# Patient Record
Sex: FEMALE | Race: WHITE | NOT HISPANIC OR LATINO | Employment: OTHER | URBAN - METROPOLITAN AREA
[De-identification: names, ages, dates, MRNs, and addresses within clinical notes are randomized per-mention and may not be internally consistent; named-entity substitution may affect disease eponyms.]

---

## 2017-01-09 ENCOUNTER — OFFICE VISIT (OUTPATIENT)
Dept: URGENT CARE | Facility: PHYSICIAN GROUP | Age: 82
End: 2017-01-09
Payer: MEDICARE

## 2017-01-09 VITALS
WEIGHT: 130 LBS | HEART RATE: 74 BPM | DIASTOLIC BLOOD PRESSURE: 86 MMHG | SYSTOLIC BLOOD PRESSURE: 118 MMHG | TEMPERATURE: 98.4 F | OXYGEN SATURATION: 95 % | HEIGHT: 62 IN | BODY MASS INDEX: 23.92 KG/M2

## 2017-01-09 DIAGNOSIS — J06.9 ACUTE UPPER RESPIRATORY INFECTION: ICD-10-CM

## 2017-01-09 PROCEDURE — 99214 OFFICE O/P EST MOD 30 MIN: CPT | Performed by: FAMILY MEDICINE

## 2017-01-09 RX ORDER — DOXYCYCLINE HYCLATE 100 MG
TABLET ORAL
Qty: 20 TAB | Refills: 0 | Status: SHIPPED | OUTPATIENT
Start: 2017-01-09 | End: 2017-02-16

## 2017-01-10 ENCOUNTER — SLEEP CENTER VISIT (OUTPATIENT)
Dept: SLEEP MEDICINE | Facility: MEDICAL CENTER | Age: 82
End: 2017-01-10
Payer: MEDICARE

## 2017-01-10 VITALS
WEIGHT: 132 LBS | RESPIRATION RATE: 15 BRPM | DIASTOLIC BLOOD PRESSURE: 60 MMHG | TEMPERATURE: 97.5 F | HEIGHT: 62 IN | BODY MASS INDEX: 24.29 KG/M2 | SYSTOLIC BLOOD PRESSURE: 130 MMHG | HEART RATE: 72 BPM

## 2017-01-10 DIAGNOSIS — F51.04 CHRONIC INSOMNIA: ICD-10-CM

## 2017-01-10 DIAGNOSIS — G47.33 OSA TREATED WITH BIPAP: ICD-10-CM

## 2017-01-10 DIAGNOSIS — I47.10 SVT (SUPRAVENTRICULAR TACHYCARDIA): ICD-10-CM

## 2017-01-10 PROCEDURE — 99213 OFFICE O/P EST LOW 20 MIN: CPT | Performed by: NURSE PRACTITIONER

## 2017-01-10 NOTE — PROGRESS NOTES
Chief Complaint:    Chief Complaint   Patient presents with   • Sinus Problem     sinus pressure, headache, cough       History of Present Illness:     present. This is a new problem. Has headaches, pressure in sinuses, and nasal symptoms. Some sore throat. Mild cough. No fever.  was seen here 1/3/17 and reports his symptoms started just like her symptoms and he was rx'd Doxcycline and feels it really helped. He is still on med. Patient is concerned due to her underlying sleep apnea and does not want her symptoms to get as bad as her  got.       Review of Systems:    Constitutional: Negative for fever, chills, and diaphoresis.   Eyes: Negative for change in vision, photophobia, pain, redness, and discharge.  ENT: See HPI.    Respiratory: See HPI.    Cardiovascular: Negative for chest pain, palpitations, orthopnea, claudication, leg swelling, and PND.   Gastrointestinal: Negative for abdominal pain, nausea, vomiting, diarrhea, constipation, blood in stool, and melena.   Genitourinary: Negative for dysuria, urinary urgency, urinary frequency, hematuria, and flank pain.   Musculoskeletal: Negative for myalgias, joint pain, neck pain, and back pain.   Skin: Negative for rash and itching.   Neurological: See HPI.    Endo: Negative for polydipsia.   Heme: Does not bruise/bleed easily.   Psychiatric/Behavioral: Negative for depression, suicidal ideas, hallucinations, memory loss and substance abuse. The patient is not nervous/anxious and does not have insomnia.      Past Medical History:    Past Medical History   Diagnosis Date   • GERD (gastroesophageal reflux disease)    • OSTEOPOROSIS    • Vitamin D deficiency disease    • Insomnia    • Blepharospasm syndrome    • Female bladder prolapse, acquired    • Hypertension    • Constipation    • Sleep apnea    • Hyperlipidemia    • Edema 5/29/2012   • Palpitations 5/29/2012   • Varicose veins 5/29/2012   • Atherosclerosis of native arteries of the  extremities with intermittent claudication 10/16/2013   • Bright red rectal bleeding 6/24/2015   • Chest pain 07/2015     Unspecified; Nuclear stress test negative    • AAA (abdominal aortic aneurysm) (HCC)        Past Surgical History:    Past Surgical History   Procedure Laterality Date   • Abdominal hysterectomy total     • Injection sclero hemorroids     • Hemorrhoidectomy     • Hysterectomy, total abdominal     • Arthroscopy, knee     • Tonsillectomy     • Pr knee scope,diagnostic  2003;2009   • Other orthopedic surgery       knee scope x 2   • Cystocele repair  1/17/2011     Performed by GILMAR CHUNG at [facility]   • Rectocele repair  1/17/2011     Performed by GILMAR CHUNG at [facility]   • Bladder suspension  1/17/2011     Performed by GILMAR CHUNG at [facility]   • Cystoscopy  1/17/2011     Performed by GILMAR CHUNG at [facility]   • Cataract extraction with iol         Social History:    Social History     Social History   • Marital Status:      Spouse Name: N/A   • Number of Children: N/A   • Years of Education: N/A     Occupational History   • Not on file.     Social History Main Topics   • Smoking status: Never Smoker    • Smokeless tobacco: Not on file   • Alcohol Use: 0.0 oz/week     0 Standard drinks or equivalent per week      Comment: occasionally   • Drug Use: No   • Sexual Activity: No     Other Topics Concern   • Not on file     Social History Narrative       Family History:    Family History   Problem Relation Age of Onset   • Non-contributory Mother      gall bladder surgery   • Cancer Father      colon rectal   • Heart Disease Brother      sleep apnea   • Heart Disease Brother        Medications:    Current Outpatient Prescriptions on File Prior to Visit   Medication Sig Dispense Refill   • rosuvastatin (CRESTOR) 10 MG Tab Take 1 Tab by mouth every evening. 90 Tab 3   • metoprolol  "(LOPRESSOR) 25 MG Tab TAKE ONE TABLET BY MOUTH TWICE DAILY 180 Tab 3   • atorvastatin (LIPITOR) 10 MG Tab Take 1 Tab by mouth every day. 90 Tab 3   • PREMARIN 0.625 MG/GM Cream INSERT 1 GRAM VAGINALLY 3 TIMES WEEKLY 1 Tube 0   • mometasone (NASONEX) 50 MCG/ACT nasal spray Spray 2 Sprays in nose every day. 1 Inhaler 5   • Lansoprazole (PREVACID PO) Take  by mouth.     • Multiple Vitamins-Minerals (CENTRUM SILVER PO) Take 1 Tab by mouth every day.     • omeprazole (PRILOSEC) 20 MG CPDR Take 20 mg by mouth every day.       No current facility-administered medications on file prior to visit.       Allergies:    Allergies   Allergen Reactions   • Vicodin [Hydrocodone-Acetaminophen] Rash   • Vytorin          Vitals:    Filed Vitals:    01/09/17 1558   BP: 118/86   Pulse: 74   Temp: 36.9 °C (98.4 °F)   Height: 1.575 m (5' 2\")   Weight: 58.968 kg (130 lb)   SpO2: 95%       Physical Exam:    Constitutional: Vital signs reviewed. Appears well-developed and well-nourished. No acute distress.   Eyes: Sclera white, conjunctivae clear.   ENT: Bilateral nasal congestion and erythematous nasal mucosa. External ears normal. External auditory canals normal without discharge. TMs translucent and non-bulging. Hearing normal. Lips/teeth are normal. Oral mucosa pink and moist. Posterior pharynx: WNL.  Neck: Neck supple.   Cardiovascular: Regular rate and rhythm. No murmur.  Pulmonary/Chest: Respirations non-labored. Clear to auscultation bilaterally.  Lymph: Cervical nodes without tenderness or enlargement.  Musculoskeletal: Normal gait. Normal range of motion. No muscular atrophy or weakness.  Neurological: Alert and oriented to person, place, and time. Muscle tone normal. Coordination normal.   Skin: No rashes or lesions. Warm, dry, normal turgor.  Psychiatric: Normal mood and affect. Behavior is normal. Judgment and thought content normal.       Assessment / Plan:    1. Acute upper respiratory infection  - doxycycline (VIBRAMYCIN) 100 " MG Tab; 1 TAB TWICE A DAY X 10 DAYS.  Dispense: 20 Tab; Refill: 0      Discussed with them DDX and management options.    They feel she has same as , and  was rx'd Doxycycline on 1/3/17 with improvement. Agreeable to same.    Agreeable to medication prescribed.    Follow-up with PCP or urgent care if getting worse or not better with above.

## 2017-01-10 NOTE — PROGRESS NOTES
Chief Complaint   Patient presents with   • Follow-Up     Overdue August Visit          HPI: This patient is a 84 y.o. female, who presents for 6 month follow-up of RUY. Her  accompanies her today. PSG indicates an AHI of 48.3, minimum saturation 81%. Patient was initially tried on auto CPAP but had elevated AHI and continued fatigue. She underwent titration study and was successfully titrated to BiPAP 19/15 cm H2O. She was unable to tolerate pressures so they have been lowered for tolerance. CNOX on 13/9 cm H2O indicated adequate nocturnal saturation however compliance card showed elevated AHI. Her pressures have been increased back to BiPAP 15/11. Compliance report indicates 100% usage, average use of 8 hours per night, AHI of 11. She has tried numerous different masks, and has a myriad of complaints about numerous different types of masks today. We did discuss alternatives including dental appliance or oxygen. She tells me she has found a mask that is reasonably comfortable. [facility] home care has been very helpful. Despite trouble she wants to continue with BiPAP. Patient complains of ongoing insomnia for many years. She is unable to fall asleep until 2 or 3 in the morning. Her insomnia is not exacerbated by BiPAP. Patient is currently being treated with doxycycline for URI.  She was recently treated for a UTI. We discussed referral to repair. The past but she did not follow through with this. This was revisited today and she is agreeable to consider the possibility.    Past Medical History   Diagnosis Date   • GERD (gastroesophageal reflux disease)    • OSTEOPOROSIS    • Vitamin D deficiency disease    • Insomnia    • Blepharospasm syndrome    • Female bladder prolapse, acquired    • Hypertension    • Constipation    • Sleep apnea    • Hyperlipidemia    • Edema 5/29/2012   • Palpitations 5/29/2012   • Varicose veins 5/29/2012   • Atherosclerosis of native arteries of the extremities with  "intermittent claudication 10/16/2013   • Bright red rectal bleeding 6/24/2015   • Chest pain 07/2015     Unspecified; Nuclear stress test negative    • AAA (abdominal aortic aneurysm) (HCC)        Social History   Substance Use Topics   • Smoking status: Never Smoker    • Smokeless tobacco: Not on file   • Alcohol Use: 0.0 oz/week     0 Standard drinks or equivalent per week      Comment: occasionally       Family History   Problem Relation Age of Onset   • Non-contributory Mother      gall bladder surgery   • Cancer Father      colon rectal   • Heart Disease Brother      sleep apnea   • Heart Disease Brother        Current medications as of today   Current Outpatient Prescriptions   Medication Sig Dispense Refill   • doxycycline (VIBRAMYCIN) 100 MG Tab 1 TAB TWICE A DAY X 10 DAYS. 20 Tab 0   • rosuvastatin (CRESTOR) 10 MG Tab Take 1 Tab by mouth every evening. 90 Tab 3   • metoprolol (LOPRESSOR) 25 MG Tab TAKE ONE TABLET BY MOUTH TWICE DAILY 180 Tab 3   • atorvastatin (LIPITOR) 10 MG Tab Take 1 Tab by mouth every day. 90 Tab 3   • PREMARIN 0.625 MG/GM Cream INSERT 1 GRAM VAGINALLY 3 TIMES WEEKLY 1 Tube 0   • mometasone (NASONEX) 50 MCG/ACT nasal spray Spray 2 Sprays in nose every day. 1 Inhaler 5   • Lansoprazole (PREVACID PO) Take  by mouth.     • Multiple Vitamins-Minerals (CENTRUM SILVER PO) Take 1 Tab by mouth every day.     • omeprazole (PRILOSEC) 20 MG CPDR Take 20 mg by mouth every day.       No current facility-administered medications for this visit.       Allergies: Vicodin and Vytorin    Blood pressure 130/60, pulse 72, temperature 36.4 °C (97.5 °F), temperature source Temporal, resp. rate 15, height 1.575 m (5' 2.01\"), weight 59.875 kg (132 lb).      ROS:   Constitutional: Denies fevers, chills, night sweats, weight loss. Positive for fatigue.  HEENT: Denies earache, difficulty hearing, tinnitus, nasal congestion, hoarseness  Cardiovascular: Denies chest pain, tightness, palpitations, orthopnea or " edema  Respiratory: Denies cough, wheeze, dyspnea, hemoptysis  Sleep: See HPI  GI: Denies heartburn, dysphagia, nausea, abdominal pain, diarrhea or constipation  : Denies frequent urination, hematuria, discharge or painful urination  Musculoskeletal: Denies back pain, painful joints, sore muscles  Neurological: Denies weakness or headaches  Skin: No rashes    Physical exam:   Appearance: Well-nourished, well-developed, in no acute distress  HEENT: Normocephalic, atraumatic, white sclera, PERRLA  Respiratory: no intercostal retractions or accessory muscle use   Lungs auscultation: Clear to auscultation bilaterally  Cardiovascular: Regular rate rhythm no murmurs, rubs or gallops  Gait: Normal  Digits: No clubbing, cyanosis  Motor: No focal deficits  Orientation: Oriented to time, person and place    Diagnosis:  1. Chronic insomnia  REFERRAL TO OTHER   2. RUY treated with BiPAP     3. SVT (supraventricular tachycardia) (HCC)         Plan:  1. Continue BiPAP nightly  2. Clean mask and tubing weekly  3. Follow up with preferred home care for mask fitting  4. Referral to Dr. Evelyne Bucio for insomnia  5. Follow-up  Here annually

## 2017-01-10 NOTE — MR AVS SNAPSHOT
"        Nini Taylor   1/10/2017 11:40 AM   Sleep Center Visit   MRN: [id number]    Department:  [facility]   Dept Phone:  [phone]    Description:  Female : 1932   Provider:  UNIQUE Davies           Reason for Visit     Follow-Up Overdue August Visit       Allergies as of 1/10/2017     Allergen Noted Reactions    Vicodin [Hydrocodone-Acetaminophen] 2010   Rash    Vytorin 12/15/2008         You were diagnosed with     Chronic insomnia   [806446]         Vital Signs     Blood Pressure Pulse Temperature Respirations Height Weight    130/60 mmHg 72 36.4 °C (97.5 °F) (Temporal) 15 1.575 m (5' 2.01\") 59.875 kg (132 lb)    Body Mass Index Smoking Status                24.14 kg/m2 Never Smoker           Basic Information     Date Of Birth Sex Race Ethnicity Preferred Language    1932 Female White Non- English      Problem List              ICD-10-CM Priority Class Noted - Resolved    GERD (gastroesophageal reflux disease) K21.9   Unknown - Present    OSTEOPOROSIS    Unknown - Present    Insomnia G47.00   Unknown - Present    Blepharospasm syndrome G24.5   Unknown - Present    Hyperlipidemia E78.5   2012 - Present    Vitamin D deficiency disease E55.9   Unknown - Present    Female bladder prolapse, acquired N81.10   Unknown - Present    Hypertension I10   Unknown - Present    Edema (Chronic) R60.9   2012 - Present    Varicose veins (Chronic) I86.8   2012 - Present    Atherosclerosis of native arteries of extremity with intermittent claudication (HCC) I70.219   10/16/2013 - Present    RUY on CPAP G47.33   2014 - Present    Palpitations R00.2   2015 - Present    Other chest pain R07.89   2015 - Present    Abdominal aortic aneurysm (HCC) I71.4   2015 - Present    SVT (supraventricular tachycardia) (HCC) I47.1   2015 - Present    Palpitation R00.2   2015 - Present    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   2016 - Present    Chronic insomnia " F51.04   1/10/2017 - Present      Health Maintenance        Date Due Completion Dates    IMM DTaP/Tdap/Td Vaccine (1 - Tdap) 7/8/1951 ---    IMM ZOSTER VACCINE 7/8/1992 ---    BONE DENSITY 7/8/1997 ---    MAMMOGRAM 10/18/2017 10/18/2016    IMM PNEUMOCOCCAL 65+ (ADULT) LOW/MEDIUM RISK SERIES (2 of 2 - PPSV23) 10/19/2017 10/19/2016    COLONOSCOPY 2/19/2023 2/19/2013 (Done)    Override on 2/19/2013: Done            Current Immunizations     13-VALENT PCV PREVNAR 10/19/2016    Influenza TIV (IM) 10/15/2011    Influenza Vaccine 10/1/2010    Influenza Vaccine Quad Inj (Preserved) 10/19/2016    Pneumococcal Vaccine (UF)Historical Data 10/1/2009      Below and/or attached are the medications your provider expects you to take. Review all of your home medications and newly ordered medications with your provider and/or pharmacist. Follow medication instructions as directed by your provider and/or pharmacist. Please keep your medication list with you and share with your provider. Update the information when medications are discontinued, doses are changed, or new medications (including over-the-counter products) are added; and carry medication information at all times in the event of emergency situations     Allergies:  VICODIN - Rash     VYTORIN - (reactions not documented)               Medications  Valid as of: January 10, 2017 - 12:07 PM    Generic Name Brand Name Tablet Size Instructions for use    Atorvastatin Calcium (Tab) LIPITOR 10 MG Take 1 Tab by mouth every day.        Doxycycline Hyclate (Tab) VIBRAMYCIN 100 MG 1 TAB TWICE A DAY X 10 DAYS.        Estrogens, Conjugated (Cream) PREMARIN 0.625 MG/GM INSERT 1 GRAM VAGINALLY 3 TIMES WEEKLY        Lansoprazole   Take  by mouth.        Metoprolol Tartrate (Tab) LOPRESSOR 25 MG TAKE ONE TABLET BY MOUTH TWICE DAILY        Mometasone Furoate (Suspension) NASONEX 50 MCG/ACT Spray 2 Sprays in nose every day.        Multiple Vitamins-Minerals   Take 1 Tab by mouth every day.         Omeprazole (CAPSULE DELAYED RELEASE) PRILOSEC 20 MG Take 20 mg by mouth every day.        Rosuvastatin Calcium (Tab) CRESTOR 10 MG Take 1 Tab by mouth every evening.        .                 Medicines prescribed today were sent to:     [facility] [address] [place] [address] [address] [id number] [facility] [address]    Phone: [phone] Fax: [phone]    Open 24 Hours?: No      Medication refill instructions:       If your prescription bottle indicates you have medication refills left, it is not necessary to call your provider’s office. Please contact your pharmacy and they will refill your medication.    If your prescription bottle indicates you do not have any refills left, you may request refills at any time through one of the following ways: The online [employer] system (except Urgent Care), by calling your provider’s office, or by asking your pharmacy to contact your provider’s office with a refill request. Medication refills are processed only during regular business hours and may not be available until the next business day. Your provider may request additional information or to have a follow-up visit with you prior to refilling your medication.   *Please Note: Medication refills are assigned a new Rx number when refilled electronically. Your pharmacy may indicate that no refills were authorized even though a new prescription for the same medication is available at the pharmacy. Please request the medicine by name with the pharmacy before contacting your provider for a refill.        Referral     A referral request has been sent to our patient care coordination department. Please allow 3-5 business days for us to process this request and contact you either by phone or mail. If you do not hear from us by the 5th business day, please call us at [phone].        Instructions    1.       Other Notes About Your Plan     DME:  [facility] / ph [phone] / fax [phone]                MyChart Status: Patient Declined

## 2017-02-03 ENCOUNTER — TELEPHONE (OUTPATIENT)
Dept: CARDIOLOGY | Facility: MEDICAL CENTER | Age: 82
End: 2017-02-03

## 2017-02-03 DIAGNOSIS — E78.2 MIXED HYPERLIPIDEMIA: ICD-10-CM

## 2017-02-03 DIAGNOSIS — I10 ESSENTIAL HYPERTENSION: ICD-10-CM

## 2017-02-03 NOTE — TELEPHONE ENCOUNTER
----- Message from Chandrika Muñoz sent at 2/3/2017 11:34 AM PST -----  Regarding: asking to  lab slip today  Contact: [phone]  ST/sg    Pt calling to report she d/c'd taking statin on 12/26 and wants to get blood work to check her cholesterol.  Please call pt to discuss, also asking all about ST's progress, [phone].  Pt is asking to  lab slip today in person.        Patient calls,  States that she stopped her Generic Crestor herself on 12/16/17 due to myalgias.  She is wanting to have lab work done off the statin.   She states that she feels much better since she stopped the medication.  Lab slip ordered for lipid and CMP.    Advised that she may need to be to be referred to Dr. Yu for lipid treatment.   She has tried Simvastatin, Atorvastatin, Vytorin, Crestor and Generic Crestor.   aydin

## 2017-02-06 ENCOUNTER — HOSPITAL ENCOUNTER (OUTPATIENT)
Dept: LAB | Facility: MEDICAL CENTER | Age: 82
End: 2017-02-06
Payer: MEDICARE

## 2017-02-06 DIAGNOSIS — I10 ESSENTIAL HYPERTENSION: ICD-10-CM

## 2017-02-06 DIAGNOSIS — E78.2 MIXED HYPERLIPIDEMIA: ICD-10-CM

## 2017-02-06 LAB
ALBUMIN SERPL BCP-MCNC: 4 G/DL (ref 3.2–4.9)
ALBUMIN/GLOB SERPL: 1.4 G/DL
ALP SERPL-CCNC: 75 U/L (ref 30–99)
ALT SERPL-CCNC: 8 U/L (ref 2–50)
ANION GAP SERPL CALC-SCNC: 3 MMOL/L (ref 0–11.9)
AST SERPL-CCNC: 19 U/L (ref 12–45)
BILIRUB SERPL-MCNC: 1.1 MG/DL (ref 0.1–1.5)
BUN SERPL-MCNC: 17 MG/DL (ref 8–22)
CALCIUM SERPL-MCNC: 9.7 MG/DL (ref 8.5–10.5)
CHLORIDE SERPL-SCNC: 101 MMOL/L (ref 96–112)
CHOLEST SERPL-MCNC: 274 MG/DL (ref 100–199)
CO2 SERPL-SCNC: 32 MMOL/L (ref 20–33)
CREAT SERPL-MCNC: 1.01 MG/DL (ref 0.5–1.4)
GLOBULIN SER CALC-MCNC: 2.8 G/DL (ref 1.9–3.5)
GLUCOSE SERPL-MCNC: 85 MG/DL (ref 65–99)
HDLC SERPL-MCNC: 58 MG/DL
LDLC SERPL CALC-MCNC: 196 MG/DL
POTASSIUM SERPL-SCNC: 4.2 MMOL/L (ref 3.6–5.5)
PROT SERPL-MCNC: 6.8 G/DL (ref 6–8.2)
SODIUM SERPL-SCNC: 136 MMOL/L (ref 135–145)
TRIGL SERPL-MCNC: 101 MG/DL (ref 0–149)

## 2017-02-06 PROCEDURE — 80053 COMPREHEN METABOLIC PANEL: CPT

## 2017-02-06 PROCEDURE — 36415 COLL VENOUS BLD VENIPUNCTURE: CPT

## 2017-02-06 PROCEDURE — 80061 LIPID PANEL: CPT

## 2017-02-08 ENCOUNTER — TELEPHONE (OUTPATIENT)
Dept: CARDIOLOGY | Facility: MEDICAL CENTER | Age: 82
End: 2017-02-08

## 2017-02-08 DIAGNOSIS — E78.00 ELEVATED LDL CHOLESTEROL LEVEL: ICD-10-CM

## 2017-02-08 DIAGNOSIS — Z78.9 STATIN INTOLERANCE: ICD-10-CM

## 2017-02-08 NOTE — TELEPHONE ENCOUNTER
Called patient. She states that she got her lab results from the lab yesterday, and is concerned because her cholesterol is 274. She stopped her statin medication in December due to myalgias.    Reviewed Richard Jolley LPN's note from 2/3/2017. Advised patient that she can see another cardiologist in this office or be referred to [facility].    Patient agrees to see Dr. Bloch at the [facility]. Referral initiated.    RISHABH TOMPKINS

## 2017-02-08 NOTE — TELEPHONE ENCOUNTER
----- Message from Richard Whaley L.P.N. sent at 2/7/2017  6:14 PM PST -----  Regarding: FW: patient wants to go over lab results      ----- Message -----     From: Magaly Bentley     Sent: 2/7/2017   4:41 PM       To: Richard Whaley L.P.N.  Subject: patient wants to go over lab results             ST/Jan      Patient wants to go over her lab results. She can be reached at [phone]

## 2017-02-16 ENCOUNTER — TELEPHONE (OUTPATIENT)
Dept: VASCULAR LAB | Facility: MEDICAL CENTER | Age: 82
End: 2017-02-16

## 2017-02-16 ENCOUNTER — NON-PROVIDER VISIT (OUTPATIENT)
Dept: VASCULAR LAB | Facility: MEDICAL CENTER | Age: 82
End: 2017-02-16
Attending: INTERNAL MEDICINE
Payer: MEDICARE

## 2017-02-16 DIAGNOSIS — I71.40 ABDOMINAL AORTIC ANEURYSM (AAA) WITHOUT RUPTURE (HCC): ICD-10-CM

## 2017-02-16 DIAGNOSIS — E78.5 HYPERLIPIDEMIA, UNSPECIFIED HYPERLIPIDEMIA TYPE: ICD-10-CM

## 2017-02-16 PROCEDURE — 99213 OFFICE O/P EST LOW 20 MIN: CPT

## 2017-02-16 NOTE — PROGRESS NOTES
"Date of Service: 02/16/2017    Nini Taylor has been referred for evaluation and management of dyslipidemia    Referral Source: Brigitte EDDY  History of ASCVD: No    Current Prescription Lipid Lowering Medications - including dose:   Statin: None  Non-Statin: None  Current Lipid Lowering and Related Supplements:   None  Any Current Side Effects Potentially Related to Lipid Lowering therapy?   No  Current Adherence to Lipid Lowering Therapies   Completely non-adherent  Previously Attempted Interventions for Lipids - including outcome  Statin: Simvastatin Unknown dose/duration - myalgias    Crestor unkown dose, duration \"years\" - states she started getting sharp \"zings\" down her arms.   Atorvastatin unknown dose, duration of 90 days - severe UE myalgia   Non-Statin: States none, however MR shows possibly Vytorin use   Outcome: Unknown  Any Previous History of Statin Intolerance?   Yes, Details: See above.   Baseline Lipids Prior to Treatment:      2/6/2017 12:20   Cholesterol,Tot 274 (H)   Triglycerides 101   HDL 58    (H)     Other Pertinent History: Abdominal Aortic Aneurism, also diagnosed with atherosclerosis of extremities with intermittent claudication.   History of other CV risk factors: Denies FH of CVD.  PAST MEDICAL HISTORY:  has a past medical history of GERD (gastroesophageal reflux disease); OSTEOPOROSIS; Vitamin D deficiency disease; Insomnia; Blepharospasm syndrome; Female bladder prolapse, acquired; Hypertension; Constipation; Sleep apnea; Hyperlipidemia; Edema (5/29/2012); Palpitations (5/29/2012); Varicose veins (5/29/2012); Atherosclerosis of native arteries of the extremities with intermittent claudication (10/16/2013); Bright red rectal bleeding (6/24/2015); Chest pain (07/2015); and AAA (abdominal aortic aneurysm) (CMS-HCC).  PAST SURGICAL HISTORY:  has past surgical history that includes abdominal hysterectomy total; injection sclero hemorroids; hemorrhoidectomy; " hysterectomy, total abdominal; arthroscopy, knee; tonsillectomy; knee scope,diagnostic (2003;2009); other orthopedic surgery; cystocele repair (1/17/2011); rectocele repair (1/17/2011); bladder suspension (1/17/2011); cystoscopy (1/17/2011); and cataract extraction with iol.  CURRENT MEDICATIONS:   Current outpatient prescriptions:   •  doxycycline, 1 TAB TWICE A DAY X 10 DAYS.  •  rosuvastatin, 10 mg, Oral, Q EVENING  •  metoprolol, TAKE ONE TABLET BY MOUTH TWICE DAILY  •  atorvastatin, 10 mg, Oral, DAILY  •  PREMARIN, INSERT 1 GRAM VAGINALLY 3 TIMES WEEKLY  •  mometasone, 2 Spray, Nasal, DAILY  •  Lansoprazole (PREVACID PO), Take  by mouth.  •  Multiple Vitamins-Minerals (CENTRUM SILVER PO), 1 Tab, Oral, DAILY  •  omeprazole, 20 mg, Oral, DAILY  ALLERGIES: Vicodin and Vytorin    SOCIAL HISTORY   History   Smoking status   • Never Smoker    Smokeless tobacco   • Not on file     Change in weight: Stable  Exercise habits: no regular exercise program  Diet: common adult    ROS  Physical Exam    DATA REVIEW:  Most Recent Lipid Panel:   Lab Results   Component Value Date    CHOLSTRLTOT 274* 02/06/2017    TRIGLYCERIDE 101 02/06/2017    HDL 58 02/06/2017    * 02/06/2017       Other Pertinent Blood Work:   Lab Results   Component Value Date    SODIUM 136 02/06/2017    POTASSIUM 4.2 02/06/2017    CHLORIDE 101 02/06/2017    CO2 32 02/06/2017    ANION 3.0 02/06/2017    GLUCOSE 85 02/06/2017    BUN 17 02/06/2017    CREATININE 1.01 02/06/2017    CALCIUM 9.7 02/06/2017    ASTSGOT 19 02/06/2017    ALTSGPT 8 02/06/2017    ALKPHOSPHAT 75 02/06/2017    TBILIRUBIN 1.1 02/06/2017    ALBUMIN 4.0 02/06/2017    AGRATIO 1.4 02/06/2017    CREACTPROT 0.20 11/29/2016    TSHULTRASEN 1.520 07/05/2016    CPKTOTAL 38 12/22/2016       Other: NA    Recent Imaging Studies:    None since last visit    ASSESSMENT AND PLAN  Patient Type, check all that apply:   Secondary Prevention (Abdominal aortic aneurism)  Established Atherosclerotic  "Cardiovascular Disease (ASCVD)  1) AAA  2) PAD   Other Established (non-atherosclerotic) Vascular Disease, if Present:  As above  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):   Unclear - Baseline LDL-C c/w FH, but no obvious family history or physical findings  ACC/AHA Indication for Statin Therapy, gurpreet all that apply:  LDL-C at baseline >190 mg/dl: Indication for High intensity statin   Calculated Risk for ASCVD, if applicable    N/A  Other Significant Risk Markers, if any, gurpreet all that apply   None  National Lipid Association (NLA) Goal  LDL-C:   <70 mg/dL  Lifestyle Recommendations From Today’s Visit:    None  Statin Therapy Recommendations from Today’s Visit:   Crestor 5 mg MWF  Non-Statin Prescription Medications Recommendations from Today’s Visit:   None  Indication for PCSK9 Inhibitor, if applicable:  Not currently indicated until maximum tolerated statin dose is found.   Supplements Recommended at this visit: CoQ10     Discussed prior statin use, it is apparent pt was not able to tolerate simvastatin and atorvastatin.  She states she had some \"zinging\" pain down her arms.  Pt states she has not been on any statin for the past 2 weeks and her zinging in her shoulder hasn't improved.  She had myalgia in large muscle groups with the previous statins, but no such pain with Crestor.  It appears there may be confounding lifestyle habits adding to the muscle pain.  She also feels it may be due to her extremely large purse she constantly carries everywhere.  The pain described by the patient more likely fits some type of nerve pain.  Not entirely consistent with myalgias related to statin use.       Medication reconciliation performed.     Pt's cholesterol has been mostly well maintained on statin therapy before, suggested trying CoQ10 for 1 week prior to beginning Crestor 5 mg MWF.  Pt declined starting Vit D replacement to help reduce myalgia, will obtain Vit D level prior to next visit to help " encourage replacement if necessary.  Pt currently has Crestor at home and an active Rx.  Instructed pt to monitor increased pain and to avoid heavy exercise while she starts the statin again.  No prior elevated CPK or PASTORA in the past, not significantly concerned with another trial of Crestor.    Due to baseline LDL, LDL goal of <70 would be desired, however if pt could only obtain <100, then it may be appropriate considering pt's age.    Blood Work Ordered At Today’s visit: Lipid Panel, Vit D   Follow-Up: 8 weeks    Chandler Brown, PHARMD     Agree with above.  Patient appears to have evidence of ASCVD as documented by PMH of AAA and PAD  May have FH, but difficult to make the diagnosis in absence of family history and/or physical findings.  Agree with trial of crestor - increase dose as tolerated  May be candidate for zetia or welchol as add on therapy  Would consider PCSK9 inhibitor if LDL not well controlled on max tolerated statin therapy.    Michael J Bloch, MD  Certified Clinical Lipid Specialist  Medial Director, [facility]    CC:  Nevaeh Munroe M.D.

## 2017-02-16 NOTE — MR AVS SNAPSHOT
Nini Taylor   2017 2:45 PM   Non-Provider Visit   MRN: [id number]    Department:  Vascular Medicine   Dept Phone:  [phone]    Description:  Female : 1932   Provider:  [facility] EXAM 4           Allergies as of 2017     Allergen Noted Reactions    Vicodin [Hydrocodone-Acetaminophen] 2010   Rash    Vytorin 12/15/2008         Vital Signs     Smoking Status                   Never Smoker            Basic Information     Date Of Birth Sex Race Ethnicity Preferred Language    1932 Female White Non- English      Your appointments     2017 10:00 AM   Follow up Pharmacotherapy Visit with [facility] EXAM 5   [facility]  (--)    [address] [facility]  [place] [address] [phone]            May 11, 2017  2:20 PM   Initial Visit with Michael J Bloch, M.D., [facility] EXAM 1   [facility]  (--)    [address] [facility] [address] [facility] [place] [address] [phone]              Problem List              ICD-10-CM Priority Class Noted - Resolved    GERD (gastroesophageal reflux disease) K21.9   Unknown - Present    OSTEOPOROSIS    Unknown - Present    Insomnia G47.00   Unknown - Present    Blepharospasm syndrome G24.5   Unknown - Present    Hyperlipidemia E78.5   2012 - Present    Vitamin D deficiency disease E55.9   Unknown - Present    Female bladder prolapse, acquired N81.10   Unknown - Present    Hypertension I10   Unknown - Present    Edema (Chronic) R60.9   2012 - Present    Varicose veins (Chronic) I86.8   2012 - Present    Atherosclerosis of native arteries of extremity with intermittent claudication (CMS-[facility]) I70.219   10/16/2013 - Present    RUY treated with BiPAP G47.33   2014 - Present    Palpitations R00.2   2015 - Present    Other chest pain R07.89   2015 - Present    Abdominal aortic aneurysm (CMS-[facility]) I71.4   2015 - Present    SVT (supraventricular  tachycardia) (CMS-HCC) I47.1   8/17/2015 - Present    Palpitation R00.2   8/17/2015 - Present    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   12/22/2016 - Present    Chronic insomnia F51.04   1/10/2017 - Present      Health Maintenance        Date Due Completion Dates    IMM DTaP/Tdap/Td Vaccine (1 - Tdap) 7/8/1951 ---    IMM ZOSTER VACCINE 7/8/1992 ---    BONE DENSITY 7/8/1997 ---    MAMMOGRAM 10/18/2017 10/18/2016    IMM PNEUMOCOCCAL 65+ (ADULT) LOW/MEDIUM RISK SERIES (2 of 2 - PPSV23) 10/19/2017 10/19/2016    COLONOSCOPY 2/19/2023 2/19/2013 (Done)    Override on 2/19/2013: Done            Current Immunizations     13-VALENT PCV PREVNAR 10/19/2016    Influenza TIV (IM) 10/15/2011    Influenza Vaccine 10/1/2010    Influenza Vaccine Quad Inj (Preserved) 10/19/2016    Pneumococcal Vaccine (UF)Historical Data 10/1/2009      Below and/or attached are the medications your provider expects you to take. Review all of your home medications and newly ordered medications with your provider and/or pharmacist. Follow medication instructions as directed by your provider and/or pharmacist. Please keep your medication list with you and share with your provider. Update the information when medications are discontinued, doses are changed, or new medications (including over-the-counter products) are added; and carry medication information at all times in the event of emergency situations     Allergies:  VICODIN - Rash     VYTORIN - (reactions not documented)               Medications  Valid as of: February 16, 2017 -  2:48 PM    Generic Name Brand Name Tablet Size Instructions for use    Atorvastatin Calcium (Tab) LIPITOR 10 MG Take 1 Tab by mouth every day.        Doxycycline Hyclate (Tab) VIBRAMYCIN 100 MG 1 TAB TWICE A DAY X 10 DAYS.        Estrogens, Conjugated (Cream) PREMARIN 0.625 MG/GM INSERT 1 GRAM VAGINALLY 3 TIMES WEEKLY        Lansoprazole   Take  by mouth.        Metoprolol Tartrate (Tab) LOPRESSOR 25 MG TAKE ONE TABLET BY MOUTH TWICE DAILY         Mometasone Furoate (Suspension) NASONEX 50 MCG/ACT Spray 2 Sprays in nose every day.        Multiple Vitamins-Minerals   Take 1 Tab by mouth every day.        Omeprazole (CAPSULE DELAYED RELEASE) PRILOSEC 20 MG Take 20 mg by mouth every day.        Rosuvastatin Calcium (Tab) CRESTOR 10 MG Take 1 Tab by mouth every evening.        .                 Medicines prescribed today were sent to:     [facility] [address] [place] [address] [address] [facility]    [address] [facility] [place] [address]    Phone: [phone] Fax: [phone]    Open 24 Hours?: No      Medication refill instructions:       If your prescription bottle indicates you have medication refills left, it is not necessary to call your provider’s office. Please contact your pharmacy and they will refill your medication.    If your prescription bottle indicates you do not have any refills left, you may request refills at any time through one of the following ways: The online [employer] system (except Urgent Care), by calling your provider’s office, or by asking your pharmacy to contact your provider’s office with a refill request. Medication refills are processed only during regular business hours and may not be available until the next business day. Your provider may request additional information or to have a follow-up visit with you prior to refilling your medication.   *Please Note: Medication refills are assigned a new Rx number when refilled electronically. Your pharmacy may indicate that no refills were authorized even though a new prescription for the same medication is available at the pharmacy. Please request the medicine by name with the pharmacy before contacting your provider for a refill.        Other Notes About Your Plan     DME:  [facility] / ph [phone] / fax [phone]             [employer] Status: Patient Declined

## 2017-03-16 ENCOUNTER — TELEPHONE (OUTPATIENT)
Dept: MEDICAL GROUP | Facility: PHYSICIAN GROUP | Age: 82
End: 2017-03-16

## 2017-03-16 ENCOUNTER — HOSPITAL ENCOUNTER (OUTPATIENT)
Facility: MEDICAL CENTER | Age: 82
End: 2017-03-16
Attending: FAMILY MEDICINE
Payer: MEDICARE

## 2017-03-16 ENCOUNTER — TELEPHONE (OUTPATIENT)
Dept: VASCULAR LAB | Facility: MEDICAL CENTER | Age: 82
End: 2017-03-16

## 2017-03-16 DIAGNOSIS — R35.0 FREQUENT URINATION: ICD-10-CM

## 2017-03-16 LAB
APPEARANCE UR: ABNORMAL
BACTERIA #/AREA URNS HPF: ABNORMAL /HPF
BILIRUB UR QL STRIP.AUTO: NEGATIVE
COLOR UR AUTO: YELLOW
CULTURE IF INDICATED INDCX: YES UA CULTURE
EPITHELIAL CELLS 1715: ABNORMAL /HPF
GLUCOSE UR STRIP.AUTO-MCNC: NEGATIVE MG/DL
KETONES UR STRIP.AUTO-MCNC: NEGATIVE MG/DL
LEUKOCYTE ESTERASE UR QL STRIP.AUTO: ABNORMAL
MICRO URNS: ABNORMAL
NITRITE UR QL STRIP.AUTO: POSITIVE
PH UR: 6 [PH]
PROT UR QL STRIP: NEGATIVE MG/DL
RBC #/AREA URNS HPF: ABNORMAL /HPF
RBC UR QL AUTO: NEGATIVE
SP GR UR STRIP.AUTO: 1.01
WBC #/AREA URNS HPF: >150 /HPF

## 2017-03-16 PROCEDURE — 87077 CULTURE AEROBIC IDENTIFY: CPT

## 2017-03-16 PROCEDURE — 87086 URINE CULTURE/COLONY COUNT: CPT

## 2017-03-16 PROCEDURE — 87186 SC STD MICRODIL/AGAR DIL: CPT

## 2017-03-16 PROCEDURE — 81001 URINALYSIS AUTO W/SCOPE: CPT

## 2017-03-16 NOTE — TELEPHONE ENCOUNTER
Patient came in today and stated that she has been urinating frequently and has an odor to her urine.  She is asking if you can order a urine test for her.  I had her leave a sample just in case.  Please sign the pended order if this is ok.  Thank you.

## 2017-03-17 ENCOUNTER — TELEPHONE (OUTPATIENT)
Dept: MEDICAL GROUP | Facility: PHYSICIAN GROUP | Age: 82
End: 2017-03-17

## 2017-03-17 DIAGNOSIS — N30.00 ACUTE CYSTITIS WITHOUT HEMATURIA: ICD-10-CM

## 2017-03-17 RX ORDER — CIPROFLOXACIN 500 MG/1
500 TABLET, FILM COATED ORAL 2 TIMES DAILY
Qty: 20 TAB | Refills: 0 | Status: SHIPPED | OUTPATIENT
Start: 2017-03-17 | End: 2017-03-27

## 2017-03-17 NOTE — TELEPHONE ENCOUNTER
----- Message from Michael J Bloch, M.D. sent at 3/16/2017 11:23 AM PDT -----  Regarding: RE: Muscle Cramps on Rosuvastatin  Please schedule her for a pharmacotherapy f/u appt ASAP - hopefully this week. Have her stay on the rosuva until that appointment.  Please drop a phone note to document when done.    Thanks  Bloch  ----- Message -----     From: Lizz Cobos, Kiko Ass't     Sent: 3/16/2017  11:09 AM       To: Michael J Bloch, M.D.  Subject: Muscle Cramps on Rosuvastatin                    Dr. Bloch,    Nini has been taking her Rosuvastatin and it has been going good except for muscle cramps in her arms and legs. Last night was the worst the pain in her arms woke her up at 2am the pain was so bad she broke out in a sweat. The pain stayed until 4am and then she fell back to sleep. She said she is still walking everyday and the pain does not bother her in the day time. She wants to know what she should do?

## 2017-03-17 NOTE — TELEPHONE ENCOUNTER
Pt called stating Dr Bloch has her on rosuvastatin that might be causing he to have muscle cramps and pain.  She has appointment with him on Mon.  She states pharmacist told her Cipro can cause muscle pain as well so she is going to ask Dr Bloch if she should take it.  She will call our office back if any concerns.

## 2017-03-17 NOTE — TELEPHONE ENCOUNTER
----- Message from Nevaeh Munroe M.D. sent at 3/17/2017  7:38 AM PDT -----  Looks like there is a urine infection.  I am sending in antibiotics

## 2017-03-17 NOTE — TELEPHONE ENCOUNTER
I have scheduled Nini for Monday 03/20/2017 at 3:00pm. She is going to take her Rosuvastatin tomorrow as regularly scheduled and then when discuss with the pharmacist on Monday whether to continue or not.

## 2017-03-18 LAB
BACTERIA UR CULT: ABNORMAL
SIGNIFICANT IND 70042: ABNORMAL
SOURCE SOURCE: ABNORMAL

## 2017-03-20 ENCOUNTER — TELEPHONE (OUTPATIENT)
Dept: MEDICAL GROUP | Facility: PHYSICIAN GROUP | Age: 82
End: 2017-03-20

## 2017-03-20 ENCOUNTER — NON-PROVIDER VISIT (OUTPATIENT)
Dept: VASCULAR LAB | Facility: MEDICAL CENTER | Age: 82
End: 2017-03-20
Attending: FAMILY MEDICINE
Payer: MEDICARE

## 2017-03-20 DIAGNOSIS — E78.5 HYPERLIPIDEMIA, UNSPECIFIED HYPERLIPIDEMIA TYPE: ICD-10-CM

## 2017-03-20 PROCEDURE — 99212 OFFICE O/P EST SF 10 MIN: CPT

## 2017-03-20 NOTE — MR AVS SNAPSHOT
Nini Hightowersarah beth   3/20/2017 3:00 PM   Appointment   MRN: [id number]    Department:  Vascular Medicine   Dept Phone:  [phone]    Description:  Female : 1932   Provider:  [facility] EXAM 5           Allergies as of 3/20/2017     Allergen Noted Reactions    Vicodin [Hydrocodone-Acetaminophen] 2010   Rash    Vytorin 12/15/2008         Vital Signs     Smoking Status                   Never Smoker            Basic Information     Date Of Birth Sex Race Ethnicity Preferred Language    1932 Female White Non- English      Your appointments     2017  8:30 AM   Established Patient with [facility] EXAM 5   [facility]  (--)    [address] [facility]  [place] [address]   [phone]            2017 10:00 AM   Follow up Pharmacotherapy Visit with [facility] EXAM 5   [facility]  (--)    [address] [facility] [address] [place] [address]   [phone]            May 11, 2017  2:20 PM   Initial Visit with Michael J Bloch, M.D., [facility] EXAM 1   [facility]  (--)    [address] [facility] [place] [address]   [phone]              Problem List              ICD-10-CM Priority Class Noted - Resolved    GERD (gastroesophageal reflux disease) K21.9   Unknown - Present    OSTEOPOROSIS    Unknown - Present    Insomnia G47.00   Unknown - Present    Blepharospasm syndrome G24.5   Unknown - Present    Hyperlipidemia E78.5   2012 - Present    Vitamin D deficiency disease E55.9   Unknown - Present    Female bladder prolapse, acquired N81.10   Unknown - Present    Hypertension I10   Unknown - Present    Edema (Chronic) R60.9   2012 - Present    Varicose veins (Chronic) I86.8   2012 - Present    Atherosclerosis of native arteries of extremity with intermittent claudication (CMS-HCC) I70.219   10/16/2013 - Present    RUY treated with BiPAP G47.33   2014 -  Present    Palpitations R00.2   4/24/2015 - Present    Other chest pain R07.89   7/17/2015 - Present    Abdominal aortic aneurysm (CMS-HCC) I71.4   7/17/2015 - Present    SVT (supraventricular tachycardia) (CMS-HCC) I47.1   8/17/2015 - Present    Palpitation R00.2   8/17/2015 - Present    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   12/22/2016 - Present    Chronic insomnia F51.04   1/10/2017 - Present      Health Maintenance        Date Due Completion Dates    IMM DTaP/Tdap/Td Vaccine (1 - Tdap) 7/8/1951 ---    IMM ZOSTER VACCINE 7/8/1992 ---    BONE DENSITY 7/8/1997 ---    MAMMOGRAM 10/18/2017 10/18/2016    IMM PNEUMOCOCCAL 65+ (ADULT) LOW/MEDIUM RISK SERIES (2 of 2 - PPSV23) 10/19/2017 10/19/2016    COLONOSCOPY 2/19/2023 2/19/2013 (Done)    Override on 2/19/2013: Done            Current Immunizations     13-VALENT PCV PREVNAR 10/19/2016    Influenza TIV (IM) 10/15/2011    Influenza Vaccine 10/1/2010    Influenza Vaccine Quad Inj (Preserved) 10/19/2016    Pneumococcal Vaccine (UF)Historical Data 10/1/2009      Below and/or attached are the medications your provider expects you to take. Review all of your home medications and newly ordered medications with your provider and/or pharmacist. Follow medication instructions as directed by your provider and/or pharmacist. Please keep your medication list with you and share with your provider. Update the information when medications are discontinued, doses are changed, or new medications (including over-the-counter products) are added; and carry medication information at all times in the event of emergency situations     Allergies:  VICODIN - Rash     VYTORIN - (reactions not documented)               Medications  Valid as of: March 20, 2017 -  3:15 PM    Generic Name Brand Name Tablet Size Instructions for use    Ciprofloxacin HCl (Tab) CIPRO 500 MG Take 1 Tab by mouth 2 times a day for 10 days.        Estrogens, Conjugated (Cream) PREMARIN 0.625 MG/GM INSERT 1 GRAM VAGINALLY 3 TIMES WEEKLY           Lansoprazole   Take  by mouth.        Metoprolol Tartrate (Tab) LOPRESSOR 25 MG TAKE ONE TABLET BY MOUTH TWICE DAILY        Mometasone Furoate (Suspension) NASONEX 50 MCG/ACT Spray 2 Sprays in nose every day.        Multiple Vitamins-Minerals   Take 1 Tab by mouth every day.        Omeprazole (CAPSULE DELAYED RELEASE) PRILOSEC 20 MG Take 20 mg by mouth every day.        Rosuvastatin Calcium (Tab) CRESTOR 10 MG Take 1 Tab by mouth every evening.        .                 Medicines prescribed today were sent to:     [facility] [address] [place] [address] [address] [place] [address] [facility] [address]    Phone: [phone] Fax: [phone]    Open 24 Hours?: No      Medication refill instructions:       If your prescription bottle indicates you have medication refills left, it is not necessary to call your provider’s office. Please contact your pharmacy and they will refill your medication.    If your prescription bottle indicates you do not have any refills left, you may request refills at any time through one of the following ways: The online [employer] system (except Urgent Care), by calling your provider’s office, or by asking your pharmacy to contact your provider’s office with a refill request. Medication refills are processed only during regular business hours and may not be available until the next business day. Your provider may request additional information or to have a follow-up visit with you prior to refilling your medication.   *Please Note: Medication refills are assigned a new Rx number when refilled electronically. Your pharmacy may indicate that no refills were authorized even though a new prescription for the same medication is available at the pharmacy. Please request the medicine by name with the pharmacy before contacting your provider for a refill.        Other Notes About Your Plan     DME:  [facility] / ph [phone] / fax [phone]             [employer] Status:  Patient Declined

## 2017-03-20 NOTE — TELEPHONE ENCOUNTER
Patient was notified, she states that she has an appointment today with her cardiologist to reevaluate her Crestor since she experiencing muscle pain.

## 2017-03-20 NOTE — TELEPHONE ENCOUNTER
----- Message from GIANNI Alves sent at 3/20/2017  6:46 AM PDT -----  Please let Nini know that her urine culture was positive and is sensitive to the antibiotic she was given.  Remind her to complete the entire course to ensure resolution of the infection.  Thank you

## 2017-03-22 NOTE — PROGRESS NOTES
"Date of Service: 03/20/2017    Nini Taylor has been referred for evaluation and management of dyslipidemia    Referral Source: Brigitte EDDY  History of ASCVD: No    Current Prescription Lipid Lowering Medications - including dose:   Statin: Crestor 5 mg MWF  Non-Statin: None  Current Lipid Lowering and Related Supplements:   CoQ10  Any Current Side Effects Potentially Related to Lipid Lowering therapy?   No  Current Adherence to Lipid Lowering Therapies   Completely non-adherent  Previously Attempted Interventions for Lipids - including outcome  Statin: Simvastatin Unknown dose/duration - myalgias                Crestor unkown dose, duration \"years\" - states she started getting sharp \"zings\" down her arms.              Atorvastatin unknown dose, duration of 90 days - severe UE myalgia    Non-Statin: States none, however MR shows possibly Vytorin use              Outcome: Unknown  Any Previous History of Statin Intolerance?   Yes, Details: See above.   Baseline Lipids Prior to Treatment:         2/6/2017 12:20    Cholesterol,Tot  274 (H)    Triglycerides  101    HDL  58    LDL  196 (H)      Other Pertinent History: Abdominal Aortic Aneurism, also diagnosed with atherosclerosis of extremities with intermittent claudication.    History of other CV risk factors: Denies FH of CVD.  PAST MEDICAL HISTORY:  has a past medical history of GERD (gastroesophageal reflux disease); OSTEOPOROSIS; Vitamin D deficiency disease; Insomnia; Blepharospasm syndrome; Female bladder prolapse, acquired; Hypertension; Constipation; Sleep apnea; Hyperlipidemia; Edema (5/29/2012); Palpitations (5/29/2012); Varicose veins (5/29/2012); Atherosclerosis of native arteries of the extremities with intermittent claudication (10/16/2013); Bright red rectal bleeding (6/24/2015); Chest pain (07/2015); and AAA (abdominal aortic aneurysm) (CMS-[facility]).  PAST SURGICAL HISTORY:  has past surgical history that includes abdominal hysterectomy " total; injection sclero hemorroids; hemorrhoidectomy; hysterectomy, total abdominal; arthroscopy, knee; tonsillectomy; knee scope,diagnostic (2003;2009); other orthopedic surgery; cystocele repair (1/17/2011); rectocele repair (1/17/2011); bladder suspension (1/17/2011); cystoscopy (1/17/2011); and cataract extraction with iol.   CURRENT MEDICATIONS:     Current outpatient prescriptions:    •  doxycycline, 1 TAB TWICE A DAY X 10 DAYS.  •  rosuvastatin, 10 mg, Oral, Q EVENING  •  metoprolol, TAKE ONE TABLET BY MOUTH TWICE DAILY  •  atorvastatin, 10 mg, Oral, DAILY  •  PREMARIN, INSERT 1 GRAM VAGINALLY 3 TIMES WEEKLY  •  mometasone, 2 Spray, Nasal, DAILY  •  Lansoprazole (PREVACID PO), Take  by mouth.  •  Multiple Vitamins-Minerals (CENTRUM SILVER PO), 1 Tab, Oral, DAILY  •  omeprazole, 20 mg, Oral, DAILY     ALLERGIES: Vicodin and Vytorin    SOCIAL HISTORY   History    Smoking status    •  Never Smoker     Smokeless tobacco    •  Not on file      Change in weight: Stable  Exercise habits: no regular exercise program  Diet: common adult    ROS  Physical Exam    DATA REVIEW:  Most Recent Lipid Panel:   Lab Results    Component  Value  Date      CHOLSTRLTOT  274*  02/06/2017      TRIGLYCERIDE  101  02/06/2017      HDL  58  02/06/2017      LDL  196*  02/06/2017        Other Pertinent Blood Work:   Lab Results    Component  Value  Date      SODIUM  136  02/06/2017      POTASSIUM  4.2  02/06/2017      CHLORIDE  101  02/06/2017      CO2  32  02/06/2017      ANION  3.0  02/06/2017      GLUCOSE  85  02/06/2017      BUN  17  02/06/2017      CREATININE  1.01  02/06/2017      CALCIUM  9.7  02/06/2017      ASTSGOT  19  02/06/2017      ALTSGPT  8  02/06/2017      ALKPHOSPHAT  75  02/06/2017      TBILIRUBIN  1.1  02/06/2017      ALBUMIN  4.0  02/06/2017      AGRATIO  1.4  02/06/2017      CREACTPROT  0.20  11/29/2016      TSHULTRASEN  1.520  07/05/2016      CPKTOTAL  38  12/22/2016        Other: NA    Recent Imaging Studies:    None  "since last visit    ASSESSMENT AND PLAN  Patient Type, check all that apply:    Secondary Prevention (Abdominal aortic aneurism)  Established Atherosclerotic Cardiovascular Disease (ASCVD)  1) AAA  2) PAD    Other Established (non-atherosclerotic) Vascular Disease, if Present:  As above  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):   Unclear - Baseline LDL-C c/w FH, but no obvious family history or physical findings  ACC/AHA Indication for Statin Therapy, gurpreet all that apply:  LDL-C at baseline >190 mg/dl: Indication for High intensity statin   Calculated Risk for ASCVD, if applicable    N/A  Other Significant Risk Markers, if any, gurpreet all that apply   None  National Lipid Association (NLA) Goal  LDL-C:   <70 mg/dL  Lifestyle Recommendations From Today’s Visit:    None  Statin Therapy Recommendations from Today’s Visit:   Crestor 5 mg MWF  Non-Statin Prescription Medications Recommendations from Today’s Visit:   None  Indication for PCSK9 Inhibitor, if applicable:  Not currently indicated until maximum tolerated statin dose is found.    Supplements Recommended at this visit: CoQ10     Pt made appt for today due to \"zinging\" pain she has had with he shoulder and leg.  States the Zinging started acting up again.  States the pain in her leg occurs mostly when she lies down to sleep at night.  Based on the area the patient was describing, sounds to be more of a sciatic nerve condition.  She denies pain in large muscle groups or pain in any type of muscle.      States her shoulder has been starting to have the same sensations as her leg.  Pt is convinced the pain is due to the statin medication.  Had a lengthy conversation about nerve vs muscle pain.  She states that this current pain is annoying but it is tolerable.  She has high concerns it is the statin.  Will obtain CPK with lipid panel, to help pt have peace of mind and investigate if she is having any kind of muscle deterioration.     Instructed pt " to follow up with PCP concerning possible sciatica or nerve conditions.     States she has been consistent with Crestor and CoQ10.    Due to baseline LDL, LDL goal of <70 would be desired, however if pt could only obtain <100, then it may be appropriate considering pt's age.    Blood Work Ordered At Today’s visit: Lipid Panel, Vit D, CPK  Follow-Up: 3 weeks    Chandler Brown, FABRIZIOD

## 2017-04-06 ENCOUNTER — HOSPITAL ENCOUNTER (OUTPATIENT)
Dept: LAB | Facility: MEDICAL CENTER | Age: 82
End: 2017-04-06
Attending: NURSE PRACTITIONER
Payer: MEDICARE

## 2017-04-06 DIAGNOSIS — E78.5 HYPERLIPIDEMIA, UNSPECIFIED HYPERLIPIDEMIA TYPE: ICD-10-CM

## 2017-04-06 LAB
25(OH)D3 SERPL-MCNC: 22 NG/ML (ref 30–100)
CHOLEST SERPL-MCNC: 184 MG/DL (ref 100–199)
CK SERPL-CCNC: 116 U/L (ref 0–154)
HDLC SERPL-MCNC: 56 MG/DL
LDLC SERPL CALC-MCNC: 110 MG/DL
TRIGL SERPL-MCNC: 89 MG/DL (ref 0–149)

## 2017-04-06 PROCEDURE — 80061 LIPID PANEL: CPT

## 2017-04-06 PROCEDURE — 82306 VITAMIN D 25 HYDROXY: CPT | Mod: GA

## 2017-04-06 PROCEDURE — 36415 COLL VENOUS BLD VENIPUNCTURE: CPT

## 2017-04-06 PROCEDURE — 82550 ASSAY OF CK (CPK): CPT

## 2017-04-14 ENCOUNTER — NON-PROVIDER VISIT (OUTPATIENT)
Dept: VASCULAR LAB | Facility: MEDICAL CENTER | Age: 82
End: 2017-04-14
Attending: INTERNAL MEDICINE
Payer: MEDICARE

## 2017-04-14 DIAGNOSIS — E55.9 VITAMIN D DEFICIENCY DISEASE: ICD-10-CM

## 2017-04-14 PROCEDURE — 99212 OFFICE O/P EST SF 10 MIN: CPT

## 2017-04-14 NOTE — MR AVS SNAPSHOT
Nini Hightowersarah beth   2017 10:00 AM   Appointment   MRN: [id number]    Department:  Vascular Medicine   Dept Phone:  [phone]    Description:  Female : 1932   Provider:  [facility] EXAM 4           Allergies as of 2017     Allergen Noted Reactions    Vicodin [Hydrocodone-Acetaminophen] 2010   Rash    Vytorin 12/15/2008         Vital Signs     Smoking Status                   Never Smoker            Basic Information     Date Of Birth Sex Race Ethnicity Preferred Language    1932 Female White Non- English      Your appointments     May 11, 2017  2:20 PM   Initial Visit with Michael J Bloch, M.D., [facility] EXAM 1   [facility]  (--)    [address] [id number] [phone]            Oct 13, 2017 10:00 AM   Follow up Pharmacotherapy Visit with [facility] EXAM 4   [facility]  (--)    [address] [id number] [phone]              Problem List              ICD-10-CM Priority Class Noted - Resolved    GERD (gastroesophageal reflux disease) K21.9   Unknown - Present    OSTEOPOROSIS    Unknown - Present    Insomnia G47.00   Unknown - Present    Blepharospasm syndrome G24.5   Unknown - Present    Hyperlipidemia E78.5   2012 - Present    Vitamin D deficiency disease E55.9   Unknown - Present    Female bladder prolapse, acquired N81.10   Unknown - Present    Hypertension I10   Unknown - Present    Edema (Chronic) R60.9   2012 - Present    Varicose veins (Chronic) I86.8   2012 - Present    Atherosclerosis of native arteries of extremity with intermittent claudication (CMS-HCC) I70.219   10/16/2013 - Present    RUY treated with BiPAP G47.33   2014 - Present    Palpitations R00.2   2015 - Present    Other chest pain R07.89   2015 - Present    Abdominal aortic aneurysm (CMS-HCC) I71.4   2015 - Present    SVT (supraventricular  tachycardia) (CMS-HCC) I47.1   8/17/2015 - Present    Palpitation R00.2   8/17/2015 - Present    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   12/22/2016 - Present    Chronic insomnia F51.04   1/10/2017 - Present      Health Maintenance        Date Due Completion Dates    IMM DTaP/Tdap/Td Vaccine (1 - Tdap) 7/8/1951 ---    IMM ZOSTER VACCINE 7/8/1992 ---    BONE DENSITY 7/8/1997 ---    MAMMOGRAM 10/18/2017 10/18/2016    IMM PNEUMOCOCCAL 65+ (ADULT) LOW/MEDIUM RISK SERIES (2 of 2 - PPSV23) 10/19/2017 10/19/2016    COLONOSCOPY 2/19/2023 2/19/2013 (Done)    Override on 2/19/2013: Done            Current Immunizations     13-VALENT PCV PREVNAR 10/19/2016    Influenza TIV (IM) 10/15/2011    Influenza Vaccine 10/1/2010    Influenza Vaccine Quad Inj (Preserved) 10/19/2016    Pneumococcal Vaccine (UF)Historical Data 10/1/2009      Below and/or attached are the medications your provider expects you to take. Review all of your home medications and newly ordered medications with your provider and/or pharmacist. Follow medication instructions as directed by your provider and/or pharmacist. Please keep your medication list with you and share with your provider. Update the information when medications are discontinued, doses are changed, or new medications (including over-the-counter products) are added; and carry medication information at all times in the event of emergency situations     Allergies:  VICODIN - Rash     VYTORIN - (reactions not documented)               Medications  Valid as of: April 14, 2017 - 10:02 AM    Generic Name Brand Name Tablet Size Instructions for use    Estrogens, Conjugated (Cream) PREMARIN 0.625 MG/GM INSERT 1 GRAM VAGINALLY 3 TIMES WEEKLY        Lansoprazole   Take  by mouth.        Metoprolol Tartrate (Tab) LOPRESSOR 25 MG TAKE ONE TABLET BY MOUTH TWICE DAILY        Mometasone Furoate (Suspension) NASONEX 50 MCG/ACT Spray 2 Sprays in nose every day.        Multiple Vitamins-Minerals   Take 1 Tab by mouth every day.         Omeprazole (CAPSULE DELAYED RELEASE) PRILOSEC 20 MG Take 20 mg by mouth every day.        Rosuvastatin Calcium (Tab) CRESTOR 10 MG Take 1 Tab by mouth every evening.        .                 Medicines prescribed today were sent to:     [facility] [address] [place] [address] [address] [address] [facility] [address]    Phone: [phone] Fax: [phone]    Open 24 Hours?: No      Medication refill instructions:       If your prescription bottle indicates you have medication refills left, it is not necessary to call your provider’s office. Please contact your pharmacy and they will refill your medication.    If your prescription bottle indicates you do not have any refills left, you may request refills at any time through one of the following ways: The online voxapp system (except Urgent Care), by calling your provider’s office, or by asking your pharmacy to contact your provider’s office with a refill request. Medication refills are processed only during regular business hours and may not be available until the next business day. Your provider may request additional information or to have a follow-up visit with you prior to refilling your medication.   *Please Note: Medication refills are assigned a new Rx number when refilled electronically. Your pharmacy may indicate that no refills were authorized even though a new prescription for the same medication is available at the pharmacy. Please request the medicine by name with the pharmacy before contacting your provider for a refill.        Other Notes About Your Plan     DME:  [facility] / ph [phone] / fax [phone]             MyChart Status: Patient Declined

## 2017-04-14 NOTE — PROGRESS NOTES
"    Date of Service: 04/14/2017    Nini Taylor has been referred for evaluation and management of dyslipidemia    Referral Source: Brigitte EDDY  History of ASCVD: No    Current Prescription Lipid Lowering Medications - including dose:   Statin: Crestor 5 mg MWF  Non-Statin: None  Current Lipid Lowering and Related Supplements:   CoQ10  Any Current Side Effects Potentially Related to Lipid Lowering therapy?   No  Current Adherence to Lipid Lowering Therapies   Completely non-adherent  Previously Attempted Interventions for Lipids - including outcome  Statin: Simvastatin Unknown dose/duration - myalgias                Crestor unkown dose, duration \"years\" - states she started getting sharp \"zings\" down her arms.              Atorvastatin unknown dose, duration of 90 days - severe UE myalgia    Non-Statin: States none, however MR shows possibly Vytorin use              Outcome: Unknown  Any Previous History of Statin Intolerance?   Yes, Details: See above.    Baseline Lipids Prior to Treatment:          2/6/2017 12:20     Cholesterol,Tot   274 (H)     Triglycerides   101     HDL   58     LDL   196 (H)       Other Pertinent History: Abdominal Aortic Aneurism, also diagnosed with atherosclerosis of extremities with intermittent claudication.    History of other CV risk factors: Denies FH of CVD.  PAST MEDICAL HISTORY:  has a past medical history of GERD (gastroesophageal reflux disease); OSTEOPOROSIS; Vitamin D deficiency disease; Insomnia; Blepharospasm syndrome; Female bladder prolapse, acquired; Hypertension; Constipation; Sleep apnea; Hyperlipidemia; Edema (5/29/2012); Palpitations (5/29/2012); Varicose veins (5/29/2012); Atherosclerosis of native arteries of the extremities with intermittent claudication (10/16/2013); Bright red rectal bleeding (6/24/2015); Chest pain (07/2015); and AAA (abdominal aortic aneurysm) (CMS-[facility]).  PAST SURGICAL HISTORY:  has past surgical history that includes abdominal " hysterectomy total; injection sclero hemorroids; hemorrhoidectomy; hysterectomy, total abdominal; arthroscopy, knee; tonsillectomy; knee scope,diagnostic (2003;2009); other orthopedic surgery; cystocele repair (1/17/2011); rectocele repair (1/17/2011); bladder suspension (1/17/2011); cystoscopy (1/17/2011); and cataract extraction with iol.    CURRENT MEDICATIONS:      Current outpatient prescriptions:    •  doxycycline, 1 TAB TWICE A DAY X 10 DAYS.  •  rosuvastatin, 10 mg, Oral, Q EVENING  •  metoprolol, TAKE ONE TABLET BY MOUTH TWICE DAILY  •  atorvastatin, 10 mg, Oral, DAILY  •  PREMARIN, INSERT 1 GRAM VAGINALLY 3 TIMES WEEKLY  •  mometasone, 2 Spray, Nasal, DAILY  •  Lansoprazole (PREVACID PO), Take  by mouth.  •  Multiple Vitamins-Minerals (CENTRUM SILVER PO), 1 Tab, Oral, DAILY  •  omeprazole, 20 mg, Oral, DAILY      ALLERGIES: Vicodin and Vytorin    SOCIAL HISTORY   History     Smoking status     •   Never Smoker      Smokeless tobacco     •   Not on file       Change in weight: Stable  Exercise habits: no regular exercise program  Diet: common adult    ROS  Physical Exam    DATA REVIEW:  Most Recent Lipid Panel:  4/14/2017 09:54   4/6/2017 14:53   CPK Total 116   Cholesterol,Tot 184   Triglycerides 89   HDL 56    (H)     Other Pertinent Blood Work:   Lab Results     Component   Value   Date        SODIUM   136   02/06/2017        POTASSIUM   4.2   02/06/2017        CHLORIDE   101   02/06/2017        CO2   32   02/06/2017        ANION   3.0   02/06/2017        GLUCOSE   85   02/06/2017        BUN   17   02/06/2017        CREATININE   1.01   02/06/2017        CALCIUM   9.7   02/06/2017        ASTSGOT   19   02/06/2017        ALTSGPT   8   02/06/2017        ALKPHOSPHAT   75   02/06/2017        TBILIRUBIN   1.1   02/06/2017        ALBUMIN   4.0   02/06/2017        AGRATIO   1.4   02/06/2017        CREACTPROT   0.20   11/29/2016        TSHULTRASEN   1.520   07/05/2016        CPKTOTAL   38   12/22/2016    "     4/14/2017 09:54   4/6/2017 14:53   25-Hydroxy   Vitamin D 25 22 (L)     Other: NA    Recent Imaging Studies:    None since last visit    ASSESSMENT AND PLAN  Patient Type, check all that apply:    Secondary Prevention (Abdominal aortic aneurism)  Established Atherosclerotic Cardiovascular Disease (ASCVD)  1) AAA  2) PAD    Other Established (non-atherosclerotic) Vascular Disease, if Present:  As above  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):   Unclear - Baseline LDL-C c/w FH, but no obvious family history or physical findings  ACC/AHA Indication for Statin Therapy, gurpreet all that apply:  LDL-C at baseline >190 mg/dl: Indication for High intensity statin    Calculated Risk for ASCVD, if applicable    N/A  Other Significant Risk Markers, if any, gurpreet all that apply   None  National Lipid Association (NLA) Goal  LDL-C:   <70 mg/dL  Lifestyle Recommendations From Today’s Visit:    None  Statin Therapy Recommendations from Today’s Visit:   Crestor 5 mg MWF  Non-Statin Prescription Medications Recommendations from Today’s Visit:   None  Indication for PCSK9 Inhibitor, if applicable:  Not currently indicated until maximum tolerated statin dose is found.    Supplements Recommended at this visit: Vit D 1000 units daily    Pt not at goal for LDL, or non-HDL.    Pt states that her \"zinging\" pain in her arms has been diminishing.  States she still has \"zinging\" pain in her legs but only when she lays on her side.  Encouraged her to follow up with PCP for sciatica evaluation.    Pt anxious about her medications and states she feels that Ciprofloxacin made symptoms worse.      Discussed CPK returned normal, not likely she is having obvious muscle break down by medications.    Cholesterol is not at goal but pt would like to hold off on adding additional medication.  Pt denies any reaction to Zetia.  Pt did have a reaction to Vytorin, but likely was due to statin component.      Pt appears to be very " adherent with all medications, but requires slow changes to her medications due to anxiety.    Due to baseline LDL, LDL goal of <70 would be desired, however if pt could only obtain <100, then it may be appropriate considering pt's age.    Blood Work Ordered At Today’s visit: Lipid Panel, Vit D.  Follow-Up: 6 months.     Chandler Brown, PHARMD

## 2017-04-25 RX ORDER — CONJUGATED ESTROGENS 0.62 MG/G
CREAM VAGINAL
Qty: 1 TUBE | Refills: 0 | Status: SHIPPED | OUTPATIENT
Start: 2017-04-25 | End: 2018-10-09 | Stop reason: SDUPTHER

## 2017-04-25 NOTE — TELEPHONE ENCOUNTER
Was the patient seen in the last year in this department? Yes     Does patient have an active prescription for medications requested? No     Received Request Via: Pharmacy      Pt met protocol?: Yes    LAST OV 12/22/2016

## 2017-05-02 ENCOUNTER — OFFICE VISIT (OUTPATIENT)
Dept: MEDICAL GROUP | Facility: PHYSICIAN GROUP | Age: 82
End: 2017-05-02
Payer: MEDICARE

## 2017-05-02 ENCOUNTER — HOSPITAL ENCOUNTER (OUTPATIENT)
Facility: MEDICAL CENTER | Age: 82
End: 2017-05-02
Attending: NURSE PRACTITIONER
Payer: MEDICARE

## 2017-05-02 VITALS
SYSTOLIC BLOOD PRESSURE: 126 MMHG | DIASTOLIC BLOOD PRESSURE: 82 MMHG | OXYGEN SATURATION: 96 % | TEMPERATURE: 97.7 F | WEIGHT: 132 LBS | RESPIRATION RATE: 12 BRPM | HEIGHT: 62 IN | BODY MASS INDEX: 24.29 KG/M2 | HEART RATE: 66 BPM

## 2017-05-02 DIAGNOSIS — A49.9 BACTERIAL UTI: Primary | ICD-10-CM

## 2017-05-02 DIAGNOSIS — N39.0 BACTERIAL UTI: Primary | ICD-10-CM

## 2017-05-02 DIAGNOSIS — R30.0 DYSURIA: ICD-10-CM

## 2017-05-02 LAB
APPEARANCE UR: NORMAL
BILIRUB UR STRIP-MCNC: NEGATIVE MG/DL
COLOR UR AUTO: YELLOW
GLUCOSE UR STRIP.AUTO-MCNC: NEGATIVE MG/DL
KETONES UR STRIP.AUTO-MCNC: NEGATIVE MG/DL
LEUKOCYTE ESTERASE UR QL STRIP.AUTO: NORMAL
NITRITE UR QL STRIP.AUTO: NORMAL
PH UR STRIP.AUTO: 6 [PH] (ref 5–8)
PROT UR QL STRIP: NEGATIVE MG/DL
RBC UR QL AUTO: NORMAL
SP GR UR STRIP.AUTO: 1.01
UROBILINOGEN UR STRIP-MCNC: NEGATIVE MG/DL

## 2017-05-02 PROCEDURE — G8599 NO ASA/ANTIPLAT THER USE RNG: HCPCS | Performed by: NURSE PRACTITIONER

## 2017-05-02 PROCEDURE — 4040F PNEUMOC VAC/ADMIN/RCVD: CPT | Performed by: NURSE PRACTITIONER

## 2017-05-02 PROCEDURE — 1036F TOBACCO NON-USER: CPT | Performed by: NURSE PRACTITIONER

## 2017-05-02 PROCEDURE — 1101F PT FALLS ASSESS-DOCD LE1/YR: CPT | Performed by: NURSE PRACTITIONER

## 2017-05-02 PROCEDURE — 87077 CULTURE AEROBIC IDENTIFY: CPT

## 2017-05-02 PROCEDURE — G8420 CALC BMI NORM PARAMETERS: HCPCS | Performed by: NURSE PRACTITIONER

## 2017-05-02 PROCEDURE — 99214 OFFICE O/P EST MOD 30 MIN: CPT | Performed by: NURSE PRACTITIONER

## 2017-05-02 PROCEDURE — 87186 SC STD MICRODIL/AGAR DIL: CPT

## 2017-05-02 PROCEDURE — 81002 URINALYSIS NONAUTO W/O SCOPE: CPT | Performed by: NURSE PRACTITIONER

## 2017-05-02 PROCEDURE — G8432 DEP SCR NOT DOC, RNG: HCPCS | Performed by: NURSE PRACTITIONER

## 2017-05-02 PROCEDURE — 87086 URINE CULTURE/COLONY COUNT: CPT

## 2017-05-02 RX ORDER — SULFAMETHOXAZOLE AND TRIMETHOPRIM 800; 160 MG/1; MG/1
1 TABLET ORAL 2 TIMES DAILY
Qty: 14 TAB | Refills: 0 | Status: SHIPPED | OUTPATIENT
Start: 2017-05-02 | End: 2017-05-09

## 2017-05-02 NOTE — PROGRESS NOTES
Chief Complaint   Patient presents with   • Urinary Frequency       HISTORY OF PRESENT ILLNESS: Patient is a 84 y.o. female established patient who presents today to discuss the following.  She is patient Dr. Munroe, this is her first visit with myself.    1. Bacterial UTI 2. Dysuria  Patient states that she has been experiencing persistent dysuria.  She unfortunately has a recent history of recurrent urinary tract infections, several positive cultures indicating Escherichia coli.  She was recently treated with a course of ciprofloxacin with return of symptoms approximately one month after completion.  She denies any hematuria but does report some suprapubic discomfort.    Allergies:Vicodin and Vytorin    Current Outpatient Prescriptions Ordered in [facility]   Medication Sig Dispense Refill   • sulfamethoxazole-trimethoprim (BACTRIM DS) 800-160 MG tablet Take 1 Tab by mouth 2 times a day for 7 days. 14 Tab 0   • PREMARIN 0.625 MG/GM Cream INSERT 1 GRAM VAGINALLY 3 TIMES WEEKLY 1 Tube 0   • vitamin D (CHOLECALCIFEROL) 1000 UNIT Tab Take 1 Tab by mouth every day. 60 Tab 5   • rosuvastatin (CRESTOR) 10 MG Tab Take 1 Tab by mouth every evening. 90 Tab 3   • metoprolol (LOPRESSOR) 25 MG Tab TAKE ONE TABLET BY MOUTH TWICE DAILY 180 Tab 3   • mometasone (NASONEX) 50 MCG/ACT nasal spray Spray 2 Sprays in nose every day. 1 Inhaler 5   • Lansoprazole (PREVACID PO) Take  by mouth.     • Multiple Vitamins-Minerals (CENTRUM SILVER PO) Take 1 Tab by mouth every day.     • omeprazole (PRILOSEC) 20 MG CPDR Take 20 mg by mouth every day.       No current [facility]-ordered facility-administered medications on file.       Past Medical History   Diagnosis Date   • GERD (gastroesophageal reflux disease)    • OSTEOPOROSIS    • Vitamin D deficiency disease    • Insomnia    • Blepharospasm syndrome    • Female bladder prolapse, acquired    • Hypertension    • Constipation    • Sleep apnea    • Hyperlipidemia    • Edema 5/29/2012   • Palpitations  2012   • Varicose veins 2012   • Atherosclerosis of native arteries of the extremities with intermittent claudication 10/16/2013   • Bright red rectal bleeding 2015   • Chest pain 2015     Unspecified; Nuclear stress test negative    • AAA (abdominal aortic aneurysm) (CMS-[facility])        Social History   Substance Use Topics   • Smoking status: Never Smoker    • Smokeless tobacco: Not on file   • Alcohol Use: 0.0 oz/week     0 Standard drinks or equivalent per week      Comment: occasionally       Family Status   Relation Status Death Age   • Mother  88   • Father  87   • Brother  63   • Brother  49   • Brother  6     car accident     Family History   Problem Relation Age of Onset   • Non-contributory Mother      gall bladder surgery   • Cancer Father      colon rectal   • Heart Disease Brother      sleep apnea   • Heart Disease Brother        ROS: see above    Review of Systems   Constitutional: Negative for fever, chills, weight loss and malaise/fatigue.   HENT: Negative for ear pain, nosebleeds, congestion, sore throat and neck pain.    Eyes: Negative for blurred vision.   Respiratory: Negative for cough, sputum production, shortness of breath and wheezing.    Cardiovascular: Negative for chest pain, palpitations, orthopnea and leg swelling.   Gastrointestinal: Negative for heartburn, nausea, vomiting and abdominal pain.   Genitourinary: Negative for dysuria, urgency and frequency.   Musculoskeletal: Negative for myalgias, back pain and joint pain.   Skin: Negative for rash and itching.   Neurological: Negative for dizziness, tingling, tremors, sensory change, focal weakness and headaches.   Endo/Heme/Allergies: Does not bruise/bleed easily.   Psychiatric/Behavioral: Negative for depression, suicidal ideas and memory loss.  The patient is not nervous/anxious and does not have insomnia.        Exam:  Blood pressure 126/82, pulse 66, temperature 36.5 °C (97.7  "°F), resp. rate 12, height 1.575 m (5' 2\"), weight 59.875 kg (132 lb), SpO2 96 %.  General:  Well nourished, well developed female in NAD  Head is grossly normal.  Neck: Thyroid is not enlarged.  Pulmonary: Normal effort.   Cardiovascular: Regular rate.   Extremities: no clubbing, cyanosis, or edema.  Psych:  Mood and affect are normal.  Answering questions appropriately with good eye contact.    Lab Results   Component Value Date/Time    POC COLOR Yellow 05/02/2017 01:00 PM    POC APPEARANCE Cloudy 05/02/2017 01:00 PM    POC LEUKOCYTE ESTERASE Large 05/02/2017 01:00 PM    POC NITRITES Pink 05/02/2017 01:00 PM    POC UROBILIGEN Negative 05/02/2017 01:00 PM    POC PROTEIN Negative 05/02/2017 01:00 PM    POC URINE PH 6.0 05/02/2017 01:00 PM    POC BLOOD Trace 05/02/2017 01:00 PM    POC SPECIFIC GRAVITY 1.010 05/02/2017 01:00 PM    POC KETONES Negative 05/02/2017 01:00 PM    POC BILIRUBEN Negative 05/02/2017 01:00 PM    POC GLUCOSE Negative 05/02/2017 01:00 PM        Please note that this dictation was created using voice recognition software. I have made every reasonable attempt to correct obvious errors, but I expect that there are errors of grammar and possibly content that I did not discover before finalizing the note.    Assessment/Plan:    1. Bacterial UTI  sulfamethoxazole-trimethoprim (BACTRIM DS) 800-160 MG tablet    POCT Urinalysis    URINE CULTURE(NEW)   2. Dysuria  sulfamethoxazole-trimethoprim (BACTRIM DS) 800-160 MG tablet    POCT Urinalysis    URINE CULTURE(NEW)        1.  Treat empirically for repeat UTI, send urine for culture.  2.  Encourage patient to maintain hydration status, follow-up with gynecologist as scheduled.  3.  Return to clinic if symptoms worsen or fail to improve.    "

## 2017-05-02 NOTE — MR AVS SNAPSHOT
Nini Corneliusshaquille   2017 1:00 PM   Office Visit   MRN: [id number]    Department:  [facility]   Dept Phone:  [phone]    Description:  Female : 1932   Provider:  GIANNI Alves           Reason for Visit     Urinary Frequency           Allergies as of 2017     Allergen Noted Reactions    Vicodin [Hydrocodone-Acetaminophen] 2010   Rash    Vytorin 12/15/2008         You were diagnosed with     Bacterial UTI   [522739]  -  Primary     Dysuria   [788.1.ICD-9-CM]         Vital Signs     Smoking Status                   Never Smoker            Basic Information     Date Of Birth Sex Race Ethnicity Preferred Language    1932 Female White Non- English      Your appointments     May 11, 2017  2:20 PM   Initial Visit with Michael J Bloch, M.D., [facility] EXAM 1   [facility] [place] [place] [facility]  (--)    [address] [facility] [address]   [phone]            Oct 13, 2017 10:00 AM   Follow up Pharmacotherapy Visit with [facility] EXAM 4   [facility]  (--)    [address] [facility] [address]   [phone]              Problem List              ICD-10-CM Priority Class Noted - Resolved    GERD (gastroesophageal reflux disease) K21.9   Unknown - Present    OSTEOPOROSIS    Unknown - Present    Insomnia G47.00   Unknown - Present    Blepharospasm syndrome G24.5   Unknown - Present    Hyperlipidemia E78.5   2012 - Present    Vitamin D deficiency disease E55.9   Unknown - Present    Female bladder prolapse, acquired N81.10   Unknown - Present    Hypertension I10   Unknown - Present    Edema (Chronic) R60.9   2012 - Present    Varicose veins (Chronic) I86.8   2012 - Present    Atherosclerosis of native arteries of extremity with intermittent claudication (CMS-HCC) I70.219   10/16/2013 - Present    RUY treated with BiPAP G47.33   2014 - Present    Palpitations R00.2    4/24/2015 - Present    Other chest pain R07.89   7/17/2015 - Present    Abdominal aortic aneurysm (CMS-HCC) I71.4   7/17/2015 - Present    SVT (supraventricular tachycardia) I47.1   8/17/2015 - Present    Palpitation R00.2   8/17/2015 - Present    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   12/22/2016 - Present    Chronic insomnia F51.04   1/10/2017 - Present      Health Maintenance        Date Due Completion Dates    IMM DTaP/Tdap/Td Vaccine (1 - Tdap) 7/8/1951 ---    IMM ZOSTER VACCINE 7/8/1992 ---    BONE DENSITY 7/8/1997 ---    MAMMOGRAM 10/18/2017 10/18/2016    IMM PNEUMOCOCCAL 65+ (ADULT) LOW/MEDIUM RISK SERIES (2 of 2 - PPSV23) 10/19/2017 10/19/2016    COLONOSCOPY 2/19/2023 2/19/2013 (Done)    Override on 2/19/2013: Done            Current Immunizations     13-VALENT PCV PREVNAR 10/19/2016    Influenza TIV (IM) 10/15/2011    Influenza Vaccine 10/1/2010    Influenza Vaccine Quad Inj (Preserved) 10/19/2016    Pneumococcal Vaccine (UF)Historical Data 10/1/2009      Below and/or attached are the medications your provider expects you to take. Review all of your home medications and newly ordered medications with your provider and/or pharmacist. Follow medication instructions as directed by your provider and/or pharmacist. Please keep your medication list with you and share with your provider. Update the information when medications are discontinued, doses are changed, or new medications (including over-the-counter products) are added; and carry medication information at all times in the event of emergency situations     Allergies:  VICODIN - Rash     VYTORIN - (reactions not documented)               Medications  Valid as of: May 02, 2017 -  1:26 PM    Generic Name Brand Name Tablet Size Instructions for use    Cholecalciferol (Tab) cholecalciferol 1000 UNIT Take 1 Tab by mouth every day.        Estrogens, Conjugated (Cream) PREMARIN 0.625 MG/GM INSERT 1 GRAM VAGINALLY 3 TIMES WEEKLY        Lansoprazole   Take  by mouth.         Metoprolol Tartrate (Tab) LOPRESSOR 25 MG TAKE ONE TABLET BY MOUTH TWICE DAILY        Mometasone Furoate (Suspension) NASONEX 50 MCG/ACT Spray 2 Sprays in nose every day.        Multiple Vitamins-Minerals   Take 1 Tab by mouth every day.        Omeprazole (CAPSULE DELAYED RELEASE) PRILOSEC 20 MG Take 20 mg by mouth every day.        Rosuvastatin Calcium (Tab) CRESTOR 10 MG Take 1 Tab by mouth every evening.        Sulfamethoxazole-Trimethoprim (Tab) BACTRIM -160 MG Take 1 Tab by mouth 2 times a day for 7 days.        .                 Medicines prescribed today were sent to:     [facility] [address] [place] [address] [address] [facility]    [address] [facility] [address]    Phone: [phone] Fax: [phone]    Open 24 Hours?: No      Medication refill instructions:       If your prescription bottle indicates you have medication refills left, it is not necessary to call your provider’s office. Please contact your pharmacy and they will refill your medication.    If your prescription bottle indicates you do not have any refills left, you may request refills at any time through one of the following ways: The online [employer] system (except Urgent Care), by calling your provider’s office, or by asking your pharmacy to contact your provider’s office with a refill request. Medication refills are processed only during regular business hours and may not be available until the next business day. Your provider may request additional information or to have a follow-up visit with you prior to refilling your medication.   *Please Note: Medication refills are assigned a new Rx number when refilled electronically. Your pharmacy may indicate that no refills were authorized even though a new prescription for the same medication is available at the pharmacy. Please request the medicine by name with the pharmacy before contacting your provider for a refill.        Your To Do List     Future Labs/Procedures Complete By  Expires    URINE CULTURE(NEW)  As directed 5/2/2018      Other Notes About Your Plan     DME:  [facility] / ph [phone] / fax [phone]             MyChart Status: Patient Declined

## 2017-05-03 ENCOUNTER — HOSPITAL ENCOUNTER (OUTPATIENT)
Facility: MEDICAL CENTER | Age: 82
End: 2017-05-03
Attending: NURSE PRACTITIONER
Payer: MEDICARE

## 2017-05-03 DIAGNOSIS — N39.0 BACTERIAL UTI: ICD-10-CM

## 2017-05-03 DIAGNOSIS — R30.0 DYSURIA: ICD-10-CM

## 2017-05-03 DIAGNOSIS — A49.9 BACTERIAL UTI: ICD-10-CM

## 2017-05-05 LAB
BACTERIA UR CULT: ABNORMAL
SIGNIFICANT IND 70042: ABNORMAL
SITE SITE: ABNORMAL
SOURCE SOURCE: ABNORMAL

## 2017-05-08 ENCOUNTER — TELEPHONE (OUTPATIENT)
Dept: MEDICAL GROUP | Facility: PHYSICIAN GROUP | Age: 82
End: 2017-05-08

## 2017-05-08 NOTE — TELEPHONE ENCOUNTER
----- Message from GIANNI Alves sent at 5/7/2017  3:02 PM PDT -----  Urine culture is positive. Please complete entire course of antibiotics.  RTC if symptoms fail to resolve.  Thank you

## 2017-05-10 ENCOUNTER — OFFICE VISIT (OUTPATIENT)
Dept: URGENT CARE | Facility: PHYSICIAN GROUP | Age: 82
End: 2017-05-10
Payer: MEDICARE

## 2017-05-10 VITALS
TEMPERATURE: 98.4 F | DIASTOLIC BLOOD PRESSURE: 84 MMHG | HEART RATE: 80 BPM | RESPIRATION RATE: 16 BRPM | SYSTOLIC BLOOD PRESSURE: 122 MMHG | OXYGEN SATURATION: 93 %

## 2017-05-10 DIAGNOSIS — L27.0 ALLERGIC DRUG RASH DUE TO SULFONAMIDE: ICD-10-CM

## 2017-05-10 DIAGNOSIS — T37.0X5A ALLERGIC DRUG RASH DUE TO SULFONAMIDE: ICD-10-CM

## 2017-05-10 DIAGNOSIS — L50.0 ALLERGIC URTICARIA: ICD-10-CM

## 2017-05-10 PROCEDURE — 4040F PNEUMOC VAC/ADMIN/RCVD: CPT | Performed by: EMERGENCY MEDICINE

## 2017-05-10 PROCEDURE — G8420 CALC BMI NORM PARAMETERS: HCPCS | Performed by: EMERGENCY MEDICINE

## 2017-05-10 PROCEDURE — 1101F PT FALLS ASSESS-DOCD LE1/YR: CPT | Performed by: EMERGENCY MEDICINE

## 2017-05-10 PROCEDURE — G8599 NO ASA/ANTIPLAT THER USE RNG: HCPCS | Performed by: EMERGENCY MEDICINE

## 2017-05-10 PROCEDURE — 99203 OFFICE O/P NEW LOW 30 MIN: CPT | Performed by: EMERGENCY MEDICINE

## 2017-05-10 PROCEDURE — G8432 DEP SCR NOT DOC, RNG: HCPCS | Performed by: EMERGENCY MEDICINE

## 2017-05-10 PROCEDURE — 1036F TOBACCO NON-USER: CPT | Performed by: EMERGENCY MEDICINE

## 2017-05-10 ASSESSMENT — ENCOUNTER SYMPTOMS
CHILLS: 0
RHINORRHEA: 0
COUGH: 0
SORE THROAT: 0
FEVER: 0
DIARRHEA: 0
SHORTNESS OF BREATH: 0
VOMITING: 0
FATIGUE: 0

## 2017-05-10 NOTE — PROGRESS NOTES
Subjective:      Nini Taylor is a 84 y.o. female who presents with Rash            Rash  This is a new problem. The current episode started yesterday. The problem has been gradually worsening since onset. The affected locations include the torso, back, neck, left upper leg, left arm, right upper leg and right arm. The rash is characterized by itchiness. She was exposed to a new medication. Pertinent negatives include no congestion, cough, diarrhea, facial edema, fatigue, fever, joint pain, rhinorrhea, shortness of breath, sore throat or vomiting. Past treatments include nothing.    finished course of Bactrim DS for UTI; urinary symptoms resolved    Review of Systems   Constitutional: Negative for fever, chills and fatigue.   HENT: Negative for congestion, rhinorrhea and sore throat.    Respiratory: Negative for cough and shortness of breath.    Gastrointestinal: Negative for vomiting and diarrhea.   Genitourinary: Negative for dysuria, urgency, frequency and hematuria.   Musculoskeletal: Negative for joint pain.   Skin: Positive for itching and rash.     PMH:  has a past medical history of GERD (gastroesophageal reflux disease); OSTEOPOROSIS; Vitamin D deficiency disease; Insomnia; Blepharospasm syndrome; Female bladder prolapse, acquired; Hypertension; Constipation; Sleep apnea; Hyperlipidemia; Edema (5/29/2012); Palpitations (5/29/2012); Varicose veins (5/29/2012); Atherosclerosis of native arteries of the extremities with intermittent claudication (10/16/2013); Bright red rectal bleeding (6/24/2015); Chest pain (07/2015); and AAA (abdominal aortic aneurysm) (CMS-[facility]).  MEDS:   Current outpatient prescriptions:   •  PREMARIN 0.625 MG/GM Cream, INSERT 1 GRAM VAGINALLY 3 TIMES WEEKLY, Disp: 1 Tube, Rfl: 0  •  vitamin D (CHOLECALCIFEROL) 1000 UNIT Tab, Take 1 Tab by mouth every day., Disp: 60 Tab, Rfl: 5  •  rosuvastatin (CRESTOR) 10 MG Tab, Take 1 Tab by mouth every evening., Disp: 90 Tab, Rfl: 3  •  metoprolol  (LOPRESSOR) 25 MG Tab, TAKE ONE TABLET BY MOUTH TWICE DAILY, Disp: 180 Tab, Rfl: 3  •  mometasone (NASONEX) 50 MCG/ACT nasal spray, Spray 2 Sprays in nose every day., Disp: 1 Inhaler, Rfl: 5  •  Lansoprazole (PREVACID PO), Take  by mouth., Disp: , Rfl:   •  Multiple Vitamins-Minerals (CENTRUM SILVER PO), Take 1 Tab by mouth every day., Disp: , Rfl:   •  omeprazole (PRILOSEC) 20 MG CPDR, Take 20 mg by mouth every day., Disp: , Rfl:   ALLERGIES:   Allergies   Allergen Reactions   • Vicodin [Hydrocodone-Acetaminophen] Rash   • Vytorin      SURGHX:   Past Surgical History   Procedure Laterality Date   • Abdominal hysterectomy total     • Injection sclero hemorroids     • Hemorrhoidectomy     • Hysterectomy, total abdominal     • Arthroscopy, knee     • Tonsillectomy     • Pr knee scope,diagnostic  2003;2009   • Other orthopedic surgery       knee scope x 2   • Cystocele repair  1/17/2011     [facility]   • Rectocele repair  1/17/2011     [facility]   • Bladder suspension  1/17/2011     [facility]   • Cystoscopy  1/17/2011     [facility]   • Cataract extraction with iol       SOCHX:  reports that she has never smoked. She has never used smokeless tobacco. She reports that she drinks alcohol. She reports that she does not use illicit drugs.  FH: family history includes Cancer in her father; Heart Disease in her brother and brother; Non-contributory in her mother.       Objective:     /84 mmHg  Pulse 80  Temp(Src) 36.9 °C (98.4 °F)  Resp 16  SpO2 93%     Physical Exam   Constitutional: She is oriented to person, place, and time. She appears well-developed and well-nourished. She is cooperative. She does not have a sickly appearance. She does not appear ill. No distress.   HENT:   Nose: Nose normal.    Mouth/Throat: Uvula is midline and oropharynx is clear and moist.   Eyes: Conjunctivae are normal.   Cardiovascular: Normal rate, regular rhythm, S1 normal and S2 normal.    Murmur heard.   Systolic murmur is present with a grade of 3/6   Pulmonary/Chest: Effort normal and breath sounds normal.   Abdominal: Soft. She exhibits no distension. There is no tenderness. There is no CVA tenderness.   Neurological: She is alert and oriented to person, place, and time.   Skin: Skin is warm and dry. Rash noted. Rash is urticarial.   Diffuse urticarial rash to torso greater than proximal extremities, neck. No petechia, no purpura, no evidence of secondary infection.   Psychiatric: She has a normal mood and affect.               Assessment/Plan:     1. Allergic urticaria  Advised may use Aveeno bath, OTC nonsedating antihistamine when necessary    2. Allergic drug rash due to sulfonamide  Advised avoidance of Bactrim, sulfa medications.

## 2017-05-10 NOTE — PATIENT INSTRUCTIONS
"Use over-the-counter cetirizine (Zyrtec), fexofenadine (Allegra), or loratadine (Claritin) as needed for relief of itch symptoms.  Avoid future use of trimethoprim-sulfamethoxazole/Bactrim. Avoid sulfa antibiotics.  Drug Allergy  Allergic reactions to medicines are common. Some allergic reactions are mild. A delayed type of drug allergy that occurs 1 week or more after exposure to a medicine or vaccine is called serum sickness. A life-threatening, sudden (acute) allergic reaction that involves the whole body is called anaphylaxis.  CAUSES   \"True\" drug allergies occur when there is an allergic reaction to a medicine. This is caused by overactivity of the immune system. First, the body becomes sensitized. The immune system is triggered by your first exposure to the medicine. Following this first exposure, future exposure to the same medicine may be life-threatening.  Almost any medicine can cause an allergic reaction. Common ones are:  · Penicillin.  · Sulfonamides (sulfa drugs).  · Local anesthetics.  · X-ray dyes that contain iodine.  SYMPTOMS   Common symptoms of a minor allergic reaction are:  · Swelling around the mouth.  · An itchy red rash or hives.  · Vomiting or diarrhea.  Anaphylaxis can cause swelling of the mouth and throat. This makes it difficult to breathe and swallow. Severe reactions can be fatal within seconds, even after exposure to only a trace amount of the drug that causes the reaction.  HOME CARE INSTRUCTIONS  · If you are unsure of what caused your reaction, write down:  ¨ The names of the medicines you took.  ¨ How much medicine you took.  ¨ How you took the medicine, such as whether you took a pill, injected the medicine, or applied it to your skin.  ¨ All of the things you ate and drank.  ¨ The date and time of your reaction.  ¨ The symptoms of the reaction.  · You may want to follow up with an allergy specialist after the reaction has cleared in order to be tested to confirm the allergy. " It is important to confirm that your reaction is an allergy, not just a side effect to the medicine. If you have a true allergy to a medicine, this may prevent that medicine and related medicines from being given to you when you are very ill.  · If you have hives or a rash:  ¨ Take medicines as directed by your caregiver.  ¨ You may use an over-the-counter antihistamine (diphenhydramine) as needed.  ¨ Apply cold compresses to the skin or take baths in cool water. Avoid hot baths or showers.  · If you are severely allergic:  ¨ Continuous observation after a severe reaction may be needed. Hospitalization is often required.  ¨ Wear a medical alert bracelet or necklace stating your allergy.  ¨ You and your family must learn how to use an anaphylaxis kit or give an epinephrine injection to temporarily treat an emergency allergic reaction. If you have had a severe reaction, always carry your epinephrine injection or anaphylaxis kit with you. This can be lifesaving if you have a severe reaction.  · Do not drive or perform tasks after treatment until the medicines used to treat your reaction have worn off, or until your caregiver says it is okay.  · If you have a drug allergy that was confirmed by your health care provider:  ¨ Carry information about the drug allergy with you at all times.  ¨ Always check with a pharmacist before taking any over-the-counter medicine.  SEEK MEDICAL CARE IF:   · You think you had an allergic reaction. Symptoms usually start within 30 minutes after exposure.  · Symptoms are getting worse rather than better.  · You develop new symptoms.  · The symptoms that brought you to your caregiver return.  SEEK IMMEDIATE MEDICAL CARE IF:   · You have swelling of the mouth, difficulty breathing, or wheezing.  · You have a tight feeling in your chest or throat.  · You develop hives, swelling, or itching all over your body.  · You develop severe vomiting or diarrhea.  · You feel faint or pass out.  This is  an emergency. Use your epinephrine injection or anaphylaxis kit as you have been instructed. Call for emergency medical help. Even if you improve after the injection, you need to be examined at a hospital emergency department.  MAKE SURE YOU:   · Understand these instructions.  · Will watch your condition.  · Will get help right away if you are not doing well or get worse.     This information is not intended to replace advice given to you by your health care provider. Make sure you discuss any questions you have with your health care provider.     Document Released: 12/18/2006 Document Revised: 01/08/2016 Document Reviewed: 05/23/2012  [employer] Interactive Patient Education ©2016 [employer]    Hives  Hives are itchy, red, puffy (swollen) areas of the skin. Hives can change in size and location on your body. Hives can come and go for hours, days, or weeks. Hives do not spread from person to person (noncontagious). Scratching, exercise, and stress can make your hives worse.  HOME CARE  · Avoid things that cause your hives (triggers).  · Take antihistamine medicines as told by your doctor. Do not drive while taking an antihistamine.  · Take any other medicines for itching as told by your doctor.  · Wear loose-fitting clothing.  · Keep all doctor visits as told.  GET HELP RIGHT AWAY IF:   · You have a fever.  · Your tongue or lips are puffy.  · You have trouble breathing or swallowing.  · You feel tightness in the throat or chest.  · You have belly (abdominal) pain.  · You have lasting or severe itching that is not helped by medicine.  · You have painful or puffy joints.  These problems may be the first sign of a life-threatening allergic reaction. Call your local emergency services (911 in [place]  MAKE SURE YOU:   · Understand these instructions.  · Will watch your condition.  · Will get help right away if you are not doing well or get worse.     This information is not intended to replace advice given to you by  your health care provider. Make sure you discuss any questions you have with your health care provider.     Document Released: 09/26/2009 Document Revised: 06/18/2013 Document Reviewed: 03/12/2013  [employer] Interactive Patient Education ©2016 [employer]

## 2017-05-10 NOTE — MR AVS SNAPSHOT
Nini Hightowersarah beth   5/10/2017 3:00 PM   Office Visit   MRN: [id number]    Department:  [facility]   Dept Phone:  [phone]    Description:  Female : 1932   Provider:  Oc Crawford M.D.           Reason for Visit     Rash itchy/ red rash mostly on the back/ pt has Hx of Shingles      Allergies as of 5/10/2017     Allergen Noted Reactions    Vicodin [Hydrocodone-Acetaminophen] 2010   Rash    Vytorin 12/15/2008         You were diagnosed with     Allergic urticaria   [708.0.ICD-9-CM]         Vital Signs     Blood Pressure Pulse Temperature Respirations Oxygen Saturation Smoking Status    122/84 mmHg 80 36.9 °C (98.4 °F) 16 93% Never Smoker       Basic Information     Date Of Birth Sex Race Ethnicity Preferred Language    1932 Female White Non- English      Your appointments     May 11, 2017  2:20 PM   Initial Visit with Michael J Bloch, M.D., [facility] EXAM 1   [facility]  (--)    [address] [address]   [phone]            Oct 13, 2017 10:00 AM   Follow up Pharmacotherapy Visit with [facility] EXAM 4   [facility]  (--)    [address] [address]   [phone]              Problem List              ICD-10-CM Priority Class Noted - Resolved    GERD (gastroesophageal reflux disease) K21.9   Unknown - Present    OSTEOPOROSIS    Unknown - Present    Insomnia G47.00   Unknown - Present    Blepharospasm syndrome G24.5   Unknown - Present    Hyperlipidemia E78.5   2012 - Present    Vitamin D deficiency disease E55.9   Unknown - Present    Female bladder prolapse, acquired N81.10   Unknown - Present    Hypertension I10   Unknown - Present    Edema (Chronic) R60.9   2012 - Present    Varicose veins (Chronic) I86.8   2012 - Present    Atherosclerosis of native arteries of extremity with intermittent claudication (CMS-HCC) I70.219    10/16/2013 - Present    RUY treated with BiPAP G47.33   4/17/2014 - Present    Palpitations R00.2   4/24/2015 - Present    Other chest pain R07.89   7/17/2015 - Present    Abdominal aortic aneurysm (CMS-HCC) I71.4   7/17/2015 - Present    SVT (supraventricular tachycardia) I47.1   8/17/2015 - Present    Palpitation R00.2   8/17/2015 - Present    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   12/22/2016 - Present    Chronic insomnia F51.04   1/10/2017 - Present      Health Maintenance        Date Due Completion Dates    IMM DTaP/Tdap/Td Vaccine (1 - Tdap) 7/8/1951 ---    IMM ZOSTER VACCINE 7/8/1992 ---    BONE DENSITY 7/8/1997 ---    MAMMOGRAM 10/18/2017 10/18/2016    IMM PNEUMOCOCCAL 65+ (ADULT) LOW/MEDIUM RISK SERIES (2 of 2 - PPSV23) 10/19/2017 10/19/2016    COLONOSCOPY 2/19/2023 2/19/2013 (Done)    Override on 2/19/2013: Done            Current Immunizations     13-VALENT PCV PREVNAR 10/19/2016    Influenza TIV (IM) 10/15/2011    Influenza Vaccine 10/1/2010    Influenza Vaccine Quad Inj (Preserved) 10/19/2016    Pneumococcal Vaccine (UF)Historical Data 10/1/2009      Below and/or attached are the medications your provider expects you to take. Review all of your home medications and newly ordered medications with your provider and/or pharmacist. Follow medication instructions as directed by your provider and/or pharmacist. Please keep your medication list with you and share with your provider. Update the information when medications are discontinued, doses are changed, or new medications (including over-the-counter products) are added; and carry medication information at all times in the event of emergency situations     Allergies:  VICODIN - Rash     VYTORIN - (reactions not documented)               Medications  Valid as of: May 10, 2017 -  3:26 PM    Generic Name Brand Name Tablet Size Instructions for use    Cholecalciferol (Tab) cholecalciferol 1000 UNIT Take 1 Tab by mouth every day.        Estrogens, Conjugated (Cream) PREMARIN  0.625 MG/GM INSERT 1 GRAM VAGINALLY 3 TIMES WEEKLY        Lansoprazole   Take  by mouth.        Metoprolol Tartrate (Tab) LOPRESSOR 25 MG TAKE ONE TABLET BY MOUTH TWICE DAILY        Mometasone Furoate (Suspension) NASONEX 50 MCG/ACT Spray 2 Sprays in nose every day.        Multiple Vitamins-Minerals   Take 1 Tab by mouth every day.        Omeprazole (CAPSULE DELAYED RELEASE) PRILOSEC 20 MG Take 20 mg by mouth every day.        Rosuvastatin Calcium (Tab) CRESTOR 10 MG Take 1 Tab by mouth every evening.        .                 Medicines prescribed today were sent to:     [facility] [address] [place] [address] - [address] [id number] [facility] [address]    Phone: [phone] Fax: [phone]    Open 24 Hours?: No      Medication refill instructions:       If your prescription bottle indicates you have medication refills left, it is not necessary to call your provider’s office. Please contact your pharmacy and they will refill your medication.    If your prescription bottle indicates you do not have any refills left, you may request refills at any time through one of the following ways: The online [employer] system (except Urgent Care), by calling your provider’s office, or by asking your pharmacy to contact your provider’s office with a refill request. Medication refills are processed only during regular business hours and may not be available until the next business day. Your provider may request additional information or to have a follow-up visit with you prior to refilling your medication.   *Please Note: Medication refills are assigned a new Rx number when refilled electronically. Your pharmacy may indicate that no refills were authorized even though a new prescription for the same medication is available at the pharmacy. Please request the medicine by name with the pharmacy before contacting your provider for a refill.        Other Notes About Your Plan     DME:  [facility] / ph [phone] /  fax [phone]             MyChart Status: Patient Declined

## 2017-05-11 ENCOUNTER — OFFICE VISIT (OUTPATIENT)
Dept: VASCULAR LAB | Facility: MEDICAL CENTER | Age: 82
End: 2017-05-11
Attending: INTERNAL MEDICINE
Payer: MEDICARE

## 2017-05-11 VITALS
SYSTOLIC BLOOD PRESSURE: 133 MMHG | HEIGHT: 62 IN | WEIGHT: 127 LBS | HEART RATE: 71 BPM | BODY MASS INDEX: 23.37 KG/M2 | DIASTOLIC BLOOD PRESSURE: 77 MMHG

## 2017-05-11 DIAGNOSIS — I10 ESSENTIAL HYPERTENSION: ICD-10-CM

## 2017-05-11 DIAGNOSIS — I71.40 ABDOMINAL AORTIC ANEURYSM (AAA) WITHOUT RUPTURE (HCC): ICD-10-CM

## 2017-05-11 DIAGNOSIS — E78.5 HYPERLIPIDEMIA, UNSPECIFIED HYPERLIPIDEMIA TYPE: ICD-10-CM

## 2017-05-11 PROCEDURE — G8599 NO ASA/ANTIPLAT THER USE RNG: HCPCS | Performed by: INTERNAL MEDICINE

## 2017-05-11 PROCEDURE — 1101F PT FALLS ASSESS-DOCD LE1/YR: CPT | Performed by: INTERNAL MEDICINE

## 2017-05-11 PROCEDURE — 99212 OFFICE O/P EST SF 10 MIN: CPT

## 2017-05-11 PROCEDURE — 99213 OFFICE O/P EST LOW 20 MIN: CPT | Performed by: INTERNAL MEDICINE

## 2017-05-11 PROCEDURE — 1036F TOBACCO NON-USER: CPT | Performed by: INTERNAL MEDICINE

## 2017-05-11 PROCEDURE — 4040F PNEUMOC VAC/ADMIN/RCVD: CPT | Performed by: INTERNAL MEDICINE

## 2017-05-11 PROCEDURE — G8432 DEP SCR NOT DOC, RNG: HCPCS | Performed by: INTERNAL MEDICINE

## 2017-05-11 PROCEDURE — G8420 CALC BMI NORM PARAMETERS: HCPCS | Performed by: INTERNAL MEDICINE

## 2017-05-11 NOTE — PROGRESS NOTES
"    [facility] - Follow Up Visit    Date of Service: 5-12-17    Patient here for f/u of dyslpidemia, htn, and AAA    HPI  History of ASCVD:   Small AAA - sees Dr. Mccray - states had recent u/s at [facility] - stable by her report.  Denies h/o CAD, PAD, or tia/stroke  Current Prescription Lipid Lowering Medications - including dose:   Statin: Rosuvastatin 10 mg MWF   Non-Statin: None  Current Lipid Lowering and Related Supplements:   CoQ10 +  Vit D  No asa due to GERD  Any Current Side Effects Potentially Related to Lipid Lowering therapy?   No  Current Adherence to Lipid Lowering Therapies   Completely adherent  Previously Attempted Interventions for Lipids - including outcome  Statin: Simvastatin Unknown dose/duration - myalgias                Crestor unkown dose, duration \"years\" - states she started getting sharp \"zings\" down her arms.              Atorvastatin unknown dose, duration of 90 days - severe UE myalgia    Non-Statin: States none, however MR shows possibly Vytorin use              Outcome: Unknown  Any Previous History of Statin Intolerance?   Yes, Details: See above.    Baseline Lipids Prior to Treatment:          2/6/2017 12:20     Cholesterol,Tot   274 (H)     Triglycerides   101     HDL   58     LDL   196 (H)       Other CV risk factors:  Hypertension - will controlled in office on metoprolol.  Not checking BP at home  + FH of MI prior to 50 in brother    SOCIAL HISTORY   History     Smoking status     •   Never Smoker      Smokeless tobacco     •   Not on file       Change in weight: Stable  Exercise habits: walking every day  Diet: common adult    ROS  No headaches  No chest pain or palpitations  No sob or cough  No myalgias  No tia or cva symptoms  No depression or anxiety    Physical Exam  Vitals as attached  No bruits  Lungs cta  RRR with no murmer  No edema; good pulses  No tendon xathanomas  A few varicosities noted  Cn II-xii intac; no drift; normal gait  Normal " affect    DATA REVIEW:  Most Recent Lipid Panel:  4/14/2017 09:54   4/6/2017 14:53   CPK Total 116   Cholesterol,Tot 184   Triglycerides 89   HDL 56    (H)     Other Pertinent Blood Work:   Lab Results     Component   Value   Date        SODIUM   136   02/06/2017        POTASSIUM   4.2   02/06/2017        CHLORIDE   101   02/06/2017        CO2   32   02/06/2017        ANION   3.0   02/06/2017        GLUCOSE   85   02/06/2017        BUN   17   02/06/2017        CREATININE   1.01   02/06/2017        CALCIUM   9.7   02/06/2017        ASTSGOT   19   02/06/2017        ALTSGPT   8   02/06/2017        ALKPHOSPHAT   75   02/06/2017        TBILIRUBIN   1.1   02/06/2017        ALBUMIN   4.0   02/06/2017        AGRATIO   1.4   02/06/2017        CREACTPROT   0.20   11/29/2016        TSHULTRASEN   1.520   07/05/2016        CPKTOTAL   38   12/22/2016 4/14/2017 09:54   4/6/2017 14:53   25-Hydroxy   Vitamin D 25 22 (L)     Other: NA    Recent Imaging Studies:      Carotid December 2016   Mild bilateral internal carotid artery stenosis (<50%).    Flow within both subclavian arteries appears to be within normal limits.    Antegrade flow, bilateral vertebral arteries.     Echocardiogram dec 2016:  Agitated saline (bubble) study was performed with no evidence of right   to left shunt with and without Valsalva maneuver.  Normal left ventricular systolic function.  Left ventricular ejection fraction is visually estimated to be 70%.  Asymmetric septal hypertrophy.  Mild aortic stenosis.  Mild mitral annular calcification.  Very mild mitral regurgitation.  Right heart pressures are consistent with mild pulmonary hypertension    CT may 2014  .1.  Partially calcified 18 mm mass or cyst interposed between the 3rd portion of duodenum and inferior vena cava. Differential is broad and includes duodenal diverticulum, adenopathy, or mass protruding off the duodenum. Consider GI consultation  2.  2.0 cm saccular infrarenal abdominal  aortic aneurysm  3.  Previous hysterectomy and probably appendectomy  4.  Fatty infiltration of the liver    ASSESSMENT AND PLAN  Patient Type, check all that apply:    Secondary Prevention (Abdominal aortic aneurism)  Established Atherosclerotic Cardiovascular Disease (ASCVD)  AAA - will get results of recent aortic ultrasound for review, but will defer further surveillance to Dr. 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):   Likely - based on baseline LDL-C and +FH of premature CAD in brother - recommended cascade screening  ACC/AHA Indication for Statin Therapy, gurpreet all that apply:  LDL-C at baseline >190 mg/dl: Indication for High intensity statin    Calculated Risk for ASCVD, if applicable    N/A  Other Significant Risk Markers, if any, guprreet all that apply   + Family History of premature CAD  National Lipid Association (NLA) Goal  LDL-C:   <70 mg/dL  Lifestyle Recommendations From Today’s Visit:    Continue much improved exercise habits  Statin Therapy Recommendations from Today’s Visit:   Has had trouble tolerating statins in the past, including atrovastatin, simvastatin, and higher dose rosuvastatin.  Is tolerating this dose of rosuvastatin  Given age and above history, I am not inclined to increase crestor dose  - continue crestor 10 mg 3x per week  Non-Statin Prescription Medications Recommendations from Today’s Visit:   None currently  Consider zetia if LDL-C >130 in future  Indication for PCSK9 Inhibitor, if applicable:  Patient does have evidence of ASCVD and FH - may be candidate in future, but at present, I think that her LDL-C is reasonably well controlled for the current clinical scenario  Supplements Recommended at this visit:   - continue Vit D  - hold asa given GERD    Blood Work Ordered At Today’s  visit: CmP, ck, lipid panel prior to next visit  Follow-Up: 6 months. In pharmacotherapy clinic    Michael J. Bloch, MD  [facility]

## 2017-06-15 ENCOUNTER — TELEPHONE (OUTPATIENT)
Dept: MEDICAL GROUP | Facility: PHYSICIAN GROUP | Age: 82
End: 2017-06-15

## 2017-06-15 DIAGNOSIS — R30.0 DYSURIA: ICD-10-CM

## 2017-06-15 NOTE — TELEPHONE ENCOUNTER
1. Caller Name: Nini Taylor                                           Call Back Number: [phone] (home)         Patient approves a detailed voicemail message: N\A    Pt was seen 05/02 for a bladder infection and thinks she has it again.  She is in [place] today but will be back sometime tomorrow and is asking if she can come in and so a UA.

## 2017-06-16 ENCOUNTER — TELEPHONE (OUTPATIENT)
Dept: MEDICAL GROUP | Facility: PHYSICIAN GROUP | Age: 82
End: 2017-06-16

## 2017-06-16 ENCOUNTER — HOSPITAL ENCOUNTER (OUTPATIENT)
Dept: LAB | Facility: MEDICAL CENTER | Age: 82
End: 2017-06-16
Attending: FAMILY MEDICINE
Payer: MEDICARE

## 2017-06-16 DIAGNOSIS — N30.01 ACUTE CYSTITIS WITH HEMATURIA: ICD-10-CM

## 2017-06-16 DIAGNOSIS — R30.0 DYSURIA: ICD-10-CM

## 2017-06-16 LAB
APPEARANCE UR: ABNORMAL
BACTERIA #/AREA URNS HPF: ABNORMAL /HPF
BILIRUB UR QL STRIP.AUTO: NEGATIVE
COLOR UR: YELLOW
CULTURE IF INDICATED INDCX: YES UA CULTURE
EPI CELLS #/AREA URNS HPF: ABNORMAL /HPF
GLUCOSE UR STRIP.AUTO-MCNC: NEGATIVE MG/DL
KETONES UR STRIP.AUTO-MCNC: NEGATIVE MG/DL
LEUKOCYTE ESTERASE UR QL STRIP.AUTO: ABNORMAL
MICRO URNS: ABNORMAL
NITRITE UR QL STRIP.AUTO: POSITIVE
PH UR STRIP.AUTO: 7 [PH]
PROT UR QL STRIP: NEGATIVE MG/DL
RBC # URNS HPF: ABNORMAL /HPF
RBC UR QL AUTO: NEGATIVE
SP GR UR STRIP.AUTO: 1.01
WBC #/AREA URNS HPF: ABNORMAL /HPF

## 2017-06-16 PROCEDURE — 87077 CULTURE AEROBIC IDENTIFY: CPT

## 2017-06-16 PROCEDURE — 87186 SC STD MICRODIL/AGAR DIL: CPT

## 2017-06-16 PROCEDURE — 81001 URINALYSIS AUTO W/SCOPE: CPT

## 2017-06-16 PROCEDURE — 87086 URINE CULTURE/COLONY COUNT: CPT

## 2017-06-16 RX ORDER — CIPROFLOXACIN 500 MG/1
500 TABLET, FILM COATED ORAL 2 TIMES DAILY
Qty: 14 TAB | Refills: 0 | Status: SHIPPED | OUTPATIENT
Start: 2017-06-16 | End: 2017-06-23

## 2017-06-16 NOTE — TELEPHONE ENCOUNTER
----- Message from Nevaeh Munroe M.D. sent at 6/16/2017  3:07 PM PDT -----  Positive for UTI - antibiotics being sent in.

## 2017-06-16 NOTE — TELEPHONE ENCOUNTER
Number [phone] did not go through. Called other listed number and left vm for Pt to call office back.

## 2017-06-16 NOTE — TELEPHONE ENCOUNTER
1. Caller Name: Nini Taylor                                           Call Back Number: [phone] (home)         Patient approves a detailed voicemail message: N\A    Pt called checking on results of UA she had done this morning

## 2017-06-18 LAB
BACTERIA UR CULT: ABNORMAL
SIGNIFICANT IND 70042: ABNORMAL
SOURCE SOURCE: ABNORMAL

## 2017-06-19 ENCOUNTER — TELEPHONE (OUTPATIENT)
Dept: MEDICAL GROUP | Facility: PHYSICIAN GROUP | Age: 82
End: 2017-06-19

## 2017-06-19 NOTE — TELEPHONE ENCOUNTER
----- Message from GIANNI Alves sent at 6/18/2017  6:07 PM PDT -----  Urine culture is positive. Please advise patient to complete entire course of abx.  Thank you

## 2017-06-22 PROBLEM — D49.2 NEOPLASM OF UNSPECIFIED BEHAVIOR OF BONE, SOFT TISSUE, AND SKIN: Status: RESOLVED | Noted: 2017-06-08 | Resolved: 2017-06-22

## 2017-06-30 ENCOUNTER — PATIENT OUTREACH (OUTPATIENT)
Dept: HEALTH INFORMATION MANAGEMENT | Facility: OTHER | Age: 82
End: 2017-06-30

## 2017-06-30 NOTE — PROGRESS NOTES
Outcome:Requested a call back / before noon     WebIZ Checked & Epic Updated:  yes    HealthConnect Verified: no    Attempt # 1

## 2017-07-10 ENCOUNTER — TELEPHONE (OUTPATIENT)
Dept: CARDIOLOGY | Facility: MEDICAL CENTER | Age: 82
End: 2017-07-10

## 2017-07-10 NOTE — TELEPHONE ENCOUNTER
----- Message from Magaly Bentley sent at 7/10/2017 11:05 AM PDT -----  Regarding: question about delayed appt  ST/Sarah      Patient said appt was originally scheduled for April but the appt has been changed 3 times, she's concerned about it being changed and now she is almost 5 months past her annual visit. She can be reached at [phone].

## 2017-07-10 NOTE — TELEPHONE ENCOUNTER
Pt has questions about appointment both for her and . She states that she would like to FU with Richard RN when she returns back to the office tomorrow to discuss concerns further. She is mostly concerned about waiting until August for FU appointment.     To Richard to call pt back tomorrow. Pt states that it is OK to leave a message if she is not home. Thank you

## 2017-07-11 NOTE — TELEPHONE ENCOUNTER
Called pt and informed her that Richard will be back in the office next week. Pt appreciative of call and will FU with Richard next week.

## 2017-07-18 NOTE — PROGRESS NOTES
Attempt #:2    WebIZ Checked & Epic Updated: yes  HealthConnect Verified: no  Verify PCP: yes    Communication Preference Obtained: yes     Review Care Team: yes    Annual Wellness Visit Scheduling  1. Scheduling Status:Not Scheduled. Patient states they are not interested    Care Gap Scheduling (Attempt to Schedule EACH Overdue Care Gap!)     Health Maintenance Due   Topic Date Due   • IMM DTaP/Tdap/Td Vaccine (1 - Tdap) PT IS AWARE AND WILL SCHEDULE WITH SHE GETS BACK FROM VACATION    • IMM ZOSTER VACCINE  PT IS AWARE AND WILL SCHEDULE WITH SHE GETS BACK FROM VACATION    • BONE DENSITY  PT IS AWARE AND WILL SCHEDULE WITH SHE GETS BACK FROM VACATION    • Annual Wellness Visit  PT IS AWARE AND WILL SCHEDULE WITH SHE GETS BACK FROM VACATION          qualifyor Activation: declined  qualifyor Giana: no  Virtual Visits: no  Opt In to Text Messages: no

## 2017-08-31 DIAGNOSIS — I47.10 SVT (SUPRAVENTRICULAR TACHYCARDIA): ICD-10-CM

## 2017-09-27 ENCOUNTER — APPOINTMENT (RX ONLY)
Dept: URBAN - METROPOLITAN AREA CLINIC 4 | Facility: CLINIC | Age: 82
Setting detail: DERMATOLOGY
End: 2017-09-27

## 2017-09-27 DIAGNOSIS — Z85.828 PERSONAL HISTORY OF OTHER MALIGNANT NEOPLASM OF SKIN: ICD-10-CM

## 2017-09-27 DIAGNOSIS — L91.0 HYPERTROPHIC SCAR: ICD-10-CM

## 2017-09-27 PROCEDURE — 99213 OFFICE O/P EST LOW 20 MIN: CPT

## 2017-09-27 PROCEDURE — ? COUNSELING

## 2017-09-27 PROCEDURE — ? OBSERVATION

## 2017-09-27 ASSESSMENT — LOCATION SIMPLE DESCRIPTION DERM: LOCATION SIMPLE: RIGHT NOSE

## 2017-09-27 ASSESSMENT — LOCATION ZONE DERM: LOCATION ZONE: NOSE

## 2017-09-27 ASSESSMENT — LOCATION DETAILED DESCRIPTION DERM: LOCATION DETAILED: RIGHT NASAL ALA

## 2017-09-27 NOTE — PROCEDURE: OBSERVATION
Body Location Override (Optional - Billing Will Still Be Based On Selected Body Map Location If Applicable): right nasal ala
Size Of Lesion In Cm (Optional): 0
Detail Level: Detailed

## 2017-09-27 NOTE — HPI: SKIN LESION (BASAL CELL CARCINOMA)
Is This A New Presentation, Or A Follow-Up?: Follow Up Basal Cell Carcinoma
Additional History: Pt treated site with imiquimod for 6 weeks with a positive reaction.
Location From Outside Provider (Will Override Previously Chosen Location): Right nasal ala
When Was Basal Cell Biopsied? (Optional): 6-7-17
Accession # (Optional):

## 2017-09-29 ENCOUNTER — HOSPITAL ENCOUNTER (OUTPATIENT)
Dept: RADIOLOGY | Facility: MEDICAL CENTER | Age: 82
End: 2017-09-29
Attending: FAMILY MEDICINE
Payer: MEDICARE

## 2017-09-29 ENCOUNTER — OFFICE VISIT (OUTPATIENT)
Dept: MEDICAL GROUP | Facility: PHYSICIAN GROUP | Age: 82
End: 2017-09-29
Payer: MEDICARE

## 2017-09-29 ENCOUNTER — TELEPHONE (OUTPATIENT)
Dept: MEDICAL GROUP | Facility: PHYSICIAN GROUP | Age: 82
End: 2017-09-29

## 2017-09-29 VITALS
OXYGEN SATURATION: 95 % | HEART RATE: 76 BPM | RESPIRATION RATE: 14 BRPM | DIASTOLIC BLOOD PRESSURE: 78 MMHG | WEIGHT: 129 LBS | SYSTOLIC BLOOD PRESSURE: 126 MMHG | BODY MASS INDEX: 23.74 KG/M2 | TEMPERATURE: 97.5 F | HEIGHT: 62 IN

## 2017-09-29 DIAGNOSIS — R07.81 RIB PAIN ON RIGHT SIDE: ICD-10-CM

## 2017-09-29 DIAGNOSIS — Z23 NEED FOR INFLUENZA VACCINATION: ICD-10-CM

## 2017-09-29 DIAGNOSIS — N64.4 BREAST PAIN, RIGHT: ICD-10-CM

## 2017-09-29 PROCEDURE — 71101 X-RAY EXAM UNILAT RIBS/CHEST: CPT | Mod: RT

## 2017-09-29 PROCEDURE — 99213 OFFICE O/P EST LOW 20 MIN: CPT | Mod: 25 | Performed by: FAMILY MEDICINE

## 2017-09-29 PROCEDURE — G0008 ADMIN INFLUENZA VIRUS VAC: HCPCS | Performed by: FAMILY MEDICINE

## 2017-09-29 PROCEDURE — 90662 IIV NO PRSV INCREASED AG IM: CPT | Performed by: FAMILY MEDICINE

## 2017-09-29 RX ORDER — NITROFURANTOIN MACROCRYSTALS 50 MG/1
50 CAPSULE ORAL DAILY
COMMUNITY
End: 2018-07-26

## 2017-09-29 NOTE — TELEPHONE ENCOUNTER
----- Message from Nevaeh Munroe M.D. sent at 9/29/2017  4:37 PM PDT -----  No rib fractures, just bruised.  Tylenol as needed for pain

## 2017-09-29 NOTE — PROGRESS NOTES
Chief Complaint   Patient presents with   • Breast Pain     rt breast pain from injury       HISTORY OF PRESENT ILLNESS: Patient is a 85 y.o. female established patient here today for the following concerns:    1. Breast pain, right  3. Rib pain on right side  Nini reports that she was in the car on the 17th and twisted to reach for something behind her seat when her foot slipped out and she struck her chest with all her weight on the hand rest causing excruciating pain and trouble taking a deep breath.  No cough, hemoptysis or SOB.  + Pleurodynia.       2. Need for influenza vaccination  Due for flu vaccine.           Past Medical, Social, and Family history reviewed and updated in EPIC    Allergies:Bactrim ds; Sulfa drugs; Vicodin [hydrocodone-acetaminophen]; and Vytorin    Current Outpatient Prescriptions   Medication Sig Dispense Refill   • nitrofurantoin (MACRODANTIN) 50 MG Cap Take 50 mg by mouth every day.     • metoprolol (LOPRESSOR) 25 MG Tab Take 1 Tab by mouth 2 times a day. 180 Tab 3   • Coenzyme Q10 (CO Q 10 PO) Take  by mouth.     • PREMARIN 0.625 MG/GM Cream INSERT 1 GRAM VAGINALLY 3 TIMES WEEKLY 1 Tube 0   • vitamin D (CHOLECALCIFEROL) 1000 UNIT Tab Take 1 Tab by mouth every day. 60 Tab 5   • rosuvastatin (CRESTOR) 10 MG Tab Take 1 Tab by mouth every evening. 90 Tab 3   • mometasone (NASONEX) 50 MCG/ACT nasal spray Spray 2 Sprays in nose every day. 1 Inhaler 5   • Multiple Vitamins-Minerals (CENTRUM SILVER PO) Take 1 Tab by mouth every day.     • Lansoprazole (PREVACID PO) Take  by mouth.     • omeprazole (PRILOSEC) 20 MG CPDR Take 20 mg by mouth every day.       No current facility-administered medications for this visit.          ROS:  Review of Systems   Constitutional: Negative for fever, chills, weight loss and malaise/fatigue.   HENT: Negative for ear pain, nosebleeds, congestion, sore throat and neck pain.    Eyes: Negative for blurred vision.   Respiratory: Negative for cough, sputum  "production, shortness of breath and wheezing.    Cardiovascular: Negative for chest pain, palpitations,  and leg swelling.   Gastrointestinal: Negative for heartburn, nausea, vomiting, diarrhea and abdominal pain.   Genitourinary: Negative for dysuria, urgency and frequency.   Musculoskeletal: Negative for myalgias, back pain and joint pain.   Skin: Negative for rash and itching.   Neurological: Negative for dizziness, tingling, tremors, sensory change, focal weakness and headaches.   Endo/Heme/Allergies: Does not bruise/bleed easily.   Psychiatric/Behavioral: Negative for depression, anxiety, suicidal ideas, insomnia and memory loss.      Exam:  Blood pressure 126/78, pulse 76, temperature 36.4 °C (97.5 °F), resp. rate 14, height 1.575 m (5' 2.01\"), weight 58.5 kg (129 lb), SpO2 95 %.    General:  Well nourished, well developed in NAD  Head is grossly normal.  Neck: Supple without JVD   Pulmonary:  Normal effort. CTAB now w/r//c  Cardiovascular: Regular rate  Chest: bony tenderness along the ribs just under the right breast without stepoffs.   Extremities: no clubbing, cyanosis, or edema.  Psych: affect appropriate      Please note that this dictation was created using voice recognition software. I have made every reasonable attempt to correct obvious errors, but I expect that there are errors of grammar and possibly content that I did not discover before finalizing the note.    Assessment/Plan:  1. Breast pain, right  - [id number] (WITH 1-VIEW CXR) RIGHT; Future    2. Need for influenza vaccination  - INFLUENZA VACCINE, HIGH DOSE (65+ ONLY)    3. Rib pain on right side  Tylenol for pain, r/o rib fx.   - [id number] (WITH 1-VIEW CXR) RIGHT; Future            "

## 2017-10-12 ENCOUNTER — HOSPITAL ENCOUNTER (OUTPATIENT)
Dept: LAB | Facility: MEDICAL CENTER | Age: 82
End: 2017-10-12
Attending: INTERNAL MEDICINE
Payer: MEDICARE

## 2017-10-12 LAB
ALBUMIN SERPL BCP-MCNC: 4.1 G/DL (ref 3.2–4.9)
ALBUMIN/GLOB SERPL: 1.5 G/DL
ALP SERPL-CCNC: 75 U/L (ref 30–99)
ALT SERPL-CCNC: 13 U/L (ref 2–50)
ANION GAP SERPL CALC-SCNC: 7 MMOL/L (ref 0–11.9)
AST SERPL-CCNC: 19 U/L (ref 12–45)
BILIRUB SERPL-MCNC: 1.4 MG/DL (ref 0.1–1.5)
BUN SERPL-MCNC: 16 MG/DL (ref 8–22)
CALCIUM SERPL-MCNC: 9.3 MG/DL (ref 8.5–10.5)
CHLORIDE SERPL-SCNC: 105 MMOL/L (ref 96–112)
CHOLEST SERPL-MCNC: 177 MG/DL (ref 100–199)
CK SERPL-CCNC: 50 U/L (ref 0–154)
CO2 SERPL-SCNC: 28 MMOL/L (ref 20–33)
CREAT SERPL-MCNC: 0.9 MG/DL (ref 0.5–1.4)
GFR SERPL CREATININE-BSD FRML MDRD: 59 ML/MIN/1.73 M 2
GLOBULIN SER CALC-MCNC: 2.7 G/DL (ref 1.9–3.5)
GLUCOSE SERPL-MCNC: 90 MG/DL (ref 65–99)
HDLC SERPL-MCNC: 52 MG/DL
LDLC SERPL CALC-MCNC: 104 MG/DL
POTASSIUM SERPL-SCNC: 4.1 MMOL/L (ref 3.6–5.5)
PROT SERPL-MCNC: 6.8 G/DL (ref 6–8.2)
SODIUM SERPL-SCNC: 140 MMOL/L (ref 135–145)
TRIGL SERPL-MCNC: 107 MG/DL (ref 0–149)

## 2017-10-12 PROCEDURE — 82550 ASSAY OF CK (CPK): CPT

## 2017-10-12 PROCEDURE — 80053 COMPREHEN METABOLIC PANEL: CPT

## 2017-10-12 PROCEDURE — 80061 LIPID PANEL: CPT

## 2017-10-12 PROCEDURE — 36415 COLL VENOUS BLD VENIPUNCTURE: CPT

## 2017-10-13 ENCOUNTER — NON-PROVIDER VISIT (OUTPATIENT)
Dept: VASCULAR LAB | Facility: MEDICAL CENTER | Age: 82
End: 2017-10-13
Attending: INTERNAL MEDICINE
Payer: MEDICARE

## 2017-10-13 ENCOUNTER — TELEPHONE (OUTPATIENT)
Dept: CARDIOLOGY | Facility: MEDICAL CENTER | Age: 82
End: 2017-10-13

## 2017-10-13 VITALS — SYSTOLIC BLOOD PRESSURE: 131 MMHG | HEART RATE: 91 BPM | DIASTOLIC BLOOD PRESSURE: 79 MMHG

## 2017-10-13 DIAGNOSIS — I70.219 ATHEROSCLEROSIS OF NATIVE ARTERY OF EXTREMITY WITH INTERMITTENT CLAUDICATION, UNSPECIFIED EXTREMITY (HCC): ICD-10-CM

## 2017-10-13 PROCEDURE — 99212 OFFICE O/P EST SF 10 MIN: CPT

## 2017-10-13 NOTE — TELEPHONE ENCOUNTER
----- Message from Montez Kamara M.D. sent at 10/12/2017  9:15 PM PDT -----  Labs look good, please let her know     Thank you

## 2017-10-13 NOTE — PROGRESS NOTES
"Date of Service: 10/13/17    Nini Taylor has been referred for evaluation and management of dyslipidemia    Referral Source: Brigitte EDDY  History of ASCVD: Yes, Details: (Abdominal aortic aneurism)  Current Prescription Lipid Lowering Medications - including dose:   Statin: Statin: Rosuvastatin 10 mg MWF   Non-statin: none  Current Lipid Lowering and Related Supplements:   CoQ10  And  Vit D  Any Current Side Effects Potentially Related to Lipid Lowering therapy?   No  Current Adherence to Lipid Lowering Therapies   Complete  Previously Attempted Interventions for Lipids - including outcome  Statin: Statin: Simvastatin Unknown dose/duration - myalgias                Crestor unkown dose, duration \"years\" - states she started getting sharp \"zings\" down her arms.              Atorvastatin unknown dose, duration of 90 days - severe UE myalgia    Non-Statin: States none              Outcome: Unknown   Any Previous History of Statin Intolerance?   Yes, Details: see above  Baseline Lipids Prior to Treatment:   Shown here:  LDL-C: 196  nonHDL-C: 216  HDL-C: 58  Trigs: 101 Date: 02/06/2017    Other Pertinent History: States that she has pain on right leg when she lays on it for long periods of time  History of other CV risk factors: Hypertension - will controlled in office on metoprolol  PAST MEDICAL HISTORY:  has a past medical history of AAA (abdominal aortic aneurysm) (CMS-[facility]); Atherosclerosis of native arteries of the extremities with intermittent claudication (10/16/2013); Blepharospasm syndrome; Bright red rectal bleeding (6/24/2015); Chest pain (07/2015); Constipation; Edema (5/29/2012); Female bladder prolapse, acquired; GERD (gastroesophageal reflux disease); Hyperlipidemia; Hypertension; Insomnia; OSTEOPOROSIS; Palpitations (5/29/2012); Sleep apnea; Varicose veins (5/29/2012); and Vitamin D deficiency disease.  PAST SURGICAL HISTORY:  has a past surgical history that includes abdominal hysterectomy " "total; injection sclero hemorroids; hemorrhoidectomy; hysterectomy, total abdominal; arthroscopy, knee; tonsillectomy; knee scope,diagnostic (2003;2009); other orthopedic surgery; cystocele repair (1/17/2011); rectocele repair (1/17/2011); bladder suspension (1/17/2011); cystoscopy (1/17/2011); and cataract extraction with iol.  CURRENT MEDICATIONS:   Current Outpatient Prescriptions:   •  nitrofurantoin, 50 mg, Oral, DAILY  •  metoprolol, 25 mg, Oral, BID, 9/29/2017  •  Coenzyme Q10 (CO Q 10 PO), Take  by mouth., 9/29/2017  •  PREMARIN, INSERT 1 GRAM VAGINALLY 3 TIMES WEEKLY, 9/29/2017  •  vitamin D, 1,000 Units, Oral, DAILY, 9/29/2017  •  rosuvastatin, 10 mg, Oral, Q EVENING, 9/29/2017  •  mometasone, 2 Spray, Nasal, DAILY, 9/29/2017  •  Lansoprazole (PREVACID PO), Take  by mouth.  •  Multiple Vitamins-Minerals (CENTRUM SILVER PO), 1 Tab, Oral, DAILY, 9/29/2017  •  omeprazole, 20 mg, Oral, DAILY  ALLERGIES: Bactrim ds; Sulfa drugs; Vicodin [hydrocodone-acetaminophen]; and Vytorin    SOCIAL HISTORY   History   Smoking Status   • Never Smoker   Smokeless Tobacco   • Never Used     Change in weight: Stable  Exercise habits: no regular exercise program walks outside for 15 mins but states that she does chores at home for physical activity  Diet: low carbohydrate, has been eating \"alot more salads\"    ROS  Physical Exam   No myalgias  No headaches  No blood in stool    DATA REVIEW:  Most Recent Lipid Panel:   Lab Results   Component Value Date    CHOLSTRLTOT 177 10/12/2017    TRIGLYCERIDE 107 10/12/2017    HDL 52 10/12/2017     (H) 10/12/2017       Other Pertinent Blood Work:   Lab Results   Component Value Date    SODIUM 140 10/12/2017    POTASSIUM 4.1 10/12/2017    CHLORIDE 105 10/12/2017    CO2 28 10/12/2017    ANION 7.0 10/12/2017    GLUCOSE 90 10/12/2017    BUN 16 10/12/2017    CREATININE 0.90 10/12/2017    CALCIUM 9.3 10/12/2017    ASTSGOT 19 10/12/2017    ALTSGPT 13 10/12/2017    ALKPHOSPHAT 75 10/12/2017 "    TBILIRUBIN 1.4 10/12/2017    ALBUMIN 4.1 10/12/2017    AGRATIO 1.5 10/12/2017    CREACTPROT 0.20 11/29/2016    TSHULTRASEN 1.520 07/05/2016    CPKTOTAL 50 10/12/2017       Other: NA    Recent Imaging Studies:    None since last visit    ASSESSMENT AND PLAN  Patient Type, check all that apply:    Secondary Prevention (Abdominal aortic aneurism)  Established Atherosclerotic Cardiovascular Disease (ASCVD)  AAA   Other Established (non-atherosclerotic) CardioVascular Disease, if Present:  SVT - defer management to cardiology  Varicose veins - stable - continue conservative management  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):   Likely - based on baseline LDL-C and +FH of premature CAD in brother - recommended cascade screening  ACC/AHA Indication for Statin Therapy, gurpreet all that apply:  LDL-C at baseline >190 mg/dl: Indication for High intensity statin    Calculated Risk for ASCVD, if applicable    N/A  Other Significant Risk Markers, if any, gurpreet all that apply   + Family History of premature CAD  National Lipid Association (NLA) Goal  LDL-C:   <70 mg/dL  Lifestyle Recommendations From Today’s Visit:    Continue with daily physical activity  Statin Therapy Recommendations from Today’s Visit:   Has had trouble tolerating statins in the past, including atrovastatin, simvastatin, and higher dose rosuvastatin.  Patient tolerating rosuvastatin well, marked improvement in lipid panel form baseline , considering age continue crestor 10 mg 3x per week.   Non-Statin Prescription Medications Recommendations from Today’s Visit:   None currently  Consider zetia if LDL-C >130 in future  Indication for PCSK9 Inhibitor, if applicable:  Patient does have evidence of ASCVD and FH - may be candidate in future  Supplements Recommended at this visit:   - continue Vit D  - patient still on ASA daily, instructed her to stop if any sign of GI bleed or GERD/ stomach pain    Studies Ordered at Todays Visit: None   Blood  Work Ordered At Today’s visit:  Lipid Panel, BMP, Vit D   Follow-Up: 6 months (04/20/2017) at 0900AM    Roshan Dumont, PharmD    Agree with above.    Would consider increasing crestor to daily next visit if continues to tolerate current dose.  Agree with addition of zetia if LDL >130 or nonHDL >160 in future    Michael Bloch, MD  [facility]

## 2017-10-25 ENCOUNTER — OFFICE VISIT (OUTPATIENT)
Dept: CARDIOLOGY | Facility: MEDICAL CENTER | Age: 82
End: 2017-10-25
Payer: MEDICARE

## 2017-10-25 VITALS
HEART RATE: 60 BPM | BODY MASS INDEX: 23.74 KG/M2 | HEIGHT: 62 IN | SYSTOLIC BLOOD PRESSURE: 146 MMHG | DIASTOLIC BLOOD PRESSURE: 80 MMHG | WEIGHT: 129 LBS | OXYGEN SATURATION: 93 %

## 2017-10-25 DIAGNOSIS — R60.0 LOCALIZED EDEMA: Chronic | ICD-10-CM

## 2017-10-25 DIAGNOSIS — I10 ESSENTIAL HYPERTENSION: ICD-10-CM

## 2017-10-25 DIAGNOSIS — I71.40 ABDOMINAL AORTIC ANEURYSM (AAA) WITHOUT RUPTURE (HCC): ICD-10-CM

## 2017-10-25 DIAGNOSIS — R00.2 PALPITATION: ICD-10-CM

## 2017-10-25 DIAGNOSIS — I70.219 ATHEROSCLEROSIS OF NATIVE ARTERY OF EXTREMITY WITH INTERMITTENT CLAUDICATION, UNSPECIFIED EXTREMITY (HCC): ICD-10-CM

## 2017-10-25 DIAGNOSIS — I47.10 SVT (SUPRAVENTRICULAR TACHYCARDIA): ICD-10-CM

## 2017-10-25 PROCEDURE — 99214 OFFICE O/P EST MOD 30 MIN: CPT | Performed by: INTERNAL MEDICINE

## 2017-10-25 ASSESSMENT — ENCOUNTER SYMPTOMS
CLAUDICATION: 0
FOCAL WEAKNESS: 0
BLURRED VISION: 0
NAUSEA: 0
ABDOMINAL PAIN: 0
COUGH: 0
WEAKNESS: 0
FEVER: 0
CHILLS: 0
PALPITATIONS: 0
SORE THROAT: 0
DIZZINESS: 0
BRUISES/BLEEDS EASILY: 0
SHORTNESS OF BREATH: 0
PND: 0
FALLS: 0

## 2017-10-25 NOTE — PROGRESS NOTES
Subjective:   Nini Taylor is a 85 y.o. 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 10/16/2013   • Blepharospasm syndrome    • Bright red rectal bleeding 6/24/2015   • Chest pain 07/2015    Unspecified; Nuclear stress test negative    • Constipation    • Edema 5/29/2012   • Female bladder prolapse, acquired    • GERD (gastroesophageal reflux disease)    • Hyperlipidemia    • Hypertension    • Insomnia    • OSTEOPOROSIS    • Palpitations 5/29/2012   • Sleep apnea    • Varicose veins 5/29/2012   • Vitamin D deficiency disease      Past Surgical History:   Procedure Laterality Date   • CYSTOCELE REPAIR  1/17/2011    Performed by GILMAR CHUNG at [facility]   • RECTOCELE REPAIR  1/17/2011    Performed by GILMAR CHUNG at [facility]   • BLADDER SUSPENSION  1/17/2011    Performed by GILMAR CHUNG at [facility]   • CYSTOSCOPY  1/17/2011    Performed by GILMAR CHUNG at [facility]   • ABDOMINAL HYSTERECTOMY TOTAL     • ARTHROSCOPY, KNEE     • CATARACT EXTRACTION WITH IOL     • HEMORRHOIDECTOMY     • HYSTERECTOMY, TOTAL ABDOMINAL     • INJECTION SCLERO HEMORROIDS     • OTHER ORTHOPEDIC SURGERY      knee scope x 2   • PB KNEE SCOPE,DIAGNOSTIC  2003;2009   • TONSILLECTOMY       Family History   Problem Relation Age of Onset   • Non-contributory Mother      gall bladder surgery   • Cancer Father      colon rectal   • Heart Disease Father      rheumatic heart disease   • Heart Disease Brother      sleep apnea   • Heart Attack Brother      MI     History   Smoking Status   •  "Never Smoker   Smokeless Tobacco   • Never Used     Allergies   Allergen Reactions   • Atorvastatin    • Bactrim Ds Hives   • Sulfa Drugs Hives   • Vicodin [Hydrocodone-Acetaminophen] Rash   • Vytorin      Outpatient Encounter Prescriptions as of 10/25/2017   Medication Sig Dispense Refill   • aspirin EC (ECOTRIN) 81 MG Tablet Delayed Response Take 81 mg by mouth every day.     • nitrofurantoin (MACRODANTIN) 50 MG Cap Take 50 mg by mouth every day.     • metoprolol (LOPRESSOR) 25 MG Tab Take 1 Tab by mouth 2 times a day. 180 Tab 3   • Coenzyme Q10 (CO Q 10 PO) Take  by mouth.     • PREMARIN 0.625 MG/GM Cream INSERT 1 GRAM VAGINALLY 3 TIMES WEEKLY (Patient taking differently: INSERT 1 GRAM VAGINALLY 1 TIME PER WEEK) 1 Tube 0   • vitamin D (CHOLECALCIFEROL) 1000 UNIT Tab Take 1 Tab by mouth every day. 60 Tab 5   • rosuvastatin (CRESTOR) 10 MG Tab Take 1 Tab by mouth every evening. 90 Tab 3   • mometasone (NASONEX) 50 MCG/ACT nasal spray Spray 2 Sprays in nose every day. 1 Inhaler 5   • Lansoprazole (PREVACID PO) Take  by mouth.     • Multiple Vitamins-Minerals (CENTRUM SILVER PO) Take 1 Tab by mouth every day.     • omeprazole (PRILOSEC) 20 MG CPDR Take 20 mg by mouth every day.       No facility-administered encounter medications on file as of 10/25/2017.      Review of Systems   Constitutional: Negative for chills and fever.   HENT: Negative for sore throat.    Eyes: Negative for blurred vision.   Respiratory: Negative for cough and shortness of breath.    Cardiovascular: Negative for chest pain, palpitations, claudication, leg swelling and PND.   Gastrointestinal: Negative for abdominal pain and nausea.   Musculoskeletal: Positive for joint pain. Negative for falls.   Skin: Negative for rash.   Neurological: Negative for dizziness, focal weakness and weakness.   Endo/Heme/Allergies: Does not bruise/bleed easily.        Objective:   /80   Pulse 60   Ht 1.575 m (5' 2\")   Wt 58.5 kg (129 lb)   SpO2 93%   " BMI 23.59 kg/m²     Physical Exam   Constitutional: No distress.   HENT:   Mouth/Throat: Oropharynx is clear and moist.   Eyes: No scleral icterus.   Cardiovascular: Normal rate, regular rhythm and intact distal pulses.  Exam reveals no gallop and no friction rub.    No murmur heard.  Pulmonary/Chest: Effort normal and breath sounds normal. She has no rales.   Abdominal: Bowel sounds are normal. There is no tenderness.   Musculoskeletal: She exhibits no edema.   Neurological: She is alert.   Skin: No rash noted. She is not diaphoretic.   Psychiatric: She has a normal mood and affect.     She had normal carotid and echo testing last year    We reviewed the labs over the last years which are good well-controlled dyslipidemia off medications her LDL was 190      Assessment:     1. Localized edema     2. Essential hypertension     3. Atherosclerosis of native artery of extremity with intermittent claudication, unspecified extremity (CMS-HCC)     4. Abdominal aortic aneurysm (AAA) without rupture (CMS-HCC)     5. SVT (supraventricular tachycardia) (CMS-[facility])     6. Palpitation         Medical Decision Making:  Today's Assessment / Status / Plan:     It was my pleasure to meet with Ms. Taylor.    She is accompanied by her     She is doing well on her medications no recurrent SVT    Her lipids respond quite well to statin therapy she will continue monitor for side effects    I will see Ms. Taylor back in 1 year time and encouraged her to follow up with us over the phone or e-mail using my MyChart as issues arise.    It is my pleasure to participate in the care of Ms. Taylor.  Please do not hesitate to contact me with questions or concerns.    Montez Kamara MD PhD FAC  Cardiologist [facility]

## 2017-11-09 ENCOUNTER — SLEEP CENTER VISIT (OUTPATIENT)
Dept: SLEEP MEDICINE | Facility: MEDICAL CENTER | Age: 82
End: 2017-11-09
Payer: MEDICARE

## 2017-11-09 VITALS
DIASTOLIC BLOOD PRESSURE: 60 MMHG | RESPIRATION RATE: 15 BRPM | SYSTOLIC BLOOD PRESSURE: 118 MMHG | HEART RATE: 63 BPM | HEIGHT: 62 IN | BODY MASS INDEX: 23.55 KG/M2 | OXYGEN SATURATION: 99 % | WEIGHT: 128 LBS

## 2017-11-09 DIAGNOSIS — G47.33 OSA TREATED WITH BIPAP: ICD-10-CM

## 2017-11-09 DIAGNOSIS — G47.00 INSOMNIA, UNSPECIFIED TYPE: ICD-10-CM

## 2017-11-09 PROCEDURE — 99213 OFFICE O/P EST LOW 20 MIN: CPT | Performed by: NURSE PRACTITIONER

## 2017-11-09 RX ORDER — ZOLPIDEM TARTRATE 5 MG/1
5 TABLET ORAL NIGHTLY PRN
Qty: 30 TAB | Refills: 0 | Status: SHIPPED | OUTPATIENT
Start: 2017-11-09 | End: 2018-07-13

## 2017-11-09 NOTE — PROGRESS NOTES
Chief Complaint   Patient presents with   • Follow-Up     11 Month F/U         HPI: This patient is a 85 y.o. female, who presents forAnnual follow-up RUY.     PSG indicates an AHI of 48.3, minimum saturation 81%. Patient was initially tried on auto CPAP but had elevated AHI and continued fatigue. She underwent titration study and was successfully titrated to BiPAP 19/15 cm H2O.   She did difficult time tolerating pressures, they've been reduced to 15/11 cm H2O. Former overnight oximetry was adequate. Compliance download shows 100% use in the past 30 days, averaging 6 hours 40 minutes per night, AHI of 7.8. She understands the importance of CPAP and wants to continue use, but complains of chronic insomnia and multiple other complaints about masks, mask fitting and DME service. She now has a mask that is relatively comfortable. She reports preferred home care has been very cooperative. Alternatives such as dental appliance have been discussed with her in the past. She was also referred to Dr. Evelyne Bucio for her chronic insomnia but never followed up with this. She is not interested today. She's tried OTC sleeping aids. Her friend uses Ambien with benefit.        Past Medical History:   Diagnosis Date   • AAA (abdominal aortic aneurysm) (CMS-HCC)    • Atherosclerosis of native arteries of the extremities with intermittent claudication 10/16/2013   • Blepharospasm syndrome    • Bright red rectal bleeding 6/24/2015   • Chest pain 07/2015    Unspecified; Nuclear stress test negative    • Constipation    • Edema 5/29/2012   • Female bladder prolapse, acquired    • GERD (gastroesophageal reflux disease)    • Hyperlipidemia    • Hypertension    • Insomnia    • OSTEOPOROSIS    • Palpitations 5/29/2012   • Sleep apnea    • Varicose veins 5/29/2012   • Vitamin D deficiency disease        Social History   Substance Use Topics   • Smoking status: Never Smoker   • Smokeless tobacco: Never Used   • Alcohol use 0.0 oz/week       "Comment: occasionally       Family History   Problem Relation Age of Onset   • Non-contributory Mother      gall bladder surgery   • Cancer Father      colon rectal   • Heart Disease Father      rheumatic heart disease   • Heart Disease Brother      sleep apnea   • Heart Attack Brother      MI       Current medications as of today   Current Outpatient Prescriptions   Medication Sig Dispense Refill   • zolpidem (AMBIEN) 5 MG Tab Take 1 Tab by mouth at bedtime as needed for Sleep. 30 Tab 0   • aspirin EC (ECOTRIN) 81 MG Tablet Delayed Response Take 81 mg by mouth every day.     • nitrofurantoin (MACRODANTIN) 50 MG Cap Take 50 mg by mouth every day.     • metoprolol (LOPRESSOR) 25 MG Tab Take 1 Tab by mouth 2 times a day. 180 Tab 3   • Coenzyme Q10 (CO Q 10 PO) Take  by mouth.     • PREMARIN 0.625 MG/GM Cream INSERT 1 GRAM VAGINALLY 3 TIMES WEEKLY (Patient taking differently: INSERT 1 GRAM VAGINALLY 1 TIME PER WEEK) 1 Tube 0   • vitamin D (CHOLECALCIFEROL) 1000 UNIT Tab Take 1 Tab by mouth every day. 60 Tab 5   • rosuvastatin (CRESTOR) 10 MG Tab Take 1 Tab by mouth every evening. 90 Tab 3   • mometasone (NASONEX) 50 MCG/ACT nasal spray Spray 2 Sprays in nose every day. 1 Inhaler 5   • Lansoprazole (PREVACID PO) Take  by mouth.     • Multiple Vitamins-Minerals (CENTRUM SILVER PO) Take 1 Tab by mouth every day.     • omeprazole (PRILOSEC) 20 MG CPDR Take 20 mg by mouth every day.       No current facility-administered medications for this visit.        Allergies: Atorvastatin; Bactrim ds; Sulfa drugs; Vicodin [hydrocodone-acetaminophen]; and Vytorin    Blood pressure 118/60, pulse 63, resp. rate 15, height 1.575 m (5' 2\"), weight 58.1 kg (128 lb), SpO2 99 %.      ROS:   Constitutional: Denies fevers, chills, night sweats, weight loss. Positive fatigue due to ongoing insomnia  Eyes: Denies pain, discharge/drainage  ENT: Denies tinnitus, hearing loss, sinusitis, hoarseness, epistaxis  Allergic: Denies Allergic rhinitis or " hayfever  Respiratory: Denies wheeze, dyspnea, hemoptysis. Mild, rare cough  Cardiovascular: Denies chest pain, tightness, palpitations, orthopnea or edema  Sleep: See HPI  Musculoskeletal: Denies back pain, painful joints, sore muscles  Neurological: Denies vertigo or headaches  Skin: Denies rashes, lesions  Psychiatric: Denies depression or anxiety    Physical exam:   Constitutional: Well-nourished, well-developed, in no acute distress  Eyes: PERRL  Neck: supple, no masses  Respiratory: no intercostal retractions or accessory muscle use   Lungs auscultation: Clear to auscultation bilaterally  Cardiovascular: Regular rate rhythm , rubs or gallops, grade 1 systolic murmur  Musculoskeletal: Unsteady gait, no clubbing or cyanosis  Skin: No rashes or lesions  Neuro: No focal deficit, cranial nerves grossly intact  Psychiatric: Oriented to time, person and place.     Diagnosis:  1. RUY treated with BiPAP  DME MASK AND SUPPLIES   2. Insomnia, unspecified type  zolpidem (AMBIEN) 5 MG Tab       Plan:  1. Continue BiPAP nightly  2. Rx for Ambien provided for insomnia. Risks were discussed. Patient understands ambien can be habit forming. She understands to use this sparingly. Sleep hygiene reviewed. Call for refill if effective.  3. Clean mask & tubing weekly, order for supplies to DME  4. Replace supplies as insurance will allow  5. Follow up annually, sooner if needed

## 2018-03-05 ENCOUNTER — APPOINTMENT (RX ONLY)
Dept: URBAN - METROPOLITAN AREA CLINIC 4 | Facility: CLINIC | Age: 83
Setting detail: DERMATOLOGY
End: 2018-03-05

## 2018-03-05 DIAGNOSIS — J34.0 ABSCESS, FURUNCLE AND CARBUNCLE OF NOSE: ICD-10-CM

## 2018-03-05 DIAGNOSIS — D18.0 HEMANGIOMA: ICD-10-CM

## 2018-03-05 DIAGNOSIS — L81.4 OTHER MELANIN HYPERPIGMENTATION: ICD-10-CM

## 2018-03-05 DIAGNOSIS — Z85.828 PERSONAL HISTORY OF OTHER MALIGNANT NEOPLASM OF SKIN: ICD-10-CM

## 2018-03-05 DIAGNOSIS — L82.1 OTHER SEBORRHEIC KERATOSIS: ICD-10-CM

## 2018-03-05 DIAGNOSIS — J34.89 OTHER SPECIFIED DISORDERS OF NOSE AND NASAL SINUSES: ICD-10-CM

## 2018-03-05 PROBLEM — D18.01 HEMANGIOMA OF SKIN AND SUBCUTANEOUS TISSUE: Status: ACTIVE | Noted: 2018-03-05

## 2018-03-05 PROCEDURE — ? COUNSELING

## 2018-03-05 PROCEDURE — 99213 OFFICE O/P EST LOW 20 MIN: CPT

## 2018-03-05 PROCEDURE — ? OBSERVATION

## 2018-03-05 PROCEDURE — ? PRESCRIPTION

## 2018-03-05 RX ORDER — MUPIROCIN 20 MG/G
OINTMENT TOPICAL
Qty: 1 | Refills: 1 | Status: ERX

## 2018-03-05 ASSESSMENT — LOCATION SIMPLE DESCRIPTION DERM
LOCATION SIMPLE: LEFT FOREARM
LOCATION SIMPLE: UPPER BACK
LOCATION SIMPLE: RIGHT UPPER BACK
LOCATION SIMPLE: RIGHT UPPER ARM
LOCATION SIMPLE: RIGHT FOREARM
LOCATION SIMPLE: RIGHT NOSE
LOCATION SIMPLE: LEFT CHEEK

## 2018-03-05 ASSESSMENT — LOCATION ZONE DERM
LOCATION ZONE: NOSE
LOCATION ZONE: TRUNK
LOCATION ZONE: ARM
LOCATION ZONE: FACE

## 2018-03-05 ASSESSMENT — LOCATION DETAILED DESCRIPTION DERM
LOCATION DETAILED: LEFT PROXIMAL DORSAL FOREARM
LOCATION DETAILED: INFERIOR THORACIC SPINE
LOCATION DETAILED: RIGHT INFERIOR MEDIAL UPPER BACK
LOCATION DETAILED: RIGHT NARIS
LOCATION DETAILED: RIGHT NASAL ALA
LOCATION DETAILED: RIGHT DISTAL LATERAL POSTERIOR UPPER ARM
LOCATION DETAILED: RIGHT PROXIMAL DORSAL FOREARM
LOCATION DETAILED: LEFT CENTRAL MALAR CHEEK

## 2018-03-05 NOTE — PROCEDURE: OBSERVATION
X Size Of Lesion In Cm (Optional): 0
Detail Level: Detailed
Body Location Override (Optional - Billing Will Still Be Based On Selected Body Map Location If Applicable): right nasal ala

## 2018-04-18 ENCOUNTER — HOSPITAL ENCOUNTER (OUTPATIENT)
Dept: LAB | Facility: MEDICAL CENTER | Age: 83
End: 2018-04-18
Attending: PSYCHIATRY & NEUROLOGY
Payer: MEDICARE

## 2018-04-18 ENCOUNTER — HOSPITAL ENCOUNTER (OUTPATIENT)
Dept: LAB | Facility: MEDICAL CENTER | Age: 83
End: 2018-04-18
Attending: INTERNAL MEDICINE
Payer: MEDICARE

## 2018-04-18 DIAGNOSIS — I70.219 ATHEROSCLEROSIS OF NATIVE ARTERY OF EXTREMITY WITH INTERMITTENT CLAUDICATION, UNSPECIFIED EXTREMITY (HCC): ICD-10-CM

## 2018-04-18 LAB
ALBUMIN SERPL BCP-MCNC: 4.1 G/DL (ref 3.2–4.9)
ALBUMIN/GLOB SERPL: 1.4 G/DL
ALP SERPL-CCNC: 70 U/L (ref 30–99)
ALT SERPL-CCNC: 14 U/L (ref 2–50)
ANION GAP SERPL CALC-SCNC: 6 MMOL/L (ref 0–11.9)
ANION GAP SERPL CALC-SCNC: 9 MMOL/L (ref 0–11.9)
AST SERPL-CCNC: 19 U/L (ref 12–45)
BASOPHILS # BLD AUTO: 0.2 % (ref 0–1.8)
BASOPHILS # BLD: 0.01 K/UL (ref 0–0.12)
BILIRUB SERPL-MCNC: 1.3 MG/DL (ref 0.1–1.5)
BUN SERPL-MCNC: 16 MG/DL (ref 8–22)
BUN SERPL-MCNC: 16 MG/DL (ref 8–22)
CALCIUM SERPL-MCNC: 9.3 MG/DL (ref 8.5–10.5)
CALCIUM SERPL-MCNC: 9.4 MG/DL (ref 8.5–10.5)
CHLORIDE SERPL-SCNC: 104 MMOL/L (ref 96–112)
CHLORIDE SERPL-SCNC: 104 MMOL/L (ref 96–112)
CHOLEST SERPL-MCNC: 169 MG/DL (ref 100–199)
CO2 SERPL-SCNC: 27 MMOL/L (ref 20–33)
CO2 SERPL-SCNC: 28 MMOL/L (ref 20–33)
CREAT SERPL-MCNC: 0.97 MG/DL (ref 0.5–1.4)
CREAT SERPL-MCNC: 0.98 MG/DL (ref 0.5–1.4)
EOSINOPHIL # BLD AUTO: 0.05 K/UL (ref 0–0.51)
EOSINOPHIL NFR BLD: 1 % (ref 0–6.9)
ERYTHROCYTE [DISTWIDTH] IN BLOOD BY AUTOMATED COUNT: 45.8 FL (ref 35.9–50)
GLOBULIN SER CALC-MCNC: 2.9 G/DL (ref 1.9–3.5)
GLUCOSE SERPL-MCNC: 89 MG/DL (ref 65–99)
GLUCOSE SERPL-MCNC: 89 MG/DL (ref 65–99)
HCT VFR BLD AUTO: 45.8 % (ref 37–47)
HDLC SERPL-MCNC: 53 MG/DL
HGB BLD-MCNC: 15.2 G/DL (ref 12–16)
IMM GRANULOCYTES # BLD AUTO: 0.01 K/UL (ref 0–0.11)
IMM GRANULOCYTES NFR BLD AUTO: 0.2 % (ref 0–0.9)
LDLC SERPL CALC-MCNC: 97 MG/DL
LYMPHOCYTES # BLD AUTO: 1.87 K/UL (ref 1–4.8)
LYMPHOCYTES NFR BLD: 38.6 % (ref 22–41)
MCH RBC QN AUTO: 30.1 PG (ref 27–33)
MCHC RBC AUTO-ENTMCNC: 33.2 G/DL (ref 33.6–35)
MCV RBC AUTO: 90.7 FL (ref 81.4–97.8)
MONOCYTES # BLD AUTO: 0.46 K/UL (ref 0–0.85)
MONOCYTES NFR BLD AUTO: 9.5 % (ref 0–13.4)
NEUTROPHILS # BLD AUTO: 2.44 K/UL (ref 2–7.15)
NEUTROPHILS NFR BLD: 50.5 % (ref 44–72)
NRBC # BLD AUTO: 0 K/UL
NRBC BLD-RTO: 0 /100 WBC
PLATELET # BLD AUTO: 183 K/UL (ref 164–446)
PMV BLD AUTO: 10.1 FL (ref 9–12.9)
POTASSIUM SERPL-SCNC: 3.9 MMOL/L (ref 3.6–5.5)
POTASSIUM SERPL-SCNC: 4 MMOL/L (ref 3.6–5.5)
PROT SERPL-MCNC: 7 G/DL (ref 6–8.2)
RBC # BLD AUTO: 5.05 M/UL (ref 4.2–5.4)
SODIUM SERPL-SCNC: 138 MMOL/L (ref 135–145)
SODIUM SERPL-SCNC: 140 MMOL/L (ref 135–145)
TRIGL SERPL-MCNC: 94 MG/DL (ref 0–149)
WBC # BLD AUTO: 4.8 K/UL (ref 4.8–10.8)

## 2018-04-18 PROCEDURE — 80048 BASIC METABOLIC PNL TOTAL CA: CPT

## 2018-04-18 PROCEDURE — 80061 LIPID PANEL: CPT

## 2018-04-18 PROCEDURE — 85025 COMPLETE CBC W/AUTO DIFF WBC: CPT

## 2018-04-18 PROCEDURE — 36415 COLL VENOUS BLD VENIPUNCTURE: CPT

## 2018-04-18 PROCEDURE — 80053 COMPREHEN METABOLIC PANEL: CPT

## 2018-04-20 ENCOUNTER — TELEPHONE (OUTPATIENT)
Dept: VASCULAR LAB | Facility: MEDICAL CENTER | Age: 83
End: 2018-04-20

## 2018-04-20 ENCOUNTER — HOSPITAL ENCOUNTER (OUTPATIENT)
Dept: LAB | Facility: MEDICAL CENTER | Age: 83
End: 2018-04-20
Attending: NURSE PRACTITIONER
Payer: MEDICARE

## 2018-04-20 ENCOUNTER — ANTICOAGULATION VISIT (OUTPATIENT)
Dept: VASCULAR LAB | Facility: MEDICAL CENTER | Age: 83
End: 2018-04-20
Attending: INTERNAL MEDICINE
Payer: MEDICARE

## 2018-04-20 VITALS — SYSTOLIC BLOOD PRESSURE: 129 MMHG | HEART RATE: 80 BPM | DIASTOLIC BLOOD PRESSURE: 86 MMHG

## 2018-04-20 DIAGNOSIS — R79.89 LOW VITAMIN D LEVEL: ICD-10-CM

## 2018-04-20 DIAGNOSIS — E78.49 OTHER HYPERLIPIDEMIA: ICD-10-CM

## 2018-04-20 LAB — 25(OH)D3 SERPL-MCNC: 34 NG/ML (ref 30–100)

## 2018-04-20 PROCEDURE — 99212 OFFICE O/P EST SF 10 MIN: CPT

## 2018-04-20 PROCEDURE — 82306 VITAMIN D 25 HYDROXY: CPT

## 2018-04-20 PROCEDURE — 36415 COLL VENOUS BLD VENIPUNCTURE: CPT

## 2018-04-20 NOTE — PROGRESS NOTES
"Date of Service: 04/20/18    Nini Taylor is here for follow up of dyslipidemia.    HPI  Pertinent Interval History since last visit: none    Current Prescription Lipid Lowering Medications - including dose:   Statin: Crestor 5 mg MWF  Non-Statin: None  Current Lipid Lowering and Related Supplements:   VitD 1,000 units daily  CoQ10 Daily  Any Current Side Effects Potentially Related to Lipid Lowering therapy?   No  Current Adherence to Lipid Lowering Therapies:  Complete  Previously Attempted Interventions for Lipids - including outcome  Statin: Statin: Simvastatin Unknown dose/duration - myalgias                Crestor unkown dose, duration \"years\" - states she started getting sharp \"zings\" down her arms.              Atorvastatin unknown dose, duration of 90 days - severe UE myalgia    Non-Statin: States none              Outcome: Unknown     Baseline Lipids Prior to Treatment:   Shown here:  LDL-C: 196       nonHDL-C: 216           HDL-C: 58       Trigs: 101        Date: 02/06/2017    SOCIAL HISTORY  History   Smoking Status   • Never Smoker   Smokeless Tobacco   • Never Used      Change in weight: Stable  Exercise habits: minimal exercise   Diet: Concentrating on eating more vegetables and salads.     ROS  Physical Exam    DATA REVIEW  Most Recent Lipid Panel:   Lab Results   Component Value Date    CHOLSTRLTOT 169 04/18/2018    TRIGLYCERIDE 94 04/18/2018    HDL 53 04/18/2018    LDL 97 04/18/2018       Other Pertinent Blood Work:   Lab Results   Component Value Date    SODIUM 138 04/18/2018    POTASSIUM 3.9 04/18/2018    CHLORIDE 104 04/18/2018    CO2 28 04/18/2018    ANION 6.0 04/18/2018    GLUCOSE 89 04/18/2018    BUN 16 04/18/2018    CREATININE 0.98 04/18/2018    CALCIUM 9.4 04/18/2018    ASTSGOT 19 04/18/2018    ALTSGPT 14 04/18/2018    ALKPHOSPHAT 70 04/18/2018    TBILIRUBIN 1.3 04/18/2018    ALBUMIN 4.1 04/18/2018    AGRATIO 1.4 04/18/2018    CREACTPROT 0.20 11/29/2016    TSHULTRASEN 1.520 07/05/2016 "    CPKTOTAL 50 10/12/2017       Other:  NA    Recent Imaging Studies:    None since last visit    ASSESSMENT AND PLAN  Patient Type, check all that apply:    Secondary Prevention (Abdominal aortic aneurism)  Established Atherosclerotic Cardiovascular Disease (ASCVD)  AAA   Other Established (non-atherosclerotic) CardioVascular Disease, if Present:  SVT - defer management to cardiology  Varicose veins - stable - continue conservative management  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):   Likely - based on baseline LDL-C and +FH of premature CAD in brother - recommended cascade screening  ACC/AHA Indication for Statin Therapy, gurpreet all that apply:  LDL-C at baseline >190 mg/dl: Indication for High intensity statin    Calculated Risk for ASCVD, if applicable    N/A  Other Significant Risk Markers, if any, gurpreet all that apply   + Family History of premature CAD  National Lipid Association (NLA) Goal  LDL-C:   <70 mg/dL  Non-HDL: <100  Lifestyle Recommendations From Today’s Visit:    Continue with daily physical activity  Statin Therapy Recommendations from Today’s Visit:   Pt is willing to increase rosuvastatin 10 mg MWF to M-F, not quite able to start every day at this point.  Likely will comply with increasing to daily use at next visit.   Non-Statin Medications Recommendations from Today’s Visit:   Increase VitD from 1,000 units daily to 4,000 daily.   Indication for PCSK9 Inhibitor, if applicable:  Not currently indicated  Supplements Recommended at this visit:  None  Recommendations for Other Cardiovascular Risk Factors, gurpreet all that apply:   None    Lipid profile still not changed much from last visit.  Would like to see pt's LDL closer to 70, although non-HDL is close to 100 (currently 116)  After our discussion, pt is willing to increase to 5 days instead of 3 days weekly of rosuvastatin.      Pt wanted to check her VitD level.  I'm not opposed. Requested pt to start taking 4,000 units daily  instead of 1,000 units.      Studies Ordered at Todays Visit:  None   Blood Work Ordered At Today’s visit:   Lipid profile  Vit D  Follow-Up:   3 months    Chandler Brown, PharmD    CC:  Bloch, Michael J, M.D.

## 2018-04-25 ENCOUNTER — TELEPHONE (OUTPATIENT)
Dept: VASCULAR LAB | Facility: MEDICAL CENTER | Age: 83
End: 2018-04-25

## 2018-04-25 NOTE — TELEPHONE ENCOUNTER
[facility]    Received message from pt she would like to know the results of her VitD level.  Left VM to let her know it is WNL, however it is barely WNL.  Pt to begin new dose of VitD as we have discussed previously.    Chandler Brown, PharmD

## 2018-04-27 ENCOUNTER — TELEPHONE (OUTPATIENT)
Dept: VASCULAR LAB | Facility: MEDICAL CENTER | Age: 83
End: 2018-04-27

## 2018-04-27 ENCOUNTER — HOSPITAL ENCOUNTER (OUTPATIENT)
Dept: RADIOLOGY | Facility: MEDICAL CENTER | Age: 83
End: 2018-04-27
Attending: PSYCHIATRY & NEUROLOGY
Payer: MEDICARE

## 2018-04-27 DIAGNOSIS — H53.461 RIGHT HOMONYMOUS HEMIANOPSIA: ICD-10-CM

## 2018-04-27 DIAGNOSIS — I10 ESSENTIAL HYPERTENSION: ICD-10-CM

## 2018-04-27 DIAGNOSIS — E78.2 MIXED HYPERLIPIDEMIA: ICD-10-CM

## 2018-04-27 DIAGNOSIS — I71.40 ABDOMINAL AORTIC ANEURYSM (AAA) WITHOUT RUPTURE (HCC): ICD-10-CM

## 2018-04-27 DIAGNOSIS — I47.10 SVT (SUPRAVENTRICULAR TACHYCARDIA): ICD-10-CM

## 2018-04-27 PROCEDURE — 700117 HCHG RX CONTRAST REV CODE 255: Performed by: PSYCHIATRY & NEUROLOGY

## 2018-04-27 PROCEDURE — A9577 INJ MULTIHANCE: HCPCS | Performed by: PSYCHIATRY & NEUROLOGY

## 2018-04-27 PROCEDURE — 70553 MRI BRAIN STEM W/O & W/DYE: CPT

## 2018-04-27 RX ORDER — ROSUVASTATIN CALCIUM 10 MG/1
10 TABLET, COATED ORAL EVERY EVENING
Qty: 90 TAB | Refills: 1 | Status: SHIPPED | OUTPATIENT
Start: 2018-04-27 | End: 2018-04-30 | Stop reason: SDUPTHER

## 2018-04-27 RX ADMIN — GADOBENATE DIMEGLUMINE 7 ML: 529 INJECTION, SOLUTION INTRAVENOUS at 16:15

## 2018-04-30 DIAGNOSIS — I71.40 ABDOMINAL AORTIC ANEURYSM (AAA) WITHOUT RUPTURE (HCC): ICD-10-CM

## 2018-04-30 DIAGNOSIS — E78.2 MIXED HYPERLIPIDEMIA: ICD-10-CM

## 2018-04-30 RX ORDER — ROSUVASTATIN CALCIUM 10 MG/1
10 TABLET, COATED ORAL EVERY EVENING
Qty: 90 TAB | Refills: 3 | Status: SHIPPED | OUTPATIENT
Start: 2018-04-30 | End: 2018-08-23

## 2018-05-18 ENCOUNTER — TELEPHONE (OUTPATIENT)
Dept: SLEEP MEDICINE | Facility: MEDICAL CENTER | Age: 83
End: 2018-05-18

## 2018-05-18 DIAGNOSIS — G47.33 OSA (OBSTRUCTIVE SLEEP APNEA): ICD-10-CM

## 2018-05-18 NOTE — TELEPHONE ENCOUNTER
Pt Came in to the office for pressure adjustments . Ngoc looked over the compliance and agreed to increase pressure please sign order

## 2018-06-05 ENCOUNTER — TELEPHONE (OUTPATIENT)
Dept: CARDIOLOGY | Facility: MEDICAL CENTER | Age: 83
End: 2018-06-05

## 2018-06-05 DIAGNOSIS — I47.10 SVT (SUPRAVENTRICULAR TACHYCARDIA): ICD-10-CM

## 2018-06-05 NOTE — TELEPHONE ENCOUNTER
LM for patient that she had lipids and CMP done 4/18 by Dr. Bloch and they will not be due for dr. Kamara in Aug.

## 2018-06-05 NOTE — TELEPHONE ENCOUNTER
----- Message from Alma Michaud sent at 6/5/2018 12:34 PM PDT -----  Regarding: Labs for patient  Contact: [phone]  This patient had me make an appt with CW 8/23/18.  She wants to have labs done before her visit.  Please call her and let her know if you sent labs.  I will be sending a note for her  as well.  Thanks, Alma

## 2018-06-06 ENCOUNTER — HOSPITAL ENCOUNTER (OUTPATIENT)
Dept: LAB | Facility: MEDICAL CENTER | Age: 83
End: 2018-06-06
Attending: FAMILY MEDICINE
Payer: MEDICARE

## 2018-06-06 DIAGNOSIS — N39.0 FREQUENT UTI: ICD-10-CM

## 2018-06-06 LAB
APPEARANCE UR: CLEAR
BACTERIA #/AREA URNS HPF: ABNORMAL /HPF
BILIRUB UR QL STRIP.AUTO: NEGATIVE
COLOR UR: YELLOW
EPI CELLS #/AREA URNS HPF: NEGATIVE /HPF
GLUCOSE UR STRIP.AUTO-MCNC: NEGATIVE MG/DL
HYALINE CASTS #/AREA URNS LPF: ABNORMAL /LPF
KETONES UR STRIP.AUTO-MCNC: NEGATIVE MG/DL
LEUKOCYTE ESTERASE UR QL STRIP.AUTO: ABNORMAL
MICRO URNS: ABNORMAL
NITRITE UR QL STRIP.AUTO: NEGATIVE
PH UR STRIP.AUTO: 6.5 [PH]
PROT UR QL STRIP: NEGATIVE MG/DL
RBC # URNS HPF: ABNORMAL /HPF
RBC UR QL AUTO: NEGATIVE
SP GR UR STRIP.AUTO: 1.01
UROBILINOGEN UR STRIP.AUTO-MCNC: 0.2 MG/DL
WBC #/AREA URNS HPF: ABNORMAL /HPF

## 2018-06-06 PROCEDURE — 87186 SC STD MICRODIL/AGAR DIL: CPT

## 2018-06-06 PROCEDURE — 87086 URINE CULTURE/COLONY COUNT: CPT

## 2018-06-06 PROCEDURE — 87077 CULTURE AEROBIC IDENTIFY: CPT

## 2018-06-06 PROCEDURE — 81001 URINALYSIS AUTO W/SCOPE: CPT

## 2018-06-08 DIAGNOSIS — B96.20 E-COLI UTI: ICD-10-CM

## 2018-06-08 DIAGNOSIS — N39.0 E-COLI UTI: ICD-10-CM

## 2018-06-08 LAB
BACTERIA UR CULT: ABNORMAL
BACTERIA UR CULT: ABNORMAL
SIGNIFICANT IND 70042: ABNORMAL
SITE SITE: ABNORMAL
SOURCE SOURCE: ABNORMAL

## 2018-06-08 RX ORDER — NITROFURANTOIN 25; 75 MG/1; MG/1
100 CAPSULE ORAL 2 TIMES DAILY
Qty: 14 CAP | Refills: 0 | Status: SHIPPED | OUTPATIENT
Start: 2018-06-08 | End: 2018-06-15

## 2018-07-13 ENCOUNTER — SLEEP CENTER VISIT (OUTPATIENT)
Dept: SLEEP MEDICINE | Facility: MEDICAL CENTER | Age: 83
End: 2018-07-13
Payer: MEDICARE

## 2018-07-13 VITALS
BODY MASS INDEX: 23.74 KG/M2 | WEIGHT: 129 LBS | OXYGEN SATURATION: 96 % | HEART RATE: 76 BPM | HEIGHT: 62 IN | SYSTOLIC BLOOD PRESSURE: 128 MMHG | RESPIRATION RATE: 15 BRPM | DIASTOLIC BLOOD PRESSURE: 80 MMHG

## 2018-07-13 DIAGNOSIS — I47.10 SVT (SUPRAVENTRICULAR TACHYCARDIA): ICD-10-CM

## 2018-07-13 DIAGNOSIS — F51.01 PRIMARY INSOMNIA: ICD-10-CM

## 2018-07-13 DIAGNOSIS — G47.33 OSA TREATED WITH BIPAP: ICD-10-CM

## 2018-07-13 PROCEDURE — 99214 OFFICE O/P EST MOD 30 MIN: CPT | Performed by: NURSE PRACTITIONER

## 2018-07-13 ASSESSMENT — ENCOUNTER SYMPTOMS
DIAPHORESIS: 0
MUSCULOSKELETAL NEGATIVE: 1
PSYCHIATRIC NEGATIVE: 1
FEVER: 0
NEUROLOGICAL NEGATIVE: 1
WEIGHT LOSS: 0
GASTROINTESTINAL NEGATIVE: 1
EYE DISCHARGE: 0
CARDIOVASCULAR NEGATIVE: 1
WEAKNESS: 0
EYE PAIN: 0
BRUISES/BLEEDS EASILY: 0
RESPIRATORY NEGATIVE: 1
CHILLS: 0

## 2018-07-13 NOTE — PROGRESS NOTES
Chief Complaint   Patient presents with   • Follow-Up     Pressure Change/ compliance DL    • Apnea         HPI: This patient is a 86 y.o. female, who presents for six-month follow-up of RUY with compliance check.     PSG indicates an AHI of 48.3, minimum saturation 81%. Patient was initially tried on auto CPAP but had elevated AHI and continued fatigue. She underwent titration study and was successfully titrated to BiPAP 19/15 cm H2O.   She has had a difficult time tolerating pressures, they have been adjusted up and down.  Former overnight oximetry on a pressure of 15/11 was adequate.  Compliance download over the past 30 days indicates 100 % compliance, average use of 5 hours 47 minutes per night, AHI of 11.  She has had a difficult time with mask interface.  She has tried numerous masks.  She has questions today regarding fitting.  She is tolerating pressure.  She wants to continue with therapy and understands the importance of BiPAP use.    She was also referred to Dr. Evelyne Bucio for her chronic insomnia but never followed up with this, she is not interested to do so this was revisited with her today.  She tried Ambien but was leery so has not been using. she continues to report insomnia.     Past Medical History:   Diagnosis Date   • AAA (abdominal aortic aneurysm) (HCC)    • Atherosclerosis of native arteries of the extremities with intermittent claudication 10/16/2013   • Blepharospasm syndrome    • Bright red rectal bleeding 6/24/2015   • Chest pain 07/2015    Unspecified; Nuclear stress test negative    • Constipation    • Edema 5/29/2012   • Female bladder prolapse, acquired    • GERD (gastroesophageal reflux disease)    • Hyperlipidemia    • Hypertension    • Insomnia    • OSTEOPOROSIS    • Palpitations 5/29/2012   • Sleep apnea    • Varicose veins 5/29/2012   • Vitamin D deficiency disease        Social History   Substance Use Topics   • Smoking status: Never Smoker   • Smokeless tobacco: Never Used    • Alcohol use 0.0 oz/week      Comment: occasionally       Family History   Problem Relation Age of Onset   • Non-contributory Mother      gall bladder surgery   • Cancer Father      colon rectal   • Heart Disease Father      rheumatic heart disease   • Sleep Apnea Brother    • Heart Disease Brother      sleep apnea   • Heart Attack Brother      MI       Immunization History   Administered Date(s) Administered   • Influenza TIV (IM) 10/15/2011   • Influenza Vaccine 10/01/2010   • Influenza Vaccine Adult HD 09/29/2017   • Influenza Vaccine Quad Inj (Preserved) 10/19/2016   • Pneumococcal Conjugate Vaccine (Prevnar/PCV-13) 10/19/2016   • Pneumococcal Vaccine (UF)Historical Data 10/01/2009       Current medications as of today   Current Outpatient Prescriptions   Medication Sig Dispense Refill   • metoprolol (LOPRESSOR) 25 MG Tab Take 1 Tab by mouth 2 times a day. 180 Tab 1   • rosuvastatin (CRESTOR) 10 MG Tab Take 1 Tab by mouth every evening. 90 Tab 3   • Cholecalciferol 4000 units Cap Take 4,000 Units by mouth every day. 30 Cap 5   • nitrofurantoin (MACRODANTIN) 50 MG Cap Take 50 mg by mouth every day.     • Coenzyme Q10 (CO Q 10 PO) Take  by mouth.     • PREMARIN 0.625 MG/GM Cream INSERT 1 GRAM VAGINALLY 3 TIMES WEEKLY (Patient taking differently: INSERT 1 GRAM VAGINALLY 1 TIME PER WEEK) 1 Tube 0   • mometasone (NASONEX) 50 MCG/ACT nasal spray Spray 2 Sprays in nose every day. 1 Inhaler 5   • Lansoprazole (PREVACID PO) Take  by mouth.     • Multiple Vitamins-Minerals (CENTRUM SILVER PO) Take 1 Tab by mouth every day.     • omeprazole (PRILOSEC) 20 MG CPDR Take 20 mg by mouth every day.     • aspirin EC (ECOTRIN) 81 MG Tablet Delayed Response Take 81 mg by mouth every day.       No current facility-administered medications for this visit.        Allergies: Atorvastatin; Bactrim ds; Sulfa drugs; Vicodin [hydrocodone-acetaminophen]; and Vytorin    Blood pressure 128/80, pulse 76, resp. rate 15, height 1.575 m (5'  "2\"), weight 58.5 kg (129 lb), SpO2 96 %.      Review of Systems   Constitutional: Positive for malaise/fatigue. Negative for chills, diaphoresis, fever and weight loss.   HENT: Negative.    Eyes: Negative for pain and discharge.   Respiratory: Negative.    Cardiovascular: Negative.    Gastrointestinal: Negative.    Musculoskeletal: Negative.    Skin: Negative.    Neurological: Negative.  Negative for weakness.   Endo/Heme/Allergies: Negative for environmental allergies. Does not bruise/bleed easily.   Psychiatric/Behavioral: Negative.        Physical Exam   Constitutional: She is oriented to person, place, and time and well-developed, well-nourished, and in no distress.   HENT:   Head: Normocephalic and atraumatic.   Eyes: Pupils are equal, round, and reactive to light.   Neck: Normal range of motion. Neck supple. No tracheal deviation present.   Cardiovascular: Normal rate, regular rhythm and normal heart sounds.    Pulmonary/Chest: Effort normal and breath sounds normal.   Musculoskeletal: Normal range of motion.   Neurological: She is alert and oriented to person, place, and time. Gait normal.   Skin: Skin is warm and dry.   Psychiatric: Mood, memory, affect and judgment normal.   Vitals reviewed.      Diagnoses/Plan:    1. Primary insomnia  Sleep hygiene again reviewed, stable, no worsening symptoms.  She declined sleep aid or referral to Dr. Bucio.    2. RUY treated with BiPAP  Continue BiPAP nightly.  Our Tech helped her with mask fitting.  We also verified machine is putting out adequate pressure.  It is recommended that she clean mask and supplies weekly, replace supplies as insurance will allow.    3. SVT (supraventricular tachycardia) (HCC)  Asymptomatic, patient compliant with metoprolol and follows with cardiology regularly.    Follow-up here in 6 months    Follow with PCP for routine health me  "

## 2018-07-17 ENCOUNTER — TELEPHONE (OUTPATIENT)
Dept: MEDICAL GROUP | Facility: PHYSICIAN GROUP | Age: 83
End: 2018-07-17

## 2018-07-17 NOTE — TELEPHONE ENCOUNTER
Future Appointments       Provider Department Center    7/26/2018 9:20 AM Nevaeh Munroe M.D.; [facility]  [facility]    7/27/2018 9:00 AM [facility] EXAM 4 [facility] [place] [facility]      8/23/2018 4:15 PM Montez Kamara M.D. [facility]     1/17/2019 1:00 PM UNIQUE Davies [facility] Sleep Medicine         ANNUAL WELLNESS VISIT PRE-VISIT PLANNING WITHOUT OUTREACH    1.  Reviewed note from last office visit with PCP: YES 09/29/17    2.  If any orders were placed at last visit, do we have Results/Consult Notes?        •  Labs - Labs ordered, completed on 06/06/18 and results are in chart.       •  Imaging - Imaging ordered, completed and results are in chart.       •  Referrals - No referrals were ordered at last office visit.    3.  Immunizations were updated in Social Reality using WebIZ?: Yes       •  WebIZ Recommendations: FLU, PNEUMOVAX (PPSV23), TDAP and SHINGRIX (Shingles)        •  Is patient due for Tdap? YES. Patient was notified of copay/out of pocket cost. needs script for pharmacy        •  Is patient due for Shingles? YES. Patient was notified of copay/out of pocket cost. Needs Script for pharmacy     4.  Patient is due for the following Health Maintenance Topics:   Health Maintenance Due   Topic Date Due   • IMM DTaP/Tdap/Td Vaccine (1 - Tdap) 07/08/1951   • BONE DENSITY  07/08/1997   • Annual Wellness Visit  05/10/2015   • IMM PNEUMOCOCCAL (2 of 2 - PPSV23) 10/19/2017       5.  Reviewed/Updated the following with patient:       •   Preferred Pharmacy? YES       •   Preferred Lab? YES       •   Preferred Communication? YES       •   Allergies? YES       •   Medications? YES. Was Abstract Encounter opened and chart updated? YES       •   Social History? YES. Was Abstract Encounter opened and chart updated? YES       •   Family History (document living status of  immediate family members and if + hx of  cancer, diabetes, hypertension, hyperlipidemia, heart attack, stroke) YES. Was Abstract Encounter opened and chart updated? YES    6.  Care Team Updated:       •   DME Company (gait device, O2, CPAP, etc.): YES CPAP       •   Other Specialists (eye doctor, derm, GYN, cardiology, endo, etc): YES    7.  Patient has the following Care Path diagnoses on Problem List:  NONE    8.  MDX printed and highlighted for Provider? NO INS MCR     9.  Patient was advised: “This is a free wellness visit. The provider will screen for medical conditions to help you stay healthy. If you have other concerns to address you may be asked to discuss these at a separate visit or there may be an additional fee.”     10.  Patient was informed to arrive 15 min prior to their scheduled appointment and bring in their medication bottles.

## 2018-07-23 DIAGNOSIS — I10 ESSENTIAL HYPERTENSION: ICD-10-CM

## 2018-07-24 ENCOUNTER — HOSPITAL ENCOUNTER (OUTPATIENT)
Dept: LAB | Facility: MEDICAL CENTER | Age: 83
End: 2018-07-24
Attending: NURSE PRACTITIONER
Payer: MEDICARE

## 2018-07-24 ENCOUNTER — HOSPITAL ENCOUNTER (OUTPATIENT)
Dept: LAB | Facility: MEDICAL CENTER | Age: 83
End: 2018-07-24
Attending: FAMILY MEDICINE
Payer: MEDICARE

## 2018-07-24 DIAGNOSIS — I10 ESSENTIAL HYPERTENSION: ICD-10-CM

## 2018-07-24 DIAGNOSIS — N39.0 FREQUENT UTI: ICD-10-CM

## 2018-07-24 LAB
25(OH)D3 SERPL-MCNC: 41 NG/ML (ref 30–100)
ALBUMIN SERPL BCP-MCNC: 4.4 G/DL (ref 3.2–4.9)
ALBUMIN/GLOB SERPL: 1.8 G/DL
ALP SERPL-CCNC: 72 U/L (ref 30–99)
ALT SERPL-CCNC: 12 U/L (ref 2–50)
ANION GAP SERPL CALC-SCNC: 6 MMOL/L (ref 0–11.9)
APPEARANCE UR: CLEAR
AST SERPL-CCNC: 21 U/L (ref 12–45)
BACTERIA #/AREA URNS HPF: NEGATIVE /HPF
BASOPHILS # BLD AUTO: 0.4 % (ref 0–1.8)
BASOPHILS # BLD: 0.02 K/UL (ref 0–0.12)
BILIRUB SERPL-MCNC: 1.5 MG/DL (ref 0.1–1.5)
BILIRUB UR QL STRIP.AUTO: NEGATIVE
BUN SERPL-MCNC: 14 MG/DL (ref 8–22)
CALCIUM SERPL-MCNC: 9.6 MG/DL (ref 8.5–10.5)
CHLORIDE SERPL-SCNC: 106 MMOL/L (ref 96–112)
CHOLEST SERPL-MCNC: 175 MG/DL (ref 100–199)
CO2 SERPL-SCNC: 28 MMOL/L (ref 20–33)
COLOR UR: YELLOW
CREAT SERPL-MCNC: 1.02 MG/DL (ref 0.5–1.4)
EOSINOPHIL # BLD AUTO: 0.09 K/UL (ref 0–0.51)
EOSINOPHIL NFR BLD: 1.9 % (ref 0–6.9)
EPI CELLS #/AREA URNS HPF: NORMAL /HPF
ERYTHROCYTE [DISTWIDTH] IN BLOOD BY AUTOMATED COUNT: 45.8 FL (ref 35.9–50)
GLOBULIN SER CALC-MCNC: 2.5 G/DL (ref 1.9–3.5)
GLUCOSE SERPL-MCNC: 89 MG/DL (ref 65–99)
GLUCOSE UR STRIP.AUTO-MCNC: NEGATIVE MG/DL
HCT VFR BLD AUTO: 45.9 % (ref 37–47)
HDLC SERPL-MCNC: 55 MG/DL
HGB BLD-MCNC: 15.4 G/DL (ref 12–16)
HYALINE CASTS #/AREA URNS LPF: NORMAL /LPF
IMM GRANULOCYTES # BLD AUTO: 0.01 K/UL (ref 0–0.11)
IMM GRANULOCYTES NFR BLD AUTO: 0.2 % (ref 0–0.9)
KETONES UR STRIP.AUTO-MCNC: NEGATIVE MG/DL
LDLC SERPL CALC-MCNC: 102 MG/DL
LEUKOCYTE ESTERASE UR QL STRIP.AUTO: ABNORMAL
LYMPHOCYTES # BLD AUTO: 1.95 K/UL (ref 1–4.8)
LYMPHOCYTES NFR BLD: 40.5 % (ref 22–41)
MCH RBC QN AUTO: 30.3 PG (ref 27–33)
MCHC RBC AUTO-ENTMCNC: 33.6 G/DL (ref 33.6–35)
MCV RBC AUTO: 90.2 FL (ref 81.4–97.8)
MICRO URNS: ABNORMAL
MONOCYTES # BLD AUTO: 0.51 K/UL (ref 0–0.85)
MONOCYTES NFR BLD AUTO: 10.6 % (ref 0–13.4)
NEUTROPHILS # BLD AUTO: 2.24 K/UL (ref 2–7.15)
NEUTROPHILS NFR BLD: 46.4 % (ref 44–72)
NITRITE UR QL STRIP.AUTO: NEGATIVE
NRBC # BLD AUTO: 0 K/UL
NRBC BLD-RTO: 0 /100 WBC
PH UR STRIP.AUTO: 6.5 [PH]
PLATELET # BLD AUTO: 187 K/UL (ref 164–446)
PMV BLD AUTO: 10.3 FL (ref 9–12.9)
POTASSIUM SERPL-SCNC: 3.9 MMOL/L (ref 3.6–5.5)
PROT SERPL-MCNC: 6.9 G/DL (ref 6–8.2)
PROT UR QL STRIP: NEGATIVE MG/DL
RBC # BLD AUTO: 5.09 M/UL (ref 4.2–5.4)
RBC # URNS HPF: NORMAL /HPF
RBC UR QL AUTO: NEGATIVE
SODIUM SERPL-SCNC: 140 MMOL/L (ref 135–145)
SP GR UR STRIP.AUTO: 1.01
TRIGL SERPL-MCNC: 92 MG/DL (ref 0–149)
UROBILINOGEN UR STRIP.AUTO-MCNC: 0.2 MG/DL
WBC # BLD AUTO: 4.8 K/UL (ref 4.8–10.8)
WBC #/AREA URNS HPF: NORMAL /HPF

## 2018-07-24 PROCEDURE — 82306 VITAMIN D 25 HYDROXY: CPT

## 2018-07-24 PROCEDURE — 81001 URINALYSIS AUTO W/SCOPE: CPT

## 2018-07-24 PROCEDURE — 85025 COMPLETE CBC W/AUTO DIFF WBC: CPT

## 2018-07-24 PROCEDURE — 80061 LIPID PANEL: CPT

## 2018-07-24 PROCEDURE — 80053 COMPREHEN METABOLIC PANEL: CPT

## 2018-07-24 PROCEDURE — 36415 COLL VENOUS BLD VENIPUNCTURE: CPT

## 2018-07-25 ENCOUNTER — TELEPHONE (OUTPATIENT)
Dept: MEDICAL GROUP | Facility: PHYSICIAN GROUP | Age: 83
End: 2018-07-25

## 2018-07-26 ENCOUNTER — OFFICE VISIT (OUTPATIENT)
Dept: MEDICAL GROUP | Facility: PHYSICIAN GROUP | Age: 83
End: 2018-07-26
Payer: MEDICARE

## 2018-07-26 VITALS
BODY MASS INDEX: 23.63 KG/M2 | OXYGEN SATURATION: 97 % | HEART RATE: 74 BPM | WEIGHT: 128.4 LBS | HEIGHT: 62 IN | RESPIRATION RATE: 14 BRPM | SYSTOLIC BLOOD PRESSURE: 122 MMHG | TEMPERATURE: 97.1 F | DIASTOLIC BLOOD PRESSURE: 80 MMHG

## 2018-07-26 DIAGNOSIS — E55.9 VITAMIN D DEFICIENCY DISEASE: ICD-10-CM

## 2018-07-26 DIAGNOSIS — R60.0 LOCALIZED EDEMA: Chronic | ICD-10-CM

## 2018-07-26 DIAGNOSIS — M81.0 AGE-RELATED OSTEOPOROSIS WITHOUT CURRENT PATHOLOGICAL FRACTURE: ICD-10-CM

## 2018-07-26 DIAGNOSIS — I71.40 ABDOMINAL AORTIC ANEURYSM (AAA) WITHOUT RUPTURE (HCC): ICD-10-CM

## 2018-07-26 DIAGNOSIS — G47.33 OSA TREATED WITH BIPAP: ICD-10-CM

## 2018-07-26 DIAGNOSIS — F51.04 CHRONIC INSOMNIA: ICD-10-CM

## 2018-07-26 DIAGNOSIS — I47.10 SVT (SUPRAVENTRICULAR TACHYCARDIA): ICD-10-CM

## 2018-07-26 DIAGNOSIS — I70.219 ATHEROSCLEROSIS OF NATIVE ARTERY OF EXTREMITY WITH INTERMITTENT CLAUDICATION, UNSPECIFIED EXTREMITY (HCC): ICD-10-CM

## 2018-07-26 DIAGNOSIS — K86.2 PANCREATIC CYST: ICD-10-CM

## 2018-07-26 DIAGNOSIS — Z23 NEED FOR VACCINATION AGAINST STREPTOCOCCUS PNEUMONIAE: ICD-10-CM

## 2018-07-26 DIAGNOSIS — I10 ESSENTIAL HYPERTENSION: ICD-10-CM

## 2018-07-26 DIAGNOSIS — K21.9 GASTROESOPHAGEAL REFLUX DISEASE, ESOPHAGITIS PRESENCE NOT SPECIFIED: ICD-10-CM

## 2018-07-26 DIAGNOSIS — E78.5 HYPERLIPIDEMIA, UNSPECIFIED HYPERLIPIDEMIA TYPE: ICD-10-CM

## 2018-07-26 DIAGNOSIS — N81.10 FEMALE BLADDER PROLAPSE, ACQUIRED: ICD-10-CM

## 2018-07-26 DIAGNOSIS — Z12.39 SCREENING FOR BREAST CANCER: ICD-10-CM

## 2018-07-26 PROCEDURE — G0009 ADMIN PNEUMOCOCCAL VACCINE: HCPCS | Performed by: FAMILY MEDICINE

## 2018-07-26 PROCEDURE — 90732 PPSV23 VACC 2 YRS+ SUBQ/IM: CPT | Performed by: FAMILY MEDICINE

## 2018-07-26 PROCEDURE — G0439 PPPS, SUBSEQ VISIT: HCPCS | Mod: 25 | Performed by: FAMILY MEDICINE

## 2018-07-26 ASSESSMENT — ACTIVITIES OF DAILY LIVING (ADL)
BATHING_REQUIRES_ASSISTANCE: 0
TRANSPORTATION COMMENTS: PATIENT DOES NOT DRIVE

## 2018-07-26 ASSESSMENT — PATIENT HEALTH QUESTIONNAIRE - PHQ9: CLINICAL INTERPRETATION OF PHQ2 SCORE: 0

## 2018-07-26 ASSESSMENT — ENCOUNTER SYMPTOMS: GENERAL WELL-BEING: GOOD

## 2018-07-26 NOTE — PROGRESS NOTES
Chief Complaint   Patient presents with   • Annual Exam         HPI:  Nini is a 86 y.o. 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     • metoprolol (LOPRESSOR) 25 MG Tab Take 1 Tab by mouth 2 times a day. 180 Tab 1   • rosuvastatin (CRESTOR) 10 MG Tab Take 1 Tab by mouth every evening. (Patient taking differently: Take 10 mg by mouth every evening.) 90 Tab 3   • Cholecalciferol 4000 units Cap Take 4,000 Units by mouth every day. 30 Cap 5   • Coenzyme Q10 (CO Q 10 PO) Take  by mouth.     • PREMARIN 0.625 MG/GM Cream INSERT 1 GRAM VAGINALLY 3 TIMES WEEKLY (Patient taking differently: INSERT 1 GRAM VAGINALLY 1 TIME PER WEEK) 1 Tube 0   • mometasone (NASONEX) 50 MCG/ACT nasal spray Spray 2 Sprays in nose every day. 1 Inhaler 5   • Multiple Vitamins-Minerals (CENTRUM SILVER PO) Take 1 Tab by mouth every day.     • aspirin EC (ECOTRIN) 81 MG Tablet Delayed Response Take 81 mg by mouth every day.     • nitrofurantoin (MACRODANTIN) 50 MG Cap Take 50 mg by mouth every day.     • Lansoprazole (PREVACID PO) Take  by mouth.     •  omeprazole (PRILOSEC) 20 MG CPDR Take 20 mg by mouth every day.       No current facility-administered medications for this visit.         Patient is taking medications as noted in medication list.  Current supplements as per medication list.     Allergies: Atorvastatin; Bactrim ds; Sulfa drugs; Vicodin [hydrocodone-acetaminophen]; and Vytorin    Current social contact/activities: none     Is patient current with immunizations? No, due for PNEUMOVAX (PPSV23). Patient is interested in receiving PNEUMOVAX (PPSV23) today.  Patient declined Tetanus.    She  reports that she has never smoked. She has never used smokeless tobacco. She reports that she drinks alcohol. She reports that she does not use drugs.  Counseling given: Not Answered        DPA/Advanced directive: Patient has Durable Power of , but it is not on file. Instructed to bring in a copy to scan into their chart.    ROS:    Gait: Uses no assistive device   Ostomy: No   Other tubes: No   Amputations: No   Chronic oxygen use No   Last eye exam 2018   Wears hearing aids: No   : Denies any urinary leakage during the last 6 months      Screening:        Depression Screening    Little interest or pleasure in doing things?  0 - not at all  Feeling down, depressed, or hopeless? 0 - not at all  Patient Health Questionnaire Score: 0    If depressive symptoms identified deferred to follow up visit unless specifically addressed in assessment and plan.    Interpretation of PHQ-9 Total Score   Score Severity   1-4 No Depression   5-9 Mild Depression   10-14 Moderate Depression   15-19 Moderately Severe Depression   20-27 Severe Depression    Screening for Cognitive Impairment    Three Minute Recall (leader, season, table)  2/3    Judson clock face with all 12 numbers and set the hands to show 10 past 11.  Yes    If cognitive concerns identified, deferred for follow up unless specifically addressed in assessment and plan.    Fall Risk Assessment    Has the patient  had two or more falls in the last year or any fall with injury in the last year?  No  If fall risk identified, deferred for follow up unless specifically addressed in assessment and plan.    Safety Assessment    Throw rugs on floor.  No  Handrails on all stairs.  Yes  Good lighting in all hallways.  Yes  Difficulty hearing.  Yes  Patient counseled about all safety risks that were identified.    Functional Assessment ADLs    Are there any barriers preventing you from cooking for yourself or meeting nutritional needs?  No.    Are there any barriers preventing you from driving safely or obtaining transportation?  Yes. Patient does not drive  Are there any barriers preventing you from using a telephone or calling for help?  No.    Are there any barriers preventing you from shopping?  No.    Are there any barriers preventing you from taking care of your own finances?  No.    Are there any barriers preventing you from managing your medications?  No.    Are there any barriers preventing you from showering, bathing or dressing yourself?  No.    Are you currently engaging in any exercise or physical activity?  Yes.  Walk,   What is your perception of your health?  Good.    Health Maintenance Summary                IMM DTaP/Tdap/Td Vaccine Overdue 7/8/1951     IMM ZOSTER VACCINES Overdue 7/8/1982     BONE DENSITY Overdue 7/8/1997     Annual Wellness Visit Overdue 5/10/2015      Done 5/9/2014     IMM PNEUMOCOCCAL 65+ (ADULT) LOW/MEDIUM RISK SERIES Overdue 10/19/2017      Done 10/19/2016 Imm Admin: Pneumococcal Conjugate Vaccine (Prevnar/PCV-13)    IMM INFLUENZA Next Due 9/1/2018      Done 9/29/2017 Imm Admin: Influenza Vaccine Adult HD     Patient has more history with this topic...    MAMMOGRAM Next Due 10/18/2018      Done 10/18/2016 [id number]    COLONOSCOPY Next Due 2/19/2023      Done 2/19/2013           Patient Care Team:  Nevaeh Munroe M.D. as PCP - General (Family Medicine)  [facility]  GIANNI Weaver as Consulting Physician (Urology)  Montez Kamara M.D. as Consulting Physician (Cardiology)  Geovani Bradley M.D. as Consulting Physician (Allergy)    Social History   Substance Use Topics   • Smoking status: Never Smoker   • Smokeless tobacco: Never Used   • Alcohol use 0.0 oz/week      Comment: occasionally     Family History   Problem Relation Age of Onset   • Non-contributory Mother         gall bladder surgery   • Cancer Father         colon rectal   • Heart Disease Father         rheumatic heart disease   • Sleep Apnea Brother    • Heart Disease Brother         sleep apnea   • Heart Attack Brother         MI   • No Known Problems Maternal Grandmother    • No Known Problems Maternal Grandfather    • No Known Problems Paternal Grandmother    • No Known Problems Paternal Grandfather      She  has a past medical history of AAA (abdominal aortic aneurysm) ([facility]); Atherosclerosis of native arteries of the extremities with intermittent claudication (10/16/2013); Blepharospasm syndrome; Bright red rectal bleeding (6/24/2015); Chest pain (07/2015); Constipation; Edema (5/29/2012); Female bladder prolapse, acquired; GERD (gastroesophageal reflux disease); Hyperlipidemia; Hypertension; Insomnia; OSTEOPOROSIS; Palpitations (5/29/2012); Sleep apnea; Varicose veins (5/29/2012); and Vitamin D deficiency disease.   Past Surgical History:   Procedure Laterality Date   • CYSTOCELE REPAIR  1/17/2011    Performed by GILMAR CHUNG at [facility]   • RECTOCELE REPAIR  1/17/2011    Performed by GILMAR CHUNG at [facility]   • BLADDER SUSPENSION  1/17/2011    Performed by GILMAR CHUNG at [facility]   • CYSTOSCOPY  1/17/2011    Performed by GILMAR CHUNG at [facility]   • ABDOMINAL HYSTERECTOMY TOTAL     • ARTHROSCOPY, KNEE     • CATARACT EXTRACTION WITH IOL     • HEMORRHOIDECTOMY     • HYSTERECTOMY,  "TOTAL ABDOMINAL     • INJECTION SCLERO HEMORROIDS     • OTHER ORTHOPEDIC SURGERY      knee scope x 2   • PB KNEE SCOPE,DIAGNOSTIC  2003;2009   • TONSILLECTOMY             Exam:     Blood pressure 122/80, pulse 74, temperature 36.2 °C (97.1 °F), resp. rate 14, height 1.575 m (5' 2\"), weight 58.2 kg (128 lb 6.4 oz), SpO2 97 %. Body mass index is 23.48 kg/m².    Hearing excellent.    Dentition good  Alert, oriented in no acute distress.  Eye contact is good, speech goal directed, affect calm      Assessment and Plan. The following treatment and monitoring plan is recommended:    1. Abdominal aortic aneurysm (AAA) without rupture (HCC)  Continue with BP control.      2. Atherosclerosis of native artery of extremity with intermittent claudication, unspecified extremity (HCC)  Noted continue with walking daily, Statin, ASA    3. Chronic insomnia  Stable, unchanged.  Regular sleep hygeine.     4. Localized edema  Stable. 2/2 to venous insufficiency    5. Essential hypertension  Controlled.     6. Female bladder prolapse, acquired  Noted, at risk for bladder infection.  UA clear    7. Gastroesophageal reflux disease, esophagitis presence not specified  Stable with H2 blocker prn.      8. Hyperlipidemia, unspecified hyperlipidemia type  Controlled with 3 day per week statin therapy    9. RUY treated with BiPAP  Continue BiPAP    10. Age-related osteoporosis without current pathological fracture  Fall prevention, Bone density check due     11. Pancreatic cyst  Noted.  Asymptomatic continue GI follow up for surveillance.     12. SVT (supraventricular tachycardia) (HCC)  Stable.  Asymptomatic at this time.     13. Vitamin D deficiency disease  Continue vit D OTC      Services suggested: none  Health Care Screening recommendations as per orders if indicated.  Referrals offered: PT/OT/Nutrition counseling/Behavioral Health/Smoking cessation as per orders if indicated.    Discussion today about general wellness and lifestyle " habits:    · Prevent falls and reduce trip hazards; Cautioned about securing or removing rugs.  · Have a working fire alarm and carbon monoxide detector;   · Engage in regular physical activity and social activities       Follow-up:

## 2018-07-27 ENCOUNTER — NON-PROVIDER VISIT (OUTPATIENT)
Dept: VASCULAR LAB | Facility: MEDICAL CENTER | Age: 83
End: 2018-07-27
Attending: INTERNAL MEDICINE
Payer: MEDICARE

## 2018-07-27 ENCOUNTER — OFFICE VISIT (OUTPATIENT)
Dept: URGENT CARE | Facility: PHYSICIAN GROUP | Age: 83
End: 2018-07-27
Payer: MEDICARE

## 2018-07-27 VITALS
SYSTOLIC BLOOD PRESSURE: 132 MMHG | HEIGHT: 62 IN | TEMPERATURE: 97.5 F | BODY MASS INDEX: 23.55 KG/M2 | OXYGEN SATURATION: 96 % | DIASTOLIC BLOOD PRESSURE: 76 MMHG | WEIGHT: 128 LBS | HEART RATE: 81 BPM

## 2018-07-27 DIAGNOSIS — I70.219 ATHEROSCLEROSIS OF NATIVE ARTERY OF EXTREMITY WITH INTERMITTENT CLAUDICATION, UNSPECIFIED EXTREMITY (HCC): ICD-10-CM

## 2018-07-27 DIAGNOSIS — E78.5 HYPERLIPIDEMIA, UNSPECIFIED HYPERLIPIDEMIA TYPE: ICD-10-CM

## 2018-07-27 DIAGNOSIS — E55.9 VITAMIN D DEFICIENCY DISEASE: ICD-10-CM

## 2018-07-27 DIAGNOSIS — T50.Z95A ADVERSE EFFECT OF VACCINE, INITIAL ENCOUNTER: ICD-10-CM

## 2018-07-27 PROCEDURE — 99212 OFFICE O/P EST SF 10 MIN: CPT

## 2018-07-27 PROCEDURE — 99213 OFFICE O/P EST LOW 20 MIN: CPT | Performed by: FAMILY MEDICINE

## 2018-07-27 RX ORDER — DIPHENHYDRAMINE HCL 25 MG
25 CAPSULE ORAL ONCE
Status: COMPLETED | OUTPATIENT
Start: 2018-07-27 | End: 2018-07-27

## 2018-07-27 RX ORDER — TRIAMCINOLONE ACETONIDE 1 MG/G
OINTMENT TOPICAL
Qty: 1 TUBE | Refills: 0 | Status: SHIPPED | OUTPATIENT
Start: 2018-07-27 | End: 2020-06-14

## 2018-07-27 RX ADMIN — Medication 25 MG: at 16:37

## 2018-07-27 ASSESSMENT — ENCOUNTER SYMPTOMS
SENSORY CHANGE: 0
WEIGHT LOSS: 0
FOCAL WEAKNESS: 0

## 2018-07-27 NOTE — PROGRESS NOTES
"Date of Service: 07/27/18    Nini Taylor is here for follow up of dyslipidemia.    HPI  Pertinent Interval History since last visit: none    Current Prescription Lipid Lowering Medications - including dose:   Statin: Statin: Crestor 10 mg MWF  Non-Statin: None  Current Lipid Lowering and Related Supplements:   CoQ10  Any Current Side Effects Potentially Related to Lipid Lowering therapy?   Yes, Details: Patient tried taking Crestor 5 days a week, but started having muscle aches   Current Adherence to Lipid Lowering Therapies:  Partial  Any Previous History of Statin Intolerance?   Statin: Simvastatin Unknown dose/duration - myalgias                Crestor unkown dose, duration \"years\" - states she started getting sharp \"zings\" down her arms.              Atorvastatin unknown dose, duration of 90 days - severe UE myalgia    Non-Statin: States none, however MR shows possibly Vytorin use              Outcome: Unknown  Baseline Lipids Prior to Treatment:    2/6/2017 12:20    Cholesterol,Tot  274 (H)    Triglycerides  101    HDL  58    LDL  196 (H)          SOCIAL HISTORY  History   Smoking Status   • Never Smoker   Smokeless Tobacco   • Never Used      Change in weight: Stable  Exercise habits: moderate regular exercise program , walks half a mile daily  Diet: common adult    ROS  Physical Exam    DATA REVIEW  Most Recent Lipid Panel:   Lab Results   Component Value Date    CHOLSTRLTOT 175 07/24/2018    TRIGLYCERIDE 92 07/24/2018    HDL 55 07/24/2018     (H) 07/24/2018       Other Pertinent Blood Work:   Lab Results   Component Value Date    SODIUM 140 07/24/2018    POTASSIUM 3.9 07/24/2018    CHLORIDE 106 07/24/2018    CO2 28 07/24/2018    ANION 6.0 07/24/2018    GLUCOSE 89 07/24/2018    BUN 14 07/24/2018    CREATININE 1.02 07/24/2018    CALCIUM 9.6 07/24/2018    ASTSGOT 21 07/24/2018    ALTSGPT 12 07/24/2018    ALKPHOSPHAT 72 07/24/2018    TBILIRUBIN 1.5 07/24/2018    ALBUMIN 4.4 07/24/2018    AGRATIO 1.8 " 07/24/2018    CREACTPROT 0.20 11/29/2016    CPKTOTAL 50 10/12/2017       Other:  NA    Recent Imaging Studies:    None since last visit    ASSESSMENT AND PLAN  Patient Type, check all that apply:   Secondary Prevention  Established Atherosclerotic Cardiovascular Disease (ASCVD)  AAA  Other Established (non-atherosclerotic) Vascular Disease, if Present:    SVT - defer management to cardiology  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): Possible Likely - based on baseline LDL-C and +FH of premature CAD in brother - recommended cascade screening  ACC/AHA Indication for Statin Therapy, gurpreet all that apply:   LDL-C at baseline >190 mg/dl: Indication for High intensity statin    Calculated Risk for ASCVD, if applicable:  N/A  Other Significant Risk Markers, if any, gurpreet all that apply:  Family history of premature ASCVD in first degree relative  National Lipid Association (NLA) Goal (if applicable):  LDL-C:   <70 mg/dL  Lifestyle Recommendations From Today’s Visit:    Eating Plan: Concentrate on  Low sat/Trans fat, continue exercising daily  Non-Statin Medications Recommendations from Today’s Visit:   Continue taking 4000 mg vit D and COQ10  Indication for PCSK9 Inhibitor, if applicable:  Not currently indicated  Supplements Recommended at this visit:  Continue taking 4000 mg vit D and COQ10  Recommendations for Other Cardiovascular Risk Factors, gurpreet all that apply:   None    Patient tried taking Crestor 10 mg 5 days a week but stopped after a month secondary to some pain in arms and legs. Her LDL have increased from 97 to 102 and non HDL from 116 to 120. She is currently back to taking 10 mg 3 days a week. I discussed the addition of  Zetia with patient if her LDL keeps rising (>130), she wants to give it another try with increasing Crestor to 4 days a week, then possibly re-challenging 5 days a week if she tolerates it. Her Vit D doses were increased form 1000 mg to 4000 mg daily, this might also help  reduce her symptoms.    She is also willing to improving her diet.    Studies Ordered at Todays Visit:  None   Blood Work Ordered At Today’s visit:   CMP (per patient request), Lipid panel and Vit D  Follow-Up:   3 months    Roshan Dumont, PharmD    CC:  Bloch, Michael J, M.D.

## 2018-07-27 NOTE — PROGRESS NOTES
"Subjective:      Nini Taylor is a 86 y.o. female who presents with Arm Pain (right upper arm reaction to Pneu vacc 23)            Pneumovax yesterday with right upper extremity redness.  She has not used any over-the-counter medications at this point.  No drainage.  No oral swelling breath.  No wheezing.  She has been having anaphylactic reaction to no past medical history of anaphylaxis influenza vaccine and she is concerned about this.        Review of Systems   Constitutional: Negative for malaise/fatigue and weight loss.   Skin: Negative for itching and rash.   Neurological: Negative for sensory change and focal weakness.     .  Medications, Allergies, and current problem list reviewed today in Epic       Objective:     /76   Pulse 81   Temp 36.4 °C (97.5 °F)   Ht 1.575 m (5' 2\")   Wt 58.1 kg (128 lb)   SpO2 96%   BMI 23.41 kg/m²      Physical Exam   Constitutional: She appears well-developed and well-nourished. No distress.   HENT:   Head: Normocephalic and atraumatic.   Eyes: Conjunctivae are normal.   Neck: Tracheal deviation present.   Cardiovascular: Normal rate, regular rhythm and normal heart sounds.    Pulmonary/Chest: Effort normal and breath sounds normal. She has no wheezes.   Musculoskeletal:        Back:    Neurological:   Speech is clear. Patient is appropriate and cooperative.     Skin: Skin is warm and dry.               Assessment/Plan:     1. Adverse effect of vaccine, initial encounter  triamcinolone acetonide (KENALOG) 0.1 % Ointment    diphenhydrAMINE (BENADRYL) capsule 25 mg     Differential diagnosis, natural history, supportive care, and indications for immediate follow-up discussed at length.      Local reaction.  Nini will contact me with worse symptoms or fevers.  "

## 2018-08-16 ENCOUNTER — HOSPITAL ENCOUNTER (OUTPATIENT)
Facility: MEDICAL CENTER | Age: 83
End: 2018-08-16
Attending: FAMILY MEDICINE
Payer: MEDICARE

## 2018-08-16 DIAGNOSIS — N39.0 FREQUENT UTI: ICD-10-CM

## 2018-08-16 LAB
APPEARANCE UR: ABNORMAL
BACTERIA #/AREA URNS HPF: ABNORMAL /HPF
BILIRUB UR QL STRIP.AUTO: NEGATIVE
COLOR UR: YELLOW
EPI CELLS #/AREA URNS HPF: ABNORMAL /HPF
GLUCOSE UR STRIP.AUTO-MCNC: NEGATIVE MG/DL
HYALINE CASTS #/AREA URNS LPF: ABNORMAL /LPF
KETONES UR STRIP.AUTO-MCNC: NEGATIVE MG/DL
LEUKOCYTE ESTERASE UR QL STRIP.AUTO: ABNORMAL
MICRO URNS: ABNORMAL
NITRITE UR QL STRIP.AUTO: POSITIVE
PH UR STRIP.AUTO: 6 [PH]
PROT UR QL STRIP: NEGATIVE MG/DL
RBC # URNS HPF: ABNORMAL /HPF
RBC UR QL AUTO: ABNORMAL
SP GR UR STRIP.AUTO: 1.01
UROBILINOGEN UR STRIP.AUTO-MCNC: 0.2 MG/DL
WBC #/AREA URNS HPF: ABNORMAL /HPF

## 2018-08-16 PROCEDURE — 87086 URINE CULTURE/COLONY COUNT: CPT

## 2018-08-16 PROCEDURE — 87186 SC STD MICRODIL/AGAR DIL: CPT

## 2018-08-16 PROCEDURE — 81001 URINALYSIS AUTO W/SCOPE: CPT

## 2018-08-16 PROCEDURE — 87077 CULTURE AEROBIC IDENTIFY: CPT

## 2018-08-17 DIAGNOSIS — N30.01 ACUTE CYSTITIS WITH HEMATURIA: ICD-10-CM

## 2018-08-17 RX ORDER — NITROFURANTOIN 25; 75 MG/1; MG/1
100 CAPSULE ORAL 2 TIMES DAILY
Qty: 20 CAP | Refills: 0 | Status: SHIPPED | OUTPATIENT
Start: 2018-08-17 | End: 2018-08-27

## 2018-08-21 ENCOUNTER — TELEPHONE (OUTPATIENT)
Dept: MEDICAL GROUP | Facility: PHYSICIAN GROUP | Age: 83
End: 2018-08-21

## 2018-08-21 NOTE — TELEPHONE ENCOUNTER
----- Message from Nevaeh Munroe M.D. sent at 8/21/2018 12:21 PM PDT -----  E. Coli UTI sensitive to the antibiotics given.  Finish them completely.

## 2018-08-23 ENCOUNTER — OFFICE VISIT (OUTPATIENT)
Dept: CARDIOLOGY | Facility: MEDICAL CENTER | Age: 83
End: 2018-08-23
Payer: MEDICARE

## 2018-08-23 VITALS
BODY MASS INDEX: 23.74 KG/M2 | HEART RATE: 82 BPM | WEIGHT: 129 LBS | DIASTOLIC BLOOD PRESSURE: 82 MMHG | HEIGHT: 62 IN | SYSTOLIC BLOOD PRESSURE: 130 MMHG | OXYGEN SATURATION: 94 %

## 2018-08-23 DIAGNOSIS — I71.40 ABDOMINAL AORTIC ANEURYSM (AAA) WITHOUT RUPTURE (HCC): ICD-10-CM

## 2018-08-23 DIAGNOSIS — E78.5 DYSLIPIDEMIA: ICD-10-CM

## 2018-08-23 DIAGNOSIS — R60.0 LOWER EXTREMITY EDEMA: ICD-10-CM

## 2018-08-23 DIAGNOSIS — I70.219 ATHEROSCLEROSIS OF NATIVE ARTERY OF EXTREMITY WITH INTERMITTENT CLAUDICATION, UNSPECIFIED EXTREMITY (HCC): ICD-10-CM

## 2018-08-23 DIAGNOSIS — I47.10 SVT (SUPRAVENTRICULAR TACHYCARDIA): ICD-10-CM

## 2018-08-23 PROCEDURE — 99214 OFFICE O/P EST MOD 30 MIN: CPT | Performed by: INTERNAL MEDICINE

## 2018-08-23 RX ORDER — SIMVASTATIN 20 MG
20 TABLET ORAL EVERY EVENING
Qty: 90 TAB | Refills: 3 | Status: SHIPPED | OUTPATIENT
Start: 2018-08-23 | End: 2018-10-30

## 2018-08-31 ASSESSMENT — ENCOUNTER SYMPTOMS
FALLS: 0
FEVER: 0
DIZZINESS: 0
SORE THROAT: 0
BLURRED VISION: 0
BRUISES/BLEEDS EASILY: 0
COUGH: 0
PND: 0
ABDOMINAL PAIN: 0
NAUSEA: 0
SHORTNESS OF BREATH: 0
PALPITATIONS: 0
WEAKNESS: 0
CHILLS: 0
CLAUDICATION: 0
FOCAL WEAKNESS: 0

## 2018-08-31 NOTE — PROGRESS NOTES
Chief Complaint   Patient presents with   • Palpitations       Subjective:   Nini Taylor is a 86 y.o. female who presents today for follow-up of her history of AAA SVT hypertension hyperlipidemia    She has considerable anxiety related or stress but overall has done well this year with no major issues    Past Medical History:   Diagnosis Date   • AAA (abdominal aortic aneurysm) (HCC)    • Atherosclerosis of native arteries of the extremities with intermittent claudication 10/16/2013   • Blepharospasm syndrome    • Bright red rectal bleeding 6/24/2015   • Chest pain 07/2015    Unspecified; Nuclear stress test negative    • Constipation    • Edema 5/29/2012   • Female bladder prolapse, acquired    • GERD (gastroesophageal reflux disease)    • Hyperlipidemia    • Hypertension    • Insomnia    • OSTEOPOROSIS    • Palpitations 5/29/2012   • Sleep apnea    • Varicose veins 5/29/2012   • Vitamin D deficiency disease      Past Surgical History:   Procedure Laterality Date   • CYSTOCELE REPAIR  1/17/2011    Performed by GILMAR CHUNG at [facility]   • RECTOCELE REPAIR  1/17/2011    Performed by GILMAR CHUNG at [facility]   • BLADDER SUSPENSION  1/17/2011    Performed by GILMAR CHUNG at [facility]   • CYSTOSCOPY  1/17/2011    Performed by GILMAR CHUNG at [facility]   • ABDOMINAL HYSTERECTOMY TOTAL     • ARTHROSCOPY, KNEE     • CATARACT EXTRACTION WITH IOL     • HEMORRHOIDECTOMY     • HYSTERECTOMY, TOTAL ABDOMINAL     • INJECTION SCLERO HEMORROIDS     • OTHER ORTHOPEDIC SURGERY      knee scope x 2   • PB KNEE SCOPE,DIAGNOSTIC  2003;2009   • TONSILLECTOMY       Family History   Problem Relation Age of Onset   • Non-contributory Mother         gall bladder surgery   • Cancer Father         colon rectal   • Heart Disease Father         rheumatic heart disease   • Sleep Apnea Brother    • Heart Disease Brother         sleep  apnea   • Heart Attack Brother         MI   • No Known Problems Maternal Grandmother    • No Known Problems Maternal Grandfather    • No Known Problems Paternal Grandmother    • No Known Problems Paternal Grandfather      Social History     Social History   • Marital status:      Spouse name: N/A   • Number of children: N/A   • Years of education: N/A     Occupational History   • Not on file.     Social History Main Topics   • Smoking status: Never Smoker   • Smokeless tobacco: Never Used   • Alcohol use 0.0 oz/week      Comment: occasionally   • Drug use: No   • Sexual activity: No     Other Topics Concern   • Not on file     Social History Narrative   • No narrative on file     Allergies   Allergen Reactions   • Atorvastatin    • Bactrim Ds Hives   • Pneumococcal Vaccines Swelling   • Sulfa Drugs Hives   • Vicodin [Hydrocodone-Acetaminophen] Rash   • Vytorin    • Zetia [Ezetimibe]      Outpatient Encounter Prescriptions as of 2018   Medication Sig Dispense Refill   • simvastatin (ZOCOR) 20 MG Tab Take 1 Tab by mouth every evening. 90 Tab 3   • [] nitrofurantoin monohydr macro (MACROBID) 100 MG Cap Take 1 Cap by mouth 2 times a day for 10 days. 20 Cap 0   • Multiple Vitamins-Minerals (ICAPS AREDS 2) Cap Take  by mouth.     • metoprolol (LOPRESSOR) 25 MG Tab Take 1 Tab by mouth 2 times a day. 180 Tab 1   • Cholecalciferol 4000 units Cap Take 4,000 Units by mouth every day. 30 Cap 5   • Coenzyme Q10 (CO Q 10 PO) Take  by mouth.     • Multiple Vitamins-Minerals (CENTRUM SILVER PO) Take 1 Tab by mouth every day.     • triamcinolone acetonide (KENALOG) 0.1 % Ointment Apply thin layer to affected area twice daily as needed 1 Tube 0   • [DISCONTINUED] rosuvastatin (CRESTOR) 10 MG Tab Take 1 Tab by mouth every evening. (Patient taking differently: Take 10 mg by mouth every evening.) 90 Tab 3   • PREMARIN 0.625 MG/GM Cream INSERT 1 GRAM VAGINALLY 3 TIMES WEEKLY (Patient taking differently: INSERT 1 GRAM  "VAGINALLY 1 TIME PER WEEK) 1 Tube 0   • mometasone (NASONEX) 50 MCG/ACT nasal spray Spray 2 Sprays in nose every day. 1 Inhaler 5     No facility-administered encounter medications on file as of 8/23/2018.      Review of Systems   Constitutional: Negative for chills and fever.   HENT: Negative for sore throat.    Eyes: Negative for blurred vision.   Respiratory: Negative for cough and shortness of breath.    Cardiovascular: Negative for chest pain, palpitations, claudication, leg swelling and PND.   Gastrointestinal: Negative for abdominal pain and nausea.   Musculoskeletal: Negative for falls and joint pain.   Skin: Negative for rash.   Neurological: Negative for dizziness, focal weakness and weakness.   Endo/Heme/Allergies: Does not bruise/bleed easily.        Objective:   /82   Pulse 82   Ht 1.575 m (5' 2\")   Wt 58.5 kg (129 lb)   SpO2 94%   BMI 23.59 kg/m²     Physical Exam   Constitutional: No distress.   HENT:   Mouth/Throat: Oropharynx is clear and moist. No oropharyngeal exudate.   Eyes: No scleral icterus.   Neck: No JVD present.   Cardiovascular: Normal rate and normal heart sounds.  Exam reveals no gallop and no friction rub.    No murmur heard.  Pulmonary/Chest: No respiratory distress. She has no wheezes. She has no rales.   Abdominal: Soft. Bowel sounds are normal.   Musculoskeletal: She exhibits no edema.   Neurological: She is alert.   Skin: No rash noted. She is not diaphoretic.   Psychiatric: She has a normal mood and affect.     We reviewed her labs   a  Assessment:     1. Lower extremity edema     2. Dyslipidemia     3. Atherosclerosis of native artery of extremity with intermittent claudication, unspecified extremity (HCC)  LIPID PROFILE   4. Abdominal aortic aneurysm (AAA) without rupture ([facility])     5. SVT (supraventricular tachycardia) ([facility])         Medical Decision Making:  Today's Assessment / Status / Plan:     It was my pleasure to meet with Ms. Taylor.    She is done well on " medical therapy she has considerable stress related to her health but is remarkably healthy especially given her age    Blood pressure is at goal    Lipids at goal    AAA was very minimal    I will see Ms. Taylro back in 6 months time and encouraged her to follow up with us over the phone or e-mail using my MyChart as issues arise.    It is my pleasure to participate in the care of Ms. Taylor.  Please do not hesitate to contact me with questions or concerns.    Montez Kamara MD PhD FACC  Cardiologist [facility]

## 2018-09-04 ENCOUNTER — APPOINTMENT (RX ONLY)
Dept: URBAN - METROPOLITAN AREA CLINIC 4 | Facility: CLINIC | Age: 83
Setting detail: DERMATOLOGY
End: 2018-09-04

## 2018-09-04 DIAGNOSIS — D22 MELANOCYTIC NEVI: ICD-10-CM

## 2018-09-04 DIAGNOSIS — L81.4 OTHER MELANIN HYPERPIGMENTATION: ICD-10-CM

## 2018-09-04 DIAGNOSIS — D18.0 HEMANGIOMA: ICD-10-CM

## 2018-09-04 DIAGNOSIS — L82.1 OTHER SEBORRHEIC KERATOSIS: ICD-10-CM

## 2018-09-04 PROBLEM — D18.01 HEMANGIOMA OF SKIN AND SUBCUTANEOUS TISSUE: Status: ACTIVE | Noted: 2018-09-04

## 2018-09-04 PROBLEM — D22.5 MELANOCYTIC NEVI OF TRUNK: Status: ACTIVE | Noted: 2018-09-04

## 2018-09-04 PROCEDURE — ? COUNSELING

## 2018-09-04 PROCEDURE — 99213 OFFICE O/P EST LOW 20 MIN: CPT

## 2018-09-04 ASSESSMENT — LOCATION DETAILED DESCRIPTION DERM
LOCATION DETAILED: LEFT INFERIOR MEDIAL UPPER BACK
LOCATION DETAILED: EPIGASTRIC SKIN
LOCATION DETAILED: LEFT INFERIOR LATERAL MIDBACK
LOCATION DETAILED: LEFT INFERIOR UPPER BACK

## 2018-09-04 ASSESSMENT — LOCATION SIMPLE DESCRIPTION DERM
LOCATION SIMPLE: LEFT UPPER BACK
LOCATION SIMPLE: LEFT LOWER BACK
LOCATION SIMPLE: ABDOMEN

## 2018-09-04 ASSESSMENT — LOCATION ZONE DERM: LOCATION ZONE: TRUNK

## 2018-10-04 ENCOUNTER — TELEPHONE (OUTPATIENT)
Dept: CARDIOLOGY | Facility: MEDICAL CENTER | Age: 83
End: 2018-10-04

## 2018-10-05 NOTE — TELEPHONE ENCOUNTER
Patient Calls     Montez Kamara M.D.   You 21 hours ago (4:05 PM)     I agree she may tolerate the medicines better every other day and she could do the lipids prior to seeing Dr. Bloch so they could discuss.  She can also take co-Q10 which may help her tolerate the medicines better.     Thank you (Routing comment)      Attempted to call patient, unable to reach.  Unable to leave voicemail, busy signal.  Will try again at a later time.

## 2018-10-09 NOTE — TELEPHONE ENCOUNTER
Was the patient seen in the last year in this department? Yes    Does patient have an active prescription for medications requested? No     Received Request Via: Patient      Pt met protocol?: Yes, last ov 7/26/18

## 2018-10-10 ENCOUNTER — HOSPITAL ENCOUNTER (OUTPATIENT)
Dept: LAB | Facility: MEDICAL CENTER | Age: 83
End: 2018-10-10
Attending: FAMILY MEDICINE
Payer: MEDICARE

## 2018-10-10 ENCOUNTER — TELEPHONE (OUTPATIENT)
Dept: MEDICAL GROUP | Facility: PHYSICIAN GROUP | Age: 83
End: 2018-10-10

## 2018-10-10 DIAGNOSIS — N39.0 FREQUENT UTI: ICD-10-CM

## 2018-10-10 LAB
APPEARANCE UR: CLEAR
BACTERIA #/AREA URNS HPF: NEGATIVE /HPF
BILIRUB UR QL STRIP.AUTO: NEGATIVE
COLOR UR: YELLOW
EPI CELLS #/AREA URNS HPF: NORMAL /HPF
GLUCOSE UR STRIP.AUTO-MCNC: NEGATIVE MG/DL
HYALINE CASTS #/AREA URNS LPF: NORMAL /LPF
KETONES UR STRIP.AUTO-MCNC: ABNORMAL MG/DL
LEUKOCYTE ESTERASE UR QL STRIP.AUTO: ABNORMAL
MICRO URNS: ABNORMAL
NITRITE UR QL STRIP.AUTO: NEGATIVE
PH UR STRIP.AUTO: 6 [PH]
PROT UR QL STRIP: NEGATIVE MG/DL
RBC # URNS HPF: NORMAL /HPF
RBC UR QL AUTO: NEGATIVE
SP GR UR STRIP.AUTO: 1.01
UROBILINOGEN UR STRIP.AUTO-MCNC: 0.2 MG/DL
WBC #/AREA URNS HPF: NORMAL /HPF

## 2018-10-10 PROCEDURE — 81001 URINALYSIS AUTO W/SCOPE: CPT

## 2018-10-10 RX ORDER — CONJUGATED ESTROGENS 0.62 MG/G
CREAM VAGINAL
Qty: 30 G | Refills: 1 | Status: SHIPPED | OUTPATIENT
Start: 2018-10-10 | End: 2019-12-10 | Stop reason: SDUPTHER

## 2018-10-10 NOTE — TELEPHONE ENCOUNTER
Pt wants to know if she can have Urine Lab work done because of a chronic condition. Pt is requesting labs to be put in before she sees Dr. Baxter for a reoccurring bladder problem. Pt has a paper that allows her to have Urine labs done and wants to make sure it is alright to have labs done. Please advise.

## 2018-10-10 NOTE — TELEPHONE ENCOUNTER
Return pt call to review MD recommendation.  Pt states that she has been taking Co-Q10 from Dr. Bloch's recommendations.  Pt states no other concerns or questions at this time.  Pt is appreciative of information given and verbalizes understanding.

## 2018-10-11 ENCOUNTER — TELEPHONE (OUTPATIENT)
Dept: MEDICAL GROUP | Facility: PHYSICIAN GROUP | Age: 83
End: 2018-10-11

## 2018-10-11 DIAGNOSIS — N39.0 RECURRENT UTI: ICD-10-CM

## 2018-10-11 DIAGNOSIS — Z23 NEED FOR VACCINATION: ICD-10-CM

## 2018-10-11 RX ORDER — NITROFURANTOIN 25; 75 MG/1; MG/1
100 CAPSULE ORAL DAILY
Qty: 30 CAP | Refills: 1 | Status: SHIPPED | OUTPATIENT
Start: 2018-10-11 | End: 2019-06-12

## 2018-10-11 NOTE — TELEPHONE ENCOUNTER
Pt states she has follow ups with Urology and Gynecology and will bring these results up.   She does states that she is having continued vaginal burning, with a slight odor. She states she used to use a premerin cream that you use to prescribe if that's something she should continue?Please Advise

## 2018-10-11 NOTE — TELEPHONE ENCOUNTER
Patient notified and understood.  She would like to know if she should still take Macrobid.  If so, she is going to need a prescription.  Thank you.

## 2018-10-11 NOTE — TELEPHONE ENCOUNTER
----- Message from Nevaeh Munroe M.D. sent at 10/11/2018 10:11 AM PDT -----  Urine looks much better, any further symptoms?

## 2018-10-11 NOTE — TELEPHONE ENCOUNTER
Patient is on the MA Schedule tomorrow for FLU vaccine/injection.    SPECIFIC Action To Be Taken: Orders pending, please sign.

## 2018-10-12 ENCOUNTER — NON-PROVIDER VISIT (OUTPATIENT)
Dept: MEDICAL GROUP | Facility: PHYSICIAN GROUP | Age: 83
End: 2018-10-12
Payer: MEDICARE

## 2018-10-12 DIAGNOSIS — Z23 NEED FOR VACCINATION: ICD-10-CM

## 2018-10-12 PROCEDURE — G0008 ADMIN INFLUENZA VIRUS VAC: HCPCS | Performed by: FAMILY MEDICINE

## 2018-10-12 PROCEDURE — 90662 IIV NO PRSV INCREASED AG IM: CPT | Performed by: FAMILY MEDICINE

## 2018-10-12 NOTE — PROGRESS NOTES
"Nini Taylor is a 86 y.o. female here for a non-provider visit for:   FLU    Reason for immunization: Annual Flu Vaccine  Immunization records indicate need for vaccine: Yes, confirmed with Epic and confirmed with [place] [facility]  Minimum interval has been met for this vaccine: Yes  ABN completed: Not Indicated    Order and dose verified by: RADHA  VIS Dated  08/07/2015 was given to patient: Yes  All IAC Questionnaire questions were answered \"No.\"    Patient tolerated injection and no adverse effects were observed or reported: Yes    Pt scheduled for next dose in series: Not Indicated    "

## 2018-10-25 ENCOUNTER — HOSPITAL ENCOUNTER (OUTPATIENT)
Dept: LAB | Facility: MEDICAL CENTER | Age: 83
End: 2018-10-25
Attending: INTERNAL MEDICINE
Payer: MEDICARE

## 2018-10-25 DIAGNOSIS — E55.9 VITAMIN D DEFICIENCY DISEASE: ICD-10-CM

## 2018-10-25 DIAGNOSIS — E78.5 HYPERLIPIDEMIA, UNSPECIFIED HYPERLIPIDEMIA TYPE: ICD-10-CM

## 2018-10-25 DIAGNOSIS — I70.219 ATHEROSCLEROSIS OF NATIVE ARTERY OF EXTREMITY WITH INTERMITTENT CLAUDICATION, UNSPECIFIED EXTREMITY (HCC): ICD-10-CM

## 2018-10-25 LAB
25(OH)D3 SERPL-MCNC: 35 NG/ML (ref 30–100)
ALBUMIN SERPL BCP-MCNC: 4 G/DL (ref 3.2–4.9)
ALBUMIN/GLOB SERPL: 1.3 G/DL
ALP SERPL-CCNC: 74 U/L (ref 30–99)
ALT SERPL-CCNC: 13 U/L (ref 2–50)
ANION GAP SERPL CALC-SCNC: 10 MMOL/L (ref 0–11.9)
AST SERPL-CCNC: 17 U/L (ref 12–45)
BILIRUB SERPL-MCNC: 1.2 MG/DL (ref 0.1–1.5)
BUN SERPL-MCNC: 18 MG/DL (ref 8–22)
CALCIUM SERPL-MCNC: 9.9 MG/DL (ref 8.5–10.5)
CHLORIDE SERPL-SCNC: 102 MMOL/L (ref 96–112)
CHOLEST SERPL-MCNC: 211 MG/DL (ref 100–199)
CO2 SERPL-SCNC: 28 MMOL/L (ref 20–33)
CREAT SERPL-MCNC: 1.04 MG/DL (ref 0.5–1.4)
FASTING STATUS PATIENT QL REPORTED: NORMAL
GLOBULIN SER CALC-MCNC: 3 G/DL (ref 1.9–3.5)
GLUCOSE SERPL-MCNC: 90 MG/DL (ref 65–99)
HDLC SERPL-MCNC: 53 MG/DL
LDLC SERPL CALC-MCNC: 134 MG/DL
POTASSIUM SERPL-SCNC: 4.1 MMOL/L (ref 3.6–5.5)
PROT SERPL-MCNC: 7 G/DL (ref 6–8.2)
SODIUM SERPL-SCNC: 140 MMOL/L (ref 135–145)
TRIGL SERPL-MCNC: 122 MG/DL (ref 0–149)

## 2018-10-25 PROCEDURE — 80053 COMPREHEN METABOLIC PANEL: CPT

## 2018-10-25 PROCEDURE — 82306 VITAMIN D 25 HYDROXY: CPT

## 2018-10-25 PROCEDURE — 80061 LIPID PANEL: CPT

## 2018-10-25 PROCEDURE — 36415 COLL VENOUS BLD VENIPUNCTURE: CPT

## 2018-10-30 ENCOUNTER — NON-PROVIDER VISIT (OUTPATIENT)
Dept: VASCULAR LAB | Facility: MEDICAL CENTER | Age: 83
End: 2018-10-30
Attending: INTERNAL MEDICINE
Payer: MEDICARE

## 2018-10-30 VITALS — DIASTOLIC BLOOD PRESSURE: 92 MMHG | SYSTOLIC BLOOD PRESSURE: 139 MMHG | HEART RATE: 70 BPM

## 2018-10-30 DIAGNOSIS — E78.49 OTHER HYPERLIPIDEMIA: ICD-10-CM

## 2018-10-30 PROCEDURE — 99212 OFFICE O/P EST SF 10 MIN: CPT

## 2018-10-30 RX ORDER — ROSUVASTATIN CALCIUM 10 MG/1
10 TABLET, COATED ORAL EVERY EVENING
Qty: 30 TAB | Refills: 11 | Status: SHIPPED | OUTPATIENT
Start: 2018-10-30 | End: 2019-01-22

## 2018-10-30 NOTE — PROGRESS NOTES
"Date of Service: 10/30/18    Nini Taylor is here for follow up of dyslipidemia.    HPI  Pertinent Interval History since last visit: Pt switched to simvastatin out of curiosity if it would achieve the same efficacy as rosuvastatin.     Current Prescription Lipid Lowering Medications - including dose:   Statin: Simvastatin 20 mg daily.   Non-Statin: None  Current Lipid Lowering and Related Supplements:   CoQ10   Any Current Side Effects Potentially Related to Lipid Lowering therapy?   No  Current Adherence to Lipid Lowering Therapies:  Partial  Any Previous History of Statin Intolerance?   Statin: Simvastatin Unknown dose/duration - myalgias                Crestor unkown dose, duration \"years\" - states she started getting sharp \"zings\" down her arms.              Atorvastatin unknown dose, duration of 90 days - severe UE myalgia    Non-Statin: States none, however MR shows possibly Vytorin use              Outcome: Unknown  Baseline Lipids Prior to Treatment:    2/6/2017 12:20    Cholesterol,Tot  274 (H)    Triglycerides  101    HDL  58    LDL  196 (H)           SOCIAL HISTORY  History   Smoking Status   • Never Smoker   Smokeless Tobacco   • Never Used      Change in weight: Stable  Exercise habits: minimal exercise, walks uphill daily as tolerated.  Not able to do it all days of the week.   Diet: Focused on avoiding sugars and increased fruit and vegetable.    ROS  Physical Exam    DATA REVIEW  Most Recent Lipid Panel:   Lab Results   Component Value Date    CHOLSTRLTOT 211 (H) 10/25/2018    TRIGLYCERIDE 122 10/25/2018    HDL 53 10/25/2018     (H) 10/25/2018       Other Pertinent Blood Work:   Lab Results   Component Value Date    SODIUM 140 10/25/2018    POTASSIUM 4.1 10/25/2018    CHLORIDE 102 10/25/2018    CO2 28 10/25/2018    ANION 10.0 10/25/2018    GLUCOSE 90 10/25/2018    BUN 18 10/25/2018    CREATININE 1.04 10/25/2018    CALCIUM 9.9 10/25/2018    ASTSGOT 17 10/25/2018    ALTSGPT 13 10/25/2018    " ALKPHOSPHAT 74 10/25/2018    TBILIRUBIN 1.2 10/25/2018    ALBUMIN 4.0 10/25/2018    AGRATIO 1.3 10/25/2018    CREACTPROT 0.20 11/29/2016    CPKTOTAL 50 10/12/2017       Other:  NA    Recent Imaging Studies:    None since last visit    ASSESSMENT AND PLAN  Patient Type, check all that apply:   Secondary Prevention  Established Atherosclerotic Cardiovascular Disease (ASCVD)  Yes, Details: AAA  Other Established (non-atherosclerotic) Vascular Disease, if Present:    SVT - defer management to cardiology  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): Possible Likely - based on baseline LDL-C and +FH of premature CAD in brother - recommended cascade screening at previous visit.  ACC/AHA Indication for Statin Therapy, gurpreet all that apply:   LDL-C at baseline >190 mg/dl: Indication for High intensity statin    Calculated Risk for ASCVD, if applicable:  N/A  Other Significant Risk Markers, if any, gurpreet all that apply:  Family history of premature ASCVD in first degree relative  National Lipid Association (NLA) Goal (if applicable):  LDL-C:   <70 mg/dL  Lifestyle Recommendations From Today’s Visit:    Exercise: continue exercise as she tolerates.   Statin Medications Recommendations from Today’s Visit:   D/C simvastatin and resume Crestor 10 mg at least MWF, pt willing to try MTuWF if she tolerates MWF.   Non-Statin Medications Recommendations from Today’s Visit:   None  Indication for PCSK9 Inhibitor, if applicable:  Not currently indicated  Supplements Recommended at this visit:  None  Recommendations for Other Cardiovascular Risk Factors, gurpreet all that apply:   Hypertension- BP elevated today, continue to trend.    Pt is not at goal and lipid panel has worsened on simvastatin 20 mg daily.  Pt denies myalgia.  She made the transition because she was curious if 20 mg of simvastatin would work better than 10 mg rosuvastatin.  Pt willing to restart Crestor.    Likely she can tolerate Crestor 3-4 x weekly, but  will not obtain LDL goal of <70.  I feel given the pt's age that arguably a goal of <100 might be appropriate to forgo additional medication.  She will follow up with vascular in 4 weeks and will request if the following plan would be appropriate.    Today: initiate Crestor 10 mg MWF and pt willing to increase to MTuWF if tolerating MWF.  -If LDL still >70, retrial Zetia  -Discourage PCSK9 use given pt is over 80 years old, unless advised differently by vascular.     Studies Ordered at Todays Visit:  None   Blood Work Ordered At Today’s visit:   Lipid panel  Lipoprotein A    Follow-Up:   5 weeks; she is already scheduled for vascular.     Chandler Brown, PharmD     Agree with above.  Goal <70 optimally, but agree that willem not go to pcsk9i unless was well away from goal - probably >100 - on max tolerated statin.   Does seem good candidate for zetia if closer to goal.    Michael J Bloch, MD  Certified Clinical Lipid Specialist  Medial Director, [facility]      CC:  Nevaeh Munroe M.D.

## 2018-10-30 NOTE — Clinical Note
Dr Bloch,  I think LDL will be around  once she makes the transition back to rosuvastatin (currently on simvastatin).  Do you think she would be a good candidate for reduced intensity goal LDL of <100 given her age?  If so, that would likely rule out the need for PCSK9 for the future.    Please advise  [place]

## 2018-11-25 DIAGNOSIS — I47.10 SVT (SUPRAVENTRICULAR TACHYCARDIA): ICD-10-CM

## 2018-12-02 ENCOUNTER — OFFICE VISIT (OUTPATIENT)
Dept: URGENT CARE | Facility: PHYSICIAN GROUP | Age: 83
End: 2018-12-02
Payer: MEDICARE

## 2018-12-02 VITALS
TEMPERATURE: 98.6 F | OXYGEN SATURATION: 95 % | HEART RATE: 85 BPM | RESPIRATION RATE: 16 BRPM | SYSTOLIC BLOOD PRESSURE: 126 MMHG | WEIGHT: 128 LBS | BODY MASS INDEX: 23.55 KG/M2 | HEIGHT: 62 IN | DIASTOLIC BLOOD PRESSURE: 84 MMHG

## 2018-12-02 DIAGNOSIS — J98.8 RTI (RESPIRATORY TRACT INFECTION): ICD-10-CM

## 2018-12-02 DIAGNOSIS — H65.03 BILATERAL ACUTE SEROUS OTITIS MEDIA, RECURRENCE NOT SPECIFIED: ICD-10-CM

## 2018-12-02 PROCEDURE — 99214 OFFICE O/P EST MOD 30 MIN: CPT | Performed by: FAMILY MEDICINE

## 2018-12-02 RX ORDER — FLUTICASONE PROPIONATE 50 MCG
2 SPRAY, SUSPENSION (ML) NASAL
Qty: 1 BOTTLE | Refills: 1 | Status: SHIPPED | OUTPATIENT
Start: 2018-12-02 | End: 2019-01-22

## 2018-12-02 RX ORDER — ACETAMINOPHEN AND CODEINE PHOSPHATE 300; 30 MG/1; MG/1
1 TABLET ORAL EVERY 6 HOURS PRN
Qty: 28 TAB | Refills: 0 | Status: SHIPPED | OUTPATIENT
Start: 2018-12-02 | End: 2018-12-09

## 2018-12-02 RX ORDER — PREDNISONE 10 MG/1
30 TABLET ORAL EVERY MORNING
Qty: 21 TAB | Refills: 0 | Status: SHIPPED | OUTPATIENT
Start: 2018-12-02 | End: 2018-12-09

## 2018-12-02 ASSESSMENT — ENCOUNTER SYMPTOMS
FEVER: 0
COUGH: 1
DIZZINESS: 0
SORE THROAT: 1
SHORTNESS OF BREATH: 0
FOCAL WEAKNESS: 0
CHILLS: 0
HEMOPTYSIS: 0
ORTHOPNEA: 0
HEADACHES: 1

## 2018-12-02 NOTE — PROGRESS NOTES
Subjective:      Nini Taylor is a 86 y.o. female who presents with Pharyngitis (x1day bilat ear problem, headache, runny nose)      - This is a very pleasant, well and non-toxic appearing 86 y.o. female with complaints of 3 days w/ some slight cough and sinus congestion and ears feel plugged. No NFC. Says tylenol w/ codein has helped her in past when she has sinus headaches           ALLERGIES:  Atorvastatin; Bactrim ds; Pneumococcal vaccines; Sulfa drugs; Vicodin [hydrocodone-acetaminophen]; Vytorin; and Zetia [ezetimibe]     PMH:  Past Medical History:   Diagnosis Date   • AAA (abdominal aortic aneurysm) (HCC)    • Atherosclerosis of native arteries of the extremities with intermittent claudication 10/16/2013   • Blepharospasm syndrome    • Bright red rectal bleeding 6/24/2015   • Chest pain 07/2015    Unspecified; Nuclear stress test negative    • Constipation    • Edema 5/29/2012   • Female bladder prolapse, acquired    • GERD (gastroesophageal reflux disease)    • Hyperlipidemia    • Hypertension    • Insomnia    • OSTEOPOROSIS    • Palpitations 5/29/2012   • Sleep apnea    • Varicose veins 5/29/2012   • Vitamin D deficiency disease         MEDS:    Current Outpatient Prescriptions:   •  Acetaminophen-Codeine 300-30 MG Tab, Take 1 Tab by mouth every 6 hours as needed for up to 7 days., Disp: 28 Tab, Rfl: 0  •  fluticasone (FLONASE) 50 MCG/ACT nasal spray, Spray 2 Sprays in nose every bedtime., Disp: 1 Bottle, Rfl: 1  •  predniSONE (DELTASONE) 10 MG Tab, Take 3 Tabs by mouth every morning for 7 days., Disp: 21 Tab, Rfl: 0  •  metoprolol (LOPRESSOR) 25 MG Tab, TAKE 1 TABLET BY MOUTH TWICE DAILY, Disp: 180 Tab, Rfl: 2  •  rosuvastatin (CRESTOR) 10 MG Tab, Take 1 Tab by mouth every evening., Disp: 30 Tab, Rfl: 11  •  Multiple Vitamins-Minerals (ICAPS AREDS 2) Cap, Take  by mouth., Disp: , Rfl:   •  Cholecalciferol 4000 units Cap, Take 4,000 Units by mouth every day., Disp: 30 Cap, Rfl: 5  •  Multiple  "Vitamins-Minerals (CENTRUM SILVER PO), Take 1 Tab by mouth every day., Disp: , Rfl:   •  nitrofurantoin monohydr macro (MACROBID) 100 MG Cap, Take 1 Cap by mouth every day. (Patient not taking: Reported on 12/2/2018), Disp: 30 Cap, Rfl: 1  •  PREMARIN 0.625 MG/GM Cream, INSERT 1GM VAGINALLY 3 TIMES WEEKLY, Disp: 30 g, Rfl: 1  •  triamcinolone acetonide (KENALOG) 0.1 % Ointment, Apply thin layer to affected area twice daily as needed, Disp: 1 Tube, Rfl: 0  •  Coenzyme Q10 (CO Q 10 PO), Take  by mouth., Disp: , Rfl:     ** I have documented what I find to be significant in regards to past medical, social, family and surgical history  in my HPI or under PMH/PSH/FH review section, otherwise it is contributory **           HPI    Review of Systems   Constitutional: Negative for chills and fever.   HENT: Positive for congestion, ear pain and sore throat.    Respiratory: Positive for cough. Negative for hemoptysis and shortness of breath.    Cardiovascular: Negative for chest pain and orthopnea.   Neurological: Positive for headaches. Negative for dizziness and focal weakness.          Objective:     /84   Pulse 85   Temp 37 °C (98.6 °F) (Temporal)   Resp 16   Ht 1.575 m (5' 2\")   Wt 58.1 kg (128 lb)   SpO2 95%   BMI 23.41 kg/m²      Physical Exam   Constitutional: She appears well-developed. No distress.   HENT:   Head: Normocephalic and atraumatic.   Mouth/Throat: Oropharynx is clear and moist.   Eyes: Conjunctivae are normal.   Neck: Neck supple.   Cardiovascular: Regular rhythm.    No murmur heard.  Pulmonary/Chest: Effort normal and breath sounds normal. No respiratory distress.   Neurological: She is alert. She exhibits normal muscle tone.   Skin: Skin is warm and dry.   Psychiatric: She has a normal mood and affect. Judgment normal.   Nursing note and vitals reviewed.  ears: + b/l [place]            Assessment/Plan:         1. RTI (respiratory tract infection)  Acetaminophen-Codeine 300-30 MG Tab   2. " Bilateral acute serous otitis media, recurrence not specified  fluticasone (FLONASE) 50 MCG/ACT nasal spray    predniSONE (DELTASONE) 10 MG Tab             Dx & d/c instructions discussed w/ patient and/or family members.     ER precautions (worsening signs symptoms and when to go to ER) discussed.    Follow up w/ PCP in 2-3 days to make sure symptoms improving and no further intervention/treatment and/or work-up needed was advised, ER if feeling worse or not improving in 2 days.    Possible side effects (i.e. Rash, GI upset/constipation, sedation, elevation of BP or sugars) of any medications given discussed.     Patient left in stable condition

## 2018-12-03 ENCOUNTER — HOSPITAL ENCOUNTER (OUTPATIENT)
Dept: LAB | Facility: MEDICAL CENTER | Age: 83
End: 2018-12-03
Attending: NURSE PRACTITIONER
Payer: MEDICARE

## 2018-12-03 DIAGNOSIS — E78.49 OTHER HYPERLIPIDEMIA: ICD-10-CM

## 2018-12-03 DIAGNOSIS — I70.219 ATHEROSCLEROSIS OF NATIVE ARTERY OF EXTREMITY WITH INTERMITTENT CLAUDICATION, UNSPECIFIED EXTREMITY (HCC): ICD-10-CM

## 2018-12-03 LAB
CHOLEST SERPL-MCNC: 175 MG/DL (ref 100–199)
FASTING STATUS PATIENT QL REPORTED: NORMAL
HDLC SERPL-MCNC: 50 MG/DL
LDLC SERPL CALC-MCNC: 101 MG/DL
TRIGL SERPL-MCNC: 120 MG/DL (ref 0–149)

## 2018-12-03 PROCEDURE — 83695 ASSAY OF LIPOPROTEIN(A): CPT

## 2018-12-03 PROCEDURE — 36415 COLL VENOUS BLD VENIPUNCTURE: CPT

## 2018-12-03 PROCEDURE — 80061 LIPID PANEL: CPT

## 2018-12-04 ENCOUNTER — TELEPHONE (OUTPATIENT)
Dept: CARDIOLOGY | Facility: MEDICAL CENTER | Age: 83
End: 2018-12-04

## 2018-12-04 NOTE — TELEPHONE ENCOUNTER
LIPID PROFILE   Order: [id number]   Status:  Final result   Visible to patient:  No (Not Released)  Dx:  Atherosclerosis of native artery of e...   Notes recorded by Montez Kamara M.D. on 12/4/2018 at 9:06 AM PST  Labs look good, please let her know     Thank you     Called patient to review MD recommendations.  Pt states no other concerns or questions at this time.  Pt verbalizes understanding and is appreciative of information given.

## 2018-12-05 ENCOUNTER — NON-PROVIDER VISIT (OUTPATIENT)
Dept: VASCULAR LAB | Facility: MEDICAL CENTER | Age: 83
End: 2018-12-05
Attending: INTERNAL MEDICINE
Payer: MEDICARE

## 2018-12-05 VITALS — DIASTOLIC BLOOD PRESSURE: 90 MMHG | HEART RATE: 73 BPM | SYSTOLIC BLOOD PRESSURE: 151 MMHG

## 2018-12-05 DIAGNOSIS — E78.5 DYSLIPIDEMIA: ICD-10-CM

## 2018-12-05 LAB — LPA SERPL-MCNC: 114 MG/DL

## 2018-12-05 PROCEDURE — 99212 OFFICE O/P EST SF 10 MIN: CPT

## 2018-12-05 NOTE — PROGRESS NOTES
"Date of Service: 12/05/18    Nini Taylor is here for follow up of dyslipidemia.    HPI  Pertinent Interval History since last visit: none    Current Prescription Lipid Lowering Medications - including dose:   Statin: Crestor 10 mg Mon Tue Thur Fri  Non-Statin: None  Current Lipid Lowering and Related Supplements:   CoQ10  Any Current Side Effects Potentially Related to Lipid Lowering therapy?   Mild myalgias, but pt says they disappear quickly.  Tolerable per pt.   Current Adherence to Lipid Lowering Therapies:  Complete  Any Previous History of Statin Intolerance?   Statin: Simvastatin Unknown dose/duration - myalgias                Crestor unkown dose, duration \"years\" - states she started getting sharp \"zings\" down her arms.              Atorvastatin unknown dose, duration of 90 days - severe UE myalgia    Non-Statin: States none, however MR shows possibly Vytorin use              Outcome: Unknown  Baseline Lipids Prior to Treatment:  Baseline Lipids Prior to Treatment:    2/6/2017 12:20    Cholesterol,Tot  274 (H)    Triglycerides  101    HDL  58    LDL  196 (H)           SOCIAL HISTORY  History   Smoking Status   • Never Smoker   Smokeless Tobacco   • Never Used      Change in weight: Stable  Exercise habits: minimal exercise   Diet: common adult, states she focuses on portion control.     ROS  Physical Exam    DATA REVIEW  Most Recent Lipid Panel:   Lab Results   Component Value Date    CHOLSTRLTOT 175 12/03/2018    TRIGLYCERIDE 120 12/03/2018    HDL 50 12/03/2018     (H) 12/03/2018       Other Pertinent Blood Work:   Lab Results   Component Value Date    SODIUM 140 10/25/2018    POTASSIUM 4.1 10/25/2018    CHLORIDE 102 10/25/2018    CO2 28 10/25/2018    ANION 10.0 10/25/2018    GLUCOSE 90 10/25/2018    BUN 18 10/25/2018    CREATININE 1.04 10/25/2018    CALCIUM 9.9 10/25/2018    ASTSGOT 17 10/25/2018    ALTSGPT 13 10/25/2018    ALKPHOSPHAT 74 10/25/2018    TBILIRUBIN 1.2 10/25/2018    ALBUMIN 4.0 " 10/25/2018    AGRATIO 1.3 10/25/2018    CPKTOTAL 50 10/12/2017       Other:  NA    Recent Imaging Studies:    None since last visit    ASSESSMENT AND PLAN  Patient Type, check all that apply:   Secondary Prevention  Established Atherosclerotic Cardiovascular Disease (ASCVD)  Yes, Details: AAA  Other Established (non-atherosclerotic) Vascular Disease, if Present:    SVT - defer management to cardiology  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): Possible Likely - based on baseline LDL-C and +FH of premature CAD in brother - recommended cascade screening at previous visit.  ACC/AHA Indication for Statin Therapy, gurpreet all that apply:   LDL-C at baseline >190 mg/dl: Indication for High intensity statin    Calculated Risk for ASCVD, if applicable:  N/A  Other Significant Risk Markers, if any, gurpreet all that apply:  Family history of premature ASCVD in first degree relative  National Lipid Association (NLA) Goal (if applicable):  LDL-C:   <70 mg/dL  Lifestyle Recommendations From Today’s Visit:    Exercise: continue exercise as she tolerates. Continue exercise as she can tolerate, currently walk [employer] frequently where it is warm and she won't slip on ice.   Statin Medications Recommendations from Today’s Visit:   Continue with rosuvastatin 10 mg 4x weekly.  Unwilling to increase to daily use.   Non-Statin Medications Recommendations from Today’s Visit:   Continue CoQ10  Indication for PCSK9 Inhibitor, if applicable:  Not currently indicated  Supplements Recommended at this visit:  None  Recommendations for Other Cardiovascular Risk Factors, gurpreet all that apply:   Hypertension- BP high on two seperate readings. States BP is >130/80 at home.  Currently on Prednisone.  Requested pt to contact cardiology if BP consistently >130/80 after she finishes prednisone.     Pt is not at goal, but has seen a nearly 50% reduction in LDL from baseline.  Pt does not wish to intensify medication or trial Zetia.  She will  focus on TLC at this time and continue with current regimen.  Pt request seems reasonable given her age and benefit seen compared to baseline.     She will address BP with cardiology if BP continues to stay >130/80 after she has finished prednisone.      Studies Ordered at Todays Visit:  None   Blood Work Ordered At Today’s visit:   CMP, lipid panel  Follow-Up:   6 months in clinic, 1 month with vascular    Chandler Brown, PharmD     Agree with above.    Would strongly consider trial of zetia if remains above goal.  Continue to increase statin slowly as tolerated.    Michael Bloch, MD  [facility]      CC:  Nevaeh Munroe M.D.  Michael Bloch, M.D.

## 2018-12-06 ENCOUNTER — TELEPHONE (OUTPATIENT)
Dept: CARDIOLOGY | Facility: MEDICAL CENTER | Age: 83
End: 2018-12-06

## 2018-12-06 NOTE — TELEPHONE ENCOUNTER
Patient wants call back about blood pressure   Received: Today Message Contents   KARLA Madden/Ailyn     Patient said that her blood pressure is 127/79 and she wants a call back at [phone].      Patient states that she was being seen In Dr. Bloch's office and states that her BP was 151 systolic.  She states she gets anxious, but was told by Yudy Arteaga to call this office.  On the way home she retook her BP and she said at [facility] it was 127 systolic and at home it was 117 systolic.      Pt continued to state that she was getting some muscle pain and states she thinks it is from her recliner but she wasn't sure if it was from her statin.   Pt states she takes CoQ10.  Recommended CoQ10 200-400mg to supplement.  Pt states she would like to talk to Chandler.  She states no other concerns or questions at this time and is appreciative of information given.

## 2018-12-12 ENCOUNTER — HOSPITAL ENCOUNTER (OUTPATIENT)
Dept: LAB | Facility: MEDICAL CENTER | Age: 83
End: 2018-12-12
Attending: FAMILY MEDICINE
Payer: MEDICARE

## 2018-12-12 DIAGNOSIS — N39.0 FREQUENT UTI: ICD-10-CM

## 2018-12-12 LAB
APPEARANCE UR: ABNORMAL
BACTERIA #/AREA URNS HPF: ABNORMAL /HPF
BILIRUB UR QL STRIP.AUTO: NEGATIVE
COLOR UR: YELLOW
EPI CELLS #/AREA URNS HPF: NEGATIVE /HPF
GLUCOSE UR STRIP.AUTO-MCNC: NEGATIVE MG/DL
HYALINE CASTS #/AREA URNS LPF: ABNORMAL /LPF
KETONES UR STRIP.AUTO-MCNC: NEGATIVE MG/DL
LEUKOCYTE ESTERASE UR QL STRIP.AUTO: ABNORMAL
MICRO URNS: ABNORMAL
NITRITE UR QL STRIP.AUTO: POSITIVE
PH UR STRIP.AUTO: 6 [PH]
PROT UR QL STRIP: NEGATIVE MG/DL
RBC # URNS HPF: ABNORMAL /HPF
RBC UR QL AUTO: ABNORMAL
SP GR UR STRIP.AUTO: 1.01
UROBILINOGEN UR STRIP.AUTO-MCNC: 0.2 MG/DL
WBC #/AREA URNS HPF: ABNORMAL /HPF

## 2018-12-12 PROCEDURE — 81001 URINALYSIS AUTO W/SCOPE: CPT

## 2018-12-12 PROCEDURE — 87086 URINE CULTURE/COLONY COUNT: CPT

## 2018-12-12 PROCEDURE — 87077 CULTURE AEROBIC IDENTIFY: CPT

## 2018-12-12 PROCEDURE — 87186 SC STD MICRODIL/AGAR DIL: CPT

## 2018-12-13 RX ORDER — CEPHALEXIN 500 MG/1
500 CAPSULE ORAL 3 TIMES DAILY
Qty: 21 CAP | Refills: 0 | Status: SHIPPED | OUTPATIENT
Start: 2018-12-13 | End: 2019-03-26 | Stop reason: SDUPTHER

## 2018-12-14 ENCOUNTER — TELEPHONE (OUTPATIENT)
Dept: MEDICAL GROUP | Facility: PHYSICIAN GROUP | Age: 83
End: 2018-12-14

## 2018-12-14 NOTE — TELEPHONE ENCOUNTER
----- Message from Nevaeh Munroe M.D. sent at 12/14/2018  3:31 PM PST -----  E coli bacteria on UTI culture.  Sensitive to antibiotics given.

## 2019-01-17 ENCOUNTER — HOSPITAL ENCOUNTER (OUTPATIENT)
Dept: LAB | Facility: MEDICAL CENTER | Age: 84
End: 2019-01-17
Attending: NURSE PRACTITIONER
Payer: MEDICARE

## 2019-01-17 ENCOUNTER — SLEEP CENTER VISIT (OUTPATIENT)
Dept: SLEEP MEDICINE | Facility: MEDICAL CENTER | Age: 84
End: 2019-01-17
Payer: MEDICARE

## 2019-01-17 VITALS
RESPIRATION RATE: 15 BRPM | WEIGHT: 126 LBS | HEIGHT: 62 IN | DIASTOLIC BLOOD PRESSURE: 82 MMHG | SYSTOLIC BLOOD PRESSURE: 120 MMHG | BODY MASS INDEX: 23.19 KG/M2 | OXYGEN SATURATION: 92 % | HEART RATE: 77 BPM

## 2019-01-17 DIAGNOSIS — F51.04 CHRONIC INSOMNIA: ICD-10-CM

## 2019-01-17 DIAGNOSIS — E78.5 DYSLIPIDEMIA: ICD-10-CM

## 2019-01-17 DIAGNOSIS — G47.33 OSA TREATED WITH BIPAP: ICD-10-CM

## 2019-01-17 LAB
ALBUMIN SERPL BCP-MCNC: 4.1 G/DL (ref 3.2–4.9)
ALBUMIN/GLOB SERPL: 1.5 G/DL
ALP SERPL-CCNC: 70 U/L (ref 30–99)
ALT SERPL-CCNC: 10 U/L (ref 2–50)
ANION GAP SERPL CALC-SCNC: 9 MMOL/L (ref 0–11.9)
AST SERPL-CCNC: 21 U/L (ref 12–45)
BILIRUB SERPL-MCNC: 1.4 MG/DL (ref 0.1–1.5)
BUN SERPL-MCNC: 18 MG/DL (ref 8–22)
CALCIUM SERPL-MCNC: 9.9 MG/DL (ref 8.5–10.5)
CHLORIDE SERPL-SCNC: 104 MMOL/L (ref 96–112)
CO2 SERPL-SCNC: 28 MMOL/L (ref 20–33)
CREAT SERPL-MCNC: 0.94 MG/DL (ref 0.5–1.4)
GLOBULIN SER CALC-MCNC: 2.8 G/DL (ref 1.9–3.5)
GLUCOSE SERPL-MCNC: 90 MG/DL (ref 65–99)
POTASSIUM SERPL-SCNC: 3.9 MMOL/L (ref 3.6–5.5)
PROT SERPL-MCNC: 6.9 G/DL (ref 6–8.2)
SODIUM SERPL-SCNC: 141 MMOL/L (ref 135–145)

## 2019-01-17 PROCEDURE — 80053 COMPREHEN METABOLIC PANEL: CPT

## 2019-01-17 PROCEDURE — 99213 OFFICE O/P EST LOW 20 MIN: CPT | Performed by: FAMILY MEDICINE

## 2019-01-17 PROCEDURE — 36415 COLL VENOUS BLD VENIPUNCTURE: CPT

## 2019-01-17 NOTE — PATIENT INSTRUCTIONS
"CPAP and BIPAP Information  CPAP and BIPAP are methods of helping you breathe with the use of air pressure. CPAP stands for \"continuous positive airway pressure.\" BIPAP stands for \"bi-level positive airway pressure.\" In both methods, air is blown into your air passages to help keep you breathing well. With CPAP, the amount of pressure stays the same while you breathe in and out. CPAP is most commonly used for obstructive sleep apnea. For obstructive sleep apnea, CPAP works by holding your airways open so that they do not collapse when your muscles relax during sleep. BIPAP is similar to CPAP except the amount of pressure is increased when you inhale. This helps you take larger breaths. Your health care provider will recommend whether CPAP or BIPAP would be more helpful for you.   WHY ARE CPAP AND BIPAP TREATMENTS USED?  CPAP or BIPAP can be helpful if you have:   · Sleep apnea.    · Chronic obstructive pulmonary disease (COPD).    · Diseases that weaken the muscles of the chest, including muscular dystrophy or neurological diseases such as amyotrophic lateral sclerosis (ALS).  · Other problems that cause breathing to be weak, abnormal, or difficult.    HOW IS CPAP OR BIPAP ADMINISTERED?  Both CPAP and BIPAP are provided by a small machine with a flexible plastic tube that attaches to a plastic mask. The mask fits on your face, and air is blown into your air passages through your nose or mouth. The amount of pressure that is used to blow the air into your air passages can be set on the machine. Your health care provider will determine the pressure setting that should be used based on your individual needs.  WHEN SHOULD CPAP OR BIPAP BE USED?  In most cases, the mask is worn only when sleeping. Generally, you will need to wear the mask throughout the night and during the daytime if you take a nap. In a few cases involving certain medical conditions, people also need to wear the mask at other times when they are awake. " Follow your health care provider's instructions for when to use the machine.   USING THE MASK  · Because the mask needs to be snug, some people feel a trapped or closed-in feeling (claustrophobic) when first using the mask. You may need to get used to the mask gradually. To do this, you can first hold the mask loosely over your nose or mouth. Gradually apply the mask more snugly. You can also gradually increase the amount of time that you use the mask.  · Masks are available in various types and sizes. Some fit over your mouth and nose, and some fit over just your nose. If your mask does not fit well, talk to your health care provider about getting a different one.  · If you are using a nasal mask and you tend to breathe through your mouth, a chin strap may be applied to help keep your mouth closed.    · The CPAP and BIPAP machines have alarms that may sound if the mask comes off or develops a leak.  · If you have trouble with the mask, it is very important that you talk to your health care provider about finding a way to make the mask easier to tolerate. Do not stop using the mask. This could have a negative impact on your health.  TIPS FOR USING THE MACHINE  · Place your CPAP or BIPAP machine on a secure table or stand near an electrical outlet.    · Know where the on-off switch is located on the machine.  · Follow your health care provider's instructions for how to set the pressure on your machine and when you should use it.  · Do not eat or drink while the CPAP or BIPAP machine is on. Food or fluids could get pushed into your lungs by the pressure of the CPAP or BIPAP.  · Do not smoke. Tobacco smoke residue can damage the machine.    · For home use, CPAP and BIPAP machines can be rented or purchased through home health care companies. Many different brands of machines are available. Renting a machine before purchasing may help you find out which particular machine works well for you.  SEEK IMMEDIATE MEDICAL CARE  IF:  · You have redness or open areas around your nose or mouth where the mask fits.    · You have trouble operating the CPAP or BIPAP machine.    · You cannot tolerate wearing the CPAP or BIPAP mask.    This information is not intended to replace advice given to you by your health care provider. Make sure you discuss any questions you have with your health care provider.  Document Released: 09/15/2005 Document Revised: 01/08/2016 Document Reviewed: 07/17/2014  [employer] Interactive Patient Education © 2017 [employer]    Sleep hygiene (ref: UpToDate)  ?Sleep as long as necessary to feel rested (usually seven to eight hours for adults) and then get out of bed  ?Maintain a regular sleep schedule, particularly a regular wake-up time in the morning  ?Try not to force sleep  ?Avoid caffeinated beverages after lunch  ?Avoid alcohol near bedtime (eg, late afternoon and evening)  ?Avoid smoking or other nicotine intake, particularly during the evening  ?Adjust the bedroom environment as needed to decrease stimuli (eg, reduce ambient light, turn off the television or radio)  ?Avoid use of light-emitting screens  (laptops, tablets, smartphones, [employer]) 2 hours before bedtime   ?Resolve concerns or worries before bedtime  ?Exercise regularly for at least 20 minutes, preferably more than four to five hours prior to bedtime.  ?Avoid daytime naps, especially if they are longer than 20 to 30 minutes or occur late in the day    Stimulus control (Ref: UpToDate)    Patients with insomnia may associate their bed and bedroom with the fear of not sleeping or other arousing events, rather than the more pleasurable anticipation of sleep. The longer one stays in bed trying to sleep, the stronger the association becomes. This perpetuates the difficulty falling asleep.Stimulus control therapy is a strategy whose purpose is to disrupt this association by enhancing the likelihood of sleep . Patients should not go to bed until they are sleepy  and should use the bed primarily for sleep (and not for reading, watching television, eating, or worrying). They should not spend more than 20 minutes in bed awake. If they are awake after 20 minutes, they should leave the bedroom and engage in a relaxing activity, such as reading or listening to soothing music. Patients should not engage in activities that stimulate them or reward them for being awake in the middle of the night, such as eating or watching television. In addition, they should not return to bed until they are tired and feel ready to sleep. If they return to bed and still cannot sleep within 20 minutes, the process should be repeated. An alarm should be set to wake the patient at the same time every morning, including weekends. Daytime naps are not allowed.

## 2019-01-17 NOTE — PROGRESS NOTES
[facility] Follow Up Note     Date: 1/17/2019 / Time: 1:47 PM    Patient ID:   Name:             Nini Taylor     YOB: 1932  Age:                 86 y.o.  female   MRN:               [id number]      Thank you for requesting a sleep medicine consultation on Nini Taylor at the sleep center. She presents today with the chief complaints of RUY follow up.     HISTORY OF PRESENT ILLNESS:       Pt is currently on  BiPAP 16/12 cm. She goes to sleep around 10:30-11 pm and wakes up around 7 am. She is getting about 6 hrs of sleep on a good night and about 4 hr of sleep on a bad night. The bad nights are about 2-3 per week. She is using BiPAP most days of the week. Pt reports 6 hrs of average nightly use of BiPAP. Pt denies snoring, gasping,choking.Pt also denies significant mask leak that is interfering with sleep.      The 30 day compliance was downloaded which shows adequate compliance with more that 4 hr usage about 96%. The AHI is has improved to 6.2/hr. The mask leak is normal. The symptoms of excessive daytime, snoring and gasping has improved.     SLEEP HISTORY   PSG indicates an AHI of 48.3, minimum saturation 81%.      REVIEW OF SYSTEMS:       Constitutional: Denies fevers, Denies weight changes  Eyes: Denies changes in vision, no eye pain  Ears/Nose/Throat/Mouth: Denies nasal congestion or sore throat   Cardiovascular: Denies chest pain or palpitations   Respiratory: Denies shortness of breath , Denies cough  Gastrointestinal/Hepatic: Denies abdominal pain, nausea, vomiting, diarrhea, constipation or GI bleeding   Genitourinary: Deniesdysuria or frequency  Musculoskeletal/Rheum: Denies  joint pain and swelling   Skin/Breast: Denies rash,   Neurological: Denies headache, confusion, memory loss or focal weakness/parasthesias  Psychiatric: denies mood disorder   Sleep: denies snoring     Comprehensive review of systems form is reviewed with the patient and is attached in the  EMR.     PMH:  has a past medical history of AAA (abdominal aortic aneurysm) (HCC); Atherosclerosis of native arteries of the extremities with intermittent claudication (10/16/2013); Blepharospasm syndrome; Bright red rectal bleeding (6/24/2015); Chest pain (07/2015); Constipation; Edema (5/29/2012); Female bladder prolapse, acquired; GERD (gastroesophageal reflux disease); Hyperlipidemia; Hypertension; Insomnia; OSTEOPOROSIS; Palpitations (5/29/2012); Sleep apnea; Varicose veins (5/29/2012); and Vitamin D deficiency disease.  MEDS:   Current Outpatient Prescriptions:   •  fluticasone (FLONASE) 50 MCG/ACT nasal spray, Spray 2 Sprays in nose every bedtime., Disp: 1 Bottle, Rfl: 1  •  metoprolol (LOPRESSOR) 25 MG Tab, TAKE 1 TABLET BY MOUTH TWICE DAILY, Disp: 180 Tab, Rfl: 2  •  rosuvastatin (CRESTOR) 10 MG Tab, Take 1 Tab by mouth every evening., Disp: 30 Tab, Rfl: 11  •  PREMARIN 0.625 MG/GM Cream, INSERT 1GM VAGINALLY 3 TIMES WEEKLY, Disp: 30 g, Rfl: 1  •  triamcinolone acetonide (KENALOG) 0.1 % Ointment, Apply thin layer to affected area twice daily as needed, Disp: 1 Tube, Rfl: 0  •  Multiple Vitamins-Minerals (ICAPS AREDS 2) Cap, Take  by mouth., Disp: , Rfl:   •  Cholecalciferol 4000 units Cap, Take 4,000 Units by mouth every day., Disp: 30 Cap, Rfl: 5  •  Coenzyme Q10 (CO Q 10 PO), Take  by mouth., Disp: , Rfl:   •  Multiple Vitamins-Minerals (CENTRUM SILVER PO), Take 1 Tab by mouth every day., Disp: , Rfl:   •  nitrofurantoin monohydr macro (MACROBID) 100 MG Cap, Take 1 Cap by mouth every day. (Patient not taking: Reported on 12/2/2018), Disp: 30 Cap, Rfl: 1  ALLERGIES:   Allergies   Allergen Reactions   • Atorvastatin    • Bactrim Ds Hives   • Pneumococcal Vaccines Swelling   • Sulfa Drugs Hives   • Vicodin [Hydrocodone-Acetaminophen] Rash   • Vytorin    • Zetia [Ezetimibe]      SURGHX:   Past Surgical History:   Procedure Laterality Date   • CYSTOCELE REPAIR  1/17/2011    Performed by GILMAR CHUNG at  "SURGERY SAME DAY [facility]   • RECTOCELE REPAIR  1/17/2011    Performed by GILMAR CHUNG at SURGERY SAME DAY [facility]   • BLADDER SUSPENSION  1/17/2011    Performed by GILMAR CHUNG at SURGERY SAME DAY [facility]   • CYSTOSCOPY  1/17/2011    Performed by GILMAR CHUNG at SURGERY SAME DAY [facility]   • ABDOMINAL HYSTERECTOMY TOTAL     • ARTHROSCOPY, KNEE     • CATARACT EXTRACTION WITH IOL     • HEMORRHOIDECTOMY     • HYSTERECTOMY, TOTAL ABDOMINAL     • INJECTION SCLERO HEMORROIDS     • OTHER ORTHOPEDIC SURGERY      knee scope x 2   • PB KNEE SCOPE,DIAGNOSTIC  2003;2009   • TONSILLECTOMY       SOCHX:  reports that she has never smoked. She has never used smokeless tobacco. She reports that she drinks alcohol. She reports that she does not use drugs..  FH:   Family History   Problem Relation Age of Onset   • Non-contributory Mother         gall bladder surgery   • Cancer Father         colon rectal   • Heart Disease Father         rheumatic heart disease   • Sleep Apnea Brother    • Heart Disease Brother         sleep apnea   • Heart Attack Brother         MI   • No Known Problems Maternal Grandmother    • No Known Problems Maternal Grandfather    • No Known Problems Paternal Grandmother    • No Known Problems Paternal Grandfather          Physical Exam:  Vitals/ General Appearance:   Weight/BMI: Body mass index is 23.05 kg/m².  Blood pressure 120/82, pulse 77, resp. rate 15, height 1.575 m (5' 2\"), weight 57.2 kg (126 lb), SpO2 92 %.  Vitals:    01/17/19 1340   BP: 120/82   BP Location: Left arm   Patient Position: Sitting   BP Cuff Size: Adult   Pulse: 77   Resp: 15   SpO2: 92%   Weight: 57.2 kg (126 lb)   Height: 1.575 m (5' 2\")       Pt. is alert and oriented to time, place and person. Cooperative and in no apparent distress.       1. Head: Atraumatic, normocephalic.   2. Ears: Normal tympanic membrane and no discharge  3. Nose: No inferior turbinate hypertophy, .   4. Throat: Oropharynx " appears crowded in that the palate is overhanging   5. Neck: Supple. No thyromegaly  6. Chest: Trachea central, no spine deformity   7. Lungs auscultation: B/L good air entry, vesicular breath sounds, no adventitious sounds  8. Heart auscultation: 1st and 2nd heart sounds normal, regular rhythm. No appreciable murmur.  9.  Extremities:  no pedal edema.  11. Skin: No rash        INVESTIGATIONS:           ASSESSMENT AND PLAN     1. Sleep Apnea (RUY)      The pathophysiology of sleep anea and the increased risk of cardiovascular morbidity from untreated sleep apnea is discussed in detail with the patient.      She is urged to avoid supine sleep, weight gain and alcoholic beverages since all of these can worsen sleep apnea. She is cautioned against drowsy driving. If She feels sleepy while driving, She must pull over for a break/nap, rather than persist on the road, in the interest of She own safety and that of others on the road.   Plan   - Continue BiPAP at 19/15 cm with FFM    - compliance download was reviewed and discussed with the pt   - compliance was reinforced     2. The evolution of  insomnia is discussed in detail. The importance of cognitive restructuring and behavior modification therapy is emphasized for long-term improvement rather than the use of hypnotic agents, which may offer short term relief but may lead to the development of tolerance and side effects with prolonged use. Evening exercise, wind-down before bedtime, and stimulus control (leaving the bed when unable to fall back asleep at night) are explained.      Plan    Handouts on stimulus control and sleep hygiene are given and a couple of titles of books on insomnia are recommended, written by behavioral psychologists.     - Recommended to start melatonin at 5 mg   - other sleep aide was recommended how was deferred by the pt   - referral to Dr. Bucio

## 2019-01-22 ENCOUNTER — HOSPITAL ENCOUNTER (OUTPATIENT)
Dept: LAB | Facility: MEDICAL CENTER | Age: 84
End: 2019-01-22
Attending: INTERNAL MEDICINE
Payer: MEDICARE

## 2019-01-22 ENCOUNTER — OFFICE VISIT (OUTPATIENT)
Dept: VASCULAR LAB | Facility: MEDICAL CENTER | Age: 84
End: 2019-01-22
Attending: INTERNAL MEDICINE
Payer: MEDICARE

## 2019-01-22 VITALS
BODY MASS INDEX: 23.37 KG/M2 | HEART RATE: 68 BPM | SYSTOLIC BLOOD PRESSURE: 139 MMHG | HEIGHT: 62 IN | DIASTOLIC BLOOD PRESSURE: 82 MMHG | WEIGHT: 127 LBS

## 2019-01-22 DIAGNOSIS — I71.40 ABDOMINAL AORTIC ANEURYSM (AAA) WITHOUT RUPTURE (HCC): ICD-10-CM

## 2019-01-22 DIAGNOSIS — I47.10 SVT (SUPRAVENTRICULAR TACHYCARDIA): ICD-10-CM

## 2019-01-22 DIAGNOSIS — I10 ESSENTIAL HYPERTENSION: ICD-10-CM

## 2019-01-22 DIAGNOSIS — E78.5 DYSLIPIDEMIA: ICD-10-CM

## 2019-01-22 LAB
CHOLEST SERPL-MCNC: 258 MG/DL (ref 100–199)
FASTING STATUS PATIENT QL REPORTED: NORMAL
HDLC SERPL-MCNC: 50 MG/DL
LDLC SERPL CALC-MCNC: 184 MG/DL
TRIGL SERPL-MCNC: 118 MG/DL (ref 0–149)

## 2019-01-22 PROCEDURE — 80061 LIPID PANEL: CPT

## 2019-01-22 PROCEDURE — 99212 OFFICE O/P EST SF 10 MIN: CPT

## 2019-01-22 PROCEDURE — 99214 OFFICE O/P EST MOD 30 MIN: CPT | Performed by: INTERNAL MEDICINE

## 2019-01-22 PROCEDURE — 36415 COLL VENOUS BLD VENIPUNCTURE: CPT

## 2019-01-22 RX ORDER — EZETIMIBE 10 MG/1
10 TABLET ORAL DAILY
Qty: 30 TAB | Refills: 11 | Status: SHIPPED
Start: 2019-01-22 | End: 2020-03-18

## 2019-01-22 NOTE — PROGRESS NOTES
"    [facility] - Follow Up Visit    Date of Service: 1-22-19    Patient here for f/u of dyslpidemia, htn, and AAA    HPI  History of ASCVD:     Small AAA - sees Dr. Mccray - states had recent u/s at [place] [facility] - stable by her report  Non-Atherosclerotic CVD  SVT - No palpitations or known arrythmia.    Current Prescription Lipid Lowering Medications - including dose:   Statin: none - stopped rosuvastatin due to myalgias  Non-Statin: None  Current Lipid Lowering and Related Supplements:   CoQ10 +  Vit D  No asa due to GERD  Any Current Side Effects Potentially Related to Lipid Lowering therapy?   No  Current Adherence to Lipid Lowering Therapies   Completely adherent  Previously Attempted Interventions for Lipids - including outcome  Statin: Simvastatin Unknown dose/duration - myalgias                Crestor unkown dose, duration \"years\" - states she started getting sharp \"zings\" down her arms.              Atorvastatin unknown dose, duration of 90 days - severe UE myalgia              Rosuvastatin - low dose alternate days - myalgias    Non-Statin: States none, however MR shows possibly Vytorin use              Outcome: Unknown  Any Previous History of Statin Intolerance?   Yes, Details: See above.    Baseline Lipids Prior to Treatment:          2/6/2017 12:20     Cholesterol,Tot   274 (H)     Triglycerides   101     HDL   58     LDL   196 (H)       Other CV risk factors:  Hypertension - will controlled in office on metoprolol.  Not checking BP at home  + FH of MI prior to 50 in brother  Other issues  - RUY - going to try bipap    SOCIAL HISTORY   History     Smoking status     •   Never Smoker      Smokeless tobacco     •   Not on file       Change in weight: Stable  Exercise habits: walking most days  Diet: common adult    ROS  No headaches  No chest pain or palpitations  No sob or cough  No myalgias  No tia or cva symptoms  No depression or anxiety    Physical Exam  Vitals as attached  No " bruits  Lungs cta  RRR with no murmer  No edema; good pulses  No tendon xathanomas  A few varicosities noted  Cn II-xii intac; no drift; normal gait  Normal affect    DATA REVIEW:  Most Recent Lipid Panel:  4/14/2017 09:54   4/6/2017 14:53   CPK Total 116   Cholesterol,Tot 184   Triglycerides 89   HDL 56    (H)     Other Pertinent Blood Work:   Lab Results     Component   Value   Date        SODIUM   136   02/06/2017        POTASSIUM   4.2   02/06/2017        CHLORIDE   101   02/06/2017        CO2   32   02/06/2017        ANION   3.0   02/06/2017        GLUCOSE   85   02/06/2017        BUN   17   02/06/2017        CREATININE   1.01   02/06/2017        CALCIUM   9.7   02/06/2017        ASTSGOT   19   02/06/2017        ALTSGPT   8   02/06/2017        ALKPHOSPHAT   75   02/06/2017        TBILIRUBIN   1.1   02/06/2017        ALBUMIN   4.0   02/06/2017        AGRATIO   1.4   02/06/2017        CREACTPROT   0.20   11/29/2016        TSHULTRASEN   1.520   07/05/2016        CPKTOTAL   38   12/22/2016 4/14/2017 09:54   4/6/2017 14:53   25-Hydroxy   Vitamin D 25 22 (L)     Other: NA    Recent Imaging Studies:      Carotid December 2016   Mild bilateral internal carotid artery stenosis (<50%).    Flow within both subclavian arteries appears to be within normal limits.    Antegrade flow, bilateral vertebral arteries.     Echocardiogram dec 2016:  Agitated saline (bubble) study was performed with no evidence of right   to left shunt with and without Valsalva maneuver.  Normal left ventricular systolic function.  Left ventricular ejection fraction is visually estimated to be 70%.  Asymmetric septal hypertrophy.  Mild aortic stenosis.  Mild mitral annular calcification.  Very mild mitral regurgitation.  Right heart pressures are consistent with mild pulmonary hypertension    CT may 2014  .1.  Partially calcified 18 mm mass or cyst interposed between the 3rd portion of duodenum and inferior vena cava. Differential is  broad and includes duodenal diverticulum, adenopathy, or mass protruding off the duodenum. Consider GI consultation  2.  2.0 cm saccular infrarenal abdominal aortic aneurysm  3.  Previous hysterectomy and probably appendectomy  4.  Fatty infiltration of the liver    ASSESSMENT AND PLAN  Patient Type, check all that apply:    Secondary Prevention (Abdominal aortic aneurism)  Established Atherosclerotic Cardiovascular Disease (ASCVD)  AAA - most recent imaging with small AAA, continue medical management and deferfurther surveillance to Dr. 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):   Likely - based on baseline LDL-C and +FH of premature CAD in brother - recommended cascade screening  ACC/AHA Indication for Statin Therapy, gurpreet all that apply:  LDL-C at baseline >190 mg/dl: Indication for High intensity statin    Calculated Risk for ASCVD, if applicable    N/A  Other Significant Risk Markers, if any, gurpreet all that apply   + Family History of premature CAD  High lp(a)  National Lipid Association (NLA) Goal  LDL-C:   <70 mg/dL  - Would probably be satisfied if we can get LDL <130 given age and poor tolerability  Lifestyle Recommendations From Today’s Visit:    Continue much improved exercise habits  Statin Therapy Recommendations from Today’s Visit:   Has had trouble tolerating statins in the past, including atrovastatin, simvastatin, and higher dose rosuvastatin.  Now with inability to tolerate low dose rosuvastatin every other day  Given age and above history, I am not inclined to try statin again  Non-Statin Prescription Medications Recommendations from Today’s Visit:   - trial of ezetimibe 10 mg daily  Indication for PCSK9 Inhibitor, if applicable:  Patient does have evidence of ASCVD and FH - may be candidate in future,  but at present, I think that her LDL-C is reasonably well controlled for the current clinical scenario  Supplements Recommended at this visit:   - continue Vit D and coq10  - hold asa given GERD  Other Issues  - RUY - defer management to pcp and sleep md    Blood Work Ordered At Today’s visit:   1) fasting lipid panel now to establish new baseline off statins  2) cmp, lipid panel, tsh prior to follow up visit  Follow-Up: June 2019 in pharmacotherapy clinic    Michael J. Bloch, MD  [facility]    Cc:   ALICIA Melgar

## 2019-01-23 ENCOUNTER — TELEPHONE (OUTPATIENT)
Dept: VASCULAR LAB | Facility: MEDICAL CENTER | Age: 84
End: 2019-01-23

## 2019-01-26 ENCOUNTER — HOSPITAL ENCOUNTER (OUTPATIENT)
Dept: LAB | Facility: MEDICAL CENTER | Age: 84
End: 2019-01-26
Attending: FAMILY MEDICINE
Payer: MEDICARE

## 2019-01-26 DIAGNOSIS — N39.0 FREQUENT UTI: ICD-10-CM

## 2019-01-26 LAB
APPEARANCE UR: CLEAR
BACTERIA #/AREA URNS HPF: NEGATIVE /HPF
BILIRUB UR QL STRIP.AUTO: NEGATIVE
COLOR UR: YELLOW
EPI CELLS #/AREA URNS HPF: NEGATIVE /HPF
GLUCOSE UR STRIP.AUTO-MCNC: NEGATIVE MG/DL
HYALINE CASTS #/AREA URNS LPF: NORMAL /LPF
KETONES UR STRIP.AUTO-MCNC: NEGATIVE MG/DL
LEUKOCYTE ESTERASE UR QL STRIP.AUTO: ABNORMAL
MICRO URNS: ABNORMAL
NITRITE UR QL STRIP.AUTO: NEGATIVE
PH UR STRIP.AUTO: 7.5 [PH]
PROT UR QL STRIP: NEGATIVE MG/DL
RBC # URNS HPF: NORMAL /HPF
RBC UR QL AUTO: NEGATIVE
SP GR UR STRIP.AUTO: 1.01
UROBILINOGEN UR STRIP.AUTO-MCNC: 0.2 MG/DL
WBC #/AREA URNS HPF: NORMAL /HPF

## 2019-01-26 PROCEDURE — 81001 URINALYSIS AUTO W/SCOPE: CPT

## 2019-01-29 ENCOUNTER — HOSPITAL ENCOUNTER (OUTPATIENT)
Dept: RADIOLOGY | Facility: MEDICAL CENTER | Age: 84
End: 2019-01-29
Attending: FAMILY MEDICINE
Payer: MEDICARE

## 2019-01-29 ENCOUNTER — TELEPHONE (OUTPATIENT)
Dept: MEDICAL GROUP | Facility: PHYSICIAN GROUP | Age: 84
End: 2019-01-29

## 2019-01-29 DIAGNOSIS — R51.9 RIGHT SIDED FACIAL PAIN: ICD-10-CM

## 2019-01-29 DIAGNOSIS — R07.81 RIB PAIN ON RIGHT SIDE: ICD-10-CM

## 2019-01-29 PROCEDURE — 70150 X-RAY EXAM OF FACIAL BONES: CPT

## 2019-01-29 PROCEDURE — 71101 X-RAY EXAM UNILAT RIBS/CHEST: CPT | Mod: RT

## 2019-01-30 ENCOUNTER — TELEPHONE (OUTPATIENT)
Dept: MEDICAL GROUP | Facility: PHYSICIAN GROUP | Age: 84
End: 2019-01-30

## 2019-01-30 DIAGNOSIS — M25.561 ACUTE PAIN OF RIGHT KNEE: ICD-10-CM

## 2019-01-30 DIAGNOSIS — M25.511 PAIN AND SWELLING OF RIGHT SHOULDER: ICD-10-CM

## 2019-01-30 DIAGNOSIS — M25.411 PAIN AND SWELLING OF RIGHT SHOULDER: ICD-10-CM

## 2019-01-30 NOTE — TELEPHONE ENCOUNTER
VOICEMAIL  1. Caller Name: Nini Taylor                        Call Back Number: [phone] (home)       2. Message: pt is calling to see if you wouldn't mind ordering xrays for her right shoulder and her right knee to make sure that nothing is wrong there after her fall.      3. Patient approves office to leave a detailed voicemail/MyChart message: N\A

## 2019-01-31 NOTE — TELEPHONE ENCOUNTER
Phone Number Called: [phone] (home)       Message: Patient informed    Left Message for patient to call back: N\A

## 2019-02-04 ENCOUNTER — HOSPITAL ENCOUNTER (OUTPATIENT)
Dept: RADIOLOGY | Facility: MEDICAL CENTER | Age: 84
End: 2019-02-04
Attending: FAMILY MEDICINE
Payer: MEDICARE

## 2019-02-04 DIAGNOSIS — M25.561 ACUTE PAIN OF RIGHT KNEE: ICD-10-CM

## 2019-02-04 DIAGNOSIS — M25.511 PAIN AND SWELLING OF RIGHT SHOULDER: ICD-10-CM

## 2019-02-04 DIAGNOSIS — M25.411 PAIN AND SWELLING OF RIGHT SHOULDER: ICD-10-CM

## 2019-02-04 PROCEDURE — 73564 X-RAY EXAM KNEE 4 OR MORE: CPT | Mod: RT

## 2019-02-04 PROCEDURE — 73030 X-RAY EXAM OF SHOULDER: CPT | Mod: RT

## 2019-02-12 ENCOUNTER — OFFICE VISIT (OUTPATIENT)
Dept: CARDIOLOGY | Facility: MEDICAL CENTER | Age: 84
End: 2019-02-12
Payer: MEDICARE

## 2019-02-12 VITALS
SYSTOLIC BLOOD PRESSURE: 130 MMHG | BODY MASS INDEX: 22.82 KG/M2 | OXYGEN SATURATION: 94 % | HEART RATE: 80 BPM | DIASTOLIC BLOOD PRESSURE: 88 MMHG | HEIGHT: 62 IN | WEIGHT: 124 LBS

## 2019-02-12 DIAGNOSIS — I47.10 SVT (SUPRAVENTRICULAR TACHYCARDIA): ICD-10-CM

## 2019-02-12 DIAGNOSIS — R55 SYNCOPE AND COLLAPSE: ICD-10-CM

## 2019-02-12 DIAGNOSIS — E78.5 DYSLIPIDEMIA: ICD-10-CM

## 2019-02-12 DIAGNOSIS — R60.0 LOWER EXTREMITY EDEMA: ICD-10-CM

## 2019-02-12 DIAGNOSIS — I71.40 ABDOMINAL AORTIC ANEURYSM (AAA) WITHOUT RUPTURE (HCC): ICD-10-CM

## 2019-02-12 DIAGNOSIS — I70.219 ATHEROSCLEROSIS OF NATIVE ARTERY OF EXTREMITY WITH INTERMITTENT CLAUDICATION, UNSPECIFIED EXTREMITY (HCC): ICD-10-CM

## 2019-02-12 DIAGNOSIS — I10 ESSENTIAL HYPERTENSION: ICD-10-CM

## 2019-02-12 PROCEDURE — 99214 OFFICE O/P EST MOD 30 MIN: CPT | Performed by: INTERNAL MEDICINE

## 2019-02-12 ASSESSMENT — ENCOUNTER SYMPTOMS
COUGH: 0
PALPITATIONS: 0
DIZZINESS: 0
BRUISES/BLEEDS EASILY: 0
FEVER: 0
SHORTNESS OF BREATH: 0
NERVOUS/ANXIOUS: 1
CLAUDICATION: 0
NAUSEA: 0
BLURRED VISION: 0
CHILLS: 0
FOCAL WEAKNESS: 0
LOSS OF CONSCIOUSNESS: 1
SORE THROAT: 0
BACK PAIN: 1
PND: 0
WEAKNESS: 0
FALLS: 0
ABDOMINAL PAIN: 0

## 2019-02-12 NOTE — PATIENT INSTRUCTIONS
Lowering total cholesterol and LDL (bad) cholesterol:  - Eat leaner cuts of meat, or eliminate altogether if possible red meat, and frequently substitute fish or chicken.  - Limit saturated fat to no more than 7-10% of total calories no more than 10 g per day is recommended. Some sources of saturated fat include butter, animal fats, hydrogenated vegetable fats and oils, many desserts, whole milk dairy products.  - Replaced saturated fats with polyunsaturated fats and monounsaturated fats. Foods high in monounsaturated fat include nuts, although well, canola oil, avocados, and olives.  - Limit trans fat (processed foods) and replaced with fresh fruits and vegetables  - Recommend nonfat dairy products  - Increase substantially the amount of soluble fiber intake (legumes such as beans, fruit, whole grains).  - Consider nutritional supplements: plant sterile spreads such as Benecol, fish oil,  flaxseed oil, omega-3 acids capsules 1000 mg twice a day, or viscous fiber such as Metamucil  - Attain ideal weight and regular exercise (at least 30 minutes per day of walking)    Lowering triglycerides:  - Reduce intake of simple sugar: Desserts, candy, pastries, honey, sodas, sugared cereals, yogurt, Gatorade, sports bars, canned fruit, smoothies, fruit juice, coffee drinks  - Reduced intake of refined starches: Refined Pasta  - Reduce or abstain from alcohol  - Increase omega-3 fatty acids: [place], Trout, Mackerel, Herring, Albacore tuna and supplements  - Attain ideal weight and regular exercise (at least 30 minutes per day of walking)      Elevating HDL (good) cholesterol:  - Increase physical activity  - Seasoned foods with garlic and onions  - Increase omega-3 fatty acids and supplements as listed above  - Incorporating appropriate amounts of monounsaturated fats such as nuts, olive oil, canola oil, avocados, olives  - Stop smoking  - Attain ideal weight and regular exercise (at least 30 minutes per day of walking)

## 2019-02-12 NOTE — PROGRESS NOTES
Chief Complaint   Patient presents with   • Supraventricular Tachycardia (SVT)     follow up lab results       Subjective:   Nini Taylor is a 86 y.o. female who presents today for follow-up of her history of peripheral arterial disease and saccular aneurysm of the abdominal aorta, hypertension dyslipidemia paroxysmal SVT    She reports 2 episodes of syncope one was very clearly vasovagal when she was using the commode and had all the vasovagal symptoms, the other a couple weeks ago she was walking outside and lost consciousness and fell to the ground she had no symptoms prior to or after the episode some pain so she had some x-rays done which were acceptable she also had a black eye, looks like the last time we did a Zio patch is 2015 where she had frequent SVT    Past Medical History:   Diagnosis Date   • AAA (abdominal aortic aneurysm) ([facility])    • Atherosclerosis of native arteries of the extremities with intermittent claudication 10/16/2013   • Blepharospasm syndrome    • Bright red rectal bleeding 6/24/2015   • Chest pain 07/2015    Unspecified; Nuclear stress test negative    • Constipation    • Edema 5/29/2012   • Female bladder prolapse, acquired    • GERD (gastroesophageal reflux disease)    • Hyperlipidemia    • Hypertension    • Insomnia    • OSTEOPOROSIS    • Palpitations 5/29/2012   • Sleep apnea    • Varicose veins 5/29/2012   • Vitamin D deficiency disease      Past Surgical History:   Procedure Laterality Date   • CYSTOCELE REPAIR  1/17/2011    Performed by GILMAR CHUNG at SURGERY SAME DAY [facility]   • RECTOCELE REPAIR  1/17/2011    Performed by GILMAR CHUNG at SURGERY SAME DAY [facility]   • BLADDER SUSPENSION  1/17/2011    Performed by GILMAR CHUNG at SURGERY SAME DAY [facility]   • CYSTOSCOPY  1/17/2011    Performed by GILMAR CHUNG at SURGERY SAME DAY [facility]   • ABDOMINAL HYSTERECTOMY TOTAL     • ARTHROSCOPY, KNEE     • CATARACT EXTRACTION WITH IOL     •  HEMORRHOIDECTOMY     • HYSTERECTOMY, TOTAL ABDOMINAL     • INJECTION SCLERO HEMORROIDS     • OTHER ORTHOPEDIC SURGERY      knee scope x 2   • PB KNEE SCOPE,DIAGNOSTIC  2003;2009   • TONSILLECTOMY       Family History   Problem Relation Age of Onset   • Non-contributory Mother         gall bladder surgery   • Cancer Father         colon rectal   • Heart Disease Father         rheumatic heart disease   • Sleep Apnea Brother    • Heart Disease Brother         sleep apnea   • Heart Attack Brother         MI   • No Known Problems Maternal Grandmother    • No Known Problems Maternal Grandfather    • No Known Problems Paternal Grandmother    • No Known Problems Paternal Grandfather      Social History     Social History   • Marital status:      Spouse name: N/A   • Number of children: N/A   • Years of education: N/A     Occupational History   • Not on file.     Social History Main Topics   • Smoking status: Never Smoker   • Smokeless tobacco: Never Used   • Alcohol use 0.0 oz/week      Comment: occasionally   • Drug use: No   • Sexual activity: No     Other Topics Concern   • Not on file     Social History Narrative   • No narrative on file     Allergies   Allergen Reactions   • Atorvastatin    • Bactrim Ds Hives   • Pneumococcal Vaccines Swelling   • Sulfa Drugs Hives   • Vicodin [Hydrocodone-Acetaminophen] Rash   • Vytorin    • Zetia [Ezetimibe]      Outpatient Encounter Prescriptions as of 2/12/2019   Medication Sig Dispense Refill   • ezetimibe (ZETIA) 10 MG Tab Take 1 Tab by mouth every day. 30 Tab 11   • metoprolol (LOPRESSOR) 25 MG Tab TAKE 1 TABLET BY MOUTH TWICE DAILY 180 Tab 2   • nitrofurantoin monohydr macro (MACROBID) 100 MG Cap Take 1 Cap by mouth every day. 30 Cap 1   • PREMARIN 0.625 MG/GM Cream INSERT 1GM VAGINALLY 3 TIMES WEEKLY 30 g 1   • Multiple Vitamins-Minerals (ICAPS AREDS 2) Cap Take  by mouth.     • Cholecalciferol 4000 units Cap Take 4,000 Units by mouth every day. 30 Cap 5   • Coenzyme  "Q10 (CO Q 10 PO) Take  by mouth.     • Multiple Vitamins-Minerals (CENTRUM SILVER PO) Take 1 Tab by mouth every day.     • triamcinolone acetonide (KENALOG) 0.1 % Ointment Apply thin layer to affected area twice daily as needed 1 Tube 0     No facility-administered encounter medications on file as of 2/12/2019.      Review of Systems   Constitutional: Negative for chills and fever.   HENT: Negative for sore throat.    Eyes: Negative for blurred vision.   Respiratory: Negative for cough and shortness of breath.    Cardiovascular: Negative for chest pain, palpitations, claudication, leg swelling and PND.   Gastrointestinal: Negative for abdominal pain and nausea.   Musculoskeletal: Positive for back pain and joint pain. Negative for falls.   Skin: Negative for rash.   Neurological: Positive for loss of consciousness. Negative for dizziness, focal weakness and weakness.   Endo/Heme/Allergies: Does not bruise/bleed easily.   Psychiatric/Behavioral: The patient is nervous/anxious.         Objective:   /88 (BP Location: Left arm, Patient Position: Sitting)   Pulse 80   Ht 1.575 m (5' 2\")   Wt 56.2 kg (124 lb)   SpO2 94%   BMI 22.68 kg/m²     Physical Exam   Constitutional: No distress.   HENT:   Mouth/Throat: Oropharynx is clear and moist. No oropharyngeal exudate.   Eyes: No scleral icterus.   Neck: No JVD present.   Cardiovascular: Normal rate and normal heart sounds.  Exam reveals no gallop and no friction rub.    No murmur heard.  Pulmonary/Chest: No respiratory distress. She has no wheezes. She has no rales.   Abdominal: Soft. Bowel sounds are normal.   Musculoskeletal: She exhibits no edema.   Neurological: She is alert.   Skin: No rash noted. She is not diaphoretic.   Psychiatric: She has a normal mood and affect.     We reviewed her MRA results from last September with stable saccular aneurysm    We reviewed in person the recent labs  Recent Results (from the past 4032 hour(s))   URINALYSIS,CULTURE IF " INDICATED    Collection Time: 10/10/18  1:30 PM   Result Value Ref Range    Color Yellow     Character Clear     Specific Gravity 1.014 <1.035    Ph 6.0 5.0 - 8.0    Glucose Negative Negative mg/dL    Ketones Trace (A) Negative mg/dL    Protein Negative Negative mg/dL    Bilirubin Negative Negative    Urobilinogen, Urine 0.2 Negative    Nitrite Negative Negative    Leukocyte Esterase Moderate (A) Negative    Occult Blood Negative Negative    Micro Urine Req Microscopic    URINE MICROSCOPIC (W/UA)    Collection Time: 10/10/18  1:30 PM   Result Value Ref Range    WBC 2-5 /hpf    RBC 0-2 /hpf    Bacteria Negative None /hpf    Epithelial Cells Few /hpf    Hyaline Cast 0-2 /lpf   COMP METABOLIC PANEL    Collection Time: 10/25/18  1:31 PM   Result Value Ref Range    Sodium 140 135 - 145 mmol/L    Potassium 4.1 3.6 - 5.5 mmol/L    Chloride 102 96 - 112 mmol/L    Co2 28 20 - 33 mmol/L    Anion Gap 10.0 0.0 - 11.9    Glucose 90 65 - 99 mg/dL    Bun 18 8 - 22 mg/dL    Creatinine 1.04 0.50 - 1.40 mg/dL    Calcium 9.9 8.5 - 10.5 mg/dL    AST(SGOT) 17 12 - 45 U/L    ALT(SGPT) 13 2 - 50 U/L    Alkaline Phosphatase 74 30 - 99 U/L    Total Bilirubin 1.2 0.1 - 1.5 mg/dL    Albumin 4.0 3.2 - 4.9 g/dL    Total Protein 7.0 6.0 - 8.2 g/dL    Globulin 3.0 1.9 - 3.5 g/dL    A-G Ratio 1.3 g/dL   VITAMIN D,25 HYDROXY    Collection Time: 10/25/18  1:31 PM   Result Value Ref Range    25-Hydroxy   Vitamin D 25 35 30 - 100 ng/mL   LIPID PROFILE    Collection Time: 10/25/18  1:31 PM   Result Value Ref Range    Cholesterol,Tot 211 (H) 100 - 199 mg/dL    Triglycerides 122 0 - 149 mg/dL    HDL 53 >=40 mg/dL     (H) <100 mg/dL   FASTING STATUS    Collection Time: 10/25/18  1:31 PM   Result Value Ref Range    Fasting Status Fasting    ESTIMATED GFR    Collection Time: 10/25/18  1:31 PM   Result Value Ref Range    GFR If African American >60 >60 mL/min/1.73 m 2    GFR If Non African American 50 (A) >60 mL/min/1.73 m 2   LIPID PROFILE     Collection Time: 12/03/18 11:51 AM   Result Value Ref Range    Cholesterol,Tot 175 100 - 199 mg/dL    Triglycerides 120 0 - 149 mg/dL    HDL 50 >=40 mg/dL     (H) <100 mg/dL   LIPOPROTEIN A    Collection Time: 12/03/18 11:51 AM   Result Value Ref Range    Lipoprotein A 114 (H) <=29 mg/dL   FASTING STATUS    Collection Time: 12/03/18 11:51 AM   Result Value Ref Range    Fasting Status Fasting    URINALYSIS,CULTURE IF INDICATED    Collection Time: 12/12/18  3:46 PM   Result Value Ref Range    Color Yellow     Character Turbid (A)     Specific Gravity 1.009 <1.035    Ph 6.0 5.0 - 8.0    Glucose Negative Negative mg/dL    Ketones Negative Negative mg/dL    Protein Negative Negative mg/dL    Bilirubin Negative Negative    Urobilinogen, Urine 0.2 Negative    Nitrite Positive (A) Negative    Leukocyte Esterase Large (A) Negative    Occult Blood Small (A) Negative    Micro Urine Req Microscopic    URINE MICROSCOPIC (W/UA)    Collection Time: 12/12/18  3:46 PM   Result Value Ref Range    WBC Packed (A) /hpf    RBC 2-5 (A) /hpf    Bacteria Many (A) None /hpf    Epithelial Cells Negative /hpf    Hyaline Cast 0-2 /lpf   URINE CULTURE(NEW)    Collection Time: 12/12/18  3:46 PM   Result Value Ref Range    Significant Indicator POS (POS)     Source UR     Site      Urine Culture (A)     Urine Culture Escherichia coli  >100,000 cfu/mL   (A)        Susceptibility    Escherichia coli - SENSITIVITY, SUZANNE     Ceftriaxone <=8 Sensitive mcg/mL     Ceftazidime <=1 Sensitive mcg/mL     Cephalothin <=8 Sensitive mcg/mL     Cefotaxime <=2 Sensitive mcg/mL     Ciprofloxacin <=1 Sensitive mcg/mL     Cefepime <=8 Sensitive mcg/mL     Cefuroxime <=4 Sensitive mcg/mL     Ampicillin <=8 Sensitive mcg/mL     Cefotetan <=16 Sensitive mcg/mL     Tobramycin <=4 Sensitive mcg/mL     Nitrofurantoin <=32 Sensitive mcg/mL     Gentamicin <=4 Sensitive mcg/mL     Levofloxacin <=2 Sensitive mcg/mL     Pip/Tazobactam <=16 Sensitive mcg/mL      Piperacillin <=16 Sensitive mcg/mL     Trimeth/Sulfa <=2/38 Sensitive mcg/mL     Tigecycline <=2 Sensitive mcg/mL   COMP METABOLIC PANEL    Collection Time: 01/17/19  3:07 PM   Result Value Ref Range    Sodium 141 135 - 145 mmol/L    Potassium 3.9 3.6 - 5.5 mmol/L    Chloride 104 96 - 112 mmol/L    Co2 28 20 - 33 mmol/L    Anion Gap 9.0 0.0 - 11.9    Glucose 90 65 - 99 mg/dL    Bun 18 8 - 22 mg/dL    Creatinine 0.94 0.50 - 1.40 mg/dL    Calcium 9.9 8.5 - 10.5 mg/dL    AST(SGOT) 21 12 - 45 U/L    ALT(SGPT) 10 2 - 50 U/L    Alkaline Phosphatase 70 30 - 99 U/L    Total Bilirubin 1.4 0.1 - 1.5 mg/dL    Albumin 4.1 3.2 - 4.9 g/dL    Total Protein 6.9 6.0 - 8.2 g/dL    Globulin 2.8 1.9 - 3.5 g/dL    A-G Ratio 1.5 g/dL   ESTIMATED GFR    Collection Time: 01/17/19  3:07 PM   Result Value Ref Range    GFR If African American >60 >60 mL/min/1.73 m 2    GFR If Non  56 (A) >60 mL/min/1.73 m 2   Lipid Profile    Collection Time: 01/22/19 11:30 AM   Result Value Ref Range    Cholesterol,Tot 258 (H) 100 - 199 mg/dL    Triglycerides 118 0 - 149 mg/dL    HDL 50 >=40 mg/dL     (H) <100 mg/dL   FASTING STATUS    Collection Time: 01/22/19 11:31 AM   Result Value Ref Range    Fasting Status Fasting    URINALYSIS,CULTURE IF INDICATED    Collection Time: 01/26/19 12:34 PM   Result Value Ref Range    Color Yellow     Character Clear     Specific Gravity 1.008 <1.035    Ph 7.5 5.0 - 8.0    Glucose Negative Negative mg/dL    Ketones Negative Negative mg/dL    Protein Negative Negative mg/dL    Bilirubin Negative Negative    Urobilinogen, Urine 0.2 Negative    Nitrite Negative Negative    Leukocyte Esterase Small (A) Negative    Occult Blood Negative Negative    Micro Urine Req Microscopic    URINE MICROSCOPIC (W/UA)    Collection Time: 01/26/19 12:34 PM   Result Value Ref Range    WBC 0-2 /hpf    RBC 0-2 /hpf    Bacteria Negative None /hpf    Epithelial Cells Negative /hpf    Hyaline Cast 0-2 /lpf       Assessment:      1. SVT (supraventricular tachycardia) (HCC)  Holter Monitor / Event Recorder   2. Lower extremity edema     3. Essential hypertension     4. Dyslipidemia     5. Atherosclerosis of native artery of extremity with intermittent claudication, unspecified extremity (HCC)     6. Abdominal aortic aneurysm (AAA) without rupture (HCC)     7. Syncope and collapse  Holter Monitor / Event Recorder       Medical Decision Making:  Today's Assessment / Status / Plan:     It was my pleasure to meet with Ms. Taylor.    She is accompanied by her     unfortunately she has had intolerance to statins she reports a left arm pain that gets to be quite severe this seemed to improve coming off the medicine although she still had an episode    Her saccular aneurysm she sees surgery is has been stable on imaging from CT scan in 2014 to an MRI from 2018    For her episode of syncope this could be certainly due to tachybradycardia syndrome with frequent SVT we will get a long-term event monitor to see if we can  this feature, if that were the case certainly a pacemaker would be appropriate, will one episode was clearly vasovagal but I do not have a great explanation for her fall outside of the house    I will see Ms. Taylor back in 6 months time and encouraged her to follow up with us over the phone or e-mail using my [employer] as issues arise.    It is my pleasure to participate in the care of Ms. Taylor.  Please do not hesitate to contact me with questions or concerns.    Montez Kamara MD PhD [facility]  Cardiologist [facility]    Please note that this dictation was created using voice recognition software. I have worked with consultants from the vendor as well as technical experts from [facility] to optimize the interface. I have made every reasonable attempt to correct obvious errors, but I expect that there are errors of grammar and possibly content I did not discover before  finalizing the note.

## 2019-02-12 NOTE — LETTER
[facility]   [address], [place] [address]  [place] [address]  Phone: [phone]  Fax: [phone]              Nini Taylor  7/8/1932    Encounter Date: 2/12/2019    Montez Kamara M.D.          PROGRESS NOTE:  Chief Complaint   Patient presents with   • Supraventricular Tachycardia (SVT)     follow up lab results       Subjective:   Nini Taylor is a 86 y.o. female who presents today for follow-up of her history of peripheral arterial disease and saccular aneurysm of the abdominal aorta, hypertension dyslipidemia paroxysmal SVT    She reports 2 episodes of syncope one was very clearly vasovagal when she was using the commode and had all the vasovagal symptoms, the other a couple weeks ago she was walking outside and lost consciousness and fell to the ground she had no symptoms prior to or after the episode some pain so she had some x-rays done which were acceptable she also had a black eye, looks like the last time we did a Zio patch is 2015 where she had frequent SVT    Past Medical History:   Diagnosis Date   • AAA (abdominal aortic aneurysm) (HCC)    • Atherosclerosis of native arteries of the extremities with intermittent claudication 10/16/2013   • Blepharospasm syndrome    • Bright red rectal bleeding 6/24/2015   • Chest pain 07/2015    Unspecified; Nuclear stress test negative    • Constipation    • Edema 5/29/2012   • Female bladder prolapse, acquired    • GERD (gastroesophageal reflux disease)    • Hyperlipidemia    • Hypertension    • Insomnia    • OSTEOPOROSIS    • Palpitations 5/29/2012   • Sleep apnea    • Varicose veins 5/29/2012   • Vitamin D deficiency disease      Past Surgical History:   Procedure Laterality Date   • CYSTOCELE REPAIR  1/17/2011    Performed by GILMAR CHUNG at [facility]   • RECTOCELE REPAIR  1/17/2011    Performed by GILMAR CHUNG at [facility]   • BLADDER SUSPENSION   1/17/2011    Performed by GILMAR CHUNG at [facility]   • CYSTOSCOPY  1/17/2011    Performed by GILMAR CHUNG at [facility]   • ABDOMINAL HYSTERECTOMY TOTAL     • ARTHROSCOPY, KNEE     • CATARACT EXTRACTION WITH IOL     • HEMORRHOIDECTOMY     • HYSTERECTOMY, TOTAL ABDOMINAL     • INJECTION SCLERO HEMORROIDS     • OTHER ORTHOPEDIC SURGERY      knee scope x 2   • PB KNEE SCOPE,DIAGNOSTIC  2003;2009   • TONSILLECTOMY       Family History   Problem Relation Age of Onset   • Non-contributory Mother         gall bladder surgery   • Cancer Father         colon rectal   • Heart Disease Father         rheumatic heart disease   • Sleep Apnea Brother    • Heart Disease Brother         sleep apnea   • Heart Attack Brother         MI   • No Known Problems Maternal Grandmother    • No Known Problems Maternal Grandfather    • No Known Problems Paternal Grandmother    • No Known Problems Paternal Grandfather      Social History     Social History   • Marital status:      Spouse name: N/A   • Number of children: N/A   • Years of education: N/A     Occupational History   • Not on file.     Social History Main Topics   • Smoking status: Never Smoker   • Smokeless tobacco: Never Used   • Alcohol use 0.0 oz/week      Comment: occasionally   • Drug use: No   • Sexual activity: No     Other Topics Concern   • Not on file     Social History Narrative   • No narrative on file     Allergies   Allergen Reactions   • Atorvastatin    • Bactrim Ds Hives   • Pneumococcal Vaccines Swelling   • Sulfa Drugs Hives   • Vicodin [Hydrocodone-Acetaminophen] Rash   • Vytorin    • Zetia [Ezetimibe]      Outpatient Encounter Prescriptions as of 2/12/2019   Medication Sig Dispense Refill   • ezetimibe (ZETIA) 10 MG Tab Take 1 Tab by mouth every day. 30 Tab 11   • metoprolol (LOPRESSOR) 25 MG Tab TAKE 1 TABLET BY MOUTH TWICE DAILY 180 Tab 2   • nitrofurantoin monohydr macro (MACROBID) 100 MG Cap Take 1  "Cap by mouth every day. 30 Cap 1   • PREMARIN 0.625 MG/GM Cream INSERT 1GM VAGINALLY 3 TIMES WEEKLY 30 g 1   • Multiple Vitamins-Minerals (ICAPS AREDS 2) Cap Take  by mouth.     • Cholecalciferol 4000 units Cap Take 4,000 Units by mouth every day. 30 Cap 5   • Coenzyme Q10 (CO Q 10 PO) Take  by mouth.     • Multiple Vitamins-Minerals (CENTRUM SILVER PO) Take 1 Tab by mouth every day.     • triamcinolone acetonide (KENALOG) 0.1 % Ointment Apply thin layer to affected area twice daily as needed 1 Tube 0     No facility-administered encounter medications on file as of 2/12/2019.      Review of Systems   Constitutional: Negative for chills and fever.   HENT: Negative for sore throat.    Eyes: Negative for blurred vision.   Respiratory: Negative for cough and shortness of breath.    Cardiovascular: Negative for chest pain, palpitations, claudication, leg swelling and PND.   Gastrointestinal: Negative for abdominal pain and nausea.   Musculoskeletal: Positive for back pain and joint pain. Negative for falls.   Skin: Negative for rash.   Neurological: Positive for loss of consciousness. Negative for dizziness, focal weakness and weakness.   Endo/Heme/Allergies: Does not bruise/bleed easily.   Psychiatric/Behavioral: The patient is nervous/anxious.         Objective:   /88 (BP Location: Left arm, Patient Position: Sitting)   Pulse 80   Ht 1.575 m (5' 2\")   Wt 56.2 kg (124 lb)   SpO2 94%   BMI 22.68 kg/m²      Physical Exam   Constitutional: No distress.   HENT:   Mouth/Throat: Oropharynx is clear and moist. No oropharyngeal exudate.   Eyes: No scleral icterus.   Neck: No JVD present.   Cardiovascular: Normal rate and normal heart sounds.  Exam reveals no gallop and no friction rub.    No murmur heard.  Pulmonary/Chest: No respiratory distress. She has no wheezes. She has no rales.   Abdominal: Soft. Bowel sounds are normal.   Musculoskeletal: She exhibits no edema.   Neurological: She is alert.   Skin: No rash " noted. She is not diaphoretic.   Psychiatric: She has a normal mood and affect.     We reviewed her MRA results from last September with stable saccular aneurysm    We reviewed in person the recent labs  Recent Results (from the past 4032 hour(s))   URINALYSIS,CULTURE IF INDICATED    Collection Time: 10/10/18  1:30 PM   Result Value Ref Range    Color Yellow     Character Clear     Specific Gravity 1.014 <1.035    Ph 6.0 5.0 - 8.0    Glucose Negative Negative mg/dL    Ketones Trace (A) Negative mg/dL    Protein Negative Negative mg/dL    Bilirubin Negative Negative    Urobilinogen, Urine 0.2 Negative    Nitrite Negative Negative    Leukocyte Esterase Moderate (A) Negative    Occult Blood Negative Negative    Micro Urine Req Microscopic    URINE MICROSCOPIC (W/UA)    Collection Time: 10/10/18  1:30 PM   Result Value Ref Range    WBC 2-5 /hpf    RBC 0-2 /hpf    Bacteria Negative None /hpf    Epithelial Cells Few /hpf    Hyaline Cast 0-2 /lpf   COMP METABOLIC PANEL    Collection Time: 10/25/18  1:31 PM   Result Value Ref Range    Sodium 140 135 - 145 mmol/L    Potassium 4.1 3.6 - 5.5 mmol/L    Chloride 102 96 - 112 mmol/L    Co2 28 20 - 33 mmol/L    Anion Gap 10.0 0.0 - 11.9    Glucose 90 65 - 99 mg/dL    Bun 18 8 - 22 mg/dL    Creatinine 1.04 0.50 - 1.40 mg/dL    Calcium 9.9 8.5 - 10.5 mg/dL    AST(SGOT) 17 12 - 45 U/L    ALT(SGPT) 13 2 - 50 U/L    Alkaline Phosphatase 74 30 - 99 U/L    Total Bilirubin 1.2 0.1 - 1.5 mg/dL    Albumin 4.0 3.2 - 4.9 g/dL    Total Protein 7.0 6.0 - 8.2 g/dL    Globulin 3.0 1.9 - 3.5 g/dL    A-G Ratio 1.3 g/dL   VITAMIN D,25 HYDROXY    Collection Time: 10/25/18  1:31 PM   Result Value Ref Range    25-Hydroxy   Vitamin D 25 35 30 - 100 ng/mL   LIPID PROFILE    Collection Time: 10/25/18  1:31 PM   Result Value Ref Range    Cholesterol,Tot 211 (H) 100 - 199 mg/dL    Triglycerides 122 0 - 149 mg/dL    HDL 53 >=40 mg/dL     (H) <100 mg/dL   FASTING STATUS    Collection Time: 10/25/18   1:31 PM   Result Value Ref Range    Fasting Status Fasting    ESTIMATED GFR    Collection Time: 10/25/18  1:31 PM   Result Value Ref Range    GFR If African American >60 >60 mL/min/1.73 m 2    GFR If Non African American 50 (A) >60 mL/min/1.73 m 2   LIPID PROFILE    Collection Time: 12/03/18 11:51 AM   Result Value Ref Range    Cholesterol,Tot 175 100 - 199 mg/dL    Triglycerides 120 0 - 149 mg/dL    HDL 50 >=40 mg/dL     (H) <100 mg/dL   LIPOPROTEIN A    Collection Time: 12/03/18 11:51 AM   Result Value Ref Range    Lipoprotein A 114 (H) <=29 mg/dL   FASTING STATUS    Collection Time: 12/03/18 11:51 AM   Result Value Ref Range    Fasting Status Fasting    URINALYSIS,CULTURE IF INDICATED    Collection Time: 12/12/18  3:46 PM   Result Value Ref Range    Color Yellow     Character Turbid (A)     Specific Gravity 1.009 <1.035    Ph 6.0 5.0 - 8.0    Glucose Negative Negative mg/dL    Ketones Negative Negative mg/dL    Protein Negative Negative mg/dL    Bilirubin Negative Negative    Urobilinogen, Urine 0.2 Negative    Nitrite Positive (A) Negative    Leukocyte Esterase Large (A) Negative    Occult Blood Small (A) Negative    Micro Urine Req Microscopic    URINE MICROSCOPIC (W/UA)    Collection Time: 12/12/18  3:46 PM   Result Value Ref Range    WBC Packed (A) /hpf    RBC 2-5 (A) /hpf    Bacteria Many (A) None /hpf    Epithelial Cells Negative /hpf    Hyaline Cast 0-2 /lpf   URINE CULTURE(NEW)    Collection Time: 12/12/18  3:46 PM   Result Value Ref Range    Significant Indicator POS (POS)     Source UR     Site      Urine Culture (A)     Urine Culture Escherichia coli  >100,000 cfu/mL   (A)        Susceptibility    Escherichia coli - SENSITIVITY, SUZANNE     Ceftriaxone <=8 Sensitive mcg/mL     Ceftazidime <=1 Sensitive mcg/mL     Cephalothin <=8 Sensitive mcg/mL     Cefotaxime <=2 Sensitive mcg/mL     Ciprofloxacin <=1 Sensitive mcg/mL     Cefepime <=8 Sensitive mcg/mL     Cefuroxime <=4 Sensitive mcg/mL      Ampicillin <=8 Sensitive mcg/mL     Cefotetan <=16 Sensitive mcg/mL     Tobramycin <=4 Sensitive mcg/mL     Nitrofurantoin <=32 Sensitive mcg/mL     Gentamicin <=4 Sensitive mcg/mL     Levofloxacin <=2 Sensitive mcg/mL     Pip/Tazobactam <=16 Sensitive mcg/mL     Piperacillin <=16 Sensitive mcg/mL     Trimeth/Sulfa <=2/38 Sensitive mcg/mL     Tigecycline <=2 Sensitive mcg/mL   COMP METABOLIC PANEL    Collection Time: 01/17/19  3:07 PM   Result Value Ref Range    Sodium 141 135 - 145 mmol/L    Potassium 3.9 3.6 - 5.5 mmol/L    Chloride 104 96 - 112 mmol/L    Co2 28 20 - 33 mmol/L    Anion Gap 9.0 0.0 - 11.9    Glucose 90 65 - 99 mg/dL    Bun 18 8 - 22 mg/dL    Creatinine 0.94 0.50 - 1.40 mg/dL    Calcium 9.9 8.5 - 10.5 mg/dL    AST(SGOT) 21 12 - 45 U/L    ALT(SGPT) 10 2 - 50 U/L    Alkaline Phosphatase 70 30 - 99 U/L    Total Bilirubin 1.4 0.1 - 1.5 mg/dL    Albumin 4.1 3.2 - 4.9 g/dL    Total Protein 6.9 6.0 - 8.2 g/dL    Globulin 2.8 1.9 - 3.5 g/dL    A-G Ratio 1.5 g/dL   ESTIMATED GFR    Collection Time: 01/17/19  3:07 PM   Result Value Ref Range    GFR If African American >60 >60 mL/min/1.73 m 2    GFR If Non  56 (A) >60 mL/min/1.73 m 2   Lipid Profile    Collection Time: 01/22/19 11:30 AM   Result Value Ref Range    Cholesterol,Tot 258 (H) 100 - 199 mg/dL    Triglycerides 118 0 - 149 mg/dL    HDL 50 >=40 mg/dL     (H) <100 mg/dL   FASTING STATUS    Collection Time: 01/22/19 11:31 AM   Result Value Ref Range    Fasting Status Fasting    URINALYSIS,CULTURE IF INDICATED    Collection Time: 01/26/19 12:34 PM   Result Value Ref Range    Color Yellow     Character Clear     Specific Gravity 1.008 <1.035    Ph 7.5 5.0 - 8.0    Glucose Negative Negative mg/dL    Ketones Negative Negative mg/dL    Protein Negative Negative mg/dL    Bilirubin Negative Negative    Urobilinogen, Urine 0.2 Negative    Nitrite Negative Negative    Leukocyte Esterase Small (A) Negative    Occult Blood Negative Negative       Micro Urine Req Microscopic    URINE MICROSCOPIC (W/UA)    Collection Time: 01/26/19 12:34 PM   Result Value Ref Range    WBC 0-2 /hpf    RBC 0-2 /hpf    Bacteria Negative None /hpf    Epithelial Cells Negative /hpf    Hyaline Cast 0-2 /lpf       Assessment:     1. SVT (supraventricular tachycardia) (HCC)  Holter Monitor / Event Recorder   2. Lower extremity edema     3. Essential hypertension     4. Dyslipidemia     5. Atherosclerosis of native artery of extremity with intermittent claudication, unspecified extremity (HCC)     6. Abdominal aortic aneurysm (AAA) without rupture (HCC)     7. Syncope and collapse  Holter Monitor / Event Recorder       Medical Decision Making:  Today's Assessment / Status / Plan:     It was my pleasure to meet with Ms. Taylor.    She is accompanied by her     unfortunately she has had intolerance to statins she reports a left arm pain that gets to be quite severe this seemed to improve coming off the medicine although she still had an episode    Her saccular aneurysm she sees surgery is has been stable on imaging from CT scan in 2014 to an MRI from 2018    For her episode of syncope this could be certainly due to tachybradycardia syndrome with frequent SVT we will get a long-term event monitor to see if we can  this feature, if that were the case certainly a pacemaker would be appropriate, will one episode was clearly vasovagal but I do not have a great explanation for her fall outside of the house    I will see Ms. Taylor back in 6 months time and encouraged her to follow up with us over the phone or e-mail using my MyChart as issues arise.    It is my pleasure to participate in the care of Ms. Taylor.  Please do not hesitate to contact me with questions or concerns.    Montez Kamara MD PhD FACC  Cardiologist [facility]    Please note that this dictation was created using voice recognition software. I have worked  with consultants from the vendor as well as technical experts from [facility] to optimize the interface. I have made every reasonable attempt to correct obvious errors, but I expect that there are errors of grammar and possibly content I did not discover before finalizing the note.         Lebron Melgar M.D.  [address]  [place] [address]  VIA Facsimile: [phone]

## 2019-03-04 ENCOUNTER — TELEPHONE (OUTPATIENT)
Dept: CARDIOLOGY | Facility: MEDICAL CENTER | Age: 84
End: 2019-03-04

## 2019-03-04 NOTE — TELEPHONE ENCOUNTER
labwork   Received: 3 days ago Message Contents   KARLA Charles, yesterday I checked out Mr & Mrs Taylor ([id number]) and they wanted to know If they needed labwork prior to their appts with CW. I let them know I would message you and if we did need labs we would contact them. Sound good?   Thank you Happy Friday   Katarzyna      Upon chart review, per MD last OV note, he did not state follow up on labs for patient FV in September.    Called patient and spoke with wife Nini to review findings.  Reviewed dates for pt upcoming appointment.  She verbalizes understanding and is appreciative of information given.   She states no other concerns or questions at this time.

## 2019-03-12 ENCOUNTER — APPOINTMENT (OUTPATIENT)
Dept: CARDIOLOGY | Facility: MEDICAL CENTER | Age: 84
End: 2019-03-12
Payer: MEDICARE

## 2019-03-14 RX ORDER — ACETAMINOPHEN 160 MG
TABLET,DISINTEGRATING ORAL
Qty: 60 CAP | Refills: 11 | Status: SHIPPED | OUTPATIENT
Start: 2019-03-14 | End: 2020-06-01

## 2019-03-26 ENCOUNTER — HOSPITAL ENCOUNTER (OUTPATIENT)
Dept: LAB | Facility: MEDICAL CENTER | Age: 84
End: 2019-03-26
Attending: FAMILY MEDICINE
Payer: MEDICARE

## 2019-03-26 DIAGNOSIS — N39.0 FREQUENT UTI: ICD-10-CM

## 2019-03-26 DIAGNOSIS — N30.00 ACUTE CYSTITIS WITHOUT HEMATURIA: ICD-10-CM

## 2019-03-26 LAB
APPEARANCE UR: ABNORMAL
BACTERIA #/AREA URNS HPF: ABNORMAL /HPF
BILIRUB UR QL STRIP.AUTO: NEGATIVE
COLOR UR: YELLOW
EPI CELLS #/AREA URNS HPF: ABNORMAL /HPF
GLUCOSE UR STRIP.AUTO-MCNC: NEGATIVE MG/DL
HYALINE CASTS #/AREA URNS LPF: ABNORMAL /LPF
KETONES UR STRIP.AUTO-MCNC: NEGATIVE MG/DL
LEUKOCYTE ESTERASE UR QL STRIP.AUTO: ABNORMAL
MICRO URNS: ABNORMAL
NITRITE UR QL STRIP.AUTO: POSITIVE
PH UR STRIP.AUTO: 6.5 [PH]
PROT UR QL STRIP: NEGATIVE MG/DL
RBC # URNS HPF: ABNORMAL /HPF
RBC UR QL AUTO: ABNORMAL
SP GR UR STRIP.AUTO: 1.01
UROBILINOGEN UR STRIP.AUTO-MCNC: 0.2 MG/DL
WBC #/AREA URNS HPF: ABNORMAL /HPF

## 2019-03-26 PROCEDURE — 81001 URINALYSIS AUTO W/SCOPE: CPT

## 2019-03-26 PROCEDURE — 87086 URINE CULTURE/COLONY COUNT: CPT

## 2019-03-26 PROCEDURE — 87186 SC STD MICRODIL/AGAR DIL: CPT

## 2019-03-26 PROCEDURE — 87077 CULTURE AEROBIC IDENTIFY: CPT

## 2019-03-26 RX ORDER — CEPHALEXIN 500 MG/1
500 CAPSULE ORAL 3 TIMES DAILY
Qty: 21 CAP | Refills: 0 | Status: SHIPPED | OUTPATIENT
Start: 2019-03-26 | End: 2019-04-02

## 2019-03-28 ENCOUNTER — TELEPHONE (OUTPATIENT)
Dept: MEDICAL GROUP | Facility: PHYSICIAN GROUP | Age: 84
End: 2019-03-28

## 2019-03-28 DIAGNOSIS — B96.20 E. COLI UTI: ICD-10-CM

## 2019-03-28 DIAGNOSIS — N39.0 E. COLI UTI: ICD-10-CM

## 2019-03-28 RX ORDER — NITROFURANTOIN 25; 75 MG/1; MG/1
100 CAPSULE ORAL 2 TIMES DAILY
Qty: 14 CAP | Refills: 0 | Status: SHIPPED | OUTPATIENT
Start: 2019-03-28 | End: 2019-05-15 | Stop reason: SDUPTHER

## 2019-03-28 NOTE — TELEPHONE ENCOUNTER
----- Message from Arturo Chapin M.D. sent at 3/28/2019  9:02 AM PDT -----  Please let her know that her urine grew E coli, I have prescribed 7 days worth of antibiotics

## 2019-04-01 ENCOUNTER — NON-PROVIDER VISIT (OUTPATIENT)
Dept: CARDIOLOGY | Facility: MEDICAL CENTER | Age: 84
End: 2019-04-01
Payer: MEDICARE

## 2019-04-01 ENCOUNTER — TELEPHONE (OUTPATIENT)
Dept: CARDIOLOGY | Facility: MEDICAL CENTER | Age: 84
End: 2019-04-01

## 2019-04-01 DIAGNOSIS — I47.10 PAROXYSMAL SVT (SUPRAVENTRICULAR TACHYCARDIA): ICD-10-CM

## 2019-04-01 DIAGNOSIS — R55 SYNCOPE AND COLLAPSE: ICD-10-CM

## 2019-05-01 DIAGNOSIS — I47.10 SVT (SUPRAVENTRICULAR TACHYCARDIA) (HCC): ICD-10-CM

## 2019-05-01 DIAGNOSIS — R55 SYNCOPE AND COLLAPSE: ICD-10-CM

## 2019-05-02 ENCOUNTER — TELEPHONE (OUTPATIENT)
Dept: CARDIOLOGY | Facility: MEDICAL CENTER | Age: 84
End: 2019-05-02

## 2019-05-02 PROCEDURE — 0296T PR EXT ECG > 48HR TO 21 DAY RCRD W/CONECT INTL RCRD: CPT | Performed by: INTERNAL MEDICINE

## 2019-05-02 PROCEDURE — 0298T PR EXT ECG > 48HR TO 21 DAY REVIEW AND INTERPRETATN: CPT | Performed by: INTERNAL MEDICINE

## 2019-05-02 NOTE — TELEPHONE ENCOUNTER
----- Message from Darius Ng R.N. sent at 5/2/2019  3:02 PM PDT -----  Regarding: FW: Zio results  Contact: [phone]      ----- Message -----  From: Ana Luisa RADHA Taylor  Sent: 5/2/2019   1:27 PM  To: Ailyn Márquez R.N.  Subject: Zio results                                      My friend,  Nini would like to know the results of her Zio on 4/01. Also, she has an appt for lab work next month, will this be sufficient for her appt in Sept.   She is hard of hearing!

## 2019-05-03 NOTE — TELEPHONE ENCOUNTER
Returned patient call and reviewed MD recommendations.  She states no other concerns or questions at this time.  She verbalizes understanding and is appreciative of information given.    --------------------------  Holter Monitor / Event Recorder   Order: [id number]   Status:  Edited Result - FINAL   Visible to patient:  No (Not Released)  Dx:  Syncope and collapse; SVT (supraventr...   Notes recorded by Darius Ng R.N. on 5/2/2019 at 3:07 PM PDT  See notes 5/2/19  ------    Notes recorded by Montez Kamara M.D. on 5/2/2019 at 12:10 PM PDT  The holter test looks good, please let her know     Thank you  ------    Notes recorded by Ailyn Márquez R.N. on 5/2/2019 at 8:16 AM PDT  FV scheduled 09/24/2019, thank you!     returning call   Received: Today Message Contents   Shellie DHIRAJ Beltrán, Med Ass't  Ailyn Márquez R.N.   Cc: Darius Ng R.N.   Phone Number: [phone]     CW pt returning call re: zio results   Ph# [phone] pls return call by 11am

## 2019-05-13 ENCOUNTER — HOSPITAL ENCOUNTER (OUTPATIENT)
Dept: LAB | Facility: MEDICAL CENTER | Age: 84
End: 2019-05-13
Attending: FAMILY MEDICINE
Payer: MEDICARE

## 2019-05-13 DIAGNOSIS — N39.0 FREQUENT UTI: ICD-10-CM

## 2019-05-13 PROCEDURE — 87077 CULTURE AEROBIC IDENTIFY: CPT

## 2019-05-13 PROCEDURE — 81001 URINALYSIS AUTO W/SCOPE: CPT

## 2019-05-13 PROCEDURE — 87186 SC STD MICRODIL/AGAR DIL: CPT

## 2019-05-13 PROCEDURE — 87086 URINE CULTURE/COLONY COUNT: CPT

## 2019-05-14 ENCOUNTER — TELEPHONE (OUTPATIENT)
Dept: MEDICAL GROUP | Facility: PHYSICIAN GROUP | Age: 84
End: 2019-05-14

## 2019-05-14 ENCOUNTER — TELEPHONE (OUTPATIENT)
Dept: VASCULAR LAB | Facility: MEDICAL CENTER | Age: 84
End: 2019-05-14

## 2019-05-14 NOTE — TELEPHONE ENCOUNTER
[facility]     Pt states she is having an electrical zinging feeling in her elbow.  This has happened twice in the past month.  Pt denies CP, SOB, jaw pain, or any other s/s of CVA.      She is concerned Zetia is causing the above problem, reassured pt that this is very unlikely.  Continue Zetia and f/u with cardiology if she develops s/s of CVA.    Chandler Brown, PharmD

## 2019-05-14 NOTE — TELEPHONE ENCOUNTER
1. Caller Name: Nini                                          Call Back Number: [phone] (home)        Patient approves a detailed voicemail message: N\A    2. Patient is requesting lab results dated: 05/14/19    3. Pt notified that when all her results are in and have been reviewed by PCP we will call with results

## 2019-05-15 ENCOUNTER — TELEPHONE (OUTPATIENT)
Dept: MEDICAL GROUP | Facility: PHYSICIAN GROUP | Age: 84
End: 2019-05-15

## 2019-05-15 DIAGNOSIS — B96.20 E. COLI UTI: ICD-10-CM

## 2019-05-15 DIAGNOSIS — N39.0 E. COLI UTI: ICD-10-CM

## 2019-05-15 RX ORDER — NITROFURANTOIN 25; 75 MG/1; MG/1
100 CAPSULE ORAL 2 TIMES DAILY
Qty: 14 CAP | Refills: 0 | Status: SHIPPED | OUTPATIENT
Start: 2019-05-15 | End: 2020-01-16 | Stop reason: SDUPTHER

## 2019-05-15 NOTE — TELEPHONE ENCOUNTER
Phone Number Called: [phone] (home)     Message: Pt notified on results.     Left Message for patient to call back: no

## 2019-05-15 NOTE — TELEPHONE ENCOUNTER
----- Message from Nevaeh Munroe M.D. sent at 5/15/2019 11:17 AM PDT -----  E coli UTI - macrobid sent in.

## 2019-06-10 ENCOUNTER — HOSPITAL ENCOUNTER (OUTPATIENT)
Dept: LAB | Facility: MEDICAL CENTER | Age: 84
End: 2019-06-10
Attending: INTERNAL MEDICINE
Payer: MEDICARE

## 2019-06-10 DIAGNOSIS — I10 ESSENTIAL HYPERTENSION: ICD-10-CM

## 2019-06-10 DIAGNOSIS — E78.5 DYSLIPIDEMIA: ICD-10-CM

## 2019-06-10 LAB — TSH SERPL DL<=0.005 MIU/L-ACNC: 1.74 UIU/ML (ref 0.38–5.33)

## 2019-06-10 PROCEDURE — 80061 LIPID PANEL: CPT

## 2019-06-10 PROCEDURE — 36415 COLL VENOUS BLD VENIPUNCTURE: CPT

## 2019-06-10 PROCEDURE — 84443 ASSAY THYROID STIM HORMONE: CPT

## 2019-06-10 PROCEDURE — 80053 COMPREHEN METABOLIC PANEL: CPT

## 2019-06-11 LAB
ALBUMIN SERPL BCP-MCNC: 4 G/DL (ref 3.2–4.9)
ALBUMIN/GLOB SERPL: 1.2 G/DL
ALP SERPL-CCNC: 68 U/L (ref 30–99)
ALT SERPL-CCNC: 14 U/L (ref 2–50)
ANION GAP SERPL CALC-SCNC: 9 MMOL/L (ref 0–11.9)
AST SERPL-CCNC: 24 U/L (ref 12–45)
BILIRUB SERPL-MCNC: 1.2 MG/DL (ref 0.1–1.5)
BUN SERPL-MCNC: 14 MG/DL (ref 8–22)
CALCIUM SERPL-MCNC: 9.3 MG/DL (ref 8.5–10.5)
CHLORIDE SERPL-SCNC: 103 MMOL/L (ref 96–112)
CHOLEST SERPL-MCNC: 227 MG/DL (ref 100–199)
CO2 SERPL-SCNC: 27 MMOL/L (ref 20–33)
CREAT SERPL-MCNC: 1.01 MG/DL (ref 0.5–1.4)
FASTING STATUS PATIENT QL REPORTED: NORMAL
GLOBULIN SER CALC-MCNC: 3.3 G/DL (ref 1.9–3.5)
GLUCOSE SERPL-MCNC: 94 MG/DL (ref 65–99)
HDLC SERPL-MCNC: 51 MG/DL
LDLC SERPL CALC-MCNC: 150 MG/DL
POTASSIUM SERPL-SCNC: 3.9 MMOL/L (ref 3.6–5.5)
PROT SERPL-MCNC: 7.3 G/DL (ref 6–8.2)
SODIUM SERPL-SCNC: 139 MMOL/L (ref 135–145)
TRIGL SERPL-MCNC: 129 MG/DL (ref 0–149)

## 2019-06-12 ENCOUNTER — NON-PROVIDER VISIT (OUTPATIENT)
Dept: VASCULAR LAB | Facility: MEDICAL CENTER | Age: 84
End: 2019-06-12
Attending: INTERNAL MEDICINE
Payer: MEDICARE

## 2019-06-12 VITALS — HEART RATE: 69 BPM | DIASTOLIC BLOOD PRESSURE: 76 MMHG | SYSTOLIC BLOOD PRESSURE: 120 MMHG

## 2019-06-12 DIAGNOSIS — E78.5 DYSLIPIDEMIA: ICD-10-CM

## 2019-06-12 PROCEDURE — 99212 OFFICE O/P EST SF 10 MIN: CPT

## 2019-06-12 RX ORDER — COLESEVELAM 180 1/1
1875 TABLET ORAL 2 TIMES DAILY WITH MEALS
Qty: 180 TAB | Refills: 1 | Status: SHIPPED | OUTPATIENT
Start: 2019-06-12 | End: 2019-06-20

## 2019-06-13 ENCOUNTER — TELEPHONE (OUTPATIENT)
Dept: VASCULAR LAB | Facility: MEDICAL CENTER | Age: 84
End: 2019-06-13

## 2019-06-13 NOTE — TELEPHONE ENCOUNTER
[facility]    Pt left a message that Welchol was too expensive.  Left VM with pt to call back so that we could discuss transitioning to cholestyramine instead.      Chandler Brown, KeikoD

## 2019-06-19 ENCOUNTER — TELEPHONE (OUTPATIENT)
Dept: CARDIOLOGY | Facility: MEDICAL CENTER | Age: 84
End: 2019-06-19

## 2019-06-19 NOTE — TELEPHONE ENCOUNTER
Associated Results   FASTING STATUS   Order: [id number]   Status:  Final result   Visible to patient:  No (Inaccessible in MyChart)   Notes recorded by Montez Kamara M.D. on 6/11/2019 at 8:51 AM PDT    Labs look okay, please let her know     Thank you     Letter printed with MD recommendations and mailed to patient's mailing address.

## 2019-06-25 ENCOUNTER — TELEPHONE (OUTPATIENT)
Dept: VASCULAR LAB | Facility: MEDICAL CENTER | Age: 84
End: 2019-06-25

## 2019-06-25 NOTE — TELEPHONE ENCOUNTER
[facility]    Spoke with Nini about her concerns about side effects.  Discussed most common side effects.  She states she spoke with Johnny today and is very pleased with the help that Johnny was able to provide.     Follow up in 1-2 weeks.     Chandler Brown, PharmD

## 2019-06-25 NOTE — TELEPHONE ENCOUNTER
Patients Praluent has been Approved, spoke with [employer], patient has a very high co pay at 607.60/Month.   Will reach out to patient to discuss co pay assistance.

## 2019-07-01 ENCOUNTER — TELEPHONE (OUTPATIENT)
Dept: VASCULAR LAB | Facility: MEDICAL CENTER | Age: 84
End: 2019-07-01

## 2019-07-01 NOTE — TELEPHONE ENCOUNTER
[facility]    Pt will be stopping by the clinic tomorrow at 3:00 pm to fill out paperwork for Pravick[employer] PA.      Chandler Brown, PharmD

## 2019-07-02 ENCOUNTER — TELEPHONE (OUTPATIENT)
Dept: VASCULAR LAB | Facility: MEDICAL CENTER | Age: 84
End: 2019-07-02

## 2019-07-02 NOTE — TELEPHONE ENCOUNTER
[facility]    Pt picked up Praluent PASS application, will await pt to complete and return.    Chandler Brown, PharmD

## 2019-07-10 ENCOUNTER — TELEPHONE (OUTPATIENT)
Dept: VASCULAR LAB | Facility: MEDICAL CENTER | Age: 84
End: 2019-07-10

## 2019-07-10 NOTE — TELEPHONE ENCOUNTER
[facility]    Pt called stating she will have the PASS paperwork filled out by next week.    Chandler Brown, PharmD

## 2019-07-16 ENCOUNTER — TELEPHONE (OUTPATIENT)
Dept: VASCULAR LAB | Facility: MEDICAL CENTER | Age: 84
End: 2019-07-16

## 2019-07-16 NOTE — TELEPHONE ENCOUNTER
[facility]    Left VM with pt to see if she needed any assistance with completing PASS paperwork.    Chandler Brown, PharmD

## 2019-07-18 ENCOUNTER — APPOINTMENT (OUTPATIENT)
Dept: SLEEP MEDICINE | Facility: MEDICAL CENTER | Age: 84
End: 2019-07-18
Payer: MEDICARE

## 2019-07-23 ENCOUNTER — TELEPHONE (OUTPATIENT)
Dept: MEDICAL GROUP | Facility: PHYSICIAN GROUP | Age: 84
End: 2019-07-23

## 2019-07-23 NOTE — TELEPHONE ENCOUNTER
Future Appointments       Provider Department Center    7/30/2019 11:00 AM Nevaeh Munroe M.D.; [facility]  [facility]    9/16/2019 12:40 PM Angel Malagon M.D. [facility]     9/24/2019 9:30 AM Montez Kamara M.D. [facility]     12/11/2019 10:00 AM [facility] EXAM 5 [facility]          ANNUAL WELLNESS VISIT PRE-VISIT PLANNING WITHOUT OUTREACH    1.  Reviewed note from last office visit with PCP: YES    2.  If any orders were placed at last visit, do we have Results/Consult Notes?        •  Labs - Labs ordered, completed on 06/10/2019 and results are in chart.       •  Imaging - Imaging was not ordered at last office visit.       •  Referrals - No referrals were ordered at last office visit.    3.  Immunizations were updated in [facility] Reviewed using WebIZ?: Yes       •  WebIZ Recommendations: FLU, TDAP, SHINGRIX (Shingles) and CPOX        •  Is patient due for Tdap? NO       •  Is patient due for Shingrix? YES. Patient was not notified of copay/out of pocket cost.     4.  Patient is due for the following Health Maintenance Topics:   Health Maintenance Due   Topic Date Due   • IMM ZOSTER VACCINES (1 of 2) 07/08/1982   • Annual Wellness Visit  05/10/2015     5.  Reviewed/Updated the following with patient:       •   Preferred Pharmacy? YES       •   Preferred Lab? YES       •   Preferred Communication? YES       •   Allergies? YES       •   Medications? YES. Was Abstract Encounter opened and chart updated? YES       •   Social History? YES. Was Abstract Encounter opened and chart updated? YES       •   Family History (document living status of immediate family members and if + hx of  cancer, diabetes, hypertension, hyperlipidemia, heart attack, stroke) YES. Was Abstract Encounter opened and chart updated? YES    6.  Care Team Updated:       •   DME Company (beatrice  device, O2, CPAP, etc.): YES- CPAP       •   Other Specialists (eye doctor, derm, GYN, cardiology, endo, etc): YES    7. Orders for overdue Health Maintenance topics pended in Pre-Charting? NO    8.  Patient has the following Care Path diagnoses on Problem List:  NONE    9.  Patient was advised: “This is a free wellness visit. The provider will screen for medical conditions to help you stay healthy. If you have other concerns to address you may be asked to discuss these at a separate visit or there may be an additional fee.”     10.  Patient was informed to arrive 15 min prior to their scheduled appointment and bring in their medication bottles.

## 2019-07-24 ENCOUNTER — TELEPHONE (OUTPATIENT)
Dept: VASCULAR LAB | Facility: MEDICAL CENTER | Age: 84
End: 2019-07-24

## 2019-07-26 ENCOUNTER — TELEPHONE (OUTPATIENT)
Dept: MEDICAL GROUP | Facility: PHYSICIAN GROUP | Age: 84
End: 2019-07-26

## 2019-07-26 DIAGNOSIS — N39.0 RECURRENT UTI: ICD-10-CM

## 2019-07-26 NOTE — TELEPHONE ENCOUNTER
Pt came in office today requesting a standing order for a urine culture, states hers  in .   Please Advise

## 2019-07-30 ENCOUNTER — HOSPITAL ENCOUNTER (OUTPATIENT)
Dept: LAB | Facility: MEDICAL CENTER | Age: 84
End: 2019-07-30
Attending: FAMILY MEDICINE
Payer: MEDICARE

## 2019-07-30 ENCOUNTER — OFFICE VISIT (OUTPATIENT)
Dept: MEDICAL GROUP | Facility: PHYSICIAN GROUP | Age: 84
End: 2019-07-30
Payer: MEDICARE

## 2019-07-30 VITALS
SYSTOLIC BLOOD PRESSURE: 122 MMHG | TEMPERATURE: 98.2 F | DIASTOLIC BLOOD PRESSURE: 90 MMHG | RESPIRATION RATE: 18 BRPM | HEIGHT: 62 IN | BODY MASS INDEX: 23.22 KG/M2 | OXYGEN SATURATION: 96 % | HEART RATE: 78 BPM | WEIGHT: 126.2 LBS

## 2019-07-30 DIAGNOSIS — R79.89 LOW VITAMIN D LEVEL: ICD-10-CM

## 2019-07-30 DIAGNOSIS — I70.219 ATHEROSCLEROSIS OF NATIVE ARTERY OF EXTREMITY WITH INTERMITTENT CLAUDICATION, UNSPECIFIED EXTREMITY (HCC): ICD-10-CM

## 2019-07-30 DIAGNOSIS — E78.5 DYSLIPIDEMIA: ICD-10-CM

## 2019-07-30 DIAGNOSIS — G24.5 BLEPHAROSPASM SYNDROME: ICD-10-CM

## 2019-07-30 DIAGNOSIS — I47.10 SVT (SUPRAVENTRICULAR TACHYCARDIA): Chronic | ICD-10-CM

## 2019-07-30 DIAGNOSIS — I71.40 ABDOMINAL AORTIC ANEURYSM (AAA) WITHOUT RUPTURE (HCC): ICD-10-CM

## 2019-07-30 DIAGNOSIS — E55.9 VITAMIN D DEFICIENCY DISEASE: ICD-10-CM

## 2019-07-30 DIAGNOSIS — F51.04 CHRONIC INSOMNIA: ICD-10-CM

## 2019-07-30 DIAGNOSIS — K86.2 PANCREATIC CYST: ICD-10-CM

## 2019-07-30 DIAGNOSIS — M81.0 AGE-RELATED OSTEOPOROSIS WITHOUT CURRENT PATHOLOGICAL FRACTURE: ICD-10-CM

## 2019-07-30 DIAGNOSIS — G47.33 OSA TREATED WITH BIPAP: ICD-10-CM

## 2019-07-30 DIAGNOSIS — N39.0 RECURRENT UTI: ICD-10-CM

## 2019-07-30 DIAGNOSIS — K21.9 GASTROESOPHAGEAL REFLUX DISEASE, ESOPHAGITIS PRESENCE NOT SPECIFIED: ICD-10-CM

## 2019-07-30 DIAGNOSIS — N81.10 FEMALE BLADDER PROLAPSE, ACQUIRED: ICD-10-CM

## 2019-07-30 DIAGNOSIS — I10 ESSENTIAL HYPERTENSION: ICD-10-CM

## 2019-07-30 LAB
APPEARANCE UR: ABNORMAL
BACTERIA #/AREA URNS HPF: ABNORMAL /HPF
BILIRUB UR QL STRIP.AUTO: NEGATIVE
COLOR UR: YELLOW
EPI CELLS #/AREA URNS HPF: NEGATIVE /HPF
GLUCOSE UR STRIP.AUTO-MCNC: NEGATIVE MG/DL
HYALINE CASTS #/AREA URNS LPF: ABNORMAL /LPF
KETONES UR STRIP.AUTO-MCNC: NEGATIVE MG/DL
LEUKOCYTE ESTERASE UR QL STRIP.AUTO: ABNORMAL
MICRO URNS: ABNORMAL
NITRITE UR QL STRIP.AUTO: POSITIVE
PH UR STRIP.AUTO: 7 [PH] (ref 5–8)
PROT UR QL STRIP: NEGATIVE MG/DL
RBC # URNS HPF: ABNORMAL /HPF
RBC UR QL AUTO: ABNORMAL
SP GR UR STRIP.AUTO: 1.01
UROBILINOGEN UR STRIP.AUTO-MCNC: 0.2 MG/DL
WBC #/AREA URNS HPF: ABNORMAL /HPF

## 2019-07-30 PROCEDURE — G0439 PPPS, SUBSEQ VISIT: HCPCS | Performed by: FAMILY MEDICINE

## 2019-07-30 PROCEDURE — 81001 URINALYSIS AUTO W/SCOPE: CPT

## 2019-07-30 PROCEDURE — 87086 URINE CULTURE/COLONY COUNT: CPT

## 2019-07-30 PROCEDURE — 87077 CULTURE AEROBIC IDENTIFY: CPT

## 2019-07-30 PROCEDURE — 87186 SC STD MICRODIL/AGAR DIL: CPT

## 2019-07-30 RX ORDER — MULTIVIT-MIN/IRON/FOLIC ACID/K 18-600-40
CAPSULE ORAL
Refills: 11 | COMMUNITY
Start: 2019-07-16 | End: 2019-09-24

## 2019-07-30 ASSESSMENT — ACTIVITIES OF DAILY LIVING (ADL)
BATHING_REQUIRES_ASSISTANCE: 0
TRANSPORTATION COMMENTS: EYE ISSUES

## 2019-07-30 ASSESSMENT — PATIENT HEALTH QUESTIONNAIRE - PHQ9: CLINICAL INTERPRETATION OF PHQ2 SCORE: 0

## 2019-07-30 ASSESSMENT — ENCOUNTER SYMPTOMS: GENERAL WELL-BEING: EXCELLENT

## 2019-07-30 NOTE — PROGRESS NOTES
Chief Complaint   Patient presents with   • Annual Wellness Visit         HPI:  Nini is a 87 y.o. here for Medicare Annual Wellness Visit        Patient Active Problem List    Diagnosis Date Noted   • Low vitamin D level 04/20/2018   • Chronic insomnia 01/10/2017   • Pancreatic cyst 12/22/2016   • SVT (supraventricular tachycardia) (HCC) - on Zio 2015 and 2019 08/17/2015   • Palpitation 08/17/2015   • Other chest pain 07/17/2015   • Abdominal aortic aneurysm (HCC) - MRA 2.9 cm 9/2018 saccular aneurysm 07/17/2015   • Palpitations 04/24/2015   • RUY treated with BiPAP 04/17/2014   • Atherosclerosis of native arteries of extremity with intermittent claudication (HCC) 10/16/2013   • Lower extremity edema 05/29/2012   • Varicose veins 05/29/2012   • Vitamin D deficiency disease    • Female bladder prolapse, acquired    • Essential hypertension    • Dyslipidemia    • GERD (gastroesophageal reflux disease)    • Age-related osteoporosis without current pathological fracture    • Insomnia    • Blepharospasm syndrome        Current Outpatient Prescriptions   Medication Sig Dispense Refill   • coenzyme Q-10 30 MG capsule Take 60 mg by mouth every day.     • Alirocumab (PRALUENT) 75 MG/ML Solution Pen-injector Inject 75 mg as instructed every 14 days. 2 PEN 5   • Cholecalciferol (VITAMIN D3) 2000 UNIT Cap TAKE 2 CAPSULES (4000 UNITS) BY MOUTH ONCE DAILY 60 Cap 11   • metoprolol (LOPRESSOR) 25 MG Tab TAKE 1 TABLET BY MOUTH TWICE DAILY 180 Tab 2   • PREMARIN 0.625 MG/GM Cream INSERT 1GM VAGINALLY 3 TIMES WEEKLY 30 g 1   • Multiple Vitamins-Minerals (ICAPS AREDS 2) Cap Take  by mouth.     • Multiple Vitamins-Minerals (CENTRUM SILVER PO) Take 1 Tab by mouth every day.     • Cholecalciferol (VITAMIN D) 2000 units Cap TAKE 2 CAPSULES BY MOUTH ONCE DAILY  11   • nitrofurantoin monohyd macro (MACROBID) 100 MG Cap Take 1 Cap by mouth 2 times a day. (Patient not taking: Reported on 7/24/2019) 14 Cap 0   • ezetimibe (ZETIA) 10 MG Tab  Take 1 Tab by mouth every day. (Patient not taking: Reported on 7/30/2019) 30 Tab 11   • triamcinolone acetonide (KENALOG) 0.1 % Ointment Apply thin layer to affected area twice daily as needed (Patient not taking: Reported on 7/30/2019) 1 Tube 0     No current facility-administered medications for this visit.         Patient is taking medications as noted in medication list.  Current supplements as per medication list.     Allergies: Atorvastatin; Bactrim ds; Pneumococcal vaccines; Sulfa drugs; Vicodin [hydrocodone-acetaminophen]; and Vytorin    Current social contact/activities: Visiting with friends and family    Is patient current with immunizations? Yes.    She  reports that she has never smoked. She has never used smokeless tobacco. She reports that she drinks alcohol. She reports that she does not use drugs.  Counseling given: Not Answered        DPA/Advanced directive: Patient has Advanced Directive on file.     ROS:    Gait: Uses no assistive device   Ostomy: No   Other tubes: No   Amputations: No   Chronic oxygen use Yes CPAP  Last eye exam 2019  Wears hearing aids: No   : Reports urinary leakage during the last 6 months that has interfered a lot with their daily activities or sleep.  Annual Health Assessment Questions:    1.  Are you currently engaging in any exercise or physical activity? Yes    2.  How would you describe your mood or emotional well-being today? good    3.  Have you had any falls in the last year? Yes    4.  Have you noticed any problems with your balance or had difficulty walking? No    5.  In the last six months have you experienced any leakage of urine? Yes    6. DPA/Advanced Directive: Patient has Advanced Directive on file.     Screening:        Depression Screening    Little interest or pleasure in doing things?  0 - not at all  Feeling down, depressed, or hopeless? 0 - not at all  Patient Health Questionnaire Score: 0    If depressive symptoms identified deferred to follow up  visit unless specifically addressed in assessment and plan.    Interpretation of PHQ-9 Total Score   Score Severity   1-4 No Depression   5-9 Mild Depression   10-14 Moderate Depression   15-19 Moderately Severe Depression   20-27 Severe Depression    Screening for Cognitive Impairment    Three Minute Recall (village, kitchen, baby)  2/3 Village, kitchen, baby  Judson clock face with all 12 numbers and set the hands to show 10 past 10.  Yes 10:10 5/5  If cognitive concerns identified, deferred for follow up unless specifically addressed in assessment and plan.    Fall Risk Assessment    Has the patient had two or more falls in the last year or any fall with injury in the last year?  Yes  If fall risk identified, deferred for follow up unless specifically addressed in assessment and plan.    Safety Assessment    Throw rugs on floor.  Yes  Handrails on all stairs.  Yes  Good lighting in all hallways.  Yes  Difficulty hearing.  No  Patient counseled about all safety risks that were identified.    Functional Assessment ADLs    Are there any barriers preventing you from cooking for yourself or meeting nutritional needs?  No.    Are there any barriers preventing you from driving safely or obtaining transportation?  Yes. Eye issues    Are there any barriers preventing you from using a telephone or calling for help?  No.    Are there any barriers preventing you from shopping?  No.    Are there any barriers preventing you from taking care of your own finances?  No.    Are there any barriers preventing you from managing your medications?  No.    Are there any barriers preventing you from showering, bathing or dressing yourself?  No.    Are you currently engaging in any exercise or physical activity?  Yes.  Walk 1/2+ mile every morning  What is your perception of your health?  Excellent.    Health Maintenance Summary                IMM ZOSTER VACCINES Overdue 7/8/1982     Annual Wellness Visit Overdue 5/10/2015      Done  5/9/2014     IMM DTaP/Tdap/Td Vaccine Postponed 7/21/2020 Originally 7/8/1951. Provider advises postponing for medical reasons    IMM INFLUENZA Next Due 9/1/2019      Done 10/12/2018 Imm Admin: Influenza Vaccine Adult HD     Patient has more history with this topic...    MAMMOGRAM Next Due 11/7/2020      Done 11/7/2018 [id number]     Patient has more history with this topic...    COLONOSCOPY Next Due 2/19/2023      Done 2/19/2013     BONE DENSITY Next Due 11/7/2023      Done 11/7/2018 DS-BONE DENSITY STUDY (DEXA)          Patient Care Team:  Nevaeh Munroe M.D. as PCP - General (Family Medicine)  [facility]  GIANNI Weaver as Consulting Physician (Urology)  Montez Kamara M.D. as Consulting Physician (Cardiology)  Geovani Bradley M.D. as Consulting Physician (Allergy)  Yonatan Shahid M.D. as Consulting Physician (Ophthalmology)    Social History   Substance Use Topics   • Smoking status: Never Smoker   • Smokeless tobacco: Never Used   • Alcohol use 0.0 oz/week      Comment: occasionally     Family History   Problem Relation Age of Onset   • Non-contributory Mother         gall bladder surgery   • Heart Disease Mother    • Cancer Father         colon rectal   • Heart Disease Father         rheumatic heart disease   • Other Brother         HIT BY CAR   • Heart Disease Brother    • Sleep Apnea Brother    • No Known Problems Maternal Grandmother    • No Known Problems Maternal Grandfather    • No Known Problems Paternal Grandmother    • No Known Problems Paternal Grandfather    • No Known Problems Son      She  has a past medical history of AAA (abdominal aortic aneurysm) ([facility]); Atherosclerosis of native arteries of the extremities with intermittent claudication (10/16/2013); Blepharospasm syndrome; Bright red rectal bleeding (6/24/2015); Chest pain (07/2015); Constipation; Edema (5/29/2012); Female bladder prolapse, acquired; GERD (gastroesophageal reflux disease);  "Hyperlipidemia; Hypertension; Insomnia; OSTEOPOROSIS; Palpitations (5/29/2012); Sleep apnea; SVT (supraventricular tachycardia) (HCC) - on Zio 2015 and 2019 (8/17/2015); Varicose veins (5/29/2012); and Vitamin D deficiency disease.   Past Surgical History:   Procedure Laterality Date   • CYSTOCELE REPAIR  1/17/2011    Performed by GILMAR CHUNG at [facility]   • RECTOCELE REPAIR  1/17/2011    Performed by GILMAR CHUNG at [facility]   • BLADDER SUSPENSION  1/17/2011    Performed by GILMAR CHUNG at [facility]   • CYSTOSCOPY  1/17/2011    Performed by GILMAR CHUNG at [facility]   • ABDOMINAL HYSTERECTOMY TOTAL     • ARTHROSCOPY, KNEE     • CATARACT EXTRACTION WITH IOL     • HEMORRHOIDECTOMY     • HYSTERECTOMY, TOTAL ABDOMINAL     • INJECTION SCLERO HEMORROIDS     • OTHER ORTHOPEDIC SURGERY      knee scope x 2   • PB KNEE SCOPE,DIAGNOSTIC  2003;2009   • TONSILLECTOMY             Exam:   Ambulatory Vitals  Encounter Vitals  Temperature: 36.8 °C (98.2 °F)  Temp src: Temporal  Blood Pressure : 122/90  Pulse: 78  Respiration: 18  Pulse Oximetry: 96 %  Weight: 57.2 kg (126 lb 3.2 oz)  Height: 157.5 cm (5' 2.01\")  BMI (Calculated): 23.08    Hearing good.    Dentition good  Alert, oriented in no acute distress.  Eye contact is good, speech goal directed, affect calm      Assessment and Plan. The following treatment and monitoring plan is recommended:    1. Abdominal aortic aneurysm (AAA) without rupture (HCC)  Stable continue surveillance.     2. Age-related osteoporosis without current pathological fracture  Continue weight bearing activity, update DXA in 2 years.     3. Atherosclerosis of native artery of extremity with intermittent claudication, unspecified extremity (HCC)  Continue walking.      4. Blepharospasm syndrome  Stable.     5. Chronic insomnia  Stable noted, continue regular exercise.     6. Dyslipidemia  Awaiting " Praluent, statin intolerant.  Continue Zetia    7. Essential hypertension  Control on metoprolol    8. Female bladder prolapse, acquired  Stable, continue monitoring for UTI as more prone.     9. Gastroesophageal reflux disease, esophagitis presence not specified  Stable.      10. Low vitamin D level  Continue Vitamin D    11. RUY treated with BiPAP  Continue BiPAP    12. Pancreatic cyst  Stable.     13. SVT (supraventricular tachycardia) (HCC) - on Zio 2015 and 2019  Continue metoprolol     14. Vitamin D deficiency disease  Continue vitamin D and Ca++      Services suggested: No services needed at this time  Health Care Screening recommendations as per orders if indicated.  Referrals offered: PT/OT/Nutrition counseling/Behavioral Health/Smoking cessation as per orders if indicated.    Discussion today about general wellness and lifestyle habits:    · Prevent falls and reduce trip hazards; Cautioned about securing or removing rugs.  · Have a working fire alarm and carbon monoxide detector;   · Engage in regular physical activity and social activities       Follow-up:  6 months.

## 2019-07-30 NOTE — TELEPHONE ENCOUNTER
Phone Number Called: [phone] (home)      Call outcome: left message for patient to call back regarding message below    Message: LVMTCB

## 2019-07-31 DIAGNOSIS — N30.00 ACUTE CYSTITIS WITHOUT HEMATURIA: ICD-10-CM

## 2019-07-31 RX ORDER — NITROFURANTOIN 25; 75 MG/1; MG/1
100 CAPSULE ORAL 2 TIMES DAILY
Qty: 14 CAP | Refills: 0 | Status: SHIPPED | OUTPATIENT
Start: 2019-07-31 | End: 2019-08-07

## 2019-08-02 ENCOUNTER — TELEPHONE (OUTPATIENT)
Dept: MEDICAL GROUP | Facility: PHYSICIAN GROUP | Age: 84
End: 2019-08-02

## 2019-08-02 NOTE — TELEPHONE ENCOUNTER
----- Message from Arturo Chapin M.D. sent at 8/2/2019 10:15 AM PDT -----  Please let her know that her UTI was from E coli and should be effectively treated by the antibiotics that Dr. Munroe had prescribed

## 2019-08-02 NOTE — TELEPHONE ENCOUNTER
1st attempt:  Phone Number Called: [phone]    Call outcome: left message for patient to call back regarding message below    Message: call back regarding culture results.

## 2019-08-08 ENCOUNTER — ANCILLARY PROCEDURE (OUTPATIENT)
Dept: VASCULAR LAB | Facility: MEDICAL CENTER | Age: 84
End: 2019-08-08
Attending: INTERNAL MEDICINE
Payer: MEDICARE

## 2019-08-08 NOTE — NON-PROVIDER
Patient came in for injection training for her Praluent. Patient still is not feeling comfortable with giving herself the injection. Scheduled patient and appt to come back in for more training in 2 weeks when her injection is due.       Johnny Cates, Med Ass't  [facility]

## 2019-08-15 ENCOUNTER — TELEPHONE (OUTPATIENT)
Dept: VASCULAR LAB | Facility: MEDICAL CENTER | Age: 84
End: 2019-08-15

## 2019-08-15 NOTE — TELEPHONE ENCOUNTER
[facility]    Confirmed pt is using her own supply of PCKS9 that is shipped to her home.  No concerns or complaints at this time.    Chandler Brown, PharmD

## 2019-08-22 ENCOUNTER — APPOINTMENT (OUTPATIENT)
Dept: VASCULAR LAB | Facility: MEDICAL CENTER | Age: 84
End: 2019-08-22
Attending: INTERNAL MEDICINE
Payer: MEDICARE

## 2019-09-04 ENCOUNTER — ANTICOAGULATION MONITORING (OUTPATIENT)
Dept: VASCULAR LAB | Facility: MEDICAL CENTER | Age: 84
End: 2019-09-04

## 2019-09-04 DIAGNOSIS — I47.10 SVT (SUPRAVENTRICULAR TACHYCARDIA): ICD-10-CM

## 2019-09-04 NOTE — PROGRESS NOTES
[facility]    Pt came to the clinic today with questions about her praluent injections.  She states she had a warm sensation go up her arm a few weeks ago and was concerned this is from the medication.  Pt reports this same sensation has occurred ever 3-4 weeks for the past 2 years.  Instructed the pt that this is an unlikely reaction especially since she had it before ever starting the medication.    Pt is due for dose of Praluent tomorrow.  Because she was already here at the clinic she administered one of our samples for pt convenience so she does not have to return tomorrow.    Chandler Brown, PharmD

## 2019-09-05 ENCOUNTER — HOSPITAL ENCOUNTER (OUTPATIENT)
Dept: LAB | Facility: MEDICAL CENTER | Age: 84
End: 2019-09-05
Attending: FAMILY MEDICINE
Payer: MEDICARE

## 2019-09-05 DIAGNOSIS — N39.0 RECURRENT UTI: ICD-10-CM

## 2019-09-05 LAB
APPEARANCE UR: CLEAR
BACTERIA #/AREA URNS HPF: NEGATIVE /HPF
BILIRUB UR QL STRIP.AUTO: NEGATIVE
COLOR UR: YELLOW
EPI CELLS #/AREA URNS HPF: NEGATIVE /HPF
GLUCOSE UR STRIP.AUTO-MCNC: NEGATIVE MG/DL
HYALINE CASTS #/AREA URNS LPF: NORMAL /LPF
KETONES UR STRIP.AUTO-MCNC: NEGATIVE MG/DL
LEUKOCYTE ESTERASE UR QL STRIP.AUTO: ABNORMAL
MICRO URNS: ABNORMAL
NITRITE UR QL STRIP.AUTO: NEGATIVE
PH UR STRIP.AUTO: 6.5 [PH] (ref 5–8)
PROT UR QL STRIP: NEGATIVE MG/DL
RBC # URNS HPF: NORMAL /HPF
RBC UR QL AUTO: NEGATIVE
SP GR UR STRIP.AUTO: 1.01
UROBILINOGEN UR STRIP.AUTO-MCNC: 0.2 MG/DL
WBC #/AREA URNS HPF: NORMAL /HPF

## 2019-09-05 PROCEDURE — 81001 URINALYSIS AUTO W/SCOPE: CPT

## 2019-09-11 ENCOUNTER — APPOINTMENT (RX ONLY)
Dept: URBAN - METROPOLITAN AREA CLINIC 4 | Facility: CLINIC | Age: 84
Setting detail: DERMATOLOGY
End: 2019-09-11

## 2019-09-11 DIAGNOSIS — L82.1 OTHER SEBORRHEIC KERATOSIS: ICD-10-CM

## 2019-09-11 DIAGNOSIS — Z85.828 PERSONAL HISTORY OF OTHER MALIGNANT NEOPLASM OF SKIN: ICD-10-CM

## 2019-09-11 DIAGNOSIS — D18.0 HEMANGIOMA: ICD-10-CM

## 2019-09-11 DIAGNOSIS — L81.4 OTHER MELANIN HYPERPIGMENTATION: ICD-10-CM

## 2019-09-11 DIAGNOSIS — D22 MELANOCYTIC NEVI: ICD-10-CM

## 2019-09-11 PROBLEM — D18.01 HEMANGIOMA OF SKIN AND SUBCUTANEOUS TISSUE: Status: ACTIVE | Noted: 2019-09-11

## 2019-09-11 PROBLEM — D22.5 MELANOCYTIC NEVI OF TRUNK: Status: ACTIVE | Noted: 2019-09-11

## 2019-09-11 PROCEDURE — ? OBSERVATION

## 2019-09-11 PROCEDURE — 99213 OFFICE O/P EST LOW 20 MIN: CPT

## 2019-09-11 ASSESSMENT — LOCATION ZONE DERM
LOCATION ZONE: TRUNK
LOCATION ZONE: FACE
LOCATION ZONE: NOSE
LOCATION ZONE: ARM

## 2019-09-11 ASSESSMENT — LOCATION SIMPLE DESCRIPTION DERM
LOCATION SIMPLE: LEFT UPPER BACK
LOCATION SIMPLE: ABDOMEN
LOCATION SIMPLE: LEFT LOWER BACK
LOCATION SIMPLE: LEFT FOREARM
LOCATION SIMPLE: RIGHT TEMPLE
LOCATION SIMPLE: RIGHT FOREARM
LOCATION SIMPLE: LEFT CHEEK
LOCATION SIMPLE: UPPER BACK
LOCATION SIMPLE: RIGHT NOSE

## 2019-09-11 ASSESSMENT — LOCATION DETAILED DESCRIPTION DERM
LOCATION DETAILED: LEFT INFERIOR LATERAL MIDBACK
LOCATION DETAILED: LEFT PROXIMAL DORSAL FOREARM
LOCATION DETAILED: RIGHT NASAL ALA
LOCATION DETAILED: LEFT INFERIOR UPPER BACK
LOCATION DETAILED: INFERIOR THORACIC SPINE
LOCATION DETAILED: LEFT SUPERIOR UPPER BACK
LOCATION DETAILED: EPIGASTRIC SKIN
LOCATION DETAILED: RIGHT CENTRAL TEMPLE
LOCATION DETAILED: LEFT SUPERIOR MEDIAL BUCCAL CHEEK
LOCATION DETAILED: RIGHT PROXIMAL DORSAL FOREARM

## 2019-09-16 ENCOUNTER — SLEEP CENTER VISIT (OUTPATIENT)
Dept: SLEEP MEDICINE | Facility: MEDICAL CENTER | Age: 84
End: 2019-09-16
Payer: MEDICARE

## 2019-09-16 VITALS
SYSTOLIC BLOOD PRESSURE: 122 MMHG | WEIGHT: 128 LBS | RESPIRATION RATE: 15 BRPM | DIASTOLIC BLOOD PRESSURE: 82 MMHG | HEIGHT: 62 IN | HEART RATE: 76 BPM | BODY MASS INDEX: 23.55 KG/M2 | OXYGEN SATURATION: 95 %

## 2019-09-16 DIAGNOSIS — G47.33 OSA TREATED WITH BIPAP: ICD-10-CM

## 2019-09-16 PROCEDURE — 99213 OFFICE O/P EST LOW 20 MIN: CPT | Performed by: FAMILY MEDICINE

## 2019-09-16 NOTE — PROGRESS NOTES
[facility] Follow Up Note     Date: 9/16/2019 / Time: 12:52 PM    Patient ID:   Name:             Nini Taylor     YOB: 1932  Age:                 87 y.o.  female   MRN:               [id number]      Thank you for requesting a sleep medicine consultation on Nini Taylor at the sleep center. She presents today with the chief complaints of RUY follow up. PSG indicates an AHI of 48.3, minimum saturation 81%.    HISTORY OF PRESENT ILLNESS:       Pt is currently on BiPAP 16/12 cm. She goes to sleep around 10:30 pm and wakes up around 6:30 am. She is getting about 7 hrs of sleep on a good night and about 6 hr of sleep on a bad night. The bad nights are rare. Overall,  She does finds her sleep refreshing. She does not take regular naps.    She is using CPAP most days of the week. Pt reports 6 hrs of average nightly use of CPAP. Pt denies snoring, gasping,choking.Pt also denies significant mask leak that is interfering with sleep. The 30 day compliance was downloaded which shows adequate compliance with more that 4 hr usage about 100%. The AHI is has improved to 7.6/hr. The mask leak is normal. The symptoms of excessive daytime, snoring and gasping has improved. However she does have occasional mask leak.      REVIEW OF SYSTEMS:       Constitutional: Denies fevers, Denies weight changes  Eyes: Denies changes in vision, no eye pain  Ears/Nose/Throat/Mouth: Denies nasal congestion or sore throat   Cardiovascular: Denies chest pain or palpitations   Respiratory: Denies shortness of breath , Denies cough  Gastrointestinal/Hepatic: Denies abdominal pain, nausea, vomiting, diarrhea, constipation or GI bleeding   Genitourinary: Deniesdysuria or frequency  Musculoskeletal/Rheum: Denies  joint pain and swelling   Skin/Breast: Denies rash,   Neurological: Denies headache, confusion, memory loss or focal weakness/parasthesias  Psychiatric: denies mood disorder   Sleep: improved snoring      Comprehensive review of systems form is reviewed with the patient and is attached in the EMR.     PMH:  has a past medical history of AAA (abdominal aortic aneurysm) (HCC), Atherosclerosis of native arteries of the extremities with intermittent claudication (10/16/2013), Blepharospasm syndrome, Bright red rectal bleeding (6/24/2015), Chest pain (07/2015), Constipation, Edema (5/29/2012), Female bladder prolapse, acquired, GERD (gastroesophageal reflux disease), Hyperlipidemia, Hypertension, Insomnia, OSTEOPOROSIS, Palpitations (5/29/2012), Sleep apnea, SVT (supraventricular tachycardia) (HCC) - on Zio 2015 and 2019 (8/17/2015), Varicose veins (5/29/2012), and Vitamin D deficiency disease.  MEDS:   Current Outpatient Medications:   •  metoprolol (LOPRESSOR) 25 MG Tab, TAKE 1 TABLET BY MOUTH TWICE DAILY, Disp: 180 Tab, Rfl: 2  •  Cholecalciferol (VITAMIN D) 2000 units Cap, TAKE 2 CAPSULES BY MOUTH ONCE DAILY, Disp: , Rfl: 11  •  coenzyme Q-10 30 MG capsule, Take 60 mg by mouth every day., Disp: , Rfl:   •  Alirocumab (PRALUENT) 75 MG/ML Solution Pen-injector, Inject 75 mg as instructed every 14 days., Disp: 2 PEN, Rfl: 5  •  Cholecalciferol (VITAMIN D3) 2000 UNIT Cap, TAKE 2 CAPSULES (4000 UNITS) BY MOUTH ONCE DAILY, Disp: 60 Cap, Rfl: 11  •  PREMARIN 0.625 MG/GM Cream, INSERT 1GM VAGINALLY 3 TIMES WEEKLY, Disp: 30 g, Rfl: 1  •  Multiple Vitamins-Minerals (ICAPS AREDS 2) Cap, Take  by mouth., Disp: , Rfl:   •  Multiple Vitamins-Minerals (CENTRUM SILVER PO), Take 1 Tab by mouth every day., Disp: , Rfl:   •  nitrofurantoin monohyd macro (MACROBID) 100 MG Cap, Take 1 Cap by mouth 2 times a day. (Patient not taking: Reported on 7/24/2019), Disp: 14 Cap, Rfl: 0  •  ezetimibe (ZETIA) 10 MG Tab, Take 1 Tab by mouth every day. (Patient not taking: Reported on 7/30/2019), Disp: 30 Tab, Rfl: 11  •  triamcinolone acetonide (KENALOG) 0.1 % Ointment, Apply thin layer to affected area twice daily as needed (Patient not taking:  "Reported on 7/30/2019), Disp: 1 Tube, Rfl: 0  ALLERGIES:   Allergies   Allergen Reactions   • Atorvastatin    • Bactrim Ds Hives   • Pneumococcal Vaccines Swelling   • Sulfa Drugs Hives   • Vicodin [Hydrocodone-Acetaminophen] Rash   • Vytorin      SURGHX:   Past Surgical History:   Procedure Laterality Date   • CYSTOCELE REPAIR  1/17/2011    Performed by GILMAR CHUNG [facility]   • RECTOCELE REPAIR  1/17/2011    Performed by [facility]ILMAR [facility]UNG [facility]   • BLADDER SUSPENSION  1/17/2011    Performed by GILMAR CHUNG [facility]   • CYSTOSCOPY  1/17/2011    Performed by [facility]ILMAR CHUNG [facility]   • ABDOMINAL HYSTERECTOMY TOTAL     • ARTHROSCOPY, KNEE     • CATARACT EXTRACTION WITH IOL     • HEMORRHOIDECTOMY     • HYSTERECTOMY, TOTAL ABDOMINAL     • INJECTION SCLERO HEMORROIDS     • OTHER ORTHOPEDIC SURGERY      knee scope x 2   • PB KNEE SCOPE,DIAGNOSTIC  2003;2009   • TONSILLECTOMY       SOCHX:  reports that she has never smoked. She has never used smokeless tobacco. She reports that she drank alcohol. She reports that she does not use drugs..  FH:   Family History   Problem Relation Age of Onset   • Non-contributory Mother         gall bladder surgery   • Heart Disease Mother    • Cancer Father         colon rectal   • Heart Disease Father         rheumatic heart disease   • Other Brother         HIT BY CAR   • Heart Disease Brother    • Sleep Apnea Brother    • No Known Problems Maternal Grandmother    • No Known Problems Maternal Grandfather    • No Known Problems Paternal Grandmother    • No Known Problems Paternal Grandfather    • No Known Problems Son          Physical Exam:  Vitals/ General Appearance:   Weight/BMI: Body mass index is 23.41 kg/m².  /82 (BP Location: Right arm, Patient Position: Sitting, BP Cuff Size: Adult)   Pulse 76   Resp 15   Ht 1.575 m (5' 2\")   Wt 58.1 kg (128 lb)   SpO2 " "95%   Vitals:    09/16/19 1244   BP: 122/82   BP Location: Right arm   Patient Position: Sitting   BP Cuff Size: Adult   Pulse: 76   Resp: 15   SpO2: 95%   Weight: 58.1 kg (128 lb)   Height: 1.575 m (5' 2\")       Pt. is alert and oriented to time, place and person. Cooperative and in no apparent distress.       -Head: Atraumatic, normocephalic.    -. Throat: Oropharynx appears crowded in that the palate is overhanging   -. Neck: Supple. No thyromegaly  -. Chest: Trachea central, no spine deformity   -. Lungs auscultation: B/L good air entry, vesicular breath sounds, no adventitious sounds  -. Heart auscultation: 1st and 2nd heart sounds normal, regular rhythm. No appreciable murmur.  - Extremities: no clubbing,  - Skin: No rash  - NEUROLOGICAL EXAMINATION: On neurological exam, the patient was alert and oriented x3. speech was clear and fluent without dysarthria.      ASSESSMENT AND PLAN     1. Sleep Apnea      The pathophysiology of sleep anea and the increased risk of cardiovascular morbidity from untreated sleep apnea is discussed in detail with the patient.    She is urged to avoid supine sleep, weight gain and alcoholic beverages since all of these can worsen sleep apnea. She is cautioned against drowsy driving. If She feels sleepy while driving, She must pull over for a break/nap, rather than persist on the road, in the interest of She own safety and that of others on the road.   Plan   - Changed pressure to BiPAP 17/13 cm due to elevated AHI    - Mask fitting was done and airfit F30 small is ordered today    - compliance download was reviewed and discussed with the pt   - compliance was reinforced     2. Regarding treatment of other past medical problems and general health maintenance,  She is urged to follow up with PCP.      "

## 2019-09-24 ENCOUNTER — OFFICE VISIT (OUTPATIENT)
Dept: CARDIOLOGY | Facility: MEDICAL CENTER | Age: 84
End: 2019-09-24
Payer: MEDICARE

## 2019-09-24 VITALS
HEIGHT: 62 IN | OXYGEN SATURATION: 96 % | SYSTOLIC BLOOD PRESSURE: 128 MMHG | WEIGHT: 124 LBS | BODY MASS INDEX: 22.82 KG/M2 | HEART RATE: 74 BPM | DIASTOLIC BLOOD PRESSURE: 74 MMHG

## 2019-09-24 DIAGNOSIS — I47.10 SVT (SUPRAVENTRICULAR TACHYCARDIA): Chronic | ICD-10-CM

## 2019-09-24 DIAGNOSIS — I71.40 ABDOMINAL AORTIC ANEURYSM (AAA) WITHOUT RUPTURE (HCC): ICD-10-CM

## 2019-09-24 DIAGNOSIS — I35.0 MILD AORTIC STENOSIS: ICD-10-CM

## 2019-09-24 DIAGNOSIS — I10 ESSENTIAL HYPERTENSION: ICD-10-CM

## 2019-09-24 PROCEDURE — 99214 OFFICE O/P EST MOD 30 MIN: CPT | Performed by: INTERNAL MEDICINE

## 2019-09-30 ASSESSMENT — ENCOUNTER SYMPTOMS
FEVER: 0
PND: 0
FOCAL WEAKNESS: 0
PALPITATIONS: 1
WEAKNESS: 0
BACK PAIN: 1
CHILLS: 0
SORE THROAT: 0
ABDOMINAL PAIN: 1
BRUISES/BLEEDS EASILY: 0
DIZZINESS: 0
NAUSEA: 0
CLAUDICATION: 0
COUGH: 0
NERVOUS/ANXIOUS: 1
FALLS: 0
BLURRED VISION: 0
SHORTNESS OF BREATH: 1

## 2019-10-01 NOTE — PROGRESS NOTES
Chief Complaint   Patient presents with   • Supraventricular Tachycardia (SVT)       Subjective:   Nini Taylor is a 87 y.o. female who presents today for follow-up of her history of peripheral arterial disease SVT    She has not had any further syncope spells still has a lot of health concerns about multiple different issues    Past Medical History:   Diagnosis Date   • AAA (abdominal aortic aneurysm) (HCC)    • Atherosclerosis of native arteries of the extremities with intermittent claudication 10/16/2013   • Blepharospasm syndrome    • Bright red rectal bleeding 6/24/2015   • Chest pain 07/2015    Unspecified; Nuclear stress test negative    • Constipation    • Edema 5/29/2012   • Female bladder prolapse, acquired    • GERD (gastroesophageal reflux disease)    • Hyperlipidemia    • Hypertension    • Insomnia    • OSTEOPOROSIS    • Palpitations 5/29/2012   • Sleep apnea    • SVT (supraventricular tachycardia) (HCC) - on Zio 2015 and 2019 8/17/2015   • Varicose veins 5/29/2012   • Vitamin D deficiency disease      Past Surgical History:   Procedure Laterality Date   • CYSTOCELE REPAIR  1/17/2011    Performed by GILMAR CHUNG at [facility]   • RECTOCELE REPAIR  1/17/2011    Performed by GILMAR CHUNG at [facility]   • BLADDER SUSPENSION  1/17/2011    Performed by GILMAR CHUNG at [facility]   • CYSTOSCOPY  1/17/2011    Performed by GILMAR CHUNG at [facility]   • ABDOMINAL HYSTERECTOMY TOTAL     • ARTHROSCOPY, KNEE     • CATARACT EXTRACTION WITH IOL     • HEMORRHOIDECTOMY     • HYSTERECTOMY, TOTAL ABDOMINAL     • INJECTION SCLERO HEMORROIDS     • OTHER ORTHOPEDIC SURGERY      knee scope x 2   • PB KNEE SCOPE,DIAGNOSTIC  2003;2009   • TONSILLECTOMY       Family History   Problem Relation Age of Onset   • Non-contributory Mother         gall bladder surgery   • Heart Disease Mother    • Cancer Father         colon  rectal   • Heart Disease Father         rheumatic heart disease   • Other Brother         HIT BY CAR   • Heart Disease Brother    • Sleep Apnea Brother    • No Known Problems Maternal Grandmother    • No Known Problems Maternal Grandfather    • No Known Problems Paternal Grandmother    • No Known Problems Paternal Grandfather    • No Known Problems Son      Social History     Socioeconomic History   • Marital status:      Spouse name: Not on file   • Number of children: Not on file   • Years of education: Not on file   • Highest education level: Not on file   Occupational History   • Not on file   Social Needs   • Financial resource strain: Not on file   • Food insecurity:     Worry: Not on file     Inability: Not on file   • Transportation needs:     Medical: Not on file     Non-medical: Not on file   Tobacco Use   • Smoking status: Never Smoker   • Smokeless tobacco: Never Used   Substance and Sexual Activity   • Alcohol use: Not Currently     Alcohol/week: 0.0 oz     Comment: occasionally   • Drug use: No   • Sexual activity: Never   Lifestyle   • Physical activity:     Days per week: Not on file     Minutes per session: Not on file   • Stress: Not on file   Relationships   • Social connections:     Talks on phone: Not on file     Gets together: Not on file     Attends Oriental orthodox service: Not on file     Active member of club or organization: Not on file     Attends meetings of clubs or organizations: Not on file     Relationship status: Not on file   • Intimate partner violence:     Fear of current or ex partner: Not on file     Emotionally abused: Not on file     Physically abused: Not on file     Forced sexual activity: Not on file   Other Topics Concern   • Not on file   Social History Narrative   • Not on file     Allergies   Allergen Reactions   • Atorvastatin    • Bactrim Ds Hives   • Pneumococcal Vaccines Swelling   • Sulfa Drugs Hives   • Vicodin [Hydrocodone-Acetaminophen] Rash   • Vytorin       Outpatient Encounter Medications as of 9/24/2019   Medication Sig Dispense Refill   • metoprolol (LOPRESSOR) 25 MG Tab TAKE 1 TABLET BY MOUTH TWICE DAILY 180 Tab 2   • coenzyme Q-10 30 MG capsule Take 60 mg by mouth every day.     • Alirocumab (PRALUENT) 75 MG/ML Solution Pen-injector Inject 75 mg as instructed every 14 days. 2 PEN 5   • nitrofurantoin monohyd macro (MACROBID) 100 MG Cap Take 1 Cap by mouth 2 times a day. 14 Cap 0   • Cholecalciferol (VITAMIN D3) 2000 UNIT Cap TAKE 2 CAPSULES (4000 UNITS) BY MOUTH ONCE DAILY 60 Cap 11   • ezetimibe (ZETIA) 10 MG Tab Take 1 Tab by mouth every day. 30 Tab 11   • PREMARIN 0.625 MG/GM Cream INSERT 1GM VAGINALLY 3 TIMES WEEKLY 30 g 1   • triamcinolone acetonide (KENALOG) 0.1 % Ointment Apply thin layer to affected area twice daily as needed 1 Tube 0   • Multiple Vitamins-Minerals (ICAPS AREDS 2) Cap Take  by mouth.     • Multiple Vitamins-Minerals (CENTRUM SILVER PO) Take 1 Tab by mouth every day.     • [DISCONTINUED] Cholecalciferol (VITAMIN D) 2000 units Cap TAKE 2 CAPSULES BY MOUTH ONCE DAILY  11     No facility-administered encounter medications on file as of 9/24/2019.      Review of Systems   Constitutional: Negative for chills and fever.   HENT: Negative for sore throat.    Eyes: Negative for blurred vision.   Respiratory: Positive for shortness of breath. Negative for cough.    Cardiovascular: Positive for palpitations. Negative for chest pain, claudication, leg swelling and PND.   Gastrointestinal: Positive for abdominal pain. Negative for nausea.   Musculoskeletal: Positive for back pain and joint pain. Negative for falls.   Skin: Negative for rash.   Neurological: Negative for dizziness, focal weakness and weakness.   Endo/Heme/Allergies: Does not bruise/bleed easily.   Psychiatric/Behavioral: The patient is nervous/anxious.         Objective:   /74 (BP Location: Left arm, Patient Position: Sitting, BP Cuff Size: Adult)   Pulse 74   Ht 1.575 m (5'  "2\")   Wt 56.2 kg (124 lb)   SpO2 96%   BMI 22.68 kg/m²     Physical Exam   Constitutional: No distress.   HENT:   Mouth/Throat: Oropharynx is clear and moist. No oropharyngeal exudate.   Eyes: No scleral icterus.   Neck: No JVD present.   Cardiovascular: Normal rate. Exam reveals no gallop and no friction rub.   Murmur heard.  Pulmonary/Chest: No respiratory distress. She has no wheezes. She has no rales.   Abdominal: Soft. Bowel sounds are normal.   Musculoskeletal: She exhibits no edema.   Neurological: She is alert.   Skin: No rash noted. She is not diaphoretic.   Psychiatric: She has a normal mood and affect.     We reviewed in person the most recent labs  Recent Results (from the past 4032 hour(s))   URINALYSIS,CULTURE IF INDICATED    Collection Time: 05/13/19  3:35 PM   Result Value Ref Range    Color Yellow     Character Turbid (A)     Specific Gravity 1.011 <1.035    Ph 6.5 5.0 - 8.0    Glucose Negative Negative mg/dL    Ketones Negative Negative mg/dL    Protein Negative Negative mg/dL    Bilirubin Negative Negative    Urobilinogen, Urine 0.2 Negative    Nitrite Positive (A) Negative    Leukocyte Esterase Large (A) Negative    Occult Blood Small (A) Negative    Micro Urine Req Microscopic    URINE CULTURE(NEW)    Collection Time: 05/13/19  3:35 PM   Result Value Ref Range    Significant Indicator POS (POS)     Source UR     Site -     Culture Result - (A)     Culture Result Escherichia coli  >100,000 cfu/mL   (A)        Susceptibility    Escherichia coli - SUZANNE     Ceftriaxone <=8 Sensitive mcg/mL     Ceftazidime <=1 Sensitive mcg/mL     Cephalothin <=8 Sensitive mcg/mL     Cefotaxime <=2 Sensitive mcg/mL     Ciprofloxacin <=1 Sensitive mcg/mL     Cefepime <=8 Sensitive mcg/mL     Cefuroxime <=4 Sensitive mcg/mL     Ampicillin <=8 Sensitive mcg/mL     Cefotetan <=16 Sensitive mcg/mL     Tobramycin <=4 Sensitive mcg/mL     Nitrofurantoin <=32 Sensitive mcg/mL     Gentamicin <=4 Sensitive mcg/mL     " Levofloxacin <=2 Sensitive mcg/mL     Pip/Tazobactam <=16 Sensitive mcg/mL     Piperacillin <=16 Sensitive mcg/mL     Trimeth/Sulfa <=2/38 Sensitive mcg/mL     Tigecycline <=2 Sensitive mcg/mL   URINE MICROSCOPIC (W/UA)    Collection Time: 05/13/19  3:35 PM   Result Value Ref Range    WBC Packed (A) /hpf    RBC 5-10 (A) /hpf    Bacteria Many (A) None /hpf    Epithelial Cells Few /hpf    Hyaline Cast 0-2 /lpf   Lipid Profile    Collection Time: 06/10/19  1:28 PM   Result Value Ref Range    Cholesterol,Tot 227 (H) 100 - 199 mg/dL    Triglycerides 129 0 - 149 mg/dL    HDL 51 >=40 mg/dL     (H) <100 mg/dL   COMP METABOLIC PANEL    Collection Time: 06/10/19  1:28 PM   Result Value Ref Range    Sodium 139 135 - 145 mmol/L    Potassium 3.9 3.6 - 5.5 mmol/L    Chloride 103 96 - 112 mmol/L    Co2 27 20 - 33 mmol/L    Anion Gap 9.0 0.0 - 11.9    Glucose 94 65 - 99 mg/dL    Bun 14 8 - 22 mg/dL    Creatinine 1.01 0.50 - 1.40 mg/dL    Calcium 9.3 8.5 - 10.5 mg/dL    AST(SGOT) 24 12 - 45 U/L    ALT(SGPT) 14 2 - 50 U/L    Alkaline Phosphatase 68 30 - 99 U/L    Total Bilirubin 1.2 0.1 - 1.5 mg/dL    Albumin 4.0 3.2 - 4.9 g/dL    Total Protein 7.3 6.0 - 8.2 g/dL    Globulin 3.3 1.9 - 3.5 g/dL    A-G Ratio 1.2 g/dL   TSH    Collection Time: 06/10/19  1:28 PM   Result Value Ref Range    TSH 1.740 0.380 - 5.330 uIU/mL   ESTIMATED GFR    Collection Time: 06/10/19  1:28 PM   Result Value Ref Range    GFR If African American >60 >60 mL/min/1.73 m 2    GFR If Non African American 52 (A) >60 mL/min/1.73 m 2   FASTING STATUS    Collection Time: 06/10/19  1:41 PM   Result Value Ref Range    Fasting Status Fasting    URINALYSIS,CULTURE IF INDICATED    Collection Time: 07/30/19  1:30 PM   Result Value Ref Range    Color Yellow     Character Cloudy (A)     Specific Gravity 1.009 <1.035    Ph 7.0 5.0 - 8.0    Glucose Negative Negative mg/dL    Ketones Negative Negative mg/dL    Protein Negative Negative mg/dL    Bilirubin Negative Negative     Urobilinogen, Urine 0.2 Negative    Nitrite Positive (A) Negative    Leukocyte Esterase Large (A) Negative    Occult Blood Trace (A) Negative    Micro Urine Req Microscopic    URINE MICROSCOPIC (W/UA)    Collection Time: 07/30/19  1:30 PM   Result Value Ref Range    WBC Packed (A) /hpf    RBC 2-5 (A) /hpf    Bacteria Many (A) None /hpf    Epithelial Cells Negative /hpf    Hyaline Cast 0-2 /lpf   URINE CULTURE(NEW)    Collection Time: 07/30/19  1:30 PM   Result Value Ref Range    Significant Indicator POS (POS)     Source UR     Site -     Culture Result - (A)     Culture Result Escherichia coli  >100,000 cfu/mL   (A)        Susceptibility    Escherichia coli - SUZANNE     Ampicillin <=8 Sensitive mcg/mL     Ceftriaxone <=8 Sensitive mcg/mL     Ceftazidime <=1 Sensitive mcg/mL     Cephalothin <=8 Sensitive mcg/mL     Cefotaxime <=2 Sensitive mcg/mL     Ciprofloxacin <=1 Sensitive mcg/mL     Cefepime <=8 Sensitive mcg/mL     Cefuroxime <=4 Sensitive mcg/mL     Ampicillin/sulbactam <=8/4 Sensitive mcg/mL     Cefotetan <=16 Sensitive mcg/mL     Tobramycin <=4 Sensitive mcg/mL     Nitrofurantoin <=32 Sensitive mcg/mL     Gentamicin <=4 Sensitive mcg/mL     Levofloxacin <=2 Sensitive mcg/mL     Pip/Tazobactam <=16 Sensitive mcg/mL     Piperacillin <=16 Sensitive mcg/mL     Trimeth/Sulfa <=2/38 Sensitive mcg/mL     Tigecycline <=2 Sensitive mcg/mL   URINALYSIS,CULTURE IF INDICATED    Collection Time: 09/05/19  1:25 PM   Result Value Ref Range    Color Yellow     Character Clear     Specific Gravity 1.010 <1.035    Ph 6.5 5.0 - 8.0    Glucose Negative Negative mg/dL    Ketones Negative Negative mg/dL    Protein Negative Negative mg/dL    Bilirubin Negative Negative    Urobilinogen, Urine 0.2 Negative    Nitrite Negative Negative    Leukocyte Esterase Small (A) Negative    Occult Blood Negative Negative    Micro Urine Req Microscopic    URINE MICROSCOPIC (W/UA)    Collection Time: 09/05/19  1:25 PM   Result Value Ref Range     WBC 0-2 /hpf    RBC 0-2 /hpf    Bacteria Negative None /hpf    Epithelial Cells Negative /hpf    Hyaline Cast 0-2 /lpf       Assessment:     1. Abdominal aortic aneurysm (AAA) without rupture (HCC)     2. Essential hypertension     3. SVT (supraventricular tachycardia) (HCC) - on Zio 2015 and 2019     4. Mild aortic stenosis  EC-ECHOCARDIOGRAM COMPLETE W/O CONT       Medical Decision Making:  Today's Assessment / Status / Plan:     It was my pleasure to meet with Ms. Taylor.    We will follow-up on her echocardiogram it seems her murmur is progressed    Fortunately no significant palpitations    Blood pressure is well controlled.      She is intolerant to statin so she is on the PCSK9 and Zetia    I will see Ms. Taylor back in 6 months time and encouraged her to follow up with us over the phone or electronically using my [employer] as issues arise.    It is my pleasure to participate in the care of Ms. Taylor.  Please do not hesitate to contact me with questions or concerns.    Montez Kamara MD PhD FAC  Cardiologist [facility]    Please note that this dictation was created using voice recognition software. I have worked with consultants from the vendor as well as technical experts from [facility] to optimize the interface. I have made every reasonable attempt to correct obvious errors, but I expect that there are errors of grammar and possibly content I did not discover before finalizing the note.

## 2019-10-02 ENCOUNTER — TELEPHONE (OUTPATIENT)
Dept: SLEEP MEDICINE | Facility: MEDICAL CENTER | Age: 84
End: 2019-10-02

## 2019-10-02 ENCOUNTER — TELEMEDICINE ORIGINATING SITE VISIT (OUTPATIENT)
Dept: SLEEP MEDICINE | Facility: MEDICAL CENTER | Age: 84
End: 2019-10-02

## 2019-10-02 NOTE — TELEPHONE ENCOUNTER
Refaxed order with supporting documents to DME:  [facility] /  [phone] / fax [phone]

## 2019-10-03 ENCOUNTER — NON-PROVIDER VISIT (OUTPATIENT)
Dept: VASCULAR LAB | Facility: MEDICAL CENTER | Age: 84
End: 2019-10-03
Attending: INTERNAL MEDICINE
Payer: MEDICARE

## 2019-10-03 VITALS — DIASTOLIC BLOOD PRESSURE: 85 MMHG | SYSTOLIC BLOOD PRESSURE: 140 MMHG | HEART RATE: 99 BPM

## 2019-10-03 PROCEDURE — 99211 OFF/OP EST MAY X REQ PHY/QHP: CPT

## 2019-10-03 NOTE — PROGRESS NOTES
[facility]    HPI:    Pt reports difficulty with injection technique or Praluent.     -Provided tips for injection and how to know if the medication was delivered.   -Pt self injected medication without any problem.   -Reports she is tolerating the medication well.   -Likely pt won't need additional appointments for injection at this time.       A/P  Pt to try self injection on her own next time.    BP elevated on two separate readings, will address at next appointment if BP continues to stay elevated.     Follow up in 2 weeks.    Chandler Brown, PharmD

## 2019-10-08 ENCOUNTER — HOSPITAL ENCOUNTER (OUTPATIENT)
Dept: LAB | Facility: MEDICAL CENTER | Age: 84
End: 2019-10-08
Attending: FAMILY MEDICINE
Payer: MEDICARE

## 2019-10-08 DIAGNOSIS — N39.0 RECURRENT UTI: ICD-10-CM

## 2019-10-08 PROCEDURE — 81001 URINALYSIS AUTO W/SCOPE: CPT

## 2019-10-16 ENCOUNTER — TELEPHONE (OUTPATIENT)
Dept: MEDICAL GROUP | Facility: PHYSICIAN GROUP | Age: 84
End: 2019-10-16

## 2019-10-16 DIAGNOSIS — R30.0 DYSURIA: ICD-10-CM

## 2019-10-16 NOTE — TELEPHONE ENCOUNTER
Patient states that she thinks she has a urine infection.  She gave a urine sample on the 8th of October.  That showed that she did not have one.  She thinks she has developed a UTI since the last sample that she gave.  She is asking if you can put in a new order for her?  She has a standing order, but that one says every 14 days and she is worried that that order will not work since it has not been 14 days.  Please advise.  Thank you.

## 2019-10-16 NOTE — TELEPHONE ENCOUNTER
----- Message from Lien Hollis sent at 10/15/2019  1:29 PM PDT -----  Regarding: appt questions  Contact: [phone]  Patient wants to know if she needs to make an appt with Ludwig regarding her past uti lab results. She is waiting right now in the lobby but I told her you would call when you are able to. She said she will wait a bit. If she doesn't answer today she said to leave a message because her  an appt. Thank you

## 2019-10-17 ENCOUNTER — HOSPITAL ENCOUNTER (OUTPATIENT)
Dept: LAB | Facility: MEDICAL CENTER | Age: 84
End: 2019-10-17
Attending: FAMILY MEDICINE
Payer: MEDICARE

## 2019-10-17 DIAGNOSIS — R30.0 DYSURIA: ICD-10-CM

## 2019-10-17 LAB
APPEARANCE UR: ABNORMAL
BACTERIA #/AREA URNS HPF: ABNORMAL /HPF
BILIRUB UR QL STRIP.AUTO: NEGATIVE
COLOR UR: YELLOW
EPI CELLS #/AREA URNS HPF: NEGATIVE /HPF
GLUCOSE UR STRIP.AUTO-MCNC: NEGATIVE MG/DL
HYALINE CASTS #/AREA URNS LPF: ABNORMAL /LPF
KETONES UR STRIP.AUTO-MCNC: NEGATIVE MG/DL
LEUKOCYTE ESTERASE UR QL STRIP.AUTO: ABNORMAL
MICRO URNS: ABNORMAL
NITRITE UR QL STRIP.AUTO: POSITIVE
PH UR STRIP.AUTO: 6.5 [PH] (ref 5–8)
PROT UR QL STRIP: NEGATIVE MG/DL
RBC # URNS HPF: ABNORMAL /HPF
RBC UR QL AUTO: ABNORMAL
SP GR UR STRIP.AUTO: 1.01
UROBILINOGEN UR STRIP.AUTO-MCNC: 0.2 MG/DL
WBC #/AREA URNS HPF: ABNORMAL /HPF

## 2019-10-17 PROCEDURE — 81001 URINALYSIS AUTO W/SCOPE: CPT

## 2019-10-17 PROCEDURE — 87186 SC STD MICRODIL/AGAR DIL: CPT

## 2019-10-17 PROCEDURE — 87086 URINE CULTURE/COLONY COUNT: CPT

## 2019-10-17 PROCEDURE — 87077 CULTURE AEROBIC IDENTIFY: CPT

## 2019-10-18 ENCOUNTER — TELEPHONE (OUTPATIENT)
Dept: MEDICAL GROUP | Facility: PHYSICIAN GROUP | Age: 84
End: 2019-10-18

## 2019-10-18 RX ORDER — CEFDINIR 300 MG/1
300 CAPSULE ORAL 2 TIMES DAILY
Qty: 14 CAP | Refills: 0 | Status: SHIPPED | OUTPATIENT
Start: 2019-10-18 | End: 2019-10-25

## 2019-10-18 NOTE — TELEPHONE ENCOUNTER
----- Message from Nevaeh Munroe M.D. sent at 10/18/2019  8:43 AM PDT -----  Urine does look infected now.  Will send in antibiotics for the weekend while we are awaiting the culture results.

## 2019-10-18 NOTE — TELEPHONE ENCOUNTER
1. Caller Name: Nini                                           Call Back Number: [phone] (home)         Patient approves a detailed voicemail message: N\A    Called and spoke with pt. Let her know a RX was sent to her pharmacy, but we are still waiting for the culture to come back. Pt will  the RX today.

## 2019-10-23 ENCOUNTER — TELEPHONE (OUTPATIENT)
Dept: MEDICAL GROUP | Facility: PHYSICIAN GROUP | Age: 84
End: 2019-10-23

## 2019-10-23 NOTE — TELEPHONE ENCOUNTER
1st attempt:  Phone Number Called: [phone]    Call outcome: left message for patient to call back regarding message below    Message: Left a message for the patient to call back regarding lab results.

## 2019-10-23 NOTE — TELEPHONE ENCOUNTER
----- Message from Nevaeh Munroe M.D. sent at 10/23/2019 12:59 PM PDT -----  Pansensitive E.coli should be cleared with Abx started.

## 2019-11-06 ENCOUNTER — HOSPITAL ENCOUNTER (OUTPATIENT)
Dept: CARDIOLOGY | Facility: MEDICAL CENTER | Age: 84
End: 2019-11-06
Attending: INTERNAL MEDICINE
Payer: MEDICARE

## 2019-11-06 DIAGNOSIS — I35.0 MILD AORTIC STENOSIS: ICD-10-CM

## 2019-11-06 LAB
LV EJECT FRACT  99904: 55
LV EJECT FRACT MOD 2C 99903: 51.41
LV EJECT FRACT MOD 4C 99902: 61.35
LV EJECT FRACT MOD BP 99901: 55.9

## 2019-11-06 PROCEDURE — 93306 TTE W/DOPPLER COMPLETE: CPT

## 2019-11-06 PROCEDURE — 93306 TTE W/DOPPLER COMPLETE: CPT | Mod: 26 | Performed by: INTERNAL MEDICINE

## 2019-11-12 ENCOUNTER — TELEPHONE (OUTPATIENT)
Dept: VASCULAR LAB | Facility: MEDICAL CENTER | Age: 84
End: 2019-11-12

## 2019-11-12 NOTE — TELEPHONE ENCOUNTER
[facility]    Pt called complaining of hip pain with sharp pain that radiates down the leg. She is concerned if it has been caused by Praluent.  Based on how the pt is describing her symptoms I told her it doesn't sound like medication induced but more typical sciatic pain.  Pt will be reaching out to her provider, continue Praluent at the current time, next injection is due in two days.    Chandler Brown, KeikoD

## 2019-11-22 ENCOUNTER — TELEPHONE (OUTPATIENT)
Dept: VASCULAR LAB | Facility: MEDICAL CENTER | Age: 84
End: 2019-11-22

## 2019-11-22 NOTE — TELEPHONE ENCOUNTER
[facility]    Pt called asking if praluent can cause pain in the shoulder.  She reports this happened about a week after she had an injection.  She then mentioned something about chest pain.  I instructed the pt she should go to the ER if she ever develops CP. Pt is worried about praluent causing her aches and pains.  Based on the pain she has shared with me, it appears to be more like sciatic pain and she is working with her current physician to determine if she needs a hip replacement.  I told the pt the hip replacement and sciatic pain are probably more related than the praluent and the pain is related.     Chandler Brown, PharmD

## 2019-11-25 ENCOUNTER — NON-PROVIDER VISIT (OUTPATIENT)
Dept: MEDICAL GROUP | Facility: PHYSICIAN GROUP | Age: 84
End: 2019-11-25
Payer: MEDICARE

## 2019-11-25 DIAGNOSIS — Z23 NEED FOR VACCINATION: ICD-10-CM

## 2019-11-25 PROCEDURE — G0008 ADMIN INFLUENZA VIRUS VAC: HCPCS | Performed by: FAMILY MEDICINE

## 2019-11-25 PROCEDURE — 90662 IIV NO PRSV INCREASED AG IM: CPT | Performed by: FAMILY MEDICINE

## 2019-11-25 NOTE — PROGRESS NOTES
"Nini Taylor is a 87 y.o. female here for a non-provider visit for:   FLU    Reason for immunization: Annual Flu Vaccine  Immunization records indicate need for vaccine: Yes, confirmed with Epic and confirmed with [place] [employer]  Minimum interval has been met for this vaccine: Yes  ABN completed: Not Indicated    Order and dose verified by: DARIN HAMMOND Dated  08/15/19 was given to patient: Yes  All IAC Questionnaire questions were answered \"No.\"    Patient tolerated injection and no adverse effects were observed or reported: Yes    Pt scheduled for next dose in series: Not Indicated    "

## 2019-12-09 ENCOUNTER — HOSPITAL ENCOUNTER (OUTPATIENT)
Dept: LAB | Facility: MEDICAL CENTER | Age: 84
End: 2019-12-09
Attending: INTERNAL MEDICINE
Payer: MEDICARE

## 2019-12-09 DIAGNOSIS — E78.5 HYPERLIPIDEMIA, UNSPECIFIED HYPERLIPIDEMIA TYPE: ICD-10-CM

## 2019-12-09 LAB
ALBUMIN SERPL BCP-MCNC: 4 G/DL (ref 3.2–4.9)
ALBUMIN/GLOB SERPL: 1.4 G/DL
ALP SERPL-CCNC: 66 U/L (ref 30–99)
ALT SERPL-CCNC: 13 U/L (ref 2–50)
ANION GAP SERPL CALC-SCNC: 6 MMOL/L (ref 0–11.9)
AST SERPL-CCNC: 22 U/L (ref 12–45)
BILIRUB SERPL-MCNC: 1.1 MG/DL (ref 0.1–1.5)
BUN SERPL-MCNC: 16 MG/DL (ref 8–22)
CALCIUM SERPL-MCNC: 9.4 MG/DL (ref 8.5–10.5)
CHLORIDE SERPL-SCNC: 105 MMOL/L (ref 96–112)
CHOLEST SERPL-MCNC: 144 MG/DL (ref 100–199)
CO2 SERPL-SCNC: 28 MMOL/L (ref 20–33)
CREAT SERPL-MCNC: 0.97 MG/DL (ref 0.5–1.4)
FASTING STATUS PATIENT QL REPORTED: NORMAL
GLOBULIN SER CALC-MCNC: 2.8 G/DL (ref 1.9–3.5)
GLUCOSE SERPL-MCNC: 90 MG/DL (ref 65–99)
HDLC SERPL-MCNC: 54 MG/DL
LDLC SERPL CALC-MCNC: 71 MG/DL
POTASSIUM SERPL-SCNC: 4.3 MMOL/L (ref 3.6–5.5)
PROT SERPL-MCNC: 6.8 G/DL (ref 6–8.2)
SODIUM SERPL-SCNC: 139 MMOL/L (ref 135–145)
TRIGL SERPL-MCNC: 96 MG/DL (ref 0–149)
TSH SERPL DL<=0.005 MIU/L-ACNC: 1.81 UIU/ML (ref 0.38–5.33)

## 2019-12-09 PROCEDURE — 80053 COMPREHEN METABOLIC PANEL: CPT

## 2019-12-09 PROCEDURE — 84443 ASSAY THYROID STIM HORMONE: CPT

## 2019-12-09 PROCEDURE — 36415 COLL VENOUS BLD VENIPUNCTURE: CPT

## 2019-12-09 PROCEDURE — 80061 LIPID PANEL: CPT

## 2019-12-11 ENCOUNTER — NON-PROVIDER VISIT (OUTPATIENT)
Dept: VASCULAR LAB | Facility: MEDICAL CENTER | Age: 84
End: 2019-12-11
Attending: INTERNAL MEDICINE
Payer: MEDICARE

## 2019-12-11 VITALS — HEART RATE: 60 BPM | SYSTOLIC BLOOD PRESSURE: 124 MMHG | DIASTOLIC BLOOD PRESSURE: 80 MMHG

## 2019-12-11 DIAGNOSIS — E78.5 DYSLIPIDEMIA: ICD-10-CM

## 2019-12-11 PROCEDURE — 99212 OFFICE O/P EST SF 10 MIN: CPT

## 2019-12-11 NOTE — NON-PROVIDER
"zetia and praluent    Tsh,   CMP  Lipid panel  [facility] - FollowUp Visit  Date of Service: 12/11/19    Nini Taylor is here for follow up of dyslipidemia.    Pertinent Interval History since last visit:   Tolerating PCSK9, reports her muscle pains have completely resolved.   Current Prescription Lipid Lowering Medications - including dose:   Statin: Praluent 75 mg Q14 days.   Non-Statin: Zetia 10 mg (pt isn't able to confirm if she is or is not taking this medication)  Current Lipid Lowering and Related Supplements:   Vit D  Any Current Side Effects Potentially Related to Lipid Lowering therapy?   No  Current Adherence to Lipid Lowering Therapies:     Complete  Statin: Simvastatin Unknown dose/duration - myalgias                Crestor unkown dose, duration \"years\" - states she started getting sharp \"zings\" down her arms.              Atorvastatin unknown dose, duration of 90 days - severe UE myalgia              Rosuvastatin - low dose alternate days - myalgias    Non-Statin: States none, however MR shows possibly Vytorin use              Outcome: Unknown  Any Previous History of Statin Intolerance?   Yes, Details: See above.    Baseline Lipids Prior to Treatment:          2/6/2017 12:20     Cholesterol,Tot   274 (H)     Triglycerides   101     HDL   58     LDL   196 (H)           SOCIAL HISTORY  Social History     Tobacco Use   Smoking Status Never Smoker   Smokeless Tobacco Never Used      Change in weight: Stable  Exercise habits: minimal exercise   Diet: low calorie    ROS  Physical Exam    DATA REVIEW  Most Recent Lipid Panel:   Lab Results   Component Value Date    CHOLSTRLTOT 144 12/09/2019    TRIGLYCERIDE 96 12/09/2019    HDL 54 12/09/2019    LDL 71 12/09/2019       Other Pertinent Blood Work:   Lab Results   Component Value Date    SODIUM 139 12/09/2019    POTASSIUM 4.3 12/09/2019    CHLORIDE 105 12/09/2019    CO2 28 12/09/2019    ANION 6.0 12/09/2019    GLUCOSE 90 12/09/2019    BUN 16 " 12/09/2019    CREATININE 0.97 12/09/2019    CALCIUM 9.4 12/09/2019    ASTSGOT 22 12/09/2019    ALTSGPT 13 12/09/2019    ALKPHOSPHAT 66 12/09/2019    TBILIRUBIN 1.1 12/09/2019    ALBUMIN 4.0 12/09/2019    AGRATIO 1.4 12/09/2019    LIPOPROTA 114 (H) 12/03/2018    TSHULTRASEN 1.810 12/09/2019       Other:  NA    Recent Imaging Studies:    None since last visit    ASSESSMENT AND PLAN  Patient Type, check all that apply:    Secondary Prevention (Abdominal aortic aneurism)  Established Atherosclerotic Cardiovascular Disease (ASCVD)  AAA - most recent imaging with small AAA, continue medical management and deferfurther surveillance to Dr. 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):   Likely - based on baseline LDL-C and +FH of premature CAD in brother - recommended cascade screening  ACC/AHA Indication for Statin Therapy, gurpreet all that apply:  LDL-C at baseline >190 mg/dl: Indication for High intensity statin    Calculated Risk for ASCVD, if applicable    N/A  Other Significant Risk Markers, if any, gurpreet all that apply   + Family History of premature CAD  High lp(a)  National Lipid Association (NLA) Goal  LDL-C:   <70 mg/dL  - Would probably be satisfied if we can get LDL <130 given age and poor tolerability   Lifestyle Recommendations From Today’s Visit:   Continue with low calorie diet.  Requested pt to increase exercise intensity, not likely it will happen per pt.  Continue to walk [employer] each day.   Statin Recommendations from Today's Visit  none  Non-Statin Medications Recommendations from Today’s Visit:   Continue Praluent.  She is unable to confirm if she is taking Zetia.  Requested pt to continue Zetia if she discovers she has been taking Zetia.  However if she has not been taking the medication, we can hold off  continuing the medication until the next lipid panel.   Indication for PCSK9 Inhibitor, if applicable:  FH with suboptimal control of LDL-C despite maximally tolerated statin  Supplements Recommended at this visit:  None  Recommendations for Other Cardiovascular Risk Factors, gurpreet all that apply:   None  Other Issues:  none    Studies Ordered at Todays Visit:  None   Blood Work Ordered At Today’s visit:   [facility]  Lipid panel  Follow-Up:   3 months    Chandler Brown, KeikoD    CC:  Amber L Hayes, M.D. Bloch, Cornelio ALLRED M.D.

## 2019-12-12 RX ORDER — CONJUGATED ESTROGENS 0.62 MG/G
CREAM VAGINAL
Qty: 30 G | Refills: 1 | Status: SHIPPED | OUTPATIENT
Start: 2019-12-12 | End: 2020-09-23

## 2019-12-16 ENCOUNTER — HOSPITAL ENCOUNTER (OUTPATIENT)
Dept: LAB | Facility: MEDICAL CENTER | Age: 84
End: 2019-12-16
Attending: FAMILY MEDICINE
Payer: MEDICARE

## 2019-12-16 ENCOUNTER — TELEPHONE (OUTPATIENT)
Dept: MEDICAL GROUP | Facility: MEDICAL CENTER | Age: 84
End: 2019-12-16

## 2019-12-16 DIAGNOSIS — N39.0 RECURRENT UTI: ICD-10-CM

## 2019-12-16 LAB
APPEARANCE UR: ABNORMAL
BILIRUB UR QL STRIP.AUTO: NEGATIVE
COLOR UR: YELLOW
GLUCOSE UR STRIP.AUTO-MCNC: NEGATIVE MG/DL
KETONES UR STRIP.AUTO-MCNC: ABNORMAL MG/DL
LEUKOCYTE ESTERASE UR QL STRIP.AUTO: ABNORMAL
MICRO URNS: ABNORMAL
NITRITE UR QL STRIP.AUTO: POSITIVE
PH UR STRIP.AUTO: 6 [PH] (ref 5–8)
PROT UR QL STRIP: NEGATIVE MG/DL
RBC UR QL AUTO: ABNORMAL
SP GR UR STRIP.AUTO: 1.02
UROBILINOGEN UR STRIP.AUTO-MCNC: 0.2 MG/DL

## 2019-12-16 PROCEDURE — 87086 URINE CULTURE/COLONY COUNT: CPT

## 2019-12-16 PROCEDURE — 81001 URINALYSIS AUTO W/SCOPE: CPT

## 2019-12-16 PROCEDURE — 87186 SC STD MICRODIL/AGAR DIL: CPT | Mod: 91

## 2019-12-16 PROCEDURE — 87077 CULTURE AEROBIC IDENTIFY: CPT

## 2019-12-16 NOTE — TELEPHONE ENCOUNTER
Received call from pt regarding possibly missing Praluent dose on 12/12, which she takes every 14 days. Per package insert, pt has a 7-day window if she was sure about missing the dose. However, pt is unsure since the contents of the pen were empty upon administration.Therefore, advised pt to continue with scheduled administration on 12/26.    Naye Estevez, KeikoD

## 2019-12-17 ENCOUNTER — TELEPHONE (OUTPATIENT)
Dept: MEDICAL GROUP | Facility: PHYSICIAN GROUP | Age: 84
End: 2019-12-17

## 2019-12-17 LAB
BACTERIA #/AREA URNS HPF: ABNORMAL /HPF
EPI CELLS #/AREA URNS HPF: NEGATIVE /HPF
HYALINE CASTS #/AREA URNS LPF: ABNORMAL /LPF
RBC # URNS HPF: ABNORMAL /HPF
RENAL EPI CELLS #/AREA URNS HPF: NEGATIVE /HPF
TRANS CELLS #/AREA URNS HPF: NEGATIVE /HPF
WBC #/AREA URNS HPF: ABNORMAL /HPF

## 2019-12-17 RX ORDER — CIPROFLOXACIN 500 MG/1
500 TABLET, FILM COATED ORAL 2 TIMES DAILY
Qty: 14 TAB | Refills: 0 | Status: SHIPPED | OUTPATIENT
Start: 2019-12-17 | End: 2019-12-24

## 2019-12-18 ENCOUNTER — TELEPHONE (OUTPATIENT)
Dept: MEDICAL GROUP | Facility: PHYSICIAN GROUP | Age: 84
End: 2019-12-18

## 2019-12-18 DIAGNOSIS — E78.5 DYSLIPIDEMIA: Primary | ICD-10-CM

## 2019-12-18 RX ORDER — CEPHALEXIN 500 MG/1
500 CAPSULE ORAL 4 TIMES DAILY
Qty: 28 CAP | Refills: 0 | Status: SHIPPED | OUTPATIENT
Start: 2019-12-18 | End: 2019-12-25

## 2019-12-18 NOTE — TELEPHONE ENCOUNTER
VOICEMAIL  1. Caller Name: [facility]                      Call Back Number: [phone]    2. Message: [facility] pharmacy called stating that patient is allergic to Quinolone. [facility] pharmacy is asking if we can prescribe an alternative medication for this patient? Please advise.    3. Patient approves office to leave a detailed voicemail/MyChart message: N\A

## 2019-12-18 NOTE — TELEPHONE ENCOUNTER
Patient came into the office asking if she had a urine infection.  I advised that we did not have the results available.  Please advise.    Thank you.

## 2019-12-18 NOTE — TELEPHONE ENCOUNTER
Looks like there is still an infection.  I don't have culture results yet, but we can treat based on the last test.  Cipro is being sent in.

## 2019-12-19 NOTE — TELEPHONE ENCOUNTER
I don't have that down as an allergy.  Please add fluoroquinolones as allergy.  Keflex sent over.

## 2020-01-08 ENCOUNTER — TELEPHONE (OUTPATIENT)
Dept: VASCULAR LAB | Facility: MEDICAL CENTER | Age: 85
End: 2020-01-08

## 2020-01-08 NOTE — TELEPHONE ENCOUNTER
[facility]    Pt reports she has her last dose of Praluent she will administer on 1/9/20.  She has received a PASS application they are requesting she fills out entirely but pt has been waiting until our clinic agreed.  Instructed pt to fill out the PASS paperwork and send it in today.  Pt will call the clinic if she hasn't heard back from PASS by 1/23/20.    Chandler Brown, PharmD     CC  Johnny Cates MA

## 2020-01-14 ENCOUNTER — HOSPITAL ENCOUNTER (OUTPATIENT)
Dept: LAB | Facility: MEDICAL CENTER | Age: 85
End: 2020-01-14
Attending: FAMILY MEDICINE
Payer: MEDICARE

## 2020-01-14 DIAGNOSIS — N39.0 RECURRENT UTI: ICD-10-CM

## 2020-01-14 LAB
APPEARANCE UR: ABNORMAL
BACTERIA #/AREA URNS HPF: ABNORMAL /HPF
BILIRUB UR QL STRIP.AUTO: ABNORMAL
COLOR UR: YELLOW
EPI CELLS #/AREA URNS HPF: NEGATIVE /HPF
GLUCOSE UR STRIP.AUTO-MCNC: NEGATIVE MG/DL
HYALINE CASTS #/AREA URNS LPF: ABNORMAL /LPF
KETONES UR STRIP.AUTO-MCNC: NEGATIVE MG/DL
LEUKOCYTE ESTERASE UR QL STRIP.AUTO: ABNORMAL
MICRO URNS: ABNORMAL
NITRITE UR QL STRIP.AUTO: NEGATIVE
PH UR STRIP.AUTO: 6 [PH] (ref 5–8)
PROT UR QL STRIP: NEGATIVE MG/DL
RBC # URNS HPF: ABNORMAL /HPF
RBC UR QL AUTO: NEGATIVE
SP GR UR STRIP.AUTO: 1.02
UROBILINOGEN UR STRIP.AUTO-MCNC: 0.2 MG/DL
WBC #/AREA URNS HPF: ABNORMAL /HPF

## 2020-01-14 PROCEDURE — 87086 URINE CULTURE/COLONY COUNT: CPT

## 2020-01-14 PROCEDURE — 81001 URINALYSIS AUTO W/SCOPE: CPT

## 2020-01-14 PROCEDURE — 87077 CULTURE AEROBIC IDENTIFY: CPT

## 2020-01-14 PROCEDURE — 87186 SC STD MICRODIL/AGAR DIL: CPT

## 2020-01-15 ENCOUNTER — TELEPHONE (OUTPATIENT)
Dept: VASCULAR LAB | Facility: MEDICAL CENTER | Age: 85
End: 2020-01-15

## 2020-01-15 NOTE — TELEPHONE ENCOUNTER
[facility]    Pt reports she is received paperwork from PASS that she does not understand.  Pt wishes to bring it to the clinic to discuss.  Also, pt will need one sample of Praluent when she comes to drop off the paperwork.    Chandler Brown, PharmD

## 2020-01-16 DIAGNOSIS — B96.20 E. COLI UTI: ICD-10-CM

## 2020-01-16 DIAGNOSIS — N39.0 E. COLI UTI: ICD-10-CM

## 2020-01-16 RX ORDER — NITROFURANTOIN 25; 75 MG/1; MG/1
100 CAPSULE ORAL 2 TIMES DAILY
Qty: 14 CAP | Refills: 0 | Status: SHIPPED
Start: 2020-01-16 | End: 2020-02-15

## 2020-01-17 ENCOUNTER — HOSPITAL ENCOUNTER (OUTPATIENT)
Dept: RADIOLOGY | Facility: MEDICAL CENTER | Age: 85
End: 2020-01-17
Attending: NURSE PRACTITIONER
Payer: MEDICARE

## 2020-01-17 ENCOUNTER — OFFICE VISIT (OUTPATIENT)
Dept: URGENT CARE | Facility: PHYSICIAN GROUP | Age: 85
End: 2020-01-17
Payer: MEDICARE

## 2020-01-17 VITALS
OXYGEN SATURATION: 97 % | SYSTOLIC BLOOD PRESSURE: 142 MMHG | DIASTOLIC BLOOD PRESSURE: 80 MMHG | BODY MASS INDEX: 23.74 KG/M2 | RESPIRATION RATE: 14 BRPM | HEART RATE: 77 BPM | HEIGHT: 62 IN | WEIGHT: 129 LBS | TEMPERATURE: 98.6 F

## 2020-01-17 DIAGNOSIS — S69.92XA INJURY OF LEFT HAND, INITIAL ENCOUNTER: ICD-10-CM

## 2020-01-17 DIAGNOSIS — S69.92XA INJURY OF LEFT WRIST, INITIAL ENCOUNTER: ICD-10-CM

## 2020-01-17 DIAGNOSIS — S63.502A SPRAIN OF LEFT WRIST, INITIAL ENCOUNTER: ICD-10-CM

## 2020-01-17 DIAGNOSIS — M79.642 LEFT HAND PAIN: ICD-10-CM

## 2020-01-17 PROCEDURE — 99214 OFFICE O/P EST MOD 30 MIN: CPT | Performed by: NURSE PRACTITIONER

## 2020-01-17 PROCEDURE — 73110 X-RAY EXAM OF WRIST: CPT | Mod: LT

## 2020-01-17 PROCEDURE — 73130 X-RAY EXAM OF HAND: CPT | Mod: LT

## 2020-01-17 ASSESSMENT — PAIN SCALES - GENERAL: PAINLEVEL: 9=SEVERE PAIN

## 2020-01-18 NOTE — ADDENDUM NOTE
Addended by: JYOTHI COVARRUBIAS on: 1/17/2020 06:01 PM     Modules accepted: Orders, Level of Service

## 2020-01-18 NOTE — PROGRESS NOTES
Chief Complaint   Patient presents with   • Wrist Injury     Pt states she fell on on ice this morning, L wrist pain       HISTORY OF PRESENT ILLNESS: Patient is a 87 y.o. female who presents to urgent care today with complaints of left hand and wrist pain.  Patient notes that she had a mechanical, accidental fall on the ice this morning.  She fell with her left hand stretched out.  She immediately developed pain to her left hand and wrist and has had pain since.  She denies other areas of injury or trauma.  She has tried rest and ice for symptom relief.    Patient Active Problem List    Diagnosis Date Noted   • Low vitamin D level 04/20/2018   • Chronic insomnia 01/10/2017   • Pancreatic cyst 12/22/2016   • SVT (supraventricular tachycardia) ([facility]) - on Zio 2015 and 2019 08/17/2015   • Abdominal aortic aneurysm ([facility]) - MRA 2.9 cm 9/2018 saccular aneurysm 07/17/2015   • RUY treated with BiPAP 04/17/2014   • Atherosclerosis of native arteries of extremity with intermittent claudication ([facility]) 10/16/2013   • Varicose veins 05/29/2012   • Vitamin D deficiency disease    • Female bladder prolapse, acquired    • Essential hypertension    • Dyslipidemia    • GERD (gastroesophageal reflux disease)    • Age-related osteoporosis without current pathological fracture    • Blepharospasm syndrome        Allergies:Atorvastatin; Bactrim ds; Pneumococcal vaccines; Sulfa drugs; Vicodin [hydrocodone-acetaminophen]; and Vytorin    Current Outpatient Medications Ordered in Epic   Medication Sig Dispense Refill   • nitrofurantoin monohyd macro (MACROBID) 100 MG Cap Take 1 Cap by mouth 2 times a day. 14 Cap 0   • Alirocumab (PRALUENT) 75 MG/ML Solution Pen-injector Inject 75 mg as instructed every 14 days. Subcutaneously. 6 PEN 3   • PREMARIN 0.625 MG/GM Cream INSERT 1 GRAM VAGINALLY 3 TIMES A WEEK 30 g 1   • metoprolol (LOPRESSOR) 25 MG Tab TAKE 1 TABLET BY MOUTH TWICE DAILY 180 Tab 2   • Cholecalciferol (VITAMIN D3) 2000 UNIT Cap TAKE 2  CAPSULES (4000 UNITS) BY MOUTH ONCE DAILY 60 Cap 11   • ezetimibe (ZETIA) 10 MG Tab Take 1 Tab by mouth every day. 30 Tab 11   • triamcinolone acetonide (KENALOG) 0.1 % Ointment Apply thin layer to affected area twice daily as needed 1 Tube 0   • Multiple Vitamins-Minerals (ICAPS AREDS 2) Cap Take  by mouth.     • Multiple Vitamins-Minerals (CENTRUM SILVER PO) Take 1 Tab by mouth every day.       No current [facility]-ordered facility-administered medications on file.        Past Medical History:   Diagnosis Date   • AAA (abdominal aortic aneurysm) ([facility])    • Atherosclerosis of native arteries of the extremities with intermittent claudication 10/16/2013   • Blepharospasm syndrome    • Bright red rectal bleeding 2015   • Chest pain 2015    Unspecified; Nuclear stress test negative    • Constipation    • Edema 2012   • Female bladder prolapse, acquired    • GERD (gastroesophageal reflux disease)    • Hyperlipidemia    • Hypertension    • Insomnia    • OSTEOPOROSIS    • Palpitations 2012   • Sleep apnea    • SVT (supraventricular tachycardia) ([facility]) - on Zio  and 2015   • Varicose veins 2012   • Vitamin D deficiency disease        Social History     Tobacco Use   • Smoking status: Never Smoker   • Smokeless tobacco: Never Used   Substance Use Topics   • Alcohol use: Not Currently     Alcohol/week: 0.0 oz     Comment: occasionally   • Drug use: No       Family Status   Relation Name Status   • Mo 88.5  at age 88   • Fa 87.5  at age 87   • Bro 6  at age 63   • Bro 49  at age 49   • Bro 63  at age 6        car accident   • MGMo     • MGFa     • PGMo     • PGFa     • Son 63 Alive     Family History   Problem Relation Age of Onset   • Non-contributory Mother         gall bladder surgery   • Heart Disease Mother    • Cancer Father         colon rectal   • Heart Disease Father         rheumatic heart disease   • Other Brother  "        HIT BY CAR   • Heart Disease Brother    • Sleep Apnea Brother    • No Known Problems Maternal Grandmother    • No Known Problems Maternal Grandfather    • No Known Problems Paternal Grandmother    • No Known Problems Paternal Grandfather    • No Known Problems Son        ROS:  Review of Systems   Constitutional: Negative for fever, chills, weight loss, malaise, and fatigue.   HENT: Negative for ear pain, nosebleeds, congestion, sore throat and neck pain.    Eyes: Negative for vision changes.   Neuro: Negative for headache, sensory changes, weakness, seizure, LOC.   Cardiovascular: Negative for chest pain, palpitations, orthopnea and leg swelling.   Respiratory: Negative for cough, sputum production, shortness of breath and wheezing.   Gastrointestinal: Negative for abdominal pain, nausea, vomiting or diarrhea.   Genitourinary: Negative for dysuria, urgency and frequency.  Musculoskeletal: Positive for falls, left hand and wrist pain.  Negative for neck pain, back pain, myalgias.   Skin: Negative for rash, diaphoresis.     Exam:  /80 (BP Location: Right arm, Patient Position: Sitting, BP Cuff Size: Adult)   Pulse 77   Temp 37 °C (98.6 °F) (Temporal)   Resp 14   Ht 1.575 m (5' 2\")   Wt 58.5 kg (129 lb)   SpO2 97%   General: well-nourished, well-developed female in NAD  Head: normocephalic, atraumatic  Eyes: PERRLA, no conjunctival injection, acuity grossly intact, lids normal.  Ears: normal shape and symmetry, no tenderness, no discharge. External canals are without any significant edema or erythema. Tympanic membranes are without any inflammation, no effusion. Gross auditory acuity is intact.  Nose: symmetrical without tenderness, no discharge.  Mouth/Throat: reasonable hygiene, no erythema, exudates or tonsillar enlargement.  Neck: no masses, range of motion within normal limits, no tracheal deviation. No obvious thyroid enlargement.   Lymph: no cervical adenopathy. No supraclavicular adenopathy. " "  Neuro: alert and oriented. Cranial nerves 1-12 grossly intact. No sensory deficit.   Cardiovascular: regular rate and rhythm. No edema.  Pulmonary: no distress. Chest is symmetrical with respiration, no wheezes, crackles, or rhonchi.   Musculoskeletal: no clubbing, appropriate muscle tone, gait is stable.  There is no midline spinal tenderness.  Left hand: Mild swelling noted over first and second metacarpal with associated tenderness, no tenderness over phalanges, full range of motion fingers, distal neurovascular is intact.  Left wrist: There is swelling and tenderness noted to bilateral aspects of wrist, limited range of motion secondary to pain.  Skin: warm, dry, intact, no clubbing, no cyanosis, no rashes.   Psych: appropriate mood, affect, judgement.         DX left hand radiology reading \"No acute osseous abnormality. If pain persists, repeat radiographs in 7-10 days is recommended.\"    DX left wrist radiology reading \"Osteopenia and degenerative change without evidence of fracture. Recommend follow-up x-ray in 7 days of pain persist to evaluate for occult fracture.\"        Assessment/Plan:  1. Sprain of left wrist, initial encounter  DX-WRIST-COMPLETE 3+ LEFT    REFERRAL TO ORTHOPEDICS    acetaminophen-codeine #3 (TYLENOL #3) 300-30 MG Tab   2. Injury of left hand, initial encounter  DX-HAND 3+ LEFT    REFERRAL TO ORTHOPEDICS    acetaminophen-codeine #3 (TYLENOL #3) 300-30 MG Tab           Wrist and hand x-rays are negative for acute bony process.  Suspect soft tissue injury.  Patient is placed in a wrist splint with improvement.  RICE.  Referral to orthopedics placed. Tylenol #3, sedative effects of medication discussed with patient.   Supportive care, differential diagnoses, and indications for immediate follow-up discussed with patient.   Pathogenesis of diagnosis discussed including typical length and natural progression.   Instructed to return to clinic or nearest emergency department for any change " in condition, further concerns, or worsening of symptoms.  Patient states understanding of the plan of care and discharge instructions.  Instructed to make an appointment, for follow up, with her primary care provider.  [facility] Aware web site evaluation: I have obtained and reviewed patient utilization report from [facility] pharmacy database prior to writing prescription for controlled substance II, III or IV per [place] bill . Based on the report and my clinical assessment the prescription is medically necessary.         Please note that this dictation was created using voice recognition software. I have made every reasonable attempt to correct obvious errors, but I expect that there are errors of grammar and possibly content that I did not discover before finalizing the note.      FABRIZIO Bailey.

## 2020-01-19 ENCOUNTER — OFFICE VISIT (OUTPATIENT)
Dept: URGENT CARE | Facility: PHYSICIAN GROUP | Age: 85
End: 2020-01-19
Payer: MEDICARE

## 2020-01-19 VITALS
RESPIRATION RATE: 14 BRPM | TEMPERATURE: 97.2 F | SYSTOLIC BLOOD PRESSURE: 130 MMHG | WEIGHT: 129 LBS | BODY MASS INDEX: 23.74 KG/M2 | DIASTOLIC BLOOD PRESSURE: 70 MMHG | HEIGHT: 62 IN | OXYGEN SATURATION: 94 % | HEART RATE: 80 BPM

## 2020-01-19 DIAGNOSIS — S63.502D SPRAIN OF LEFT WRIST, SUBSEQUENT ENCOUNTER: ICD-10-CM

## 2020-01-19 DIAGNOSIS — S69.92XD INJURY OF LEFT HAND, SUBSEQUENT ENCOUNTER: ICD-10-CM

## 2020-01-19 PROCEDURE — 99213 OFFICE O/P EST LOW 20 MIN: CPT | Performed by: PHYSICIAN ASSISTANT

## 2020-01-19 ASSESSMENT — ENCOUNTER SYMPTOMS
NAUSEA: 0
EYE REDNESS: 0
FEVER: 0
COUGH: 0
JOINT SWELLING: 1
EYE DISCHARGE: 0
VOMITING: 0

## 2020-01-19 NOTE — PROGRESS NOTES
Subjective:      Nini Taylor is a 87 y.o. female who presents with Hand Pain (L thumb pain)        The patient returns to clinic c/o continued left wrist and hand pain, specifically pain to the base of the left thumb.  The patient believes her continued pain/discomfort is secondary to the Ortho-Glass splint which was placed on 1/17 after the patient slipped on the ice.  The patient had negative x-rays at that time.  The patient reports swelling to her fingers.  No numbness, tingling, or weakness.  The patient has been taking the Tylenol #3 and Aleve for her pain.  The patient is requesting for the Ortho-Glass splint to be removed at this time.    The patient states she is planning on following up with orthopedics on Tuesday.    Hand Injury   This is a recurrent problem. Episode onset: x 2 days ago. The problem occurs constantly. The problem has been unchanged. Associated symptoms include joint swelling. Pertinent negatives include no congestion, coughing, fever, nausea, rash or vomiting. Exacerbated by: movement of left wrist, hand, and thumb. She has tried acetaminophen for the symptoms. The treatment provided mild relief.     PMH:  has a past medical history of AAA (abdominal aortic aneurysm) ([facility]), Atherosclerosis of native arteries of the extremities with intermittent claudication (10/16/2013), Blepharospasm syndrome, Bright red rectal bleeding (6/24/2015), Chest pain (07/2015), Constipation, Edema (5/29/2012), Female bladder prolapse, acquired, GERD (gastroesophageal reflux disease), Hyperlipidemia, Hypertension, Insomnia, OSTEOPOROSIS, Palpitations (5/29/2012), Sleep apnea, SVT (supraventricular tachycardia) ([facility]) - on Zio 2015 and 2019 (8/17/2015), Varicose veins (5/29/2012), and Vitamin D deficiency disease.  MEDS:   Current Outpatient Medications:   •  acetaminophen-codeine #3 (TYLENOL #3) 300-30 MG Tab, Take 1-2 Tabs by mouth every 6 hours as needed for Moderate Pain or Severe Pain for up to 5 days.  Do not drive, drink alcohol or engaged in potentially hazardous activity while taking this medication., Disp: 20 Tab, Rfl: 0  •  Alirocumab (PRALUENT) 75 MG/ML Solution Pen-injector, Inject 75 mg as instructed every 14 days. Subcutaneously., Disp: 6 PEN, Rfl: 3  •  PREMARIN 0.625 MG/GM Cream, INSERT 1 GRAM VAGINALLY 3 TIMES A WEEK, Disp: 30 g, Rfl: 1  •  metoprolol (LOPRESSOR) 25 MG Tab, TAKE 1 TABLET BY MOUTH TWICE DAILY, Disp: 180 Tab, Rfl: 2  •  Cholecalciferol (VITAMIN D3) 2000 UNIT Cap, TAKE 2 CAPSULES (4000 UNITS) BY MOUTH ONCE DAILY, Disp: 60 Cap, Rfl: 11  •  ezetimibe (ZETIA) 10 MG Tab, Take 1 Tab by mouth every day., Disp: 30 Tab, Rfl: 11  •  Multiple Vitamins-Minerals (ICAPS AREDS 2) Cap, Take  by mouth., Disp: , Rfl:   •  Multiple Vitamins-Minerals (CENTRUM SILVER PO), Take 1 Tab by mouth every day., Disp: , Rfl:   •  nitrofurantoin monohyd macro (MACROBID) 100 MG Cap, Take 1 Cap by mouth 2 times a day. (Patient not taking: Reported on 1/19/2020), Disp: 14 Cap, Rfl: 0  •  triamcinolone acetonide (KENALOG) 0.1 % Ointment, Apply thin layer to affected area twice daily as needed, Disp: 1 Tube, Rfl: 0  ALLERGIES:   Allergies   Allergen Reactions   • Atorvastatin    • Bactrim Ds Hives   • Pneumococcal Vaccines Swelling   • Sulfa Drugs Hives   • Vicodin [Hydrocodone-Acetaminophen] Rash   • Vytorin      SURGHX:   Past Surgical History:   Procedure Laterality Date   • CYSTOCELE REPAIR  1/17/2011    [facility]   • RECTOCELE REPAIR  1/17/2011    [facility]   • BLADDER SUSPENSION  1/17/2011    [facility]   • CYSTOSCOPY  1/17/2011    [facility]   • ABDOMINAL HYSTERECTOMY TOTAL     • ARTHROSCOPY, KNEE     • CATARACT EXTRACTION WITH IOL     • HEMORRHOIDECTOMY     • HYSTERECTOMY, TOTAL ABDOMINAL     •  "INJECTION SCLERO HEMORROIDS     • OTHER ORTHOPEDIC SURGERY      knee scope x 2   • PB KNEE SCOPE,DIAGNOSTIC  2003;2009   • TONSILLECTOMY       SOCHX:  reports that she has never smoked. She has never used smokeless tobacco. She reports previous alcohol use. She reports that she does not use drugs.  FH: Family history was reviewed, no pertinent findings to report      Review of Systems   Constitutional: Negative for fever.   HENT: Negative for congestion.    Eyes: Negative for discharge and redness.   Respiratory: Negative for cough.    Gastrointestinal: Negative for nausea and vomiting.   Musculoskeletal: Positive for joint pain (left wrist and hand) and joint swelling.   Skin: Negative for rash.          Objective:     /70   Pulse 80   Temp 36.2 °C (97.2 °F) (Temporal)   Resp 14   Ht 1.575 m (5' 2\")   Wt 58.5 kg (129 lb)   SpO2 94%   BMI 23.59 kg/m²      Physical Exam  Constitutional:       General: She is not in acute distress.     Appearance: Normal appearance. She is well-developed.   HENT:      Head: Normocephalic and atraumatic.      Right Ear: External ear normal.      Left Ear: External ear normal.      Nose: Nose normal.   Eyes:      Extraocular Movements: Extraocular movements intact.      Conjunctiva/sclera: Conjunctivae normal.   Neck:      Musculoskeletal: Normal range of motion and neck supple.   Cardiovascular:      Rate and Rhythm: Normal rate.   Pulmonary:      Effort: Pulmonary effort is normal.   Musculoskeletal:      Comments:   Left Wrist/Hand:  Diffuse tenderness to the patient's left wrist and hand with localized tenderness over the anatomical snuffbox.  Diffuse swelling to the patient's left hand and digits.  No ecchymosis.  No erythema.  Decreased ROM - the patient reports increased pain with flexion extension of her left thumb, as well as flexion extension of her left wrist.  Neurovascular intact   Skin:     General: Skin is warm and dry.   Neurological:      Mental Status: She " is alert and oriented to person, place, and time.            Progress:  Removed the patient's previous wrist splint.     Re-applied a new volar wrist splint to provide more comfort to the patient.      Assessment/Plan:     1. Injury of left hand, subsequent encounter    2. Sprain of left wrist, subsequent encounter    Differential diagnoses, supportive care, and indications for immediate follow-up discussed with patient.   Instructed to return to clinic or nearest emergency department for any change in condition, further concerns, or worsening of symptoms.    OTC NSAIDs for pain/discomfort   RICE  Wear splint for additional support  Follow-up with Orthopedics as scheduled.   Follow-up with PCP as needed   Return to clinic or go tot he ED if symptoms worsen or fail to improve, or if the patient should develop worsening/increasing pain/tenderness, swelling, bruising, redness or warmth to the affected area, decreased ROM, numbness, tingling or weakness, fever/chills, and/or any concerning symptoms.     Discussed plan with the patient, and she agrees to the above.

## 2020-01-21 ENCOUNTER — TELEPHONE (OUTPATIENT)
Dept: VASCULAR LAB | Facility: MEDICAL CENTER | Age: 85
End: 2020-01-21

## 2020-01-21 NOTE — TELEPHONE ENCOUNTER
[facility]    Received paperwork from this pt at her request to help her with the PASS program.  She is under the impression she had to complete this paperwork for PASS.  When I viewed the paperwork it is to document an adverse reaction to praluent (post-marketing evaluation).  The company has it prefilled for her hearing loss.  Per the pt she reports she was diagnosed with hearing loss 10-12 years ago.  This is a miscommunication between the pt and the drug company and this is not her PASS paperwork.      She is due for another praluent injection on 1/23/20.  She would like to take the paperwork back although I advised her that this paperwork to report an adverse reaction is likely a miscommunication (pt is hard of hearing and was hard of hearing prior to praluent therapy).  She will  the paperwork and we will provide her with a singe sample until we can sort out what PASS is needing to continue her praluent therapy.    Chandler Brown, PharmD     CC  Johnny Cates MA

## 2020-01-22 DIAGNOSIS — E78.5 DYSLIPIDEMIA: ICD-10-CM

## 2020-01-24 ENCOUNTER — TELEPHONE (OUTPATIENT)
Dept: VASCULAR LAB | Facility: MEDICAL CENTER | Age: 85
End: 2020-01-24

## 2020-01-24 NOTE — TELEPHONE ENCOUNTER
[facility]    Spoke with PASS representative concerning this pt's pending approval.  They state her application is still under review and they do not know an estimated timeframe for approval.   When I spoke with the pt yesterday, she has two injections at home which will help her for the next 4 weeks.      Chandler Brown, PharmD

## 2020-01-28 ENCOUNTER — TELEPHONE (OUTPATIENT)
Dept: SLEEP MEDICINE | Facility: MEDICAL CENTER | Age: 85
End: 2020-01-28

## 2020-01-28 NOTE — TELEPHONE ENCOUNTER
"Patient brought mask in today for fitting. She recently received new mask. She was ordered F30, but reports she could not get it to fit without leaking into her eyes. She is than was given an F20 (For Her). She wanted to come tell us, which mask she has now, \"so we are on the same page\" and show that she is trying to make it work.  "

## 2020-02-04 ENCOUNTER — TELEPHONE (OUTPATIENT)
Dept: VASCULAR LAB | Facility: MEDICAL CENTER | Age: 85
End: 2020-02-04

## 2020-02-04 NOTE — TELEPHONE ENCOUNTER
[facility]    Received a phone call from [facility] explaining the pt has commercial insurance and therefore could qualify for a copay card.  The [employer] representative tried running a copay card for the pt but it was denied because [facility] is trying to process the pt for the PASS program.      I spoke with the PASS program and they are still processing the application.      I suggest we continue to wait until the PASS program accepts or rejects the pt.  I do not want to stop the process thus far as the co-pay card only covers $3,500 of the cost.  I am uncertain if the pt would need the PASS program later in the year.      If the pt is denied for the PASS program or continues to still have an expensive out-of-pocket expense, will suggest moving to the co-pay card instead.    Pt was given this update    Chandler Brown, PharmD     CC  Kaysi O'Rayeh MA Dr Bloch

## 2020-02-13 ENCOUNTER — HOSPITAL ENCOUNTER (OUTPATIENT)
Facility: MEDICAL CENTER | Age: 85
End: 2020-02-13
Attending: FAMILY MEDICINE
Payer: MEDICARE

## 2020-02-13 ENCOUNTER — HOSPITAL ENCOUNTER (OUTPATIENT)
Dept: LAB | Facility: MEDICAL CENTER | Age: 85
End: 2020-02-13
Attending: NURSE PRACTITIONER
Payer: MEDICARE

## 2020-02-13 DIAGNOSIS — N39.0 RECURRENT UTI: ICD-10-CM

## 2020-02-13 DIAGNOSIS — E78.5 DYSLIPIDEMIA: ICD-10-CM

## 2020-02-13 PROCEDURE — 87086 URINE CULTURE/COLONY COUNT: CPT

## 2020-02-13 PROCEDURE — 87077 CULTURE AEROBIC IDENTIFY: CPT

## 2020-02-13 PROCEDURE — 81001 URINALYSIS AUTO W/SCOPE: CPT

## 2020-02-13 PROCEDURE — 87186 SC STD MICRODIL/AGAR DIL: CPT

## 2020-02-14 ENCOUNTER — TELEPHONE (OUTPATIENT)
Dept: MEDICAL GROUP | Facility: PHYSICIAN GROUP | Age: 85
End: 2020-02-14

## 2020-02-14 NOTE — TELEPHONE ENCOUNTER
VOICEMAIL  1. Caller Name: Nini Taylor                        Call Back Number: [phone] (home)       2. Message: pt did a UA with culture yesterday and was hoping to get some medication before the 3 day weekend    3. Patient approves office to leave a detailed voicemail/MyChart message: N\A

## 2020-02-15 ENCOUNTER — OFFICE VISIT (OUTPATIENT)
Dept: URGENT CARE | Facility: PHYSICIAN GROUP | Age: 85
End: 2020-02-15
Payer: MEDICARE

## 2020-02-15 ENCOUNTER — HOSPITAL ENCOUNTER (OUTPATIENT)
Facility: MEDICAL CENTER | Age: 85
End: 2020-02-15
Attending: FAMILY MEDICINE
Payer: MEDICARE

## 2020-02-15 VITALS
DIASTOLIC BLOOD PRESSURE: 82 MMHG | RESPIRATION RATE: 16 BRPM | BODY MASS INDEX: 23.59 KG/M2 | TEMPERATURE: 98.1 F | SYSTOLIC BLOOD PRESSURE: 128 MMHG | HEART RATE: 75 BPM | WEIGHT: 129 LBS | OXYGEN SATURATION: 96 %

## 2020-02-15 DIAGNOSIS — N30.00 ACUTE CYSTITIS WITHOUT HEMATURIA: ICD-10-CM

## 2020-02-15 DIAGNOSIS — R39.9 UTI SYMPTOMS: ICD-10-CM

## 2020-02-15 LAB
APPEARANCE UR: NORMAL
BILIRUB UR STRIP-MCNC: NEGATIVE MG/DL
COLOR UR AUTO: NORMAL
GLUCOSE UR STRIP.AUTO-MCNC: NEGATIVE MG/DL
KETONES UR STRIP.AUTO-MCNC: NEGATIVE MG/DL
LEUKOCYTE ESTERASE UR QL STRIP.AUTO: NORMAL
NITRITE UR QL STRIP.AUTO: NEGATIVE
PH UR STRIP.AUTO: 5 [PH] (ref 5–8)
PROT UR QL STRIP: NEGATIVE MG/DL
RBC UR QL AUTO: NORMAL
SP GR UR STRIP.AUTO: 1.01
UROBILINOGEN UR STRIP-MCNC: 0.2 MG/DL

## 2020-02-15 PROCEDURE — 87077 CULTURE AEROBIC IDENTIFY: CPT

## 2020-02-15 PROCEDURE — 87086 URINE CULTURE/COLONY COUNT: CPT

## 2020-02-15 PROCEDURE — 87186 SC STD MICRODIL/AGAR DIL: CPT

## 2020-02-15 PROCEDURE — 99214 OFFICE O/P EST MOD 30 MIN: CPT | Performed by: FAMILY MEDICINE

## 2020-02-15 PROCEDURE — 81002 URINALYSIS NONAUTO W/O SCOPE: CPT | Performed by: FAMILY MEDICINE

## 2020-02-15 RX ORDER — CEFDINIR 300 MG/1
300 CAPSULE ORAL EVERY 12 HOURS
Qty: 10 CAP | Refills: 0 | Status: SHIPPED | OUTPATIENT
Start: 2020-02-15 | End: 2020-02-20

## 2020-02-15 ASSESSMENT — ENCOUNTER SYMPTOMS
FLANK PAIN: 0
BACK PAIN: 0
ABDOMINAL PAIN: 0
NAUSEA: 0
VOMITING: 0

## 2020-02-15 NOTE — PROGRESS NOTES
Subjective:      Nini Taylor is a 87 y.o. female who presents with UTI (seen recently needs results from previous tests)            UTI   This is a new problem. Episode onset: Past few days.  She did a urine sample by her primary care doctor 2 days ago but nobody contacted her and she tried to contact and get a hold of the doctor and the report. The problem has been unchanged. Pertinent negatives include no abdominal pain, nausea or vomiting. Nothing aggravates the symptoms. She has tried nothing for the symptoms. The treatment provided no relief.       Review of Systems   Gastrointestinal: Negative for abdominal pain, nausea and vomiting.   Genitourinary: Positive for frequency and urgency. Negative for dysuria, flank pain and hematuria.   Musculoskeletal: Negative for back pain.   All other systems reviewed and are negative.         Objective:     /82 (BP Location: Left arm, Patient Position: Sitting, BP Cuff Size: Adult)   Pulse 75   Temp 36.7 °C (98.1 °F) (Temporal)   Resp 16   Wt 58.5 kg (129 lb)   SpO2 96%   BMI 23.59 kg/m²      Physical Exam  Constitutional:       General: She is not in acute distress.     Appearance: She is not ill-appearing, toxic-appearing or diaphoretic.   HENT:      Head: Normocephalic and atraumatic.      Nose: Nose normal.   Eyes:      Conjunctiva/sclera: Conjunctivae normal.   Cardiovascular:      Rate and Rhythm: Normal rate.   Pulmonary:      Effort: Pulmonary effort is normal. No respiratory distress.      Breath sounds: No stridor.   Abdominal:      Tenderness: There is abdominal tenderness in the suprapubic area. There is no right CVA tenderness or left CVA tenderness.   Skin:     Coloration: Skin is not jaundiced or pale.   Neurological:      Mental Status: She is alert.   Psychiatric:         Mood and Affect: Mood normal.           UA today slightly abnormal but the UA from 2 days ago is also abnormal     Assessment/Plan:   ASSESSMENT:PLAN:  1. Acute cystitis  without hematuria  - Urine Culture; Future  - cefdinir (OMNICEF) 300 MG Cap; Take 1 Cap by mouth every 12 hours for 5 days.  Dispense: 10 Cap; Refill: 0    2. UTI symptoms  - POCT Urinalysis    Continue symptomatic care  Plan per orders and instructions  Warning signs reviewed  .

## 2020-02-15 NOTE — PATIENT INSTRUCTIONS
Follow up if not significantly improved as expected in 2-3 days, sooner if any worsening or new symptoms      Urinary Tract Infection, Adult  Introduction  A urinary tract infection (UTI) is an infection of any part of the urinary tract. The urinary tract includes the:  · Kidneys.  · Ureters.  · Bladder.  · Urethra.  These organs make, store, and get rid of pee (urine) in the body.  Follow these instructions at home:  · Take over-the-counter and prescription medicines only as told by your doctor.  · If you were prescribed an antibiotic medicine, take it as told by your doctor. Do not stop taking the antibiotic even if you start to feel better.  · Avoid the following drinks:  ¨ Alcohol.  ¨ Caffeine.  ¨ Tea.  ¨ Carbonated drinks.  · Drink enough fluid to keep your pee clear or pale yellow.  · Keep all follow-up visits as told by your doctor. This is important.  · Make sure to:  ¨ Empty your bladder often and completely. Do not to hold pee for long periods of time.  ¨ Empty your bladder before and after sex.  ¨ Wipe from front to back after a bowel movement if you are female. Use each tissue one time when you wipe.  Contact a doctor if:  · You have back pain.  · You have a fever.  · You feel sick to your stomach (nauseous).  · You throw up (vomit).  · Your symptoms do not get better after 3 days.  · Your symptoms go away and then come back.  Get help right away if:  · You have very bad back pain.  · You have very bad lower belly (abdominal) pain.  · You are throwing up and cannot keep down any medicines or water.  This information is not intended to replace advice given to you by your health care provider. Make sure you discuss any questions you have with your health care provider.  Document Released: 06/05/2009 Document Revised: 05/25/2017 Document Reviewed: 11/07/2016  © 2017 [employer]vier

## 2020-02-27 ENCOUNTER — TELEPHONE (OUTPATIENT)
Dept: MEDICAL GROUP | Facility: PHYSICIAN GROUP | Age: 85
End: 2020-02-27

## 2020-02-27 DIAGNOSIS — N30.00 ACUTE CYSTITIS WITHOUT HEMATURIA: ICD-10-CM

## 2020-02-27 DIAGNOSIS — H91.93 BILATERAL HEARING LOSS, UNSPECIFIED HEARING LOSS TYPE: ICD-10-CM

## 2020-02-27 LAB
APPEARANCE UR: ABNORMAL
APPEARANCE UR: NORMAL
BACTERIA #/AREA URNS HPF: ABNORMAL /HPF
BACTERIA #/AREA URNS HPF: NORMAL /HPF
BACTERIA UR CULT: ABNORMAL
BILIRUB UR QL STRIP.AUTO: NEGATIVE
BILIRUB UR QL STRIP.AUTO: NORMAL
COLOR UR: NORMAL
COLOR UR: YELLOW
EPI CELLS #/AREA URNS HPF: ABNORMAL /HPF
EPI CELLS #/AREA URNS HPF: NORMAL /HPF
GLUCOSE UR STRIP.AUTO-MCNC: NEGATIVE MG/DL
GLUCOSE UR STRIP.AUTO-MCNC: NORMAL MG/DL
HYALINE CASTS #/AREA URNS LPF: ABNORMAL /LPF
HYALINE CASTS #/AREA URNS LPF: NORMAL /LPF
KETONES UR STRIP.AUTO-MCNC: NEGATIVE MG/DL
KETONES UR STRIP.AUTO-MCNC: NORMAL MG/DL
LEUKOCYTE ESTERASE UR QL STRIP.AUTO: ABNORMAL
LEUKOCYTE ESTERASE UR QL STRIP.AUTO: NORMAL
MICRO URNS: ABNORMAL
MICRO URNS: NORMAL
NITRITE UR QL STRIP.AUTO: NORMAL
NITRITE UR QL STRIP.AUTO: POSITIVE
PH UR STRIP.AUTO: 6 [PH] (ref 5–8)
PH UR STRIP.AUTO: NORMAL [PH] (ref 5–8)
PROT UR QL STRIP: 100 MG/DL
PROT UR QL STRIP: NORMAL MG/DL
RBC # URNS HPF: ABNORMAL /HPF
RBC # URNS HPF: NORMAL /HPF
RBC UR QL AUTO: ABNORMAL
RBC UR QL AUTO: NORMAL
SIGNIFICANT IND 70042: ABNORMAL
SIGNIFICANT IND 70042: ABNORMAL
SITE SITE: ABNORMAL
SITE SITE: ABNORMAL
SOURCE SOURCE: ABNORMAL
SOURCE SOURCE: ABNORMAL
SP GR UR STRIP.AUTO: 1.01
SP GR UR STRIP.AUTO: NORMAL
UROBILINOGEN UR STRIP.AUTO-MCNC: 0.2 MG/DL
UROBILINOGEN UR STRIP.AUTO-MCNC: NORMAL MG/DL
WBC #/AREA URNS HPF: ABNORMAL /HPF
WBC #/AREA URNS HPF: NORMAL /HPF

## 2020-02-27 RX ORDER — CEPHALEXIN 500 MG/1
500 CAPSULE ORAL 4 TIMES DAILY
Qty: 28 CAP | Refills: 0 | Status: SHIPPED | OUTPATIENT
Start: 2020-02-27 | End: 2020-03-05

## 2020-02-27 NOTE — TELEPHONE ENCOUNTER
----- Message from Nevaeh Munroe M.D. sent at 2/27/2020 12:28 PM PST -----  E. coli UTI sensitive to all of the antibiotics.  We will send in Keflex

## 2020-02-27 NOTE — TELEPHONE ENCOUNTER
Patient states that her ears are feeling very full and she thinks she might have ear wax.  She willem like to see ENT.  Referral is pending if you agree.  Please advise.    Thank you.

## 2020-02-28 NOTE — TELEPHONE ENCOUNTER
Patient is welcome to come here and have us look first and attempt irrigation if needed.  I did sign the referral to ENT just in case

## 2020-03-11 DIAGNOSIS — E78.5 HYPERLIPIDEMIA, UNSPECIFIED HYPERLIPIDEMIA TYPE: ICD-10-CM

## 2020-03-14 ENCOUNTER — HOSPITAL ENCOUNTER (OUTPATIENT)
Dept: LAB | Facility: MEDICAL CENTER | Age: 85
End: 2020-03-14
Attending: NURSE PRACTITIONER
Payer: MEDICARE

## 2020-03-14 DIAGNOSIS — E78.5 HYPERLIPIDEMIA, UNSPECIFIED HYPERLIPIDEMIA TYPE: ICD-10-CM

## 2020-03-14 LAB
ALBUMIN SERPL BCP-MCNC: 4 G/DL (ref 3.2–4.9)
ALBUMIN/GLOB SERPL: 1.3 G/DL
ALP SERPL-CCNC: 73 U/L (ref 30–99)
ALT SERPL-CCNC: 11 U/L (ref 2–50)
ANION GAP SERPL CALC-SCNC: 6 MMOL/L (ref 7–16)
AST SERPL-CCNC: 21 U/L (ref 12–45)
BILIRUB SERPL-MCNC: 1 MG/DL (ref 0.1–1.5)
BUN SERPL-MCNC: 16 MG/DL (ref 8–22)
CALCIUM SERPL-MCNC: 9.6 MG/DL (ref 8.5–10.5)
CHLORIDE SERPL-SCNC: 104 MMOL/L (ref 96–112)
CHOLEST SERPL-MCNC: 133 MG/DL (ref 100–199)
CO2 SERPL-SCNC: 27 MMOL/L (ref 20–33)
CREAT SERPL-MCNC: 1.02 MG/DL (ref 0.5–1.4)
GLOBULIN SER CALC-MCNC: 3 G/DL (ref 1.9–3.5)
GLUCOSE SERPL-MCNC: 92 MG/DL (ref 65–99)
HDLC SERPL-MCNC: 55 MG/DL
LDLC SERPL CALC-MCNC: 63 MG/DL
POTASSIUM SERPL-SCNC: 4.1 MMOL/L (ref 3.6–5.5)
PROT SERPL-MCNC: 7 G/DL (ref 6–8.2)
SODIUM SERPL-SCNC: 137 MMOL/L (ref 135–145)
TRIGL SERPL-MCNC: 75 MG/DL (ref 0–149)

## 2020-03-14 PROCEDURE — 80053 COMPREHEN METABOLIC PANEL: CPT

## 2020-03-14 PROCEDURE — 80061 LIPID PANEL: CPT

## 2020-03-14 PROCEDURE — 36415 COLL VENOUS BLD VENIPUNCTURE: CPT

## 2020-03-16 ENCOUNTER — SLEEP CENTER VISIT (OUTPATIENT)
Dept: SLEEP MEDICINE | Facility: MEDICAL CENTER | Age: 85
End: 2020-03-16
Payer: MEDICARE

## 2020-03-16 VITALS
HEIGHT: 62 IN | WEIGHT: 135 LBS | OXYGEN SATURATION: 95 % | RESPIRATION RATE: 14 BRPM | BODY MASS INDEX: 24.84 KG/M2 | HEART RATE: 74 BPM | DIASTOLIC BLOOD PRESSURE: 84 MMHG | SYSTOLIC BLOOD PRESSURE: 124 MMHG

## 2020-03-16 DIAGNOSIS — G47.33 OSA (OBSTRUCTIVE SLEEP APNEA): ICD-10-CM

## 2020-03-16 PROCEDURE — 99213 OFFICE O/P EST LOW 20 MIN: CPT | Performed by: FAMILY MEDICINE

## 2020-03-16 ASSESSMENT — FIBROSIS 4 INDEX: FIB4 SCORE: 2.95

## 2020-03-16 NOTE — PROGRESS NOTES
[facility] Follow Up Note     Date: 3/16/2020 / Time: 11:10 AM    Patient ID:   Name:             Nini Taylor     YOB: 1932  Age:                 87 y.o.  female   MRN:               [id number]      Thank you for requesting a sleep medicine consultation on Nini Taylor at the sleep center. She presents today with the chief complaints of RUY follow up.     HISTORY OF PRESENT ILLNESS:       Pt is currently on BiPAP 17/13 cm. She goes to sleep around 9-11 pm and wakes up around 7 am. She is getting about 7 hrs of sleep on a good night and about 6-4 hr of sleep on a bad night. The bad nights are 1-3 per week. Overall,  She does  finds her sleep refreshing if she gets adequate sleep She does not take regular naps. She is using BiPAP most days of the week. Pt reports 7 hrs of average nightly use of BiPAP. Pt denies snoring, gasping,choking.Pt also denies significant mask leak that is interfering with sleep. The 30 day compliance was downloaded which shows adequate compliance with more that 4 hr usage about 100%. The AHI is has improved to 4.8/hr. The mask leak is normal. The symptoms of excessive daytime, snoring and gasping has improved since she is on the treatment.     REVIEW OF SYSTEMS:       Constitutional: Denies fevers, Denies weight changes    Cardiovascular: Denies chest pain or palpitations   Respiratory: Denies shortness of breath , Denies cough  Gastrointestinal/Hepatic: Denies abdominal pain, nausea,  Genitourinary: Deniesdysuria or frequency  Musculoskeletal/Rheum: Denies  joint pain and swelling   Skin/Breast: Denies rash,   Neurological: Denies headache, confusion, memory loss or focal weakness/parasthesias  Psychiatric: denies mood disorder   Sleep: denies snoring     Comprehensive review of systems form is reviewed with the patient and is attached in the EMR.     PMH:  has a past medical history of AAA (abdominal aortic aneurysm) (HCC), Atherosclerosis of  native arteries of the extremities with intermittent claudication (10/16/2013), Blepharospasm syndrome, Bright red rectal bleeding (6/24/2015), Chest pain (07/2015), Constipation, Edema (5/29/2012), Female bladder prolapse, acquired, GERD (gastroesophageal reflux disease), Hyperlipidemia, Hypertension, Insomnia, OSTEOPOROSIS, Palpitations (5/29/2012), Sleep apnea, SVT (supraventricular tachycardia) (HCC) - on Zio 2015 and 2019 (8/17/2015), Varicose veins (5/29/2012), and Vitamin D deficiency disease.  MEDS:   Current Outpatient Medications:   •  Alirocumab (PRALUENT) 75 MG/ML Solution Pen-injector, Inject 75 mg as instructed every 14 days. Subcutaneously., Disp: 6 PEN, Rfl: 1  •  PREMARIN 0.625 MG/GM Cream, INSERT 1 GRAM VAGINALLY 3 TIMES A WEEK, Disp: 30 g, Rfl: 1  •  metoprolol (LOPRESSOR) 25 MG Tab, TAKE 1 TABLET BY MOUTH TWICE DAILY, Disp: 180 Tab, Rfl: 2  •  Cholecalciferol (VITAMIN D3) 2000 UNIT Cap, TAKE 2 CAPSULES (4000 UNITS) BY MOUTH ONCE DAILY, Disp: 60 Cap, Rfl: 11  •  ezetimibe (ZETIA) 10 MG Tab, Take 1 Tab by mouth every day., Disp: 30 Tab, Rfl: 11  •  triamcinolone acetonide (KENALOG) 0.1 % Ointment, Apply thin layer to affected area twice daily as needed, Disp: 1 Tube, Rfl: 0  •  Multiple Vitamins-Minerals (ICAPS AREDS 2) Cap, Take  by mouth., Disp: , Rfl:   •  Multiple Vitamins-Minerals (CENTRUM SILVER PO), Take 1 Tab by mouth every day., Disp: , Rfl:   ALLERGIES:   Allergies   Allergen Reactions   • Atorvastatin    • Bactrim Ds Hives   • Pneumococcal Vaccines Swelling   • Sulfa Drugs Hives   • Vicodin [Hydrocodone-Acetaminophen] Rash   • Vytorin      SURGHX:   Past Surgical History:   Procedure Laterality Date   • CYSTOCELE REPAIR  1/17/2011    Performed by GILMAR CHUNG at [facility]ERY [facility]   • RECTOCELE REPAIR  1/17/2011    Performed by GILMAR CHUNG at [facility]   • BLADDER SUSPENSION  1/17/2011    Performed by GILMAR CHUNG at SURGERY SAME DAY  "[facility] ORS   • CYSTOSCOPY  1/17/2011    Performed by GILMAR CHUNG at SURGERY SAME DAY [facility] ORS   • ABDOMINAL HYSTERECTOMY TOTAL     • ARTHROSCOPY, KNEE     • CATARACT EXTRACTION WITH IOL     • HEMORRHOIDECTOMY     • HYSTERECTOMY, TOTAL ABDOMINAL     • INJECTION SCLERO HEMORROIDS     • OTHER ORTHOPEDIC SURGERY      knee scope x 2   • PB KNEE SCOPE,DIAGNOSTIC  2003;2009   • TONSILLECTOMY       SOCHX:  reports that she has never smoked. She has never used smokeless tobacco. She reports previous alcohol use. She reports that she does not use drugs..  FH:   Family History   Problem Relation Age of Onset   • Non-contributory Mother         gall bladder surgery   • Heart Disease Mother    • Cancer Father         colon rectal   • Heart Disease Father         rheumatic heart disease   • Other Brother         HIT BY CAR   • Heart Disease Brother    • Sleep Apnea Brother    • No Known Problems Maternal Grandmother    • No Known Problems Maternal Grandfather    • No Known Problems Paternal Grandmother    • No Known Problems Paternal Grandfather    • No Known Problems Son          Physical Exam:  Vitals/ General Appearance:   Weight/BMI: Body mass index is 24.69 kg/m².  /84 (BP Location: Left arm, Patient Position: Sitting, BP Cuff Size: Adult)   Pulse 74   Resp 14   Ht 1.575 m (5' 2\")   Wt 61.2 kg (135 lb)   SpO2 95%   Vitals:    03/16/20 1100   BP: 124/84   BP Location: Left arm   Patient Position: Sitting   BP Cuff Size: Adult   Pulse: 74   Resp: 14   SpO2: 95%   Weight: 61.2 kg (135 lb)   Height: 1.575 m (5' 2\")       Pt. is alert and oriented to time, place and person. Cooperative and in no apparent distress.       -Head: Atraumatic, normocephalic.   -. Throat: Oropharynx appears crowded in that the palate is  overhanging   -. Neck: Supple. No thyromegaly  -. Chest: Trachea central, no spine deformity   -. Lungs auscultation: B/L good air entry, vesicular breath sounds, no adventitious sounds  -. " Heart auscultation: 1st and 2nd heart sounds normal, regular rhythm.+ murmur.  - Extremities: no clubbing, no pedal edema.  - Skin: No rash  - NEUROLOGICAL EXAMINATION: On neurological exam, the patient was alert and oriented x3. speech was clear and fluent without dysarthria.      ASSESSMENT AND PLAN     1. Sleep Apnea      She is urged to avoid supine sleep, weight gain and alcoholic beverages since all of these can worsen sleep apnea. She is cautioned against drowsy driving. If She feels sleepy while driving, She must pull over for a break/nap, rather than persist on the road, in the interest of She own safety and that of others on the road.   Plan   - Continue BiPAP 17/13 cm with F20 mask    - compliance download was reviewed and discussed with the pt   - compliance was reinforced     2. Regarding treatment of other past medical problems and general health maintenance,  She is urged to follow up with PCP.

## 2020-03-17 ENCOUNTER — HOSPITAL ENCOUNTER (OUTPATIENT)
Dept: LAB | Facility: MEDICAL CENTER | Age: 85
End: 2020-03-17
Attending: FAMILY MEDICINE
Payer: MEDICARE

## 2020-03-17 DIAGNOSIS — N39.0 RECURRENT UTI: ICD-10-CM

## 2020-03-17 LAB
APPEARANCE UR: ABNORMAL
BACTERIA #/AREA URNS HPF: ABNORMAL /HPF
BILIRUB UR QL STRIP.AUTO: NEGATIVE
COLOR UR: YELLOW
EPI CELLS #/AREA URNS HPF: ABNORMAL /HPF
GLUCOSE UR STRIP.AUTO-MCNC: NEGATIVE MG/DL
KETONES UR STRIP.AUTO-MCNC: NEGATIVE MG/DL
LEUKOCYTE ESTERASE UR QL STRIP.AUTO: ABNORMAL
MICRO URNS: ABNORMAL
NITRITE UR QL STRIP.AUTO: POSITIVE
PH UR STRIP.AUTO: 6 [PH] (ref 5–8)
PROT UR QL STRIP: NEGATIVE MG/DL
RBC # URNS HPF: ABNORMAL /HPF
RBC UR QL AUTO: ABNORMAL
SP GR UR STRIP.AUTO: 1.02
UROBILINOGEN UR STRIP.AUTO-MCNC: 0.2 MG/DL
WBC #/AREA URNS HPF: ABNORMAL /HPF

## 2020-03-17 PROCEDURE — 87077 CULTURE AEROBIC IDENTIFY: CPT

## 2020-03-17 PROCEDURE — 87186 SC STD MICRODIL/AGAR DIL: CPT

## 2020-03-17 PROCEDURE — 81001 URINALYSIS AUTO W/SCOPE: CPT

## 2020-03-17 PROCEDURE — 87086 URINE CULTURE/COLONY COUNT: CPT

## 2020-03-18 ENCOUNTER — TELEPHONE (OUTPATIENT)
Dept: MEDICAL GROUP | Facility: PHYSICIAN GROUP | Age: 85
End: 2020-03-18

## 2020-03-18 ENCOUNTER — NON-PROVIDER VISIT (OUTPATIENT)
Dept: VASCULAR LAB | Facility: MEDICAL CENTER | Age: 85
End: 2020-03-18
Attending: INTERNAL MEDICINE
Payer: MEDICARE

## 2020-03-18 DIAGNOSIS — E78.5 DYSLIPIDEMIA: ICD-10-CM

## 2020-03-18 DIAGNOSIS — N39.0 RECURRENT UTI: ICD-10-CM

## 2020-03-18 PROCEDURE — 99212 OFFICE O/P EST SF 10 MIN: CPT

## 2020-03-18 RX ORDER — CEPHALEXIN 500 MG/1
CAPSULE ORAL
Qty: 90 CAP | Refills: 1 | Status: SHIPPED | OUTPATIENT
Start: 2020-03-18 | End: 2021-01-20

## 2020-03-18 NOTE — TELEPHONE ENCOUNTER
1. Caller Name: Nini                          Call Back Number: [phone] (home)         How would the patient prefer to be contacted with a response: Phone call OK to leave a detailed message    2. SPECIFIC Action To Be Taken: Referral pending, please sign.    3. Diagnosis/Clinical Reason for Request: Reoccurrent  UTI's     4. Specialty & Provider Name/Lab/Imaging Location: [facility]rology/ [facility]    5. Is appointment scheduled for requested order/referral: no    Patient was not informed they will receive a return phone call from the office ONLY if there are any questions before processing their request. Advised to call back if they haven't received a call from the referral department in 5 days.

## 2020-03-18 NOTE — TELEPHONE ENCOUNTER
Referral signed and Keflex sent to pharmacy for 7 days at therapeutic dose and then once daily thereafter for prophylaxis.

## 2020-03-18 NOTE — TELEPHONE ENCOUNTER
----- Message from Kiko Cummings Ass't sent at 3/18/2020 10:02 AM PDT -----  Patient informed of provider's result note. She is having abdominal pain.  She would like a referral to urology and the daily antibiotics sent over to [facility].

## 2020-03-18 NOTE — PROGRESS NOTES
"[facility] - FollowUp Visit  Date of Service: 03/18/20    Nini Taylor is here for follow up of dyslipidemia.    HPI  Pertinent Interval History since last visit:   Pt is now steady with her supply of Praluent with the assistance of the copay cards.   Current Prescription Lipid Lowering Medications - including dose:   Statin: None  Non-Statin: Praluent 75 mg every 2 weeks.   Current Lipid Lowering and Related Supplements:   Vit D  Any Current Side Effects Potentially Related to Lipid Lowering therapy?   No  Current Adherence to Lipid Lowering Therapies:  Complete  Any Previous History of Statin Intolerance?   Statin: Simvastatin Unknown dose/duration - myalgias                Crestor unkown dose, duration \"years\" - states she started getting sharp \"zings\" down her arms.              Atorvastatin unknown dose, duration of 90 days - severe UE myalgia              Rosuvastatin - low dose alternate days - myalgias    Non-Statin: States none, however MR shows possibly Vytorin use              Outcome: Unknown  Any Previous History of Statin Intolerance?   Yes, Details: See above.    Baseline Lipids Prior to Treatment:          2/6/2017 12:20     Cholesterol,Tot   274 (H)     Triglycerides   101     HDL   58     LDL   196 (H)         SOCIAL HISTORY  Social History     Tobacco Use   Smoking Status Never Smoker   Smokeless Tobacco Never Used      Change in weight: Stable  Exercise habits: walks roughly 25 min/da  Diet: low calorie    ROS  Physical Exam    DATA REVIEW  Most Recent Lipid Panel:   Lab Results   Component Value Date    CHOLSTRLTOT 133 03/14/2020    TRIGLYCERIDE 75 03/14/2020    HDL 55 03/14/2020    LDL 63 03/14/2020       Other Pertinent Blood Work:   Lab Results   Component Value Date    SODIUM 137 03/14/2020    POTASSIUM 4.1 03/14/2020    CHLORIDE 104 03/14/2020    CO2 27 03/14/2020    ANION 6.0 (L) 03/14/2020    GLUCOSE 92 03/14/2020    BUN 16 03/14/2020    CREATININE 1.02 03/14/2020    CALCIUM " 9.6 03/14/2020    ASTSGOT 21 03/14/2020    ALTSGPT 11 03/14/2020    ALKPHOSPHAT 73 03/14/2020    TBILIRUBIN 1.0 03/14/2020    ALBUMIN 4.0 03/14/2020    AGRATIO 1.3 03/14/2020    LIPOPROTA 114 (H) 12/03/2018    TSHULTRASEN 1.810 12/09/2019       Other:  NA    Recent Imaging Studies:    None since last visit    ASSESSMENT AND PLAN  Patient Type, check all that apply:    Secondary Prevention (Abdominal aortic aneurism)  Established Atherosclerotic Cardiovascular Disease (ASCVD)  AAA - most recent imaging with small AAA, continue medical management and deferfurther surveillance to Dr. 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):   Likely - based on baseline LDL-C and +FH of premature CAD in brother - recommended cascade screening at a previous visit  ACC/AHA Indication for Statin Therapy, gurpreet all that apply:  LDL-C at baseline >190 mg/dl: Indication for High intensity statin    Calculated Risk for ASCVD, if applicable    N/A  Other Significant Risk Markers, if any, gurpreet all that apply   + Family History of premature CAD  High lp(a)  National Lipid Association (NLA) Goal  LDL-C:   <70 mg/dL  - Would probably be satisfied if we can get LDL <130 given age and poor tolerability   Lifestyle Recommendations From Today’s Visit:   Continue with low calorie diet.  Continue with walking 25 min/day and increase as able or tolerate   Statin Recommendations from Today's Visit  none  Non-Statin Medications Recommendations from Today’s Visit:   Continue Praluent.  Confirmed she is not taking Zetia.   Indication for PCSK9 Inhibitor, if applicable:  FH with suboptimal control of LDL-C despite maximally tolerated statin  Supplements Recommended at this visit:  None  Recommendations for Other Cardiovascular Risk Factors, gurpreet all that apply:    None  Other Issues:  None    Pt is at goal for LDL and non-HDL with the assistance of Praluent.  Pt has not been on Zetia, therefore suggest to continue without Zetia.  She is now receiving shipments of her Praluent without any difficulty.  Congratulated pt on her TLC and encouraged her to continue with Praluent.      Studies Ordered at Todays Visit:  None   Blood Work Ordered At Today’s visit:   CMP  Lipid panel  Follow-Up:   6 months      Chandler Brown, PharmD    CC:  Nevaeh Munroe M.D.

## 2020-03-18 NOTE — RESULT ENCOUNTER NOTE
Patient informed of provider's result note. She is having abdominal pain.  She would like a referral to urology and the daily antibiotics sent over to [facility].

## 2020-04-22 ENCOUNTER — TELEPHONE (OUTPATIENT)
Dept: MEDICAL GROUP | Facility: PHYSICIAN GROUP | Age: 85
End: 2020-04-22

## 2020-04-22 DIAGNOSIS — N39.0 RECURRENT UTI: ICD-10-CM

## 2020-04-22 NOTE — TELEPHONE ENCOUNTER
1. Caller Name: Nini Taylor                          Call Back Number: [phone] (home)         How would the patient prefer to be contacted with a response: Phone call do NOT leave a detailed message    Pt states she has been taking cephalexin 500 mg since 03/18 and is asking if she should continue taking.  She is also asking if she should do a UA.

## 2020-04-30 ENCOUNTER — HOSPITAL ENCOUNTER (OUTPATIENT)
Dept: LAB | Facility: MEDICAL CENTER | Age: 85
End: 2020-04-30
Attending: FAMILY MEDICINE
Payer: MEDICARE

## 2020-04-30 ENCOUNTER — HOSPITAL ENCOUNTER (OUTPATIENT)
Dept: LAB | Facility: MEDICAL CENTER | Age: 85
End: 2020-04-30
Attending: NURSE PRACTITIONER
Payer: MEDICARE

## 2020-04-30 DIAGNOSIS — N39.0 RECURRENT UTI: ICD-10-CM

## 2020-04-30 DIAGNOSIS — E78.5 DYSLIPIDEMIA: ICD-10-CM

## 2020-04-30 LAB
ALBUMIN SERPL BCP-MCNC: 4 G/DL (ref 3.2–4.9)
ALBUMIN/GLOB SERPL: 1.5 G/DL
ALP SERPL-CCNC: 78 U/L (ref 30–99)
ALT SERPL-CCNC: 10 U/L (ref 2–50)
ANION GAP SERPL CALC-SCNC: 9 MMOL/L (ref 7–16)
APPEARANCE UR: CLEAR
AST SERPL-CCNC: 19 U/L (ref 12–45)
BACTERIA #/AREA URNS HPF: NEGATIVE /HPF
BILIRUB SERPL-MCNC: 0.8 MG/DL (ref 0.1–1.5)
BILIRUB UR QL STRIP.AUTO: NEGATIVE
BUN SERPL-MCNC: 11 MG/DL (ref 8–22)
CALCIUM SERPL-MCNC: 9.3 MG/DL (ref 8.5–10.5)
CHLORIDE SERPL-SCNC: 100 MMOL/L (ref 96–112)
CHOLEST SERPL-MCNC: 157 MG/DL (ref 100–199)
CO2 SERPL-SCNC: 28 MMOL/L (ref 20–33)
COLOR UR: YELLOW
CREAT SERPL-MCNC: 0.83 MG/DL (ref 0.5–1.4)
EPI CELLS #/AREA URNS HPF: NEGATIVE /HPF
FASTING STATUS PATIENT QL REPORTED: NORMAL
GLOBULIN SER CALC-MCNC: 2.7 G/DL (ref 1.9–3.5)
GLUCOSE SERPL-MCNC: 97 MG/DL (ref 65–99)
GLUCOSE UR STRIP.AUTO-MCNC: NEGATIVE MG/DL
HDLC SERPL-MCNC: 60 MG/DL
HYALINE CASTS #/AREA URNS LPF: ABNORMAL /LPF
KETONES UR STRIP.AUTO-MCNC: NEGATIVE MG/DL
LDLC SERPL CALC-MCNC: 79 MG/DL
LEUKOCYTE ESTERASE UR QL STRIP.AUTO: ABNORMAL
MICRO URNS: ABNORMAL
NITRITE UR QL STRIP.AUTO: NEGATIVE
PH UR STRIP.AUTO: 7 [PH] (ref 5–8)
POTASSIUM SERPL-SCNC: 4.5 MMOL/L (ref 3.6–5.5)
PROT SERPL-MCNC: 6.7 G/DL (ref 6–8.2)
PROT UR QL STRIP: NEGATIVE MG/DL
RBC # URNS HPF: ABNORMAL /HPF
RBC UR QL AUTO: NEGATIVE
SODIUM SERPL-SCNC: 137 MMOL/L (ref 135–145)
SP GR UR STRIP.AUTO: 1.01
TRIGL SERPL-MCNC: 92 MG/DL (ref 0–149)
UROBILINOGEN UR STRIP.AUTO-MCNC: 0.2 MG/DL
WBC #/AREA URNS HPF: ABNORMAL /HPF

## 2020-04-30 PROCEDURE — 80061 LIPID PANEL: CPT

## 2020-04-30 PROCEDURE — 81001 URINALYSIS AUTO W/SCOPE: CPT

## 2020-04-30 PROCEDURE — 80053 COMPREHEN METABOLIC PANEL: CPT

## 2020-04-30 PROCEDURE — 36415 COLL VENOUS BLD VENIPUNCTURE: CPT

## 2020-05-21 ENCOUNTER — TELEPHONE (OUTPATIENT)
Dept: MEDICAL GROUP | Facility: PHYSICIAN GROUP | Age: 85
End: 2020-05-21

## 2020-05-21 NOTE — TELEPHONE ENCOUNTER
I spoke to Nevaeh Munroe M.D.    Nevaeh Munroe M.D. stated to have Nini stop medication and see if symptoms improve.    Nevaeh Munroe M.D. stated that if symptoms do not improve needs ED or UC evaluation.

## 2020-05-21 NOTE — TELEPHONE ENCOUNTER
I spoke to Nini.    Nini stated that she has recurring UTI.    Nini stated with her last UTI, she was proscribed:  cephALEXin (KEFLEX) 500 MG Cap   Take 500 mg PO TID for 7 days, then one tab PO daily thereafter      Nini stated that she started the medication and a few days later got a call the her culture came back negative. At the time Nini was not sure if she should continue the RX or not.    Nini stated that she decided to take one tablet every other day.    Nini stated that she now has a burning feeling in her stomach denied any N/V/D.  She is also having vaginal burning when urinating and has notice an odor denies any vaginal itching or discharge.    Nini would like to know if she should stop medication due?  .

## 2020-05-28 ENCOUNTER — TELEPHONE (OUTPATIENT)
Dept: MEDICAL GROUP | Facility: PHYSICIAN GROUP | Age: 85
End: 2020-05-28

## 2020-05-28 ENCOUNTER — HOSPITAL ENCOUNTER (OUTPATIENT)
Dept: LAB | Facility: MEDICAL CENTER | Age: 85
End: 2020-05-28
Attending: FAMILY MEDICINE
Payer: MEDICARE

## 2020-05-28 DIAGNOSIS — N39.0 RECURRENT UTI: ICD-10-CM

## 2020-05-28 LAB
APPEARANCE UR: CLEAR
BACTERIA #/AREA URNS HPF: ABNORMAL /HPF
BILIRUB UR QL STRIP.AUTO: NEGATIVE
COLOR UR: YELLOW
EPI CELLS #/AREA URNS HPF: NEGATIVE /HPF
GLUCOSE UR STRIP.AUTO-MCNC: NEGATIVE MG/DL
HYALINE CASTS #/AREA URNS LPF: ABNORMAL /LPF
KETONES UR STRIP.AUTO-MCNC: NEGATIVE MG/DL
LEUKOCYTE ESTERASE UR QL STRIP.AUTO: ABNORMAL
MICRO URNS: ABNORMAL
NITRITE UR QL STRIP.AUTO: NEGATIVE
PH UR STRIP.AUTO: 7 [PH] (ref 5–8)
PROT UR QL STRIP: NEGATIVE MG/DL
RBC # URNS HPF: ABNORMAL /HPF
RBC UR QL AUTO: NEGATIVE
SP GR UR STRIP.AUTO: 1.01
UROBILINOGEN UR STRIP.AUTO-MCNC: 0.2 MG/DL
WBC #/AREA URNS HPF: ABNORMAL /HPF

## 2020-05-28 PROCEDURE — 87186 SC STD MICRODIL/AGAR DIL: CPT

## 2020-05-28 PROCEDURE — 81001 URINALYSIS AUTO W/SCOPE: CPT

## 2020-05-28 PROCEDURE — 87086 URINE CULTURE/COLONY COUNT: CPT

## 2020-05-28 PROCEDURE — 87077 CULTURE AEROBIC IDENTIFY: CPT

## 2020-05-28 NOTE — TELEPHONE ENCOUNTER
Please advise, cant find future lab.    Pt at [facility] currently.

## 2020-05-28 NOTE — TELEPHONE ENCOUNTER
Patient was confused as to when she should be leaving a urine sample.  I advised that she is able to leave a urine sample whenever she would like.  Patient understood.

## 2020-05-29 DIAGNOSIS — I47.10 SVT (SUPRAVENTRICULAR TACHYCARDIA) (HCC): ICD-10-CM

## 2020-06-01 ENCOUNTER — TELEPHONE (OUTPATIENT)
Dept: MEDICAL GROUP | Facility: PHYSICIAN GROUP | Age: 85
End: 2020-06-01

## 2020-06-01 NOTE — TELEPHONE ENCOUNTER
Pt states that she is having burning sensations while urinating and not urinating. Pt states she is having stomach pains.    Pt was informed to take the Macrobid. Pt states that previous Urologist is/has retiring.  Pt would like a new referral for a Urologist preferably a female provider.    Pt would like to know if she should still take the cephalexin?    Please advise.

## 2020-06-01 NOTE — TELEPHONE ENCOUNTER
Pt came by office. She has appointment at Urology on 06/29/20 with a female provider.       TED.

## 2020-06-01 NOTE — TELEPHONE ENCOUNTER
----- Message from Nevaeh Munroe M.D. sent at 5/29/2020  2:04 PM PDT -----  Urine looks like there maybe some mucous stranding and moderate bacteria.  It looks like she is likely colonized with e. Coli bacteria, please see if she is having symptoms of urinary tract infection.  If so we can call in Macrobid to treat for 1 week.  Please see if she is seeing urology and if she has follow up, as she has had many infections.  We don't want to over treat and run the risk of c diff or other resistant organisms.

## 2020-06-02 RX ORDER — NITROFURANTOIN 25; 75 MG/1; MG/1
100 CAPSULE ORAL 2 TIMES DAILY
Qty: 14 CAP | Refills: 0 | Status: SHIPPED | OUTPATIENT
Start: 2020-06-02 | End: 2020-06-09

## 2020-06-02 NOTE — TELEPHONE ENCOUNTER
Please order Macrobid for pt. Pt would like Rx sent to [facility] pharmacy on [address].    Pt advised to stop Keflex while on the Macrobid.

## 2020-06-05 ENCOUNTER — TELEPHONE (OUTPATIENT)
Dept: CARDIOLOGY | Facility: MEDICAL CENTER | Age: 85
End: 2020-06-05

## 2020-06-05 DIAGNOSIS — E78.5 DYSLIPIDEMIA: ICD-10-CM

## 2020-06-05 DIAGNOSIS — I10 ESSENTIAL HYPERTENSION, BENIGN: ICD-10-CM

## 2020-06-14 ENCOUNTER — HOSPITAL ENCOUNTER (OUTPATIENT)
Facility: MEDICAL CENTER | Age: 85
End: 2020-06-14
Attending: NURSE PRACTITIONER
Payer: MEDICARE

## 2020-06-14 ENCOUNTER — OFFICE VISIT (OUTPATIENT)
Dept: URGENT CARE | Facility: PHYSICIAN GROUP | Age: 85
End: 2020-06-14
Payer: MEDICARE

## 2020-06-14 VITALS
SYSTOLIC BLOOD PRESSURE: 138 MMHG | HEART RATE: 89 BPM | TEMPERATURE: 97.9 F | RESPIRATION RATE: 14 BRPM | WEIGHT: 125 LBS | BODY MASS INDEX: 23.6 KG/M2 | OXYGEN SATURATION: 97 % | DIASTOLIC BLOOD PRESSURE: 86 MMHG | HEIGHT: 61 IN

## 2020-06-14 DIAGNOSIS — N30.90 CYSTITIS: ICD-10-CM

## 2020-06-14 DIAGNOSIS — R30.0 DYSURIA: ICD-10-CM

## 2020-06-14 LAB
APPEARANCE UR: NORMAL
BILIRUB UR STRIP-MCNC: NEGATIVE MG/DL
COLOR UR AUTO: NORMAL
GLUCOSE UR STRIP.AUTO-MCNC: NEGATIVE MG/DL
KETONES UR STRIP.AUTO-MCNC: NEGATIVE MG/DL
LEUKOCYTE ESTERASE UR QL STRIP.AUTO: NORMAL
NITRITE UR QL STRIP.AUTO: NEGATIVE
PH UR STRIP.AUTO: 7 [PH] (ref 5–8)
PROT UR QL STRIP: NEGATIVE MG/DL
RBC UR QL AUTO: NORMAL
SP GR UR STRIP.AUTO: 1.01
UROBILINOGEN UR STRIP-MCNC: 0.2 MG/DL

## 2020-06-14 PROCEDURE — 87186 SC STD MICRODIL/AGAR DIL: CPT

## 2020-06-14 PROCEDURE — 99214 OFFICE O/P EST MOD 30 MIN: CPT | Performed by: NURSE PRACTITIONER

## 2020-06-14 PROCEDURE — 81002 URINALYSIS NONAUTO W/O SCOPE: CPT | Performed by: NURSE PRACTITIONER

## 2020-06-14 PROCEDURE — 87086 URINE CULTURE/COLONY COUNT: CPT

## 2020-06-14 PROCEDURE — 87077 CULTURE AEROBIC IDENTIFY: CPT

## 2020-06-14 ASSESSMENT — ENCOUNTER SYMPTOMS
FEVER: 0
CHILLS: 0
FLANK PAIN: 0

## 2020-06-14 ASSESSMENT — FIBROSIS 4 INDEX: FIB4 SCORE: 2.8

## 2020-06-14 NOTE — PROGRESS NOTES
Subjective:      Nini Taylor is a 87 y.o. female who presents with No chief complaint on file.      - This is a pleasant and non toxic appearing 87 y.o. female with c/o             ALLERGIES:  Atorvastatin; Bactrim ds; Pneumococcal vaccines; Sulfa drugs; Vicodin [hydrocodone-acetaminophen]; and Vytorin     PMH:  Past Medical History:   Diagnosis Date   • AAA (abdominal aortic aneurysm) ([facility])    • Atherosclerosis of native arteries of the extremities with intermittent claudication 10/16/2013   • Blepharospasm syndrome    • Bright red rectal bleeding 6/24/2015   • Chest pain 07/2015    Unspecified; Nuclear stress test negative    • Constipation    • Edema 5/29/2012   • Female bladder prolapse, acquired    • GERD (gastroesophageal reflux disease)    • Hyperlipidemia    • Hypertension    • Insomnia    • OSTEOPOROSIS    • Palpitations 5/29/2012   • Sleep apnea    • SVT (supraventricular tachycardia) ([facility]) - on Zio 2015 and 2019 8/17/2015   • Varicose veins 5/29/2012   • Vitamin D deficiency disease         PSH:  Past Surgical History:   Procedure Laterality Date   • CYSTOCELE REPAIR  1/17/2011    [facility]   • RECTOCELE REPAIR  1/17/2011    Performed by GILMAR CHUNG at [facility]   • BLADDER SUSPENSION  1/17/2011    Performed by GILMAR CHUNG at [facility]   • CYSTOSCOPY  1/17/2011    [facility]   • ABDOMINAL HYSTERECTOMY TOTAL     • ARTHROSCOPY, KNEE     • CATARACT EXTRACTION WITH IOL     • HEMORRHOIDECTOMY     • HYSTERECTOMY, TOTAL ABDOMINAL     • INJECTION SCLERO HEMORROIDS     • OTHER ORTHOPEDIC SURGERY      knee scope x 2   • PB KNEE SCOPE,DIAGNOSTIC  2003;2009   • TONSILLECTOMY         MEDS:    Current Outpatient Medications:   •  Vitamin D, Cholecalciferol, 50 MCG (2000 UT) Cap, Take 2 capsules by mouth once daily, Disp: 60 Cap, Rfl: 6  •  metoprolol  (LOPRESSOR) 25 MG Tab, Take 1 tablet by mouth twice daily, Disp: 180 Tab, Rfl: 1  •  cephALEXin (KEFLEX) 500 MG Cap, Take 500 mg PO TID for 7 days, then one tab PO daily thereafter, Disp: 90 Cap, Rfl: 1  •  Alirocumab (PRALUENT) 75 MG/ML Solution Pen-injector, Inject 75 mg as instructed every 14 days. Subcutaneously., Disp: 6 PEN, Rfl: 1  •  PREMARIN 0.625 MG/GM Cream, INSERT 1 GRAM VAGINALLY 3 TIMES A WEEK, Disp: 30 g, Rfl: 1  •  Multiple Vitamins-Minerals (ICAPS AREDS 2) Cap, Take  by mouth., Disp: , Rfl:   •  Multiple Vitamins-Minerals (CENTRUM SILVER PO), Take 1 Tab by mouth every day., Disp: , Rfl:     ** I have documented what I find to be significant in regards to past medical, social, family and surgical history  in my HPI or under PMH/PSH/FH review section, otherwise it is contributory **           HPI    Review of Systems   Genitourinary: Positive for dysuria and frequency.   All other systems reviewed and are negative.         Objective:     There were no vitals taken for this visit.     Physical Exam  Vitals signs and nursing note reviewed.   Constitutional:       General: She is not in acute distress.     Appearance: She is well-developed. She is not diaphoretic.   HENT:      Head: Normocephalic and atraumatic.   Eyes:      General: No scleral icterus.     Conjunctiva/sclera: Conjunctivae normal.   Cardiovascular:      Heart sounds: Normal heart sounds. No murmur.   Pulmonary:      Effort: Pulmonary effort is normal. No respiratory distress.      Breath sounds: Normal breath sounds.   Skin:     Coloration: Skin is not pale.      Findings: No rash.   Neurological:      Mental Status: She is alert.      Motor: No abnormal muscle tone.   Psychiatric:         Mood and Affect: Mood normal.         Behavior: Behavior normal.         Judgment: Judgment normal.                 Assessment/Plan:           1. Dysuria         - rest  - E.R. precautions discussed     Dx & d/c instructions discussed w/ patient and/or  family members.     Follow up with PCP (or UC if PCP is unavailable) in 2-3 days to make sure improving and no further additional treatment needed, ER if not improving or feeling/getting worse.    Any realistic and/or common medication side effects that may have been given today(i.e. Rash, GI upset/constipation, sedation, elevation of BP or blood sugars) reviewed.     Patient left in stable condition      reviewed if narcotics given

## 2020-06-14 NOTE — PROGRESS NOTES
Subjective:      Nini Taylor is a 87 y.o. female who presents with Dysuria (x4days/ wants someone to look at bladder)    Past Medical History:   Diagnosis Date   • AAA (abdominal aortic aneurysm) (Formerly [facility])    • Atherosclerosis of native arteries of the extremities with intermittent claudication 10/16/2013   • Blepharospasm syndrome    • Bright red rectal bleeding 6/24/2015   • Chest pain 07/2015    Unspecified; Nuclear stress test negative    • Constipation    • Edema 5/29/2012   • Female bladder prolapse, acquired    • GERD (gastroesophageal reflux disease)    • Hyperlipidemia    • Hypertension    • Insomnia    • OSTEOPOROSIS    • Palpitations 5/29/2012   • Sleep apnea    • SVT (supraventricular tachycardia) (Formerly [facility]) - on Zio 2015 and 2019 8/17/2015   • Varicose veins 5/29/2012   • Vitamin D deficiency disease      Social History     Socioeconomic History   • Marital status:      Spouse name: Not on file   • Number of children: Not on file   • Years of education: Not on file   • Highest education level: Not on file   Occupational History   • Not on file   Social Needs   • Financial resource strain: Not on file   • Food insecurity     Worry: Not on file     Inability: Not on file   • Transportation needs     Medical: Not on file     Non-medical: Not on file   Tobacco Use   • Smoking status: Never Smoker   • Smokeless tobacco: Never Used   Substance and Sexual Activity   • Alcohol use: Not Currently     Alcohol/week: 0.0 oz     Comment: occasionally   • Drug use: No   • Sexual activity: Never   Lifestyle   • Physical activity     Days per week: Not on file     Minutes per session: Not on file   • Stress: Not on file   Relationships   • Social connections     Talks on phone: Not on file     Gets together: Not on file     Attends [employer]oration[employer] service: Not on file     Active member of club or organization: Not on file     Attends meetings of clubs or organizations: Not on file     Relationship status: Not on file   •  "Intimate partner violence     Fear of current or ex partner: Not on file     Emotionally abused: Not on file     Physically abused: Not on file     Forced sexual activity: Not on file   Other Topics Concern   • Not on file   Social History Narrative   • Not on file     Family History   Problem Relation Age of Onset   • Non-contributory Mother         gall bladder surgery   • Heart Disease Mother    • Cancer Father         colon rectal   • Heart Disease Father         rheumatic heart disease   • Other Brother         HIT BY CAR   • Heart Disease Brother    • Sleep Apnea Brother    • No Known Problems Maternal Grandmother    • No Known Problems Maternal Grandfather    • No Known Problems Paternal Grandmother    • No Known Problems Paternal Grandfather    • No Known Problems Son        Allergies: Atorvastatin; Bactrim ds; Pneumococcal vaccines; Sulfa drugs; Vicodin [hydrocodone-acetaminophen]; and Vytorin    Patient is an 87-year-old female who presents today with complaint of dysuria, urgency, and frequency.  History of multiple urinary tract infections over the last year.  She does have an appointment to follow-up with urology.  Patient is requesting to be examined in the genital and rectal area as she has had some stinging there as well.          Other   This is a recurrent problem. The current episode started in the past 7 days. The problem occurs constantly. The problem has been unchanged. Pertinent negatives include no chills or fever. Nothing aggravates the symptoms. She has tried nothing for the symptoms. The treatment provided no relief.       Review of Systems   Constitutional: Negative for chills and fever.   Genitourinary: Positive for dysuria, frequency and urgency. Negative for flank pain and hematuria.   All other systems reviewed and are negative.         Objective:     /86   Pulse 89   Temp 36.6 °C (97.9 °F) (Temporal)   Resp 14   Ht 1.549 m (5' 1\")   Wt 56.7 kg (125 lb)   SpO2 97%   BMI " 23.62 kg/m²      Physical Exam  Vitals signs reviewed.   Constitutional:       Appearance: Normal appearance.   Cardiovascular:      Rate and Rhythm: Normal rate and regular rhythm.   Pulmonary:      Breath sounds: Normal breath sounds.   Abdominal:      Tenderness: There is no abdominal tenderness. There is no right CVA tenderness, left CVA tenderness, guarding or rebound.   Genitourinary:     General: Normal vulva.      Vagina: No vaginal discharge.      Rectum: Normal.   Skin:     General: Skin is warm and dry.   Neurological:      Mental Status: She is alert and oriented to person, place, and time.   Psychiatric:         Mood and Affect: Mood normal.         Behavior: Behavior normal.         Thought Content: Thought content normal.         Judgment: Judgment normal.       UA: positive leukocytes, positive blood           Assessment/Plan:       1. Dysuria    - POCT Urinalysis  - URINE CULTURE(NEW); Future  -keflex; patient already has this medication and wants to take that rather than a new medication  -push fluids  -Keep appointment with urology    2. Cystitis    - POCT Urinalysis  - URINE CULTURE(NEW); Future  -push fluids  -Keep appointment with urology

## 2020-06-17 ENCOUNTER — TELEPHONE (OUTPATIENT)
Dept: URGENT CARE | Facility: PHYSICIAN GROUP | Age: 85
End: 2020-06-17

## 2020-06-17 NOTE — TELEPHONE ENCOUNTER
Patient notified by phone of results of urine culture.  Positive for E. coli, sensitive to Keflex which the patient is currently taking.  She does have a follow-up appointment scheduled with urology on 6/29 and I have advised her to keep that appointment.  No further questions at this time.

## 2020-07-14 ENCOUNTER — HOSPITAL ENCOUNTER (OUTPATIENT)
Dept: LAB | Facility: MEDICAL CENTER | Age: 85
End: 2020-07-14
Attending: INTERNAL MEDICINE
Payer: MEDICARE

## 2020-07-14 DIAGNOSIS — I10 ESSENTIAL HYPERTENSION, BENIGN: ICD-10-CM

## 2020-07-14 DIAGNOSIS — E78.5 DYSLIPIDEMIA: ICD-10-CM

## 2020-07-14 LAB
ALBUMIN SERPL BCP-MCNC: 3.9 G/DL (ref 3.2–4.9)
ALBUMIN/GLOB SERPL: 1.3 G/DL
ALP SERPL-CCNC: 87 U/L (ref 30–99)
ALT SERPL-CCNC: 13 U/L (ref 2–50)
ANION GAP SERPL CALC-SCNC: 10 MMOL/L (ref 7–16)
AST SERPL-CCNC: 17 U/L (ref 12–45)
BILIRUB SERPL-MCNC: 0.8 MG/DL (ref 0.1–1.5)
BUN SERPL-MCNC: 12 MG/DL (ref 8–22)
CALCIUM SERPL-MCNC: 9.3 MG/DL (ref 8.5–10.5)
CHLORIDE SERPL-SCNC: 101 MMOL/L (ref 96–112)
CHOLEST SERPL-MCNC: 145 MG/DL (ref 100–199)
CO2 SERPL-SCNC: 26 MMOL/L (ref 20–33)
CREAT SERPL-MCNC: 0.82 MG/DL (ref 0.5–1.4)
FASTING STATUS PATIENT QL REPORTED: NORMAL
GLOBULIN SER CALC-MCNC: 2.9 G/DL (ref 1.9–3.5)
GLUCOSE SERPL-MCNC: 108 MG/DL (ref 65–99)
HDLC SERPL-MCNC: 61 MG/DL
LDLC SERPL CALC-MCNC: 68 MG/DL
POTASSIUM SERPL-SCNC: 4.4 MMOL/L (ref 3.6–5.5)
PROT SERPL-MCNC: 6.8 G/DL (ref 6–8.2)
SODIUM SERPL-SCNC: 137 MMOL/L (ref 135–145)
TRIGL SERPL-MCNC: 82 MG/DL (ref 0–149)

## 2020-07-14 PROCEDURE — 80053 COMPREHEN METABOLIC PANEL: CPT

## 2020-07-14 PROCEDURE — 80061 LIPID PANEL: CPT

## 2020-07-14 PROCEDURE — 36415 COLL VENOUS BLD VENIPUNCTURE: CPT

## 2020-07-22 ENCOUNTER — OFFICE VISIT (OUTPATIENT)
Dept: CARDIOLOGY | Facility: MEDICAL CENTER | Age: 85
End: 2020-07-22
Payer: MEDICARE

## 2020-07-22 VITALS
HEART RATE: 64 BPM | WEIGHT: 129 LBS | SYSTOLIC BLOOD PRESSURE: 128 MMHG | DIASTOLIC BLOOD PRESSURE: 72 MMHG | HEIGHT: 61 IN | OXYGEN SATURATION: 97 % | BODY MASS INDEX: 24.35 KG/M2 | RESPIRATION RATE: 18 BRPM

## 2020-07-22 DIAGNOSIS — I71.40 ABDOMINAL AORTIC ANEURYSM (AAA) WITHOUT RUPTURE (HCC): ICD-10-CM

## 2020-07-22 DIAGNOSIS — I47.10 SVT (SUPRAVENTRICULAR TACHYCARDIA) (HCC): Chronic | ICD-10-CM

## 2020-07-22 DIAGNOSIS — E78.5 DYSLIPIDEMIA: ICD-10-CM

## 2020-07-22 DIAGNOSIS — I70.219 ATHEROSCLEROSIS OF NATIVE ARTERY OF EXTREMITY WITH INTERMITTENT CLAUDICATION, UNSPECIFIED EXTREMITY (HCC): ICD-10-CM

## 2020-07-22 PROCEDURE — 99214 OFFICE O/P EST MOD 30 MIN: CPT | Performed by: INTERNAL MEDICINE

## 2020-07-22 RX ORDER — CEPHALEXIN 250 MG/1
250 CAPSULE ORAL
COMMUNITY
Start: 2020-06-29 | End: 2020-07-22

## 2020-07-22 RX ORDER — FUROSEMIDE 20 MG/1
20 TABLET ORAL
Qty: 30 TAB | Refills: 1 | Status: SHIPPED | OUTPATIENT
Start: 2020-07-22 | End: 2021-02-08

## 2020-07-22 RX ORDER — ESTRADIOL 0.1 MG/G
CREAM VAGINAL
COMMUNITY
Start: 2020-06-29 | End: 2023-06-13

## 2020-07-22 ASSESSMENT — FIBROSIS 4 INDEX: FIB4 SCORE: 2.218800784900916488

## 2020-07-22 NOTE — PATIENT INSTRUCTIONS
We reviewed in person the most recent labs  Recent Results (from the past 4032 hour(s))   URINALYSIS,CULTURE IF INDICATED    Collection Time: 02/13/20  4:57 PM   Result Value Ref Range    Color See Comment     Character RR     Specific Gravity RR <1.035    Ph RR 5.0 - 8.0    Glucose RR Negative mg/dL    Ketones RR Negative mg/dL    Protein RR Negative mg/dL    Bilirubin RR Negative    Urobilinogen, Urine RR Negative    Nitrite RR Negative    Leukocyte Esterase RR Negative    Occult Blood RR Negative    Micro Urine Req Microscopic    URINE MICROSCOPIC (W/UA)    Collection Time: 02/13/20  4:57 PM   Result Value Ref Range    WBC RR /hpf    RBC RR /hpf    Bacteria RR None /hpf    Epithelial Cells RR /hpf    Hyaline Cast RR /lpf   URINE CULTURE(NEW)    Collection Time: 02/13/20  4:57 PM    Specimen: Urine   Result Value Ref Range    Significant Indicator POS (POS)     Source UR     Site -     Culture Result - (A)     Culture Result Isolate Removed (A)    URINALYSIS,CULTURE IF INDICATED    Collection Time: 02/13/20  4:57 PM   Result Value Ref Range    Color Yellow     Character Turbid (A)     Specific Gravity 1.015 <1.035    Ph 6.0 5.0 - 8.0    Glucose Negative Negative mg/dL    Ketones Negative Negative mg/dL    Protein 100 (A) Negative mg/dL    Bilirubin Negative Negative    Urobilinogen, Urine 0.2 Negative    Nitrite Positive (A) Negative    Leukocyte Esterase Large (A) Negative    Occult Blood Moderate (A) Negative    Micro Urine Req Microscopic    URINE CULTURE(NEW)    Collection Time: 02/13/20  4:57 PM    Specimen: Urine   Result Value Ref Range    Significant Indicator POS (POS)     Source UR     Site -     Culture Result - (A)     Culture Result Escherichia coli  >100,000 cfu/mL   (A)        Susceptibility    Escherichia coli - SUZANNE     Ampicillin <=8 Sensitive mcg/mL     Ceftriaxone <=1 Sensitive mcg/mL     Ceftazidime <=1 Sensitive mcg/mL     Cefotaxime <=2 Sensitive mcg/mL     Cefazolin <=2 Sensitive mcg/mL      Ciprofloxacin <=1 Sensitive mcg/mL     Ampicillin/sulbactam <=8/4 Sensitive mcg/mL     Cefepime <=2 Sensitive mcg/mL     Tobramycin <=4 Sensitive mcg/mL     Cefotetan <=16 Sensitive mcg/mL     Nitrofurantoin <=32 Sensitive mcg/mL     Gentamicin <=4 Sensitive mcg/mL     Levofloxacin <=2 Sensitive mcg/mL     Pip/Tazobactam <=16 Sensitive mcg/mL     Trimeth/Sulfa <=2/38 Sensitive mcg/mL   URINE MICROSCOPIC (W/UA)    Collection Time: 02/13/20  4:57 PM   Result Value Ref Range    WBC Packed (A) /hpf    RBC 5-10 (A) /hpf    Bacteria Moderate (A) None /hpf    Epithelial Cells Moderate (A) /hpf    Hyaline Cast 0-2 /lpf   POCT Urinalysis    Collection Time: 02/15/20 10:14 AM   Result Value Ref Range    POC Color Light Yellow Negative    POC Appearance Cloudy Negative    POC Leukocyte Esterase Moderate Negative    POC Nitrites Negative Negative    POC Urobiligen 0.2 Negative (0.2) mg/dL    POC Protein Negative Negative mg/dL    POC Urine PH 5.0 5.0 - 8.0    POC Blood Trace-intact Negative    POC Specific Gravity 1.015 <1.005 - >1.030    POC Ketones Negative Negative mg/dL    POC Bilirubin Negative Negative mg/dL    POC Glucose Negative Negative mg/dL   Urine Culture    Collection Time: 02/15/20 10:36 AM    Specimen: Urine   Result Value Ref Range    Significant Indicator POS (POS)     Source UR     Site -     Culture Result - (A)     Culture Result Escherichia coli  >100,000 cfu/mL   (A)        Susceptibility    Escherichia coli - SUZANNE     Ampicillin <=8 Sensitive mcg/mL     Ceftriaxone <=1 Sensitive mcg/mL     Ceftazidime <=1 Sensitive mcg/mL     Cefotaxime <=2 Sensitive mcg/mL     Cefazolin <=2 Sensitive mcg/mL     Ciprofloxacin <=1 Sensitive mcg/mL     Ampicillin/sulbactam <=8/4 Sensitive mcg/mL     Cefepime <=2 Sensitive mcg/mL     Tobramycin <=4 Sensitive mcg/mL     Cefotetan <=16 Sensitive mcg/mL     Nitrofurantoin <=32 Sensitive mcg/mL     Gentamicin <=4 Sensitive mcg/mL     Levofloxacin <=2 Sensitive mcg/mL      Pip/Tazobactam <=16 Sensitive mcg/mL     Trimeth/Sulfa <=2/38 Sensitive mcg/mL   Comp Metabolic Panel    Collection Time: 03/14/20  9:53 AM   Result Value Ref Range    Sodium 137 135 - 145 mmol/L    Potassium 4.1 3.6 - 5.5 mmol/L    Chloride 104 96 - 112 mmol/L    Co2 27 20 - 33 mmol/L    Anion Gap 6.0 (L) 7.0 - 16.0    Glucose 92 65 - 99 mg/dL    Bun 16 8 - 22 mg/dL    Creatinine 1.02 0.50 - 1.40 mg/dL    Calcium 9.6 8.5 - 10.5 mg/dL    AST(SGOT) 21 12 - 45 U/L    ALT(SGPT) 11 2 - 50 U/L    Alkaline Phosphatase 73 30 - 99 U/L    Total Bilirubin 1.0 0.1 - 1.5 mg/dL    Albumin 4.0 3.2 - 4.9 g/dL    Total Protein 7.0 6.0 - 8.2 g/dL    Globulin 3.0 1.9 - 3.5 g/dL    A-G Ratio 1.3 g/dL   Lipid Profile    Collection Time: 03/14/20  9:53 AM   Result Value Ref Range    Cholesterol,Tot 133 100 - 199 mg/dL    Triglycerides 75 0 - 149 mg/dL    HDL 55 >=40 mg/dL    LDL 63 <100 mg/dL   ESTIMATED GFR    Collection Time: 03/14/20  9:53 AM   Result Value Ref Range    GFR If African American >60 >60 mL/min/1.73 m 2    GFR If Non  51 (A) >60 mL/min/1.73 m 2   URINALYSIS,CULTURE IF INDICATED    Collection Time: 03/17/20 12:26 PM    Specimen: Urine   Result Value Ref Range    Color Yellow     Character Cloudy (A)     Specific Gravity 1.020 <1.035    Ph 6.0 5.0 - 8.0    Glucose Negative Negative mg/dL    Ketones Negative Negative mg/dL    Protein Negative Negative mg/dL    Bilirubin Negative Negative    Urobilinogen, Urine 0.2 Negative    Nitrite Positive (A) Negative    Leukocyte Esterase Large (A) Negative    Occult Blood Trace (A) Negative    Micro Urine Req Microscopic    URINE CULTURE(NEW)    Collection Time: 03/17/20 12:26 PM    Specimen: Urine   Result Value Ref Range    Significant Indicator POS (POS)     Source UR     Site -     Culture Result - (A)     Culture Result Escherichia coli  >100,000 cfu/mL   (A)        Susceptibility    Escherichia coli - SUZANNE     Ampicillin >16 Resistant mcg/mL     Ceftriaxone <=1  Sensitive mcg/mL     Ceftazidime <=1 Sensitive mcg/mL     Cefotaxime <=2 Sensitive mcg/mL     Cefazolin 4 Sensitive mcg/mL     Ciprofloxacin >2 Resistant mcg/mL     Ampicillin/sulbactam 16/8 Intermediate mcg/mL     Cefepime <=2 Sensitive mcg/mL     Tobramycin 8 Intermediate mcg/mL     Cefotetan <=16 Sensitive mcg/mL     Nitrofurantoin <=32 Sensitive mcg/mL     Gentamicin >8 Resistant mcg/mL     Levofloxacin >4 Resistant mcg/mL     Pip/Tazobactam <=16 Sensitive mcg/mL     Trimeth/Sulfa >2/38 Resistant mcg/mL   URINE MICROSCOPIC (W/UA)    Collection Time: 03/17/20 12:26 PM   Result Value Ref Range    -150 (A) /hpf    RBC 2-5 (A) /hpf    Bacteria Moderate (A) None /hpf    Epithelial Cells Few /hpf   Comp Metabolic Panel    Collection Time: 04/30/20  9:28 AM   Result Value Ref Range    Sodium 137 135 - 145 mmol/L    Potassium 4.5 3.6 - 5.5 mmol/L    Chloride 100 96 - 112 mmol/L    Co2 28 20 - 33 mmol/L    Anion Gap 9.0 7.0 - 16.0    Glucose 97 65 - 99 mg/dL    Bun 11 8 - 22 mg/dL    Creatinine 0.83 0.50 - 1.40 mg/dL    Calcium 9.3 8.5 - 10.5 mg/dL    AST(SGOT) 19 12 - 45 U/L    ALT(SGPT) 10 2 - 50 U/L    Alkaline Phosphatase 78 30 - 99 U/L    Total Bilirubin 0.8 0.1 - 1.5 mg/dL    Albumin 4.0 3.2 - 4.9 g/dL    Total Protein 6.7 6.0 - 8.2 g/dL    Globulin 2.7 1.9 - 3.5 g/dL    A-G Ratio 1.5 g/dL   Lipid Profile    Collection Time: 04/30/20  9:28 AM   Result Value Ref Range    Cholesterol,Tot 157 100 - 199 mg/dL    Triglycerides 92 0 - 149 mg/dL    HDL 60 >=40 mg/dL    LDL 79 <100 mg/dL   ESTIMATED GFR    Collection Time: 04/30/20  9:28 AM   Result Value Ref Range    GFR If African American >60 >60 mL/min/1.73 m 2    GFR If Non African American >60 >60 mL/min/1.73 m 2   URINALYSIS,CULTURE IF INDICATED    Collection Time: 04/30/20  9:31 AM    Specimen: Urine   Result Value Ref Range    Color Yellow     Character Clear     Specific Gravity 1.010 <1.035    Ph 7.0 5.0 - 8.0    Glucose Negative Negative mg/dL    Ketones  Negative Negative mg/dL    Protein Negative Negative mg/dL    Bilirubin Negative Negative    Urobilinogen, Urine 0.2 Negative    Nitrite Negative Negative    Leukocyte Esterase Moderate (A) Negative    Occult Blood Negative Negative    Micro Urine Req Microscopic    URINE MICROSCOPIC (W/UA)    Collection Time: 04/30/20  9:31 AM   Result Value Ref Range    WBC 5-10 (A) /hpf    RBC 0-2 /hpf    Bacteria Negative None /hpf    Epithelial Cells Negative /hpf    Hyaline Cast 0-2 /lpf   FASTING STATUS    Collection Time: 04/30/20 12:05 PM   Result Value Ref Range    Fasting Status Fasting    URINALYSIS,CULTURE IF INDICATED    Collection Time: 05/28/20  1:49 PM    Specimen: Urine   Result Value Ref Range    Color Yellow     Character Clear     Specific Gravity 1.006 <1.035    Ph 7.0 5.0 - 8.0    Glucose Negative Negative mg/dL    Ketones Negative Negative mg/dL    Protein Negative Negative mg/dL    Bilirubin Negative Negative    Urobilinogen, Urine 0.2 Negative    Nitrite Negative Negative    Leukocyte Esterase Large (A) Negative    Occult Blood Negative Negative    Micro Urine Req Microscopic    URINE MICROSCOPIC (W/UA)    Collection Time: 05/28/20  1:49 PM   Result Value Ref Range    -150 (A) /hpf    RBC 2-5 (A) /hpf    Bacteria Moderate (A) None /hpf    Epithelial Cells Negative /hpf    Hyaline Cast 0-2 /lpf   URINE CULTURE(NEW)    Collection Time: 05/28/20  1:49 PM    Specimen: Urine   Result Value Ref Range    Significant Indicator POS (POS)     Source UR     Site -     Culture Result - (A)     Culture Result Escherichia coli  >100,000 cfu/mL   (A)        Susceptibility    Escherichia coli - SUZANNE     Ampicillin >16 Resistant mcg/mL     Ceftriaxone <=1 Sensitive mcg/mL     Ceftazidime <=1 Sensitive mcg/mL     Cefotaxime <=2 Sensitive mcg/mL     Cefazolin <=2 Sensitive mcg/mL     Ciprofloxacin >2 Resistant mcg/mL     Ampicillin/sulbactam 16/8 Intermediate mcg/mL     Cefepime <=2 Sensitive mcg/mL     Tobramycin 8  Intermediate mcg/mL     Cefotetan <=16 Sensitive mcg/mL     Nitrofurantoin <=32 Sensitive mcg/mL     Gentamicin >8 Resistant mcg/mL     Levofloxacin >4 Resistant mcg/mL     Pip/Tazobactam <=16 Sensitive mcg/mL     Trimeth/Sulfa >2/38 Resistant mcg/mL   POCT Urinalysis    Collection Time: 06/14/20 10:18 AM   Result Value Ref Range    POC Color light yellow Negative    POC Appearance cloudy Negative    POC Leukocyte Esterase moderate Negative    POC Nitrites negative Negative    POC Urobiligen 0.2 Negative (0.2) mg/dL    POC Protein negative Negative mg/dL    POC Urine PH 7.0 5.0 - 8.0    POC Blood small Negative    POC Specific Gravity 1.015 <1.005 - >1.030    POC Ketones negative Negative mg/dL    POC Bilirubin negative Negative mg/dL    POC Glucose negative Negative mg/dL   URINE CULTURE(NEW)    Collection Time: 06/14/20 10:29 AM    Specimen: Urine   Result Value Ref Range    Significant Indicator POS (POS)     Source UR     Site -     Culture Result - (A)     Culture Result Escherichia coli  >100,000 cfu/mL   (A)        Susceptibility    Escherichia coli - SUZANNE     Ampicillin >16 Resistant mcg/mL     Ceftriaxone <=1 Sensitive mcg/mL     Ceftazidime <=1 Sensitive mcg/mL     Cefotaxime <=2 Sensitive mcg/mL     Cefazolin <=2 Sensitive mcg/mL     Ciprofloxacin >2 Resistant mcg/mL     Ampicillin/sulbactam 16/8 Intermediate mcg/mL     Cefepime <=2 Sensitive mcg/mL     Tobramycin 8 Intermediate mcg/mL     Cefotetan <=16 Sensitive mcg/mL     Nitrofurantoin <=32 Sensitive mcg/mL     Gentamicin >8 Resistant mcg/mL     Levofloxacin >4 Resistant mcg/mL     Pip/Tazobactam <=16 Sensitive mcg/mL     Trimeth/Sulfa >2/38 Resistant mcg/mL   Lipid Profile    Collection Time: 07/14/20 10:10 AM   Result Value Ref Range    Cholesterol,Tot 145 100 - 199 mg/dL    Triglycerides 82 0 - 149 mg/dL    HDL 61 >=40 mg/dL    LDL 68 <100 mg/dL   Comp Metabolic Panel    Collection Time: 07/14/20 10:10 AM   Result Value Ref Range    Sodium 137 135 -  145 mmol/L    Potassium 4.4 3.6 - 5.5 mmol/L    Chloride 101 96 - 112 mmol/L    Co2 26 20 - 33 mmol/L    Anion Gap 10.0 7.0 - 16.0    Glucose 108 (H) 65 - 99 mg/dL    Bun 12 8 - 22 mg/dL    Creatinine 0.82 0.50 - 1.40 mg/dL    Calcium 9.3 8.5 - 10.5 mg/dL    AST(SGOT) 17 12 - 45 U/L    ALT(SGPT) 13 2 - 50 U/L    Alkaline Phosphatase 87 30 - 99 U/L    Total Bilirubin 0.8 0.1 - 1.5 mg/dL    Albumin 3.9 3.2 - 4.9 g/dL    Total Protein 6.8 6.0 - 8.2 g/dL    Globulin 2.9 1.9 - 3.5 g/dL    A-G Ratio 1.3 g/dL   FASTING STATUS    Collection Time: 07/14/20 10:10 AM   Result Value Ref Range    Fasting Status Fasting    ESTIMATED GFR    Collection Time: 07/14/20 10:10 AM   Result Value Ref Range    GFR If African American >60 >60 mL/min/1.73 m 2    GFR If Non African American >60 >60 mL/min/1.73 m 2

## 2020-07-23 ASSESSMENT — ENCOUNTER SYMPTOMS
ABDOMINAL PAIN: 0
FALLS: 0
DIZZINESS: 0
PND: 0
CLAUDICATION: 0
BLURRED VISION: 0
FEVER: 0
NAUSEA: 0
BRUISES/BLEEDS EASILY: 0
CHILLS: 0
WEAKNESS: 0
PALPITATIONS: 0
COUGH: 0
FOCAL WEAKNESS: 0
SHORTNESS OF BREATH: 0
SORE THROAT: 0

## 2020-07-23 NOTE — PROGRESS NOTES
Chief Complaint   Patient presents with   • Supraventricular Tachycardia (SVT)   • Abdominal Aortic Aneurysm   • HTN (Controlled)   • Dyslipidemia       Subjective:   Nini Taylor is a 88 y.o. female who presents today for follow-up of her history of hypertension SVT also with peripheral chill disease she sees vascular surgery    She is done well over the last 6 months is doing well with the coronavirus she tries to remain active and has little anxiety related to this but is been doing well she is struggled with a couple urine infections    Past Medical History:   Diagnosis Date   • AAA (abdominal aortic aneurysm) (HCC)    • Atherosclerosis of native arteries of the extremities with intermittent claudication 10/16/2013   • Blepharospasm syndrome    • Bright red rectal bleeding 6/24/2015   • Chest pain 07/2015    Unspecified; Nuclear stress test negative    • Constipation    • Edema 5/29/2012   • Female bladder prolapse, acquired    • GERD (gastroesophageal reflux disease)    • Hyperlipidemia    • Hypertension    • Insomnia    • OSTEOPOROSIS    • Palpitations 5/29/2012   • Sleep apnea    • SVT (supraventricular tachycardia) (HCC) - on Zio 2015 and 2019 8/17/2015   • Varicose veins 5/29/2012   • Vitamin D deficiency disease      Past Surgical History:   Procedure Laterality Date   • CYSTOCELE REPAIR  1/17/2011    Performed by [facility]UNG [facility]   • RECTOCELE REPAIR  1/17/2011    Performed by GILMAR CHUNG at [facility]   • BLADDER SUSPENSION  1/17/2011    Performed by GILMAR CHUNG at [facility]   • CYSTOSCOPY  1/17/2011    Performed by GILMAR CHUNG [facility]   • ABDOMINAL HYSTERECTOMY TOTAL     • ARTHROSCOPY, KNEE     • CATARACT EXTRACTION WITH IOL     • HEMORRHOIDECTOMY     • HYSTERECTOMY, TOTAL ABDOMINAL     • INJECTION SCLERO HEMORROIDS     • OTHER ORTHOPEDIC SURGERY      knee scope x 2   • PB KNEE  SCOPE,DIAGNOSTIC  2003;2009   • TONSILLECTOMY       Family History   Problem Relation Age of Onset   • Non-contributory Mother         gall bladder surgery   • Heart Disease Mother    • Cancer Father         colon rectal   • Heart Disease Father         rheumatic heart disease   • Other Brother         HIT BY CAR   • Heart Disease Brother    • Sleep Apnea Brother    • No Known Problems Maternal Grandmother    • No Known Problems Maternal Grandfather    • No Known Problems Paternal Grandmother    • No Known Problems Paternal Grandfather    • No Known Problems Son      Social History     Socioeconomic History   • Marital status:      Spouse name: Not on file   • Number of children: Not on file   • Years of education: Not on file   • Highest education level: Not on file   Occupational History   • Not on file   Social Needs   • Financial resource strain: Not on file   • Food insecurity     Worry: Not on file     Inability: Not on file   • Transportation needs     Medical: Not on file     Non-medical: Not on file   Tobacco Use   • Smoking status: Never Smoker   • Smokeless tobacco: Never Used   Substance and Sexual Activity   • Alcohol use: Not Currently     Alcohol/week: 0.0 oz     Comment: occasionally   • Drug use: No   • Sexual activity: Never   Lifestyle   • Physical activity     Days per week: Not on file     Minutes per session: Not on file   • Stress: Not on file   Relationships   • Social connections     Talks on phone: Not on file     Gets together: Not on file     Attends [employer] service: Not on file     Active member of club or organization: Not on file     Attends meetings of clubs or organizations: Not on file     Relationship status: Not on file   • Intimate partner violence     Fear of current or ex partner: Not on file     Emotionally abused: Not on file     Physically abused: Not on file     Forced sexual activity: Not on file   Other Topics Concern   • Not on file   Social History Narrative    • Not on file     Allergies   Allergen Reactions   • Atorvastatin    • Bactrim Ds Hives   • Pneumococcal Vaccines Swelling   • Sulfa Drugs Hives   • Vicodin [Hydrocodone-Acetaminophen] Rash   • Vytorin      Outpatient Encounter Medications as of 7/22/2020   Medication Sig Dispense Refill   • estradiol (ESTRACE) 0.1 MG/GM vaginal cream APPLY A PEA SIZED AMOUNT INTO VAGINA ONE TO TWO TIMES A WEEK     • coenzyme Q-10 30 MG capsule Take 60 mg by mouth every day.     • furosemide (LASIX) 20 MG Tab Take 1 Tab by mouth 1 time daily as needed. 30 Tab 1   • Vitamin D, Cholecalciferol, 50 MCG (2000 UT) Cap Take 2 capsules by mouth once daily 60 Cap 6   • metoprolol (LOPRESSOR) 25 MG Tab Take 1 tablet by mouth twice daily 180 Tab 1   • cephALEXin (KEFLEX) 500 MG Cap Take 500 mg PO TID for 7 days, then one tab PO daily thereafter 90 Cap 1   • Alirocumab (PRALUENT) 75 MG/ML Solution Pen-injector Inject 75 mg as instructed every 14 days. Subcutaneously. 6 PEN 1   • PREMARIN 0.625 MG/GM Cream INSERT 1 GRAM VAGINALLY 3 TIMES A WEEK 30 g 1   • Multiple Vitamins-Minerals (ICAPS AREDS 2) Cap Take  by mouth.     • Multiple Vitamins-Minerals (CENTRUM SILVER PO) Take 1 Tab by mouth every day.     • [DISCONTINUED] metoprolol (LOPRESSOR) 25 MG Tab      • [DISCONTINUED] cephALEXin (KEFLEX) 250 MG Cap Take 250 mg by mouth.       No facility-administered encounter medications on file as of 7/22/2020.      Review of Systems   Constitutional: Negative for chills and fever.   HENT: Negative for sore throat.    Eyes: Negative for blurred vision.   Respiratory: Negative for cough and shortness of breath.    Cardiovascular: Negative for chest pain, palpitations, claudication, leg swelling and PND.   Gastrointestinal: Negative for abdominal pain and nausea.   Musculoskeletal: Positive for joint pain. Negative for falls.   Skin: Negative for rash.   Neurological: Negative for dizziness, focal weakness and weakness.   Endo/Heme/Allergies: Does not  "bruise/bleed easily.        Objective:   /72 (BP Location: Left arm, Patient Position: Sitting, BP Cuff Size: Adult)   Pulse 64   Resp 18   Ht 1.549 m (5' 1\")   Wt 58.5 kg (129 lb)   SpO2 97%   BMI 24.37 kg/m²     Physical Exam   Constitutional: No distress.   HENT:   Mouth/Throat: Oropharynx is clear and moist.   Eyes: No scleral icterus.   Neck: No JVD present.   Cardiovascular: Normal rate and normal heart sounds. Exam reveals no gallop and no friction rub.   No murmur heard.  Pulmonary/Chest: No respiratory distress. She has no wheezes. She has no rales.   Abdominal: Soft. Bowel sounds are normal.   Musculoskeletal:         General: No edema.   Neurological: She is alert.   Skin: No rash noted. She is not diaphoretic.   Psychiatric: She has a normal mood and affect.     We reviewed her labs  Assessment:     1. Abdominal aortic aneurysm (AAA) without rupture (HCC)     2. Atherosclerosis of native artery of extremity with intermittent claudication, unspecified extremity (HCC)     3. Dyslipidemia     4. SVT (supraventricular tachycardia) (HCC) - on Zio 2015 and 2019         Medical Decision Making:  Today's Assessment / Status / Plan:     It was my pleasure to meet with Ms. Taylor.    Blood pressure is well controlled.  We specifically assessed the labs on hypertension treatment    She is on PCSK9 with excellent result    No significant palpitations    I will see Ms. Taylor back in 6 months time and encouraged her to follow up with us over the phone or electronically using my MyChart as issues arise.    It is my pleasure to participate in the care of Ms. Taylor.  Please do not hesitate to contact me with questions or concerns.    Montez Kamara MD PhD [facility]  Cardiologist [facility]    Please note that this dictation was created using voice recognition software. There may be errors I did not discover before finalizing the note.     "

## 2020-09-18 ENCOUNTER — HOSPITAL ENCOUNTER (OUTPATIENT)
Dept: LAB | Facility: MEDICAL CENTER | Age: 85
End: 2020-09-18
Attending: NURSE PRACTITIONER
Payer: MEDICARE

## 2020-09-18 LAB
CHOLEST SERPL-MCNC: 146 MG/DL (ref 100–199)
FASTING STATUS PATIENT QL REPORTED: NORMAL
HDLC SERPL-MCNC: 62 MG/DL
LDLC SERPL CALC-MCNC: 66 MG/DL
TRIGL SERPL-MCNC: 92 MG/DL (ref 0–149)

## 2020-09-18 PROCEDURE — 36415 COLL VENOUS BLD VENIPUNCTURE: CPT

## 2020-09-18 PROCEDURE — 80061 LIPID PANEL: CPT

## 2020-09-23 ENCOUNTER — NON-PROVIDER VISIT (OUTPATIENT)
Dept: VASCULAR LAB | Facility: MEDICAL CENTER | Age: 85
End: 2020-09-23
Attending: INTERNAL MEDICINE
Payer: MEDICARE

## 2020-09-23 VITALS — DIASTOLIC BLOOD PRESSURE: 85 MMHG | SYSTOLIC BLOOD PRESSURE: 137 MMHG | HEART RATE: 68 BPM

## 2020-09-23 DIAGNOSIS — Z23 NEED FOR VACCINATION: ICD-10-CM

## 2020-09-23 DIAGNOSIS — E78.5 DYSLIPIDEMIA: ICD-10-CM

## 2020-09-23 PROCEDURE — 99212 OFFICE O/P EST SF 10 MIN: CPT

## 2020-09-23 PROCEDURE — 90662 IIV NO PRSV INCREASED AG IM: CPT

## 2020-09-23 NOTE — NON-PROVIDER
"[facility] - FollowUp Visit  Date of Service: 09/23/20    Nini Taylor is here for follow up of dyslipidemia.    HPI  Pertinent Interval History since last visit:   none  Current Prescription Lipid Lowering Medications - including dose:   Statin: None  Non-Statin: Praluent 75 mg every 2 weeks.   Current Lipid Lowering and Related Supplements:   Vit D  Any Current Side Effects Potentially Related to Lipid Lowering therapy?   No  Current Adherence to Lipid Lowering Therapies:  Complete  Any Previous History of Statin Intolerance?   Statin: Simvastatin Unknown dose/duration - myalgias                Crestor unkown dose, duration \"years\" - states she started getting sharp \"zings\" down her arms.              Atorvastatin unknown dose, duration of 90 days - severe UE myalgia              Rosuvastatin - low dose alternate days - myalgias    Non-Statin: States none, however MR shows possibly Vytorin use              Outcome: Unknown  Any Previous History of Statin Intolerance?   Yes, Details: See above.    Baseline Lipids Prior to Treatment:          2/6/2017 12:20     Cholesterol,Tot   274 (H)     Triglycerides   101     HDL   58     LDL   196 (H)         ROS  Physical Exam    DATA REVIEW  Most Recent Lipid Panel:   Lab Results   Component Value Date    CHOLSTRLTOT 146 09/18/2020    TRIGLYCERIDE 92 09/18/2020    HDL 62 09/18/2020    LDL 66 09/18/2020       Other Pertinent Blood Work:   Lab Results   Component Value Date    SODIUM 137 07/14/2020    POTASSIUM 4.4 07/14/2020    CHLORIDE 101 07/14/2020    CO2 26 07/14/2020    ANION 10.0 07/14/2020    GLUCOSE 108 (H) 07/14/2020    BUN 12 07/14/2020    CREATININE 0.82 07/14/2020    CALCIUM 9.3 07/14/2020    ASTSGOT 17 07/14/2020    ALTSGPT 13 07/14/2020    ALKPHOSPHAT 87 07/14/2020    TBILIRUBIN 0.8 07/14/2020    ALBUMIN 3.9 07/14/2020    AGRATIO 1.3 07/14/2020    LIPOPROTA 114 (H) 12/03/2018    TSHULTRASEN 1.810 12/09/2019       Other:  NA    Recent Imaging " Studies:    None since last visit    ASSESSMENT AND PLAN  Patient Type, check all that apply:    Secondary Prevention (Abdominal aortic aneurism)  Established Atherosclerotic Cardiovascular Disease (ASCVD)  AAA - most recent imaging with small AAA, continue medical management and deferfurther surveillance to Dr. 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  Continue with walking 25 min/day and increase as able or tolerate   Statin Recommendations from Today's Visit  none  Non-Statin Medications Recommendations from Today’s Visit:   Continue Praluent.  Indication for PCSK9 Inhibitor, if applicable:  FH with suboptimal control of LDL-C despite maximally tolerated statin  Supplements Recommended at this visit:  None  Recommendations for Other Cardiovascular Risk Factors, gurpreet all that apply:   None  Other Issues:  BP elevated today, but pt reports home BP WNL.   Studies Ordered at Todays Visit:  None     Pt at goal, no changes to therapy. Pt obtaining praluent without challenges.      Pt consented to receiving immunization today.     Blood Work Ordered At Today’s visit:   Lipid panel, CMP  Follow-Up:   6 months    Chandler Brown, PharmD    CC:  Nevaeh Munroe M.D.

## 2020-11-18 ENCOUNTER — OFFICE VISIT (OUTPATIENT)
Dept: SLEEP MEDICINE | Facility: MEDICAL CENTER | Age: 85
End: 2020-11-18
Payer: MEDICARE

## 2020-11-18 VITALS
WEIGHT: 131 LBS | OXYGEN SATURATION: 95 % | HEART RATE: 66 BPM | HEIGHT: 62 IN | SYSTOLIC BLOOD PRESSURE: 126 MMHG | DIASTOLIC BLOOD PRESSURE: 74 MMHG | BODY MASS INDEX: 24.11 KG/M2

## 2020-11-18 DIAGNOSIS — F51.04 CHRONIC INSOMNIA: ICD-10-CM

## 2020-11-18 DIAGNOSIS — G47.33 OSA (OBSTRUCTIVE SLEEP APNEA): ICD-10-CM

## 2020-11-18 DIAGNOSIS — I10 ESSENTIAL HYPERTENSION: ICD-10-CM

## 2020-11-18 PROCEDURE — 99213 OFFICE O/P EST LOW 20 MIN: CPT | Performed by: NURSE PRACTITIONER

## 2020-11-18 NOTE — PROGRESS NOTES
Chief Complaint   Patient presents with   • Follow-Up     Apnea- Last seen 03/16/2020       HPI:      Mrs. Taylor is a 87 y/o female patient who is in today for RUY f/u. Former [facility] patient. PMH includes SVT, GERD, osteoporosis, dyslipidemia, hypertension, arthrosclerosis of native arteries of extremities, AAA, chronic insomnia.    PSG titration from 9/16/15 indicated complete CPAP titration to final pressure of 18 cm of water.  The overall AHI was 38.2, mean SPO2 was 80.9% and oxygen saturations were less than or equal to 89% for 281.6 minutes.  Bilevel therapy was also tried from 14/10 cm to 16/12 cm of water.  No significant central apneas were seen.  Supplemental oxygen was not used.    Compliance report from 10/19/20-11/17/20 was downloaded and reviewed with the patient which showed BiPAP IPAP/EPAP 17/13 cmH2O, biflex 1, 100% compliance, 7 hrs 34 min use, AHI of 3.0. She is tolerating the pressure and mask well. She goes to bed between 9-11 pm and wakes up at 7 am. She denies morning or headache. She still feels that her sleep is broken up however this has been a chronic issue. She is getting about 7 hrs of sleep on a good night and about 6-4 hr of sleep on a bad night. The bad nights are 1-3 per week. Overall, she does  finds her sleep refreshing if she gets adequate sleep. She does not take regular naps. The symptoms of excessive daytime, snoring and gasping has improved since she is on the treatment. She denies any new health problems or medications.       ROS:    Constitutional: Denies fevers, Denies weight changes  Eyes: Denies changes in vision, no eye pain  Ears/Nose/Throat/Mouth: Denies nasal congestion or sore throat   Cardiovascular: Denies chest pain or palpitations   Respiratory: Denies shortness of breath , Denies cough  Gastrointestinal/Hepatic: Denies abdominal pain, nausea, vomiting,   Skin/Breast: Denies rash,   Neurological: Denies headache, confusion,   Psychiatric: denies mood disorder    Sleep: denies snoring       Past Medical History:   Diagnosis Date   • AAA (abdominal aortic aneurysm) ([facility])    • Atherosclerosis of native arteries of the extremities with intermittent claudication 10/16/2013   • Blepharospasm syndrome    • Bright red rectal bleeding 6/24/2015   • Chest pain 07/2015    Unspecified; Nuclear stress test negative    • Constipation    • Edema 5/29/2012   • Female bladder prolapse, acquired    • GERD (gastroesophageal reflux disease)    • Hyperlipidemia    • Hypertension    • Insomnia    • OSTEOPOROSIS    • Palpitations 5/29/2012   • Sleep apnea    • SVT (supraventricular tachycardia) ([facility]) - on Zio 2015 and 2019 8/17/2015   • Varicose veins 5/29/2012   • Vitamin D deficiency disease        Past Surgical History:   Procedure Laterality Date   • CYSTOCELE REPAIR  1/17/2011    Performed by [facility]UNG [facility]   • RECTOCELE REPAIR  1/17/2011    Performed by [facility]ILMAR [facility]UNG [facility]   • BLADDER SUSPENSION  1/17/2011    Performed by [facility]UN[facility]   • CYSTOSCOPY  1/17/2011    Performed by [facility]   • ABDOMINAL HYSTERECTOMY TOTAL     • ARTHROSCOPY, KNEE     • CATARACT EXTRACTION WITH IOL     • HEMORRHOIDECTOMY     • HYSTERECTOMY, TOTAL ABDOMINAL     • INJECTION SCLERO HEMORROIDS     • OTHER ORTHOPEDIC SURGERY      knee scope x 2   • PB KNEE SCOPE,DIAGNOSTIC  2003;2009   • TONSILLECTOMY         Family History   Problem Relation Age of Onset   • Non-contributory Mother         gall bladder surgery   • Heart Disease Mother    • Cancer Father         colon rectal   • Heart Disease Father         rheumatic heart disease   • Other Brother         HIT BY CAR   • Heart Disease Brother    • Sleep Apnea Brother    • No Known Problems Maternal Grandmother    • No Known Problems Maternal Grandfather    • No Known Problems Paternal Grandmother    • No Known Problems  Paternal Grandfather    • No Known Problems Son        Social History     Socioeconomic History   • Marital status:      Spouse name: Not on file   • Number of children: Not on file   • Years of education: Not on file   • Highest education level: Not on file   Occupational History   • Not on file   Social Needs   • Financial resource strain: Not on file   • Food insecurity     Worry: Not on file     Inability: Not on file   • Transportation needs     Medical: Not on file     Non-medical: Not on file   Tobacco Use   • Smoking status: Never Smoker   • Smokeless tobacco: Never Used   Substance and Sexual Activity   • Alcohol use: Not Currently     Alcohol/week: 0.0 oz     Comment: occasionally   • Drug use: No   • Sexual activity: Never   Lifestyle   • Physical activity     Days per week: Not on file     Minutes per session: Not on file   • Stress: Not on file   Relationships   • Social connections     Talks on phone: Not on file     Gets together: Not on file     Attends Congregation service: Not on file     Active member of club or organization: Not on file     Attends meetings of clubs or organizations: Not on file     Relationship status: Not on file   • Intimate partner violence     Fear of current or ex partner: Not on file     Emotionally abused: Not on file     Physically abused: Not on file     Forced sexual activity: Not on file   Other Topics Concern   • Not on file   Social History Narrative   • Not on file       Allergies as of 11/18/2020 - Reviewed 11/18/2020   Allergen Reaction Noted   • Atorvastatin  10/25/2017   • Bactrim ds Hives 05/11/2017   • Pneumococcal vaccines Swelling 07/27/2018   • Sulfa drugs Hives 05/11/2017   • Vicodin [hydrocodone-acetaminophen] Rash 12/09/2010   • Vytorin  12/15/2008        Vitals:  Vitals:    11/18/20 1258   BP: 126/74   Pulse: 66   SpO2: 95%       Current medications as of today   Current Outpatient Medications   Medication Sig Dispense Refill   • coenzyme Q-10 30  MG capsule Take 60 mg by mouth every day.     • furosemide (LASIX) 20 MG Tab Take 1 Tab by mouth 1 time daily as needed. 30 Tab 1   • Vitamin D, Cholecalciferol, 50 MCG (2000 UT) Cap Take 2 capsules by mouth once daily 60 Cap 6   • metoprolol (LOPRESSOR) 25 MG Tab Take 1 tablet by mouth twice daily 180 Tab 1   • Alirocumab (PRALUENT) 75 MG/ML Solution Pen-injector Inject 75 mg as instructed every 14 days. Subcutaneously. 6 PEN 1   • Multiple Vitamins-Minerals (ICAPS AREDS 2) Cap Take  by mouth.     • Multiple Vitamins-Minerals (CENTRUM SILVER PO) Take 1 Tab by mouth every day.     • estradiol (ESTRACE) 0.1 MG/GM vaginal cream APPLY A PEA SIZED AMOUNT INTO VAGINA ONE TO TWO TIMES A WEEK     • cephALEXin (KEFLEX) 500 MG Cap Take 500 mg PO TID for 7 days, then one tab PO daily thereafter 90 Cap 1     No current facility-administered medications for this visit.          Physical Exam:   Appearance: Well developed, well nourished, no acute distress  Eyes: PERRL, EOM intact, sclera white, conjunctiva moist  Ears: no lesions or deformities  Hearing: grossly intact  Nose: no lesions or deformities  Respiratory effort: no intercostal retractions or use of accessory muscles  Extremities: no cyanosis or edema  Abdomen: soft   Gait and Station: normal  Digits and nails: no clubbing, cyanosis, petechiae or nodes.  Cranial nerves: grossly intact  Skin: no visible rashes, lesions or ulcers noted  Orientation: Oriented to time, person and place  Mood and affect: mood and affect appropriate, normal interaction with examiner  Judgement: Intact    Assessment:  1. RUY (obstructive sleep apnea)     2. Chronic insomnia     3. Essential hypertension           Plan  Discussed the cardiovascular and neuropsychiatric risks of untreated RUY; including but not limited to: HTN, DM, MI, ASCVD, CVA, CHF, traffic accidents.     1. Compliance report from 10/19/20-11/17/20 was downloaded and reviewed with the patient which showed BiPAP IPAP/EPAP  17/13 cmH2O, biflex 1, 100% compliance, 7 hrs 34 min use, AHI of 3.0. Continue BiPAP. Patient is compliant and benefiting from BiPAP therapy for management of RUY.   2. DME order ([facility]) for mask (F20 mask or MOC) and supplies was provided today. Continue to clean mask and supplies weekly with soap and water, and change supplies per insurance guidelines.   3. Continue to stay active.     4. Follow up with the appropriate healthcare practitioners for all other medical problems and issues.  5. Sleep hygiene discussed. Recommend keeping a set sleep/wake schedule. Logging enough hours of sleep. Limiting/Avoiding naps. No caffeine after noon and no heavy meals in the evening.   6. Continue to f/u with PCP for BP management, and take BP medication as directed.   7. F/u in 6 months per patient request.       FABRIZIO Feliciano.      This dictation was created using voice recognition software. The accuracy of the dictation is limited to the abilities of the software. I expect there may be some errors of grammar and possibly content.

## 2020-12-11 DIAGNOSIS — I47.10 SVT (SUPRAVENTRICULAR TACHYCARDIA) (HCC): ICD-10-CM

## 2021-01-05 ENCOUNTER — ANTICOAGULATION MONITORING (OUTPATIENT)
Dept: MEDICAL GROUP | Facility: PHYSICIAN GROUP | Age: 86
End: 2021-01-05

## 2021-01-05 DIAGNOSIS — E78.5 DYSLIPIDEMIA: ICD-10-CM

## 2021-01-11 DIAGNOSIS — Z23 NEED FOR VACCINATION: ICD-10-CM

## 2021-01-18 ENCOUNTER — HOSPITAL ENCOUNTER (OUTPATIENT)
Dept: LAB | Facility: MEDICAL CENTER | Age: 86
End: 2021-01-18
Attending: NURSE PRACTITIONER
Payer: MEDICARE

## 2021-01-18 DIAGNOSIS — E78.5 DYSLIPIDEMIA: ICD-10-CM

## 2021-01-18 LAB
ALBUMIN SERPL BCP-MCNC: 4.2 G/DL (ref 3.2–4.9)
ALBUMIN/GLOB SERPL: 1.6 G/DL
ALP SERPL-CCNC: 90 U/L (ref 30–99)
ALT SERPL-CCNC: 10 U/L (ref 2–50)
ANION GAP SERPL CALC-SCNC: 11 MMOL/L (ref 7–16)
AST SERPL-CCNC: 21 U/L (ref 12–45)
BILIRUB SERPL-MCNC: 1 MG/DL (ref 0.1–1.5)
BUN SERPL-MCNC: 9 MG/DL (ref 8–22)
CALCIUM SERPL-MCNC: 9.5 MG/DL (ref 8.5–10.5)
CHLORIDE SERPL-SCNC: 105 MMOL/L (ref 96–112)
CHOLEST SERPL-MCNC: 151 MG/DL (ref 100–199)
CO2 SERPL-SCNC: 25 MMOL/L (ref 20–33)
CREAT SERPL-MCNC: 0.87 MG/DL (ref 0.5–1.4)
GLOBULIN SER CALC-MCNC: 2.6 G/DL (ref 1.9–3.5)
GLUCOSE SERPL-MCNC: 90 MG/DL (ref 65–99)
HDLC SERPL-MCNC: 56 MG/DL
LDLC SERPL CALC-MCNC: 73 MG/DL
POTASSIUM SERPL-SCNC: 3.8 MMOL/L (ref 3.6–5.5)
PROT SERPL-MCNC: 6.8 G/DL (ref 6–8.2)
SODIUM SERPL-SCNC: 141 MMOL/L (ref 135–145)
TRIGL SERPL-MCNC: 108 MG/DL (ref 0–149)

## 2021-01-18 PROCEDURE — 36415 COLL VENOUS BLD VENIPUNCTURE: CPT

## 2021-01-18 PROCEDURE — 80061 LIPID PANEL: CPT

## 2021-01-18 PROCEDURE — 80053 COMPREHEN METABOLIC PANEL: CPT

## 2021-01-20 ENCOUNTER — NON-PROVIDER VISIT (OUTPATIENT)
Dept: VASCULAR LAB | Facility: MEDICAL CENTER | Age: 86
End: 2021-01-20
Attending: INTERNAL MEDICINE
Payer: MEDICARE

## 2021-01-20 VITALS — DIASTOLIC BLOOD PRESSURE: 74 MMHG | HEART RATE: 89 BPM | SYSTOLIC BLOOD PRESSURE: 138 MMHG

## 2021-01-20 DIAGNOSIS — E78.5 DYSLIPIDEMIA: ICD-10-CM

## 2021-01-20 PROCEDURE — 99212 OFFICE O/P EST SF 10 MIN: CPT

## 2021-01-20 NOTE — NON-PROVIDER
"[facility] - FollowUp Visit  Date of Service: 01/20/21    Nini Taylor is here for follow up of dyslipidemia.    HPI  Pertinent Interval History since last visit:   Pt reports more hearing loss.  Denies any issues with medication or lipid reduction therapy.   Current Prescription Lipid Lowering Medications - including dose:   Statin: None  Non-Statin: Praluent 75 mg every 2 weeks.   Current Lipid Lowering and Related Supplements:   Vit D  Any Current Side Effects Potentially Related to Lipid Lowering therapy?   No  Current Adherence to Lipid Lowering Therapies:  Complete  Any Previous History of Statin Intolerance?   Statin: Simvastatin Unknown dose/duration - myalgias                Crestor unkown dose, duration \"years\" - states she started getting sharp \"zings\" down her arms.              Atorvastatin unknown dose, duration of 90 days - severe UE myalgia              Rosuvastatin - low dose alternate days - myalgias    Non-Statin: States none, however MR shows possibly Vytorin use              Outcome: Unknown  Any Previous History of Statin Intolerance?   Yes, Details: See above.    Baseline Lipids Prior to Treatment:          2/6/2017 12:20     Cholesterol,Tot   274 (H)     Triglycerides   101     HDL   58     LDL   196 (H)             SOCIAL HISTORY  Social History     Tobacco Use   Smoking Status Never Smoker   Smokeless Tobacco Never Used      Change in weight: Stable  Exercise habits: moderate regular exercise program   Diet: reduced calorie    ROS  Physical Exam    DATA REVIEW  Most Recent Lipid Panel:   Lab Results   Component Value Date    CHOLSTRLTOT 151 01/18/2021    TRIGLYCERIDE 108 01/18/2021    HDL 56 01/18/2021    LDL 73 01/18/2021       Other Pertinent Blood Work:   Lab Results   Component Value Date    SODIUM 141 01/18/2021    POTASSIUM 3.8 01/18/2021    CHLORIDE 105 01/18/2021    CO2 25 01/18/2021    ANION 11.0 01/18/2021    GLUCOSE 90 01/18/2021    BUN 9 01/18/2021    CREATININE " 0.87 01/18/2021    CALCIUM 9.5 01/18/2021    ASTSGOT 21 01/18/2021    ALTSGPT 10 01/18/2021    ALKPHOSPHAT 90 01/18/2021    TBILIRUBIN 1.0 01/18/2021    ALBUMIN 4.2 01/18/2021    AGRATIO 1.6 01/18/2021    TSHULTRASEN 1.810 12/09/2019       Other:  NA    Recent Imaging Studies:    None since last visit    ASSESSMENT AND PLAN  Patient Type, check all that apply:    Secondary Prevention (Abdominal aortic aneurism)  Established Atherosclerotic Cardiovascular Disease (ASCVD)  AAA - most recent imaging with small AAA, continue medical management and deferfurther surveillance to Dr. 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  Continue with walking 25 min/day and increase as able or tolerate   Statin Recommendations from Today's Visit  none  Non-Statin Medications Recommendations from Today’s Visit:   Continue Praluent.  Indication for PCSK9 Inhibitor, if applicable:  FH with suboptimal control of LDL-C despite maximally tolerated statin  Supplements Recommended at this visit:  None  Recommendations for Other Cardiovascular Risk Factors, gurpreet all that apply:   BP elevated on first measurement.  Pt was highly anxious.  BP reduced dramatically after she sat for 5 minutes.  Likely it would  continue to reduce if she was not so anxious.    Other Issues:  Pt is nearly at goal for LDL.  Pt is at goal for non-HDL.  Given she is 89 YO, no need to increase her regimen when she is so close to goal.      Pt brought in her paperwork for the Praluent assistance program (PASS)  She has been having great anxiety and concerns over the supply of her medication.  Pt has another pen she will use in 1 week, therefore she has 3 more weeks of therapy.  She brought in her PASS paperwork in which our MA will complete and return to PASS.  Pt does not have Part D medicare so we will send the PA in hopes she does not have to pay the initail $500 for the year before the PASS will start.     If we continue to have a rejection from PASS, then our options will be:    1. Pay $500 out of pocket and reapply  2. Apply for Health Well  3. Try Repatha Ready instead.     Studies Ordered at Todays Visit:  None   Blood Work Ordered At Today’s visit:   None  Follow-Up:   2 weeks by phone  6 months in clinic    Chandler Brown, PharmD    CC:  Allison A Morgan, M.D. Wilson, Christopher R, MD Michael Bloch MD

## 2021-01-21 ENCOUNTER — TELEPHONE (OUTPATIENT)
Dept: VASCULAR LAB | Facility: MEDICAL CENTER | Age: 86
End: 2021-01-21

## 2021-01-27 ENCOUNTER — PHARMACY VISIT (OUTPATIENT)
Dept: PHARMACY | Facility: MEDICAL CENTER | Age: 86
End: 2021-01-27
Payer: COMMERCIAL

## 2021-01-27 PROCEDURE — RXMED WILLOW AMBULATORY MEDICATION CHARGE: Performed by: NURSE PRACTITIONER

## 2021-02-08 ENCOUNTER — OFFICE VISIT (OUTPATIENT)
Dept: URGENT CARE | Facility: PHYSICIAN GROUP | Age: 86
End: 2021-02-08
Payer: MEDICARE

## 2021-02-08 VITALS
TEMPERATURE: 98.5 F | DIASTOLIC BLOOD PRESSURE: 88 MMHG | HEIGHT: 62 IN | SYSTOLIC BLOOD PRESSURE: 122 MMHG | WEIGHT: 130 LBS | HEART RATE: 96 BPM | RESPIRATION RATE: 12 BRPM | OXYGEN SATURATION: 93 % | BODY MASS INDEX: 23.92 KG/M2

## 2021-02-08 DIAGNOSIS — H91.93 BILATERAL HEARING LOSS, UNSPECIFIED HEARING LOSS TYPE: Primary | ICD-10-CM

## 2021-02-08 DIAGNOSIS — J34.89 SORE IN NOSE: ICD-10-CM

## 2021-02-08 DIAGNOSIS — G47.33 OSA (OBSTRUCTIVE SLEEP APNEA): ICD-10-CM

## 2021-02-08 PROCEDURE — 99214 OFFICE O/P EST MOD 30 MIN: CPT | Performed by: PHYSICIAN ASSISTANT

## 2021-02-08 ASSESSMENT — ENCOUNTER SYMPTOMS
COUGH: 0
CHILLS: 0
NAUSEA: 0
CONSTIPATION: 0
SHORTNESS OF BREATH: 0
VOMITING: 0
ABDOMINAL PAIN: 0
DIARRHEA: 0
FEVER: 0

## 2021-02-09 NOTE — PROGRESS NOTES
Subjective:   Nini Taylor is a 88 y.o. female who presents for Referral Needed (to ENT)        HPI   The patient presents to urgent care today for a small painful bump in the right naris.  She is also requesting a new referral to ENT. She was originally had referral placed by pcp for hearing loss. She was unable to follow-up with ENT Dr. Mchugh due to Covid restrictions. Hearing loss has continued to worsen. She was recently contacted by office to set up a appointment but needed a new referral because hers was .    She has been applying ointment to her nose without relief.  She noticed a painful bump on the right nares.  Unsure if it is contributed to her CPAP at night for RUY.  No fever, chills.  No drainage or sinus pressure.    Review of Systems   Constitutional: Negative for chills, fever and malaise/fatigue.   HENT: Positive for hearing loss.    Respiratory: Negative for cough and shortness of breath.    Gastrointestinal: Negative for abdominal pain, constipation, diarrhea, nausea and vomiting.   All other systems reviewed and are negative.      PMH:  has a past medical history of AAA (abdominal aortic aneurysm) ([facility]), Atherosclerosis of native arteries of the extremities with intermittent claudication (10/16/2013), Blepharospasm syndrome, Bright red rectal bleeding (2015), Chest pain (2015), Constipation, Edema (2012), Female bladder prolapse, acquired, GERD (gastroesophageal reflux disease), Hyperlipidemia, Hypertension, Insomnia, OSTEOPOROSIS, Palpitations (2012), Sleep apnea, SVT (supraventricular tachycardia) ([facility]) - on Zio  and 2019 (2015), Varicose veins (2012), and Vitamin D deficiency disease.  MEDS:   Current Outpatient Medications:   •  Alirocumab 75 MG/ML Solution Auto-injector, Inject 75 mg under the skin every 14 days., Disp: 6 mL, Rfl: 3  •  metoprolol (LOPRESSOR) 25 MG Tab, Take 1 tablet by mouth twice daily, Disp: 180 Tab, Rfl: 0  •  estradiol  (ESTRACE) 0.1 MG/GM vaginal cream, APPLY A PEA SIZED AMOUNT INTO VAGINA ONE TO TWO TIMES A WEEK, Disp: , Rfl:   •  coenzyme Q-10 30 MG capsule, Take 60 mg by mouth every day., Disp: , Rfl:   •  Vitamin D, Cholecalciferol, 50 MCG (2000 UT) Cap, Take 2 capsules by mouth once daily, Disp: 60 Cap, Rfl: 6  •  Multiple Vitamins-Minerals (ICAPS AREDS 2) Cap, Take  by mouth., Disp: , Rfl:   •  Multiple Vitamins-Minerals (CENTRUM SILVER PO), Take 1 Tab by mouth every day., Disp: , Rfl:   ALLERGIES:   Allergies   Allergen Reactions   • Atorvastatin    • Bactrim Ds Hives   • Pneumococcal Vaccines Swelling   • Sulfa Drugs Hives   • Vicodin [Hydrocodone-Acetaminophen] Rash   • Vytorin      SURGHX:   Past Surgical History:   Procedure Laterality Date   • CYSTOCELE REPAIR  1/17/2011    Performed by GIL[facility] CHUNG [facility]   • RECTOCELE REPAIR  1/17/2011    Performed by GILMAR CHUNG at [facility]   • BLADDER SUSPENSION  1/17/2011    Performed by GILMAR CHUNG at [facility]   • CYSTOSCOPY  1/17/2011    Performed by [facility]ILMAR CHUNG at [facility]   • ABDOMINAL HYSTERECTOMY TOTAL     • ARTHROSCOPY, KNEE     • CATARACT EXTRACTION WITH IOL     • HEMORRHOIDECTOMY     • HYSTERECTOMY, TOTAL ABDOMINAL     • INJECTION SCLERO HEMORROIDS     • OTHER ORTHOPEDIC SURGERY      knee scope x 2   • PB KNEE SCOPE,DIAGNOSTIC  2003;2009   • TONSILLECTOMY       SOCHX:  reports that she has never smoked. She has never used smokeless tobacco. She reports previous alcohol use. She reports that she does not use drugs.  Family History   Problem Relation Age of Onset   • Non-contributory Mother         gall bladder surgery   • Heart Disease Mother    • Cancer Father         colon rectal   • Heart Disease Father         rheumatic heart disease   • Other Brother         HIT BY CAR   • Heart Disease Brother    • Sleep Apnea Brother    • No Known Problems Maternal  "Grandmother    • No Known Problems Maternal Grandfather    • No Known Problems Paternal Grandmother    • No Known Problems Paternal Grandfather    • No Known Problems Son         Objective:   /88   Pulse 96   Temp 36.9 °C (98.5 °F) (Temporal)   Resp 12   Ht 1.575 m (5' 2\")   Wt 59 kg (130 lb)   SpO2 93%   BMI 23.78 kg/m²     Physical Exam  Vitals signs reviewed.   Constitutional:       General: She is not in acute distress.     Appearance: Normal appearance. She is well-developed. She is not ill-appearing.   HENT:      Head: Normocephalic and atraumatic.      Right Ear: External ear normal. No middle ear effusion. There is no impacted cerumen. Tympanic membrane is scarred.      Left Ear: External ear normal.  No middle ear effusion. There is no impacted cerumen. Tympanic membrane is scarred.      Nose: Nose normal.        Mouth/Throat:      Mouth: Mucous membranes are moist.   Eyes:      Conjunctiva/sclera: Conjunctivae normal.      Pupils: Pupils are equal, round, and reactive to light.   Neck:      Musculoskeletal: Normal range of motion and neck supple.      Trachea: No tracheal deviation.   Cardiovascular:      Rate and Rhythm: Normal rate and regular rhythm.   Pulmonary:      Effort: Pulmonary effort is normal. No respiratory distress.      Breath sounds: Normal breath sounds. No stridor.   Skin:     General: Skin is warm and dry.      Capillary Refill: Capillary refill takes less than 2 seconds.   Neurological:      General: No focal deficit present.      Mental Status: She is alert and oriented to person, place, and time.   Psychiatric:         Mood and Affect: Mood normal.         Behavior: Behavior normal.           Assessment/Plan:     1. Bilateral hearing loss, unspecified hearing loss type  REFERRAL TO ENT   2. RUY (obstructive sleep apnea)     3. Sore in nose       Supportive care reviewed.  She will apply topical antibiotic to right naris.  A referral was placed to follow-up with ENT for " hearing loss.    Follow-up with primary care provider.  If symptoms worsen or persist patient can return to clinic for reevaluation. Patient confirmed understanding of information.    Please note that this dictation was created using voice recognition software. I have made every reasonable attempt to correct obvious errors, but I expect that there are errors of grammar and possibly content that I did not discover before finalizing the note.

## 2021-02-17 ENCOUNTER — TELEPHONE (OUTPATIENT)
Dept: VASCULAR LAB | Facility: MEDICAL CENTER | Age: 86
End: 2021-02-17

## 2021-02-17 NOTE — TELEPHONE ENCOUNTER
Called [employer] to check on status of PASS Program application.  Pt was denied d/t not meeting the income eligibility.    Also received a VM from her as she had a question about CLINTON Estevez, KeikoD    CC Keiko FarrellD

## 2021-02-22 ENCOUNTER — TELEPHONE (OUTPATIENT)
Dept: CARDIOLOGY | Facility: MEDICAL CENTER | Age: 86
End: 2021-02-22

## 2021-02-23 ENCOUNTER — TELEPHONE (OUTPATIENT)
Dept: VASCULAR LAB | Facility: MEDICAL CENTER | Age: 86
End: 2021-02-23

## 2021-02-23 NOTE — TELEPHONE ENCOUNTER
[facility]    Pt reports she is able to  3 months of praluent from the [facility] pharmacy.  She also got the denial for the PASS program, however it's because they needed more information on her social security.  She supplied them with more information about her social security.  We will likely hear more updates about her PASS situation.  In the meantime, she has 3 months of praluent on hand.  Will continue to follow.    Chandler Brown, PharmD

## 2021-02-23 NOTE — TELEPHONE ENCOUNTER
Rescheduled pt for 2/26/2021 2375.    Returned pt call to review findings.  She verbalizes understanding and states no other concerns or questions at this time.  Pt is appreciative of information given.

## 2021-02-26 ENCOUNTER — OFFICE VISIT (OUTPATIENT)
Dept: CARDIOLOGY | Facility: MEDICAL CENTER | Age: 86
End: 2021-02-26
Payer: MEDICARE

## 2021-02-26 VITALS
BODY MASS INDEX: 23.92 KG/M2 | HEART RATE: 69 BPM | SYSTOLIC BLOOD PRESSURE: 140 MMHG | HEIGHT: 62 IN | WEIGHT: 130 LBS | DIASTOLIC BLOOD PRESSURE: 90 MMHG | OXYGEN SATURATION: 95 % | RESPIRATION RATE: 14 BRPM

## 2021-02-26 DIAGNOSIS — I71.40 ABDOMINAL AORTIC ANEURYSM (AAA) WITHOUT RUPTURE (HCC): ICD-10-CM

## 2021-02-26 DIAGNOSIS — R06.09 DYSPNEA ON EXERTION: ICD-10-CM

## 2021-02-26 DIAGNOSIS — I47.10 SVT (SUPRAVENTRICULAR TACHYCARDIA) (HCC): ICD-10-CM

## 2021-02-26 DIAGNOSIS — I70.219 ATHEROSCLEROSIS OF NATIVE ARTERY OF EXTREMITY WITH INTERMITTENT CLAUDICATION, UNSPECIFIED EXTREMITY (HCC): ICD-10-CM

## 2021-02-26 DIAGNOSIS — I10 ESSENTIAL HYPERTENSION: ICD-10-CM

## 2021-02-26 PROCEDURE — 99214 OFFICE O/P EST MOD 30 MIN: CPT | Performed by: INTERNAL MEDICINE

## 2021-02-26 ASSESSMENT — ENCOUNTER SYMPTOMS
NAUSEA: 0
DIZZINESS: 0
COUGH: 0
FALLS: 0
SORE THROAT: 0
SHORTNESS OF BREATH: 0
PND: 0
CLAUDICATION: 0
BLURRED VISION: 0
FEVER: 0
FOCAL WEAKNESS: 0
WEAKNESS: 0
PALPITATIONS: 0
CHILLS: 0
ABDOMINAL PAIN: 0
BRUISES/BLEEDS EASILY: 0

## 2021-02-26 NOTE — TELEPHONE ENCOUNTER
Called Pt back and let her know her and her  are welcome to come in together and have a joint appt. @ 3:15pm Also confirmed with patient that both her and her 's labs and echos are most recent in Epic.

## 2021-02-27 NOTE — PROGRESS NOTES
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 female who presents today for follow-up of her history of hypertension SVT with peripheral arterial disease    She has been doing well over the last 6 months she is waiting to get the Covid vaccine    She does have a increase in work of breathing    Past Medical History:   Diagnosis Date   • AAA (abdominal aortic aneurysm) (HCC)    • Atherosclerosis of native arteries of the extremities with intermittent claudication 10/16/2013   • Blepharospasm syndrome    • Bright red rectal bleeding 6/24/2015   • Chest pain 07/2015    Unspecified; Nuclear stress test negative    • Constipation    • Edema 5/29/2012   • Female bladder prolapse, acquired    • GERD (gastroesophageal reflux disease)    • Hyperlipidemia    • Hypertension    • Insomnia    • OSTEOPOROSIS    • Palpitations 5/29/2012   • Sleep apnea    • SVT (supraventricular tachycardia) (HCC) - on Zio 2015 and 2019 8/17/2015   • Varicose veins 5/29/2012   • Vitamin D deficiency disease      Past Surgical History:   Procedure Laterality Date   • CYSTOCELE REPAIR  1/17/2011    Performed by [facility]UNG [facility]   • RECTOCELE REPAIR  1/17/2011    Performed by [facility]UNG [facility]   • BLADDER SUSPENSION  1/17/2011    Performed by IGLMAR CHUNG [facility]   • CYSTOSCOPY  1/17/2011    Performed by [facility]UNG [facility]   • ABDOMINAL HYSTERECTOMY TOTAL     • ARTHROSCOPY, KNEE     • CATARACT EXTRACTION WITH IOL     • HEMORRHOIDECTOMY     • HYSTERECTOMY, TOTAL ABDOMINAL     • INJECTION SCLERO HEMORROIDS     • OTHER ORTHOPEDIC SURGERY      knee scope x 2   • PB KNEE SCOPE,DIAGNOSTIC  2003;2009   • TONSILLECTOMY       Family  History   Problem Relation Age of Onset   • Non-contributory Mother         gall bladder surgery   • Heart Disease Mother    • Cancer Father         colon rectal   • Heart Disease Father         rheumatic heart disease   • Other Brother         HIT BY CAR   • Heart Disease Brother    • Sleep Apnea Brother    • No Known Problems Maternal Grandmother    • No Known Problems Maternal Grandfather    • No Known Problems Paternal Grandmother    • No Known Problems Paternal Grandfather    • No Known Problems Son      Social History     Socioeconomic History   • Marital status:      Spouse name: Not on file   • Number of children: Not on file   • Years of education: Not on file   • Highest education level: Not on file   Occupational History   • Not on file   Tobacco Use   • Smoking status: Never Smoker   • Smokeless tobacco: Never Used   Substance and Sexual Activity   • Alcohol use: Not Currently     Alcohol/week: 0.0 oz     Comment: occasionally   • Drug use: No   • Sexual activity: Never   Other Topics Concern   • Not on file   Social History Narrative   • Not on file     Social Determinants of Health     Financial Resource Strain:    • Difficulty of Paying Living Expenses:    Food Insecurity:    • Worried About Running Out of Food in the Last Year:    • Ran Out of Food in the Last Year:    Transportation Needs:    • Lack of Transportation (Medical):    • Lack of Transportation (Non-Medical):    Physical Activity:    • Days of Exercise per Week:    • Minutes of Exercise per Session:    Stress:    • Feeling of Stress :    Social Connections:    • Frequency of Communication with Friends and Family:    • Frequency of Social Gatherings with Friends and Family:    • Attends [employer] Services:    • Active Member of Clubs or Organizations:    • Attends Club or Organization Meetings:    • Marital Status:    Intimate Partner Violence:    • Fear of Current or Ex-Partner:    • Emotionally Abused:    • Physically Abused:    •  "Sexually Abused:      Allergies   Allergen Reactions   • Atorvastatin    • Bactrim Ds Hives   • Pneumococcal Vaccines Swelling   • Sulfa Drugs Hives   • Vicodin [Hydrocodone-Acetaminophen] Rash   • Vytorin      Outpatient Encounter Medications as of 2/26/2021   Medication Sig Dispense Refill   • Alirocumab 75 MG/ML Solution Auto-injector Inject 75 mg under the skin every 14 days. 6 mL 3   • metoprolol (LOPRESSOR) 25 MG Tab Take 1 tablet by mouth twice daily 180 Tab 0   • estradiol (ESTRACE) 0.1 MG/GM vaginal cream APPLY A PEA SIZED AMOUNT INTO VAGINA ONE TO TWO TIMES A WEEK     • coenzyme Q-10 30 MG capsule Take 60 mg by mouth every day.     • Vitamin D, Cholecalciferol, 50 MCG (2000 UT) Cap Take 2 capsules by mouth once daily 60 Cap 6   • Multiple Vitamins-Minerals (ICAPS AREDS 2) Cap Take  by mouth.     • Multiple Vitamins-Minerals (CENTRUM SILVER PO) Take 1 Tab by mouth every day.       No facility-administered encounter medications on file as of 2/26/2021.     Review of Systems   Constitutional: Negative for chills and fever.   HENT: Negative for sore throat.    Eyes: Negative for blurred vision.   Respiratory: Negative for cough and shortness of breath.    Cardiovascular: Negative for chest pain, palpitations, claudication, leg swelling and PND.   Gastrointestinal: Negative for abdominal pain and nausea.   Musculoskeletal: Negative for falls and joint pain.   Skin: Negative for rash.   Neurological: Negative for dizziness, focal weakness and weakness.   Endo/Heme/Allergies: Does not bruise/bleed easily.        Objective:   /90 (BP Location: Left arm, Patient Position: Sitting, BP Cuff Size: Adult)   Pulse 69   Resp 14   Ht 1.575 m (5' 2\")   Wt 59 kg (130 lb)   SpO2 95%   BMI 23.78 kg/m²     Physical Exam   Constitutional: No distress.   HENT:   Patient wearing a mask due to COVID precautions   Eyes: No scleral icterus.   Neck: No JVD present.   Cardiovascular: Normal rate. Exam reveals no gallop and " no friction rub.   Murmur heard.  Pulmonary/Chest: No respiratory distress. She has no wheezes. She has no rales.   Abdominal: Soft. Bowel sounds are normal.   Musculoskeletal:         General: No edema.   Neurological: She is alert.   Skin: No rash noted. She is not diaphoretic.   Psychiatric: She has a normal mood and affect.     We reviewed in person the most recent labs  Recent Results (from the past 4032 hour(s))   Lipid Profile    Collection Time: 09/18/20  9:16 AM   Result Value Ref Range    Cholesterol,Tot 146 100 - 199 mg/dL    Triglycerides 92 0 - 149 mg/dL    HDL 62 >=40 mg/dL    LDL 66 <100 mg/dL   FASTING STATUS    Collection Time: 09/18/20  9:47 AM   Result Value Ref Range    Fasting Status Fasting    Comp Metabolic Panel    Collection Time: 01/18/21  2:04 PM   Result Value Ref Range    Sodium 141 135 - 145 mmol/L    Potassium 3.8 3.6 - 5.5 mmol/L    Chloride 105 96 - 112 mmol/L    Co2 25 20 - 33 mmol/L    Anion Gap 11.0 7.0 - 16.0    Glucose 90 65 - 99 mg/dL    Bun 9 8 - 22 mg/dL    Creatinine 0.87 0.50 - 1.40 mg/dL    Calcium 9.5 8.5 - 10.5 mg/dL    AST(SGOT) 21 12 - 45 U/L    ALT(SGPT) 10 2 - 50 U/L    Alkaline Phosphatase 90 30 - 99 U/L    Total Bilirubin 1.0 0.1 - 1.5 mg/dL    Albumin 4.2 3.2 - 4.9 g/dL    Total Protein 6.8 6.0 - 8.2 g/dL    Globulin 2.6 1.9 - 3.5 g/dL    A-G Ratio 1.6 g/dL   Lipid Profile    Collection Time: 01/18/21  2:04 PM   Result Value Ref Range    Cholesterol,Tot 151 100 - 199 mg/dL    Triglycerides 108 0 - 149 mg/dL    HDL 56 >=40 mg/dL    LDL 73 <100 mg/dL   ESTIMATED GFR    Collection Time: 01/18/21  2:04 PM   Result Value Ref Range    GFR If African American >60 >60 mL/min/1.73 m 2    GFR If Non African American >60 >60 mL/min/1.73 m 2       Assessment:     1. SVT (supraventricular tachycardia) (HCC) - on Zio 2015 and 2019  EC-ECHOCARDIOGRAM COMPLETE W/O CONT   2. Essential hypertension     3. Atherosclerosis of native artery of extremity with intermittent claudication,  unspecified extremity (HCC)  EC-ECHOCARDIOGRAM COMPLETE W/O CONT   4. Abdominal aortic aneurysm (AAA) without rupture (HCC)  US-AORTA/ILIACS DUPLEX LIMITED   5. Dyspnea on exertion  NM-CARDIAC STRESS TEST    EC-ECHOCARDIOGRAM COMPLETE W/O CONT       Medical Decision Making:  Today's Assessment / Status / Plan:     It was my pleasure to meet with Ms. Taylor.    Her murmur suggest that her aortic stenosis has progressed we will get a repeat echocardiogram she also needs update on her aortic ultrasound and with the report of dyspnea we will get a stress test    We will follow up with Ms. Taylor on the results of the testing over the phone. We will determine further follow-up from there.    I will see Ms. Taylor back in 6 months time and encouraged her to follow up with us over the phone or electronically using my [employer] as issues arise.    It is my pleasure to participate in the care of Ms. Taylor.  Please do not hesitate to contact me with questions or concerns.    Montez Kamara MD PhD FAC  Cardiologist [facility]    Please note that this dictation was created using voice recognition software. There may be errors I did not discover before finalizing the note.

## 2021-03-01 ENCOUNTER — HOSPITAL ENCOUNTER (OUTPATIENT)
Facility: MEDICAL CENTER | Age: 86
End: 2021-03-01
Attending: PHYSICIAN ASSISTANT
Payer: MEDICARE

## 2021-03-01 PROCEDURE — 87186 SC STD MICRODIL/AGAR DIL: CPT

## 2021-03-01 PROCEDURE — 87077 CULTURE AEROBIC IDENTIFY: CPT | Mod: 91

## 2021-03-01 PROCEDURE — 87086 URINE CULTURE/COLONY COUNT: CPT

## 2021-03-05 PROCEDURE — 91301 MODERNA SARS-COV-2 VACCINE: CPT

## 2021-03-05 PROCEDURE — 0011A MODERNA SARS-COV-2 VACCINE: CPT

## 2021-03-08 ENCOUNTER — IMMUNIZATION (OUTPATIENT)
Dept: FAMILY PLANNING/WOMEN'S HEALTH CLINIC | Facility: IMMUNIZATION CENTER | Age: 86
End: 2021-03-08
Payer: MEDICARE

## 2021-03-08 DIAGNOSIS — Z23 ENCOUNTER FOR VACCINATION: Primary | ICD-10-CM

## 2021-03-11 ENCOUNTER — OFFICE VISIT (OUTPATIENT)
Dept: MEDICAL GROUP | Facility: PHYSICIAN GROUP | Age: 86
End: 2021-03-11
Payer: MEDICARE

## 2021-03-11 VITALS
DIASTOLIC BLOOD PRESSURE: 82 MMHG | SYSTOLIC BLOOD PRESSURE: 150 MMHG | OXYGEN SATURATION: 95 % | TEMPERATURE: 97.9 F | RESPIRATION RATE: 14 BRPM | BODY MASS INDEX: 23.74 KG/M2 | HEIGHT: 62 IN | WEIGHT: 129 LBS | HEART RATE: 79 BPM

## 2021-03-11 DIAGNOSIS — I10 ESSENTIAL HYPERTENSION: ICD-10-CM

## 2021-03-11 DIAGNOSIS — R51.9 NONINTRACTABLE HEADACHE, UNSPECIFIED CHRONICITY PATTERN, UNSPECIFIED HEADACHE TYPE: ICD-10-CM

## 2021-03-11 DIAGNOSIS — I47.10 SVT (SUPRAVENTRICULAR TACHYCARDIA) (HCC): Chronic | ICD-10-CM

## 2021-03-11 DIAGNOSIS — G44.89 OTHER HEADACHE SYNDROME: ICD-10-CM

## 2021-03-11 DIAGNOSIS — I70.219 ATHEROSCLEROSIS OF NATIVE ARTERY OF EXTREMITY WITH INTERMITTENT CLAUDICATION, UNSPECIFIED EXTREMITY (HCC): ICD-10-CM

## 2021-03-11 DIAGNOSIS — I71.40 ABDOMINAL AORTIC ANEURYSM (AAA) WITHOUT RUPTURE (HCC): ICD-10-CM

## 2021-03-11 DIAGNOSIS — E78.5 DYSLIPIDEMIA: ICD-10-CM

## 2021-03-11 PROCEDURE — 99205 OFFICE O/P NEW HI 60 MIN: CPT | Performed by: INTERNAL MEDICINE

## 2021-03-11 ASSESSMENT — PATIENT HEALTH QUESTIONNAIRE - PHQ9: CLINICAL INTERPRETATION OF PHQ2 SCORE: 0

## 2021-03-11 NOTE — PROGRESS NOTES
Subjective:     CC: Establish care    HISTORY OF THE PRESENT ILLNESS: Patient is a 88 y.o. female. This pleasant patient is here today to establish care and discuss the following issues:    SVT (supraventricular tachycardia) (HCC) - on Zio 2015 and 2019  Abdominal aortic aneurysm (AAA) without rupture (HCC)  Atherosclerosis of native artery of extremity with intermittent claudication, unspecified extremity (HCC)  Essential hypertension  Dyslipidemia  This is a chronic problem.  Stable.  The patient is followed by cardiology, Dr. Kamara.  She was last seen 2/26/2021.  She is on metoprolol 25 mg twice daily for blood pressure control and Alirocumab 75 mg subcutaneous injection every 14 days for hyperlipidemia.  Lipid panel from 1/18/2021 showed a total cholesterol of 151, LDL 73, HDL 56, triglycerides 108.  An ultrasound of the aorta and an echocardiogram were ordered and still pending.    Allergies: Atorvastatin, Bactrim ds, Pneumococcal vaccines, Sulfa drugs, Vicodin [hydrocodone-acetaminophen], and Vytorin    Current Outpatient Medications Ordered in Epic   Medication Sig Dispense Refill   • acetaminophen-codeine #3 (TYLENOL #3) 300-30 MG Tab Take 1 tablet by mouth every four hours as needed for up to 20 days. 20 tablet 0   • Alirocumab 75 MG/ML Solution Auto-injector Inject 75 mg under the skin every 14 days. 6 mL 3   • metoprolol (LOPRESSOR) 25 MG Tab Take 1 tablet by mouth twice daily 180 Tab 0   • estradiol (ESTRACE) 0.1 MG/GM vaginal cream APPLY A PEA SIZED AMOUNT INTO VAGINA ONE TO TWO TIMES A WEEK     • coenzyme Q-10 30 MG capsule Take 60 mg by mouth every day.     • Vitamin D, Cholecalciferol, 50 MCG (2000 UT) Cap Take 2 capsules by mouth once daily 60 Cap 6   • Multiple Vitamins-Minerals (ICAPS AREDS 2) Cap Take  by mouth.     • Multiple Vitamins-Minerals (CENTRUM SILVER PO) Take 1 Tab by mouth every day.       No current [facility]-ordered facility-administered medications on file.       Past Medical History:    Diagnosis Date   • AAA (abdominal aortic aneurysm) ([facility])    • Atherosclerosis of native arteries of the extremities with intermittent claudication 10/16/2013   • Blepharospasm syndrome    • Bright red rectal bleeding 6/24/2015   • Chest pain 07/2015    Unspecified; Nuclear stress test negative    • Constipation    • Edema 5/29/2012   • Female bladder prolapse, acquired    • GERD (gastroesophageal reflux disease)    • Hyperlipidemia    • Hypertension    • Insomnia    • OSTEOPOROSIS    • Palpitations 5/29/2012   • Sleep apnea    • SVT (supraventricular tachycardia) ([facility]) - on Zio 2015 and 2019 8/17/2015   • Varicose veins 5/29/2012   • Vitamin D deficiency disease        Past Surgical History:   Procedure Laterality Date   • CYSTOCELE REPAIR  1/17/2011    Performed by GILMAR CHUNG at [facility]   • RECTOCELE REPAIR  1/17/2011    Performed by GILMAR CHUNG at [facility]   • BLADDER SUSPENSION  1/17/2011    Performed by GILMAR CHUNG at [facility]   • CYSTOSCOPY  1/17/2011    Performed by GILMAR CHUNG at [facility]   • ABDOMINAL HYSTERECTOMY TOTAL     • ARTHROSCOPY, KNEE     • CATARACT EXTRACTION WITH IOL     • HEMORRHOIDECTOMY     • HYSTERECTOMY, TOTAL ABDOMINAL     • INJECTION SCLERO HEMORROIDS     • OTHER ORTHOPEDIC SURGERY      knee scope x 2   • PB KNEE SCOPE,DIAGNOSTIC  2003;2009   • TONSILLECTOMY         Social History     Tobacco Use   • Smoking status: Never Smoker   • Smokeless tobacco: Never Used   Substance Use Topics   • Alcohol use: Not Currently     Alcohol/week: 0.0 oz     Comment: occasionally   • Drug use: No       Social History     Social History Narrative   • Not on file       Family History   Problem Relation Age of Onset   • Non-contributory Mother         gall bladder surgery   • Heart Disease Mother    • Cancer Father         colon rectal   • Heart Disease Father         rheumatic heart disease  "  • Other Brother         HIT BY CAR   • Heart Disease Brother    • Sleep Apnea Brother    • No Known Problems Maternal Grandmother    • No Known Problems Maternal Grandfather    • No Known Problems Paternal Grandmother    • No Known Problems Paternal Grandfather    • No Known Problems Son        Health Maintenance: Completed    ROS:   Gen: no fevers/chills  Pulm: no sob, no cough  CV: no chest pain, no palpitations  GI: no nausea/vomiting, no diarrhea  Neuro: no headaches, no numbness/tingling      Objective:     Exam: /82   Pulse 79   Temp 36.6 °C (97.9 °F)   Resp 14   Ht 1.575 m (5' 2\")   Wt 58.5 kg (129 lb)   SpO2 95%  Body mass index is 23.59 kg/m².    General: Normal appearing. No distress.  HEENT: Normocephalic. Eyes conjunctiva clear lids without ptosis, pupils equal and reactive to light accommodation, oropharynx is without erythema, edema or exudates.   Pulmonary: Clear to ausculation.  Normal effort. No rales, ronchi, or wheezing.  Cardiovascular: Regular rate and rhythm, 3/6 systolic murmur  Abdomen: Soft, nontender, nondistended.   Musculoskeletal: No extremity cyanosis, clubbing, or edema.  Psych: Normal mood and affect. Alert and oriented x3. Judgment and insight is normal.    Labs: Reviewed and discussed with patient.    Assessment & Plan:   88 y.o. 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  Stable.  The patient is followed by cardiology, Dr. Kamara.  She was last seen 2/26/2021.  She is on metoprolol 25 mg twice daily for blood pressure control and  75 mg subcutaneous injection every 14 days for hyperlipidemia.  Lipid panel from 1/18/2021 showed a total cholesterol of 151, LDL 73, HDL 56, triglycerides 108.  An " ultrasound of the aorta and an echocardiogram were ordered and still pending.  -Continue Alirocumab 75 mg subcutaneously every 14 days  -Continue metoprolol 25 mg twice daily, blood pressure is slightly elevated at today's visit, but given the patient's age and risk for falls we will continue on the current regimen and defer additional changes to cardiology    Nonintractable headache, unspecified chronicity pattern, unspecified headache type  This is a chronic problem.  Recurrent.  Patient uses occasional Tylenol 3 which is the only thing that has been able to effectively control her headaches.  She was given warning precautions about increased risk of falls on this medication and that she should not be driving while taking this medication.   was reviewed.  - acetaminophen-codeine #3 (TYLENOL #3) 300-30 MG Tab; Take 1 tablet by mouth every four hours as needed for up to 20 days.  Dispense: 20 tablet; Refill: 0    I spent a total of 60 minutes with record review, exam, communication with the patient, communication with other providers, and documentation of this encounter.    Return in about 6 months (around 9/11/2021).    Please note that this dictation was created using voice recognition software. I have made every reasonable attempt to correct obvious errors, but I expect that there are errors of grammar and possibly content that I did not discover before finalizing the note.

## 2021-03-19 DIAGNOSIS — I47.10 SVT (SUPRAVENTRICULAR TACHYCARDIA) (HCC): ICD-10-CM

## 2021-03-29 ENCOUNTER — NURSE TRIAGE (OUTPATIENT)
Dept: HEALTH INFORMATION MANAGEMENT | Facility: OTHER | Age: 86
End: 2021-03-29

## 2021-03-29 NOTE — TELEPHONE ENCOUNTER
"Pt got covid vaccination on 3/5. On Friday, pt was fatigued, chills, and had an upset stomach. Pt wondering if she should get 2nd covid vaccination. Pt advised if she is sick to reschedule vaccine but if feeling well can get her vaccine.    Reason for Disposition  • Information only question and nurse able to answer    Additional Information  • Negative: Nursing judgment  • Negative: Nursing judgment  • Negative: Nursing judgment  • Negative: Nursing judgment    Answer Assessment - Initial Assessment Questions  1. REASON FOR CALL or QUESTION: \"What is your reason for calling today?\" or \"How can I best help you?\" or \"What question do you have that I can help answer?\"      Mild symptoms on Friday. Wondering if she should get 2nd covid vaccine    Protocols used: INFORMATION ONLY CALL - NO TRIAGE-A-OH      "

## 2021-04-05 ENCOUNTER — NURSE TRIAGE (OUTPATIENT)
Dept: HEALTH INFORMATION MANAGEMENT | Facility: OTHER | Age: 86
End: 2021-04-05

## 2021-04-05 NOTE — TELEPHONE ENCOUNTER
"Pt had first moderna Covid vaccine on March 5 and had swollen arm ( a little).  On March 25 patient went grocery shopping and started have a \"reaction\"  Sx: Sweats, cold, shivering which lasted 2 or 3 hours.  Ever since March 25 patient reports feeling poorly, fatigue.      Back has been hurting so bad she could not move as much as usual. Pt would like to know if she should be checked for UTI prior to next Covid Vaccine.    Chronic UTI and being followed by MD and has been prescribed nitrofurantoin.    1. Caller Name: Nini Taylor  2.                  Call Back Number: [phone] (home)   3.   [facility] PCP or Specialty Provider: Yes Arianna Cabrera M.D.          2. Has the patient previously tested positive for COVID-19? No    3.  In the last two weeks, has the patient had any new or worsening symptoms (not explained by alternative diagnosis)? No.    4.  Does patient have any comoribidities? None     5.  Has the patient had any known contact with someone who is suspected or confirmed to have COVID-19? No.    5. Disposition: Cleared by RN Triage as potential is low for COVID-19; OK to keep/schedule appointment    Note routed to [facility] Provider: TED only.       Reason for Disposition  • Age > 50 and no history of prior similar back pain    Additional Information  • Negative: Passed out (i.e., fainted, collapsed and was not responding)  • Negative: Shock suspected (e.g., cold/pale/clammy skin, too weak to stand, low BP, rapid pulse)  • Negative: Sounds like a life-threatening emergency to the triager  • Negative: Major injury to the back (e.g., MVA, fall > 10 feet or 3 meters, penetrating injury, etc.)  • Negative: Pain in the upper back over the ribs (rib cage) that radiates (travels) into the chest  • Negative: Pain in the upper back over the ribs (rib cage) and worsened by coughing (or clearly increases with breathing)  • Negative: SEVERE back pain of sudden onset and age > 60  • Negative: SEVERE abdominal " "pain (e.g., excruciating)  • Negative: Abdominal pain and age > 60  • Negative: Unable to urinate (or only a few drops) and bladder feels very full  • Negative: Loss of bladder or bowel control (urine or bowel incontinence; wetting self, leaking stool) of new onset  • Negative: Numbness (loss of sensation) in groin or rectal area  • Negative: Pain radiates into groin, scrotum  • Negative: Blood in urine (red, pink, or tea-colored)  • Negative: Vomiting and pain over lower ribs of back (i.e., flank - kidney area)  • Negative: Weakness of a leg or foot (e.g., unable to bear weight, dragging foot)  • Negative: Patient sounds very sick or weak to the triager  • Negative: Fever > 100.5 F (38.1 C) and flank pain  • Negative: Pain or burning with passing urine (urination)  • Negative: SEVERE back pain (e.g., excruciating, unable to do any normal activities) and not improved after pain medicine and CARE ADVICE  • Negative: Numbness in an arm or hand (i.e., loss of sensation) and upper back pain  • Negative: Numbness in a leg or foot (i.e., loss of sensation)  • Negative: High-risk adult (e.g., history of cancer, history of HIV, or history of IV drug abuse)  • Negative: Painful rash with multiple small blisters grouped together (i.e., dermatomal distribution or 'band' or 'stripe')  • Negative: Pain radiates into the thigh or further down the leg, and in both legs    Answer Assessment - Initial Assessment Questions  1. ONSET: \"When did the pain begin?\"       March 25  2. LOCATION: \"Where does it hurt?\" (upper, mid or lower back)   lowe back below the ribs  3. SEVERITY: \"How bad is the pain?\"  (e.g., Scale 1-10; mild, moderate, or severe)    - MILD (1-3): doesn't interfere with normal activities     - MODERATE (4-7): interferes with normal activities or awakens from sleep     - SEVERE (8-10): excruciating pain, unable to do any normal activities       10/10 yesterday and the day before.  Last night was a better night of sleep " "and lower pain. Today pain is 5/10  4. PATTERN: \"Is the pain constant?\" (e.g., yes, no; constant, intermittent)       Constant yesterday lower back   5. RADIATION: \"Does the pain shoot into your legs or elsewhere?\"      no  6. CAUSE:  \"What do you think is causing the back pain?\"       unknown  7. BACK OVERUSE:  \"Any recent lifting of heavy objects, strenuous work or exercise?\"      no  8. MEDICATIONS: \"What have you taken so far for the pain?\" (e.g., nothing, acetaminophen, NSAIDS)      no  9. NEUROLOGIC SYMPTOMS: \"Do you have any weakness, numbness, or problems with bowel/bladder control?\"      no  10. OTHER SYMPTOMS: \"Do you have any other symptoms?\" (e.g., fever, abdominal pain, burning with urination, blood in urine)        no  11. PREGNANCY: \"Is there any chance you are pregnant?\" (e.g., yes, no; LMP)        no    Protocols used: BACK PAIN-A-OH      "

## 2021-04-06 ENCOUNTER — OFFICE VISIT (OUTPATIENT)
Dept: MEDICAL GROUP | Facility: PHYSICIAN GROUP | Age: 86
End: 2021-04-06
Payer: MEDICARE

## 2021-04-06 ENCOUNTER — HOSPITAL ENCOUNTER (OUTPATIENT)
Facility: MEDICAL CENTER | Age: 86
End: 2021-04-06
Attending: INTERNAL MEDICINE
Payer: MEDICARE

## 2021-04-06 VITALS
HEART RATE: 115 BPM | TEMPERATURE: 98.6 F | WEIGHT: 125 LBS | RESPIRATION RATE: 14 BRPM | BODY MASS INDEX: 23 KG/M2 | DIASTOLIC BLOOD PRESSURE: 68 MMHG | SYSTOLIC BLOOD PRESSURE: 118 MMHG | OXYGEN SATURATION: 96 % | HEIGHT: 62 IN

## 2021-04-06 DIAGNOSIS — N30.00 ACUTE CYSTITIS WITHOUT HEMATURIA: ICD-10-CM

## 2021-04-06 DIAGNOSIS — M54.50 ACUTE BILATERAL LOW BACK PAIN WITHOUT SCIATICA: ICD-10-CM

## 2021-04-06 DIAGNOSIS — R30.0 DYSURIA: ICD-10-CM

## 2021-04-06 LAB
APPEARANCE UR: CLEAR
BILIRUB UR STRIP-MCNC: NORMAL MG/DL
COLOR UR AUTO: YELLOW
GLUCOSE UR STRIP.AUTO-MCNC: NEGATIVE MG/DL
KETONES UR STRIP.AUTO-MCNC: 15 MG/DL
LEUKOCYTE ESTERASE UR QL STRIP.AUTO: NORMAL
NITRITE UR QL STRIP.AUTO: NEGATIVE
PH UR STRIP.AUTO: 5.5 [PH] (ref 5–8)
PROT UR QL STRIP: NORMAL MG/DL
RBC UR QL AUTO: NEGATIVE
SP GR UR STRIP.AUTO: 1.02
UROBILINOGEN UR STRIP-MCNC: 0.2 MG/DL

## 2021-04-06 PROCEDURE — 99214 OFFICE O/P EST MOD 30 MIN: CPT | Performed by: INTERNAL MEDICINE

## 2021-04-06 PROCEDURE — 87086 URINE CULTURE/COLONY COUNT: CPT

## 2021-04-06 PROCEDURE — 81002 URINALYSIS NONAUTO W/O SCOPE: CPT | Performed by: INTERNAL MEDICINE

## 2021-04-06 RX ORDER — NITROFURANTOIN MACROCRYSTALS 50 MG/1
50 CAPSULE ORAL DAILY
COMMUNITY
End: 2021-07-21

## 2021-04-06 RX ORDER — CIPROFLOXACIN 500 MG/1
500 TABLET, FILM COATED ORAL 2 TIMES DAILY
Qty: 6 TABLET | Refills: 0 | Status: SHIPPED | OUTPATIENT
Start: 2021-04-06 | End: 2021-04-07

## 2021-04-06 NOTE — PROGRESS NOTES
Subjective:     CC: Back pain    HPI:   Nini presents today for lower back pain.    The patient states she received her Covid vaccine on March 5.  She had a slightly swollen arm following the vaccination.  On March 25 she developed a 2 to 3-hour episode of sweats, feeling cold, and back pain.  This subsequently resolved, but then the back pain returned.  She is currently completing a 14-day course of nitrofurantoin.  She denies dysuria or urinary frequency.  The back pain is actually more bilateral buttock pain, and the patient states that she does get this symptom with urinary tract infections.  Her most recent culture grew out Klebsiella sensitive to Cipro.      Past Medical History:   Diagnosis Date   • AAA (abdominal aortic aneurysm) ([facility])    • Atherosclerosis of native arteries of the extremities with intermittent claudication 10/16/2013   • Blepharospasm syndrome    • Bright red rectal bleeding 6/24/2015   • Chest pain 07/2015    Unspecified; Nuclear stress test negative    • Constipation    • Edema 5/29/2012   • Female bladder prolapse, acquired    • GERD (gastroesophageal reflux disease)    • Hyperlipidemia    • Hypertension    • Insomnia    • OSTEOPOROSIS    • Palpitations 5/29/2012   • Sleep apnea    • SVT (supraventricular tachycardia) ([facility]) - on Zio 2015 and 2019 8/17/2015   • Varicose veins 5/29/2012   • Vitamin D deficiency disease        Social History     Tobacco Use   • Smoking status: Never Smoker   • Smokeless tobacco: Never Used   Substance Use Topics   • Alcohol use: Not Currently     Alcohol/week: 0.0 oz     Comment: occasionally   • Drug use: No       Current Outpatient Medications Ordered in Epic   Medication Sig Dispense Refill   • nitrofurantoin (MACRODANTIN) 50 MG Cap Take 50 mg by mouth every day. Patient taking for daily dose to keep bacteria down     • ciprofloxacin (CIPRO) 500 MG Tab Take 1 tablet by mouth 2 times a day. 6 tablet 0   • metoprolol tartrate (LOPRESSOR) 25 MG Tab Take 1  "tablet by mouth twice daily 180 tablet 3   • Alirocumab 75 MG/ML Solution Auto-injector Inject 75 mg under the skin every 14 days. 6 mL 3   • estradiol (ESTRACE) 0.1 MG/GM vaginal cream APPLY A PEA SIZED AMOUNT INTO VAGINA ONE TO TWO TIMES A WEEK     • coenzyme Q-10 30 MG capsule Take 60 mg by mouth every day.     • Vitamin D, Cholecalciferol, 50 MCG (2000 UT) Cap Take 2 capsules by mouth once daily 60 Cap 6   • Multiple Vitamins-Minerals (ICAPS AREDS 2) Cap Take  by mouth.     • Multiple Vitamins-Minerals (CENTRUM SILVER PO) Take 1 Tab by mouth every day.       No current [facility]-ordered facility-administered medications on file.       Allergies:  Atorvastatin, Bactrim ds, Pneumococcal vaccines, Sulfa drugs, Vicodin [hydrocodone-acetaminophen], and Vytorin      ROS:   Gen: no fevers/chills  Pulm: no sob, no cough  CV: no chest pain, no palpitations  GI: no nausea/vomiting, no diarrhea  Neuro: no headaches, no numbness/tingling      Objective:     Exam:  /68   Pulse (!) 115   Temp 37 °C (98.6 °F)   Resp 14   Ht 1.575 m (5' 2\")   Wt 56.7 kg (125 lb)   SpO2 96%   BMI 22.86 kg/m²  Body mass index is 22.86 kg/m².    Repeat Pulse 95    Gen: Alert and oriented, No apparent distress.  Lungs: Normal effort, CTA bilaterally, no wheezes, rhonchi, or rales  CV: Regular rate and rhythm. No murmurs, rubs, or gallops.  Ext: No clubbing, cyanosis, edema.      Assessment & Plan:     88 y.o. female with the following -       Acute bilateral low back pain without sciatica  Acute cystitis without hematuria  This is an acute condition.  UA positive for large leukocytes.  Patient currently on 14-day course of nitrofurantoin.  Hesitant to start ciprofloxacin given its side effects, but patient has an allergy to Bactrim and does not seem to be responding to nitrofurantoin.  We will also check an x-ray of her lumbar spine.  - POCT Urinalysis  - URINE CULTURE(NEW); Future  - DX-LUMBAR SPINE-2 OR 3 VIEWS; Future  - ciprofloxacin " (CIPRO) 500 MG Tab; Take 1 tablet by mouth 2 times a day.  Dispense: 6 tablet; Refill: 0      I spent a total of 20 minutes with record review, exam, communication with the patient, communication with other providers, and documentation of this encounter.      Return in about 1 week (around 4/13/2021).    Please note that this dictation was created using voice recognition software. I have made every reasonable attempt to correct obvious errors, but I expect that there are errors of grammar and possibly content that I did not discover before finalizing the note.

## 2021-04-07 ENCOUNTER — TELEPHONE (OUTPATIENT)
Dept: MEDICAL GROUP | Facility: PHYSICIAN GROUP | Age: 86
End: 2021-04-07

## 2021-04-07 ENCOUNTER — APPOINTMENT (OUTPATIENT)
Dept: MEDICAL GROUP | Facility: PHYSICIAN GROUP | Age: 86
End: 2021-04-07
Payer: MEDICARE

## 2021-04-07 ENCOUNTER — NURSE TRIAGE (OUTPATIENT)
Dept: HEALTH INFORMATION MANAGEMENT | Facility: OTHER | Age: 86
End: 2021-04-07

## 2021-04-07 ENCOUNTER — HOSPITAL ENCOUNTER (OUTPATIENT)
Dept: RADIOLOGY | Facility: MEDICAL CENTER | Age: 86
End: 2021-04-07
Attending: INTERNAL MEDICINE
Payer: MEDICARE

## 2021-04-07 DIAGNOSIS — M54.50 ACUTE BILATERAL LOW BACK PAIN WITHOUT SCIATICA: ICD-10-CM

## 2021-04-07 PROCEDURE — 72100 X-RAY EXAM L-S SPINE 2/3 VWS: CPT

## 2021-04-07 NOTE — TELEPHONE ENCOUNTER
[facility] [address] [place] [address], [place] - [id number] [facility] [address]  Phone: [phone] Fax: [phone]    Pharmacy received rx for cipro and state they have on file that pt is allergic to levofloxacin.  They are asking for an alternative sent in.

## 2021-04-07 NOTE — TELEPHONE ENCOUNTER
Evangelina Deras spoke with the patient by phone today.  We did not have levofloxacin listed as an allergy on her medical record.  The patient does not know whether she has an allergy to this medication.  I have now placed it as an allergy on her chart.  We are bringing her in at 11:30 AM for Rocephin 1 g IM x1.  The patient will then resume her nitrofurantoin beginning tomorrow.  She is already followed by urology for her frequent UTIs.  She will need to follow-up with them for further instructions.

## 2021-04-07 NOTE — TELEPHONE ENCOUNTER
Received covid vaccine 3/5, scheduled for 2nd vaccine on  4/10, Moderna, 3/25 woke up w/extreme pain in lower back & tears & immobile, saw Dr. Cabrera yesterday, covid had nothing to do w/it.  Getting progressively worse, almost bedridden, ongoing bladder problem, UTI all the time, need x-ray of back, need 2 days of  pill before covid shot. Pharmacy put hold on prescription need to talk w/PCP regarding same.

## 2021-04-08 NOTE — TELEPHONE ENCOUNTER
----- Message from Arianna Cabrera M.D. sent at 4/7/2021  4:18 PM PDT -----  Please call patient and let her know that her back x-ray did not show any acute problem.  I will review it with her at her appointment on Friday.  Arianna

## 2021-04-08 NOTE — TELEPHONE ENCOUNTER
I called the patient and gave results on her xray per Dr. Cabrera.  Patient will be in on Friday to talk about urine culture and xray to review.

## 2021-04-08 NOTE — PROGRESS NOTES
Subjective:     CC: Follow-up on not feeling well    HPI:   Nini presents today for follow-up on lower back pain, possible UTI.    The patient was last seen on 4/6/2021 with complaint of not feeling well and lower back, almost buttock, pain approximately 3 weeks after receiving her Covid vaccine.  She was concerned that it was a reaction to the Covid vaccine.  She is followed by urology for chronic UTIs and had been placed on nitrofurantoin which she was currently taking. POCT urinalysis was positive for large leukocyte esterase.  Due to numerous allergies the patient was given intramuscular ceftriaxone.  Culture was negative.  The patient has resumed nitrofurantoin.  Lumbar spine x-ray on 4/7/2021 showed degenerative facet arthropathy bilaterally with well-preserved disc spaces, scoliosis with grade 1 L4-L5 anterolisthesis, and diffuse osteopenia.   The patient states she feels a little better, thinks she maybe got overly reactive by reading about all the side effects of the vaccine.  States she feels that many of her symptoms are just due to her advanced age.  She is scheduled to get the second 1 during her vaccine on Saturday, 4/10/2021.      Past Medical History:   Diagnosis Date   • AAA (abdominal aortic aneurysm) (formerly [facility])    • Atherosclerosis of native arteries of the extremities with intermittent claudication 10/16/2013   • Blepharospasm syndrome    • Bright red rectal bleeding 6/24/2015   • Chest pain 07/2015    Unspecified; Nuclear stress test negative    • Constipation    • Edema 5/29/2012   • Female bladder prolapse, acquired    • GERD (gastroesophageal reflux disease)    • Hyperlipidemia    • Hypertension    • Insomnia    • OSTEOPOROSIS    • Palpitations 5/29/2012   • Sleep apnea    • SVT (supraventricular tachycardia) (formerly [facility]) - on Zio 2015 and 2019 8/17/2015   • Varicose veins 5/29/2012   • Vitamin D deficiency disease        Social History     Tobacco Use   • Smoking status: Never Smoker   • Smokeless  "tobacco: Never Used   Substance Use Topics   • Alcohol use: Not Currently     Alcohol/week: 0.0 oz     Comment: occasionally   • Drug use: No       Current Outpatient Medications Ordered in Epic   Medication Sig Dispense Refill   • nitrofurantoin (MACRODANTIN) 50 MG Cap Take 50 mg by mouth every day. Patient taking for daily dose to keep bacteria down     • metoprolol tartrate (LOPRESSOR) 25 MG Tab Take 1 tablet by mouth twice daily 180 tablet 3   • Alirocumab 75 MG/ML Solution Auto-injector Inject 75 mg under the skin every 14 days. 6 mL 3   • estradiol (ESTRACE) 0.1 MG/GM vaginal cream APPLY A PEA SIZED AMOUNT INTO VAGINA ONE TO TWO TIMES A WEEK     • coenzyme Q-10 30 MG capsule Take 60 mg by mouth every day.     • Vitamin D, Cholecalciferol, 50 MCG (2000 UT) Cap Take 2 capsules by mouth once daily 60 Cap 6   • Multiple Vitamins-Minerals (ICAPS AREDS 2) Cap Take  by mouth.     • Multiple Vitamins-Minerals (CENTRUM SILVER PO) Take 1 Tab by mouth every day.       No current [facility]-ordered facility-administered medications on file.       Allergies:  Atorvastatin, Bactrim ds, Levofloxacin, Pneumococcal vaccines, Sulfa drugs, Vicodin [hydrocodone-acetaminophen], and Vytorin      ROS:   Gen: no fevers/chills  Pulm: no sob, no cough  CV: no chest pain, no palpitations  GI: no nausea/vomiting, no diarrhea  Neuro: no headaches, no numbness/tingling      Objective:     Exam:  /82   Pulse (!) 126   Resp 16   Ht 1.575 m (5' 2\")   SpO2 95%   BMI 22.86 kg/m²  Body mass index is 22.86 kg/m².    Gen: Alert and oriented, No apparent distress.  Lungs: Normal effort, CTA bilaterally, no wheezes, rhonchi, or rales  CV: Regular rate and rhythm. No murmurs, rubs, or gallops.  Ext: No clubbing, cyanosis, edema.      Assessment & Plan:     88 y.o. female with the following -     Recurrent UTI  Chronic bilateral low back pain without sciatica  This is a chronic condition.  Ongoing.  Patient is status post intramuscular " ceftriaxone and now on chronic nitrofurantoin.  She is followed by urology.  Most recent urine culture was negative.  With regard to her back pain, we did discuss that there were some abnormalities on the x-ray and we could consider ordering an MRI but this would not be the recommended route as she is not interested in pursuing any interventions at this time.  She is scheduled to get the second Covid vaccine this coming Saturday.  I did cautiously inform her that she may experience some side effects such as arm pain, fatigue, achiness and that she should try to not be overly alarmed by these symptoms as they should resolve in a few days.      I spent a total of 20 minutes with record review, exam, communication with the patient, communication with other providers, and documentation of this encounter.      Return in about 4 weeks (around 5/7/2021) for Follow-up Covid vaccine.    Please note that this dictation was created using voice recognition software. I have made every reasonable attempt to correct obvious errors, but I expect that there are errors of grammar and possibly content that I did not discover before finalizing the note.

## 2021-04-09 ENCOUNTER — OFFICE VISIT (OUTPATIENT)
Dept: MEDICAL GROUP | Facility: PHYSICIAN GROUP | Age: 86
End: 2021-04-09
Payer: MEDICARE

## 2021-04-09 VITALS
RESPIRATION RATE: 16 BRPM | SYSTOLIC BLOOD PRESSURE: 124 MMHG | OXYGEN SATURATION: 95 % | BODY MASS INDEX: 22.86 KG/M2 | HEIGHT: 62 IN | HEART RATE: 126 BPM | DIASTOLIC BLOOD PRESSURE: 82 MMHG

## 2021-04-09 DIAGNOSIS — N39.0 RECURRENT UTI: ICD-10-CM

## 2021-04-09 DIAGNOSIS — G89.29 CHRONIC BILATERAL LOW BACK PAIN WITHOUT SCIATICA: ICD-10-CM

## 2021-04-09 DIAGNOSIS — M54.50 CHRONIC BILATERAL LOW BACK PAIN WITHOUT SCIATICA: ICD-10-CM

## 2021-04-09 LAB
BACTERIA UR CULT: NORMAL
SIGNIFICANT IND 70042: NORMAL
SITE SITE: NORMAL
SOURCE SOURCE: NORMAL

## 2021-04-09 PROCEDURE — 99213 OFFICE O/P EST LOW 20 MIN: CPT | Performed by: INTERNAL MEDICINE

## 2021-04-10 ENCOUNTER — IMMUNIZATION (OUTPATIENT)
Dept: FAMILY PLANNING/WOMEN'S HEALTH CLINIC | Facility: IMMUNIZATION CENTER | Age: 86
End: 2021-04-10
Attending: INTERNAL MEDICINE
Payer: MEDICARE

## 2021-04-10 DIAGNOSIS — Z23 ENCOUNTER FOR VACCINATION: Primary | ICD-10-CM

## 2021-04-10 PROCEDURE — 91301 MODERNA SARS-COV-2 VACCINE: CPT | Performed by: INTERNAL MEDICINE

## 2021-04-10 PROCEDURE — 0012A MODERNA SARS-COV-2 VACCINE: CPT | Performed by: INTERNAL MEDICINE

## 2021-04-12 ENCOUNTER — TELEPHONE (OUTPATIENT)
Dept: CARDIOLOGY | Facility: MEDICAL CENTER | Age: 86
End: 2021-04-12

## 2021-04-12 ENCOUNTER — HOSPITAL ENCOUNTER (OUTPATIENT)
Dept: RADIOLOGY | Facility: MEDICAL CENTER | Age: 86
End: 2021-04-12
Attending: PHYSICIAN ASSISTANT
Payer: MEDICARE

## 2021-04-12 ENCOUNTER — TELEPHONE (OUTPATIENT)
Dept: URGENT CARE | Facility: PHYSICIAN GROUP | Age: 86
End: 2021-04-12

## 2021-04-12 ENCOUNTER — OFFICE VISIT (OUTPATIENT)
Dept: URGENT CARE | Facility: PHYSICIAN GROUP | Age: 86
End: 2021-04-12
Payer: MEDICARE

## 2021-04-12 VITALS
HEIGHT: 62 IN | SYSTOLIC BLOOD PRESSURE: 138 MMHG | WEIGHT: 125 LBS | TEMPERATURE: 97.9 F | DIASTOLIC BLOOD PRESSURE: 80 MMHG | RESPIRATION RATE: 14 BRPM | HEART RATE: 97 BPM | BODY MASS INDEX: 23 KG/M2 | OXYGEN SATURATION: 96 %

## 2021-04-12 DIAGNOSIS — M79.89 LEFT LEG SWELLING: ICD-10-CM

## 2021-04-12 DIAGNOSIS — L03.116 CELLULITIS OF LEFT LOWER EXTREMITY: ICD-10-CM

## 2021-04-12 PROCEDURE — 93971 EXTREMITY STUDY: CPT | Mod: LT

## 2021-04-12 PROCEDURE — 99214 OFFICE O/P EST MOD 30 MIN: CPT | Performed by: PHYSICIAN ASSISTANT

## 2021-04-12 RX ORDER — AMOXICILLIN 500 MG/1
500 CAPSULE ORAL 3 TIMES DAILY
Qty: 21 CAPSULE | Refills: 0 | Status: SHIPPED | OUTPATIENT
Start: 2021-04-12 | End: 2021-04-19

## 2021-04-12 NOTE — TELEPHONE ENCOUNTER
CW    Pt called stating her leg and ankle is very swollen and she is having a lot of pain. Pt states she is going to go to urgent care and wanted CW to know.

## 2021-04-15 ASSESSMENT — ENCOUNTER SYMPTOMS
FEVER: 0
SORE THROAT: 0
PALPITATIONS: 0
COUGH: 0
MYALGIAS: 0
SHORTNESS OF BREATH: 0
EDEMA: 1

## 2021-04-15 NOTE — PROGRESS NOTES
Subjective:   Nini Taylor is a 88 y.o. female who presents for Ankle Swelling (L ankle [id number] )      Edema  This is a new problem. The current episode started in the past 7 days. The problem occurs constantly. The problem has been unchanged. Associated symptoms include a rash. Pertinent negatives include no chest pain, coughing, fever, myalgias or sore throat. Associated symptoms comments: Swelling and redness of the left ankle  . Nothing aggravates the symptoms. She has tried nothing for the symptoms.       Review of Systems   Constitutional: Negative for fever and malaise/fatigue.   HENT: Negative for sore throat.    Respiratory: Negative for cough and shortness of breath.    Cardiovascular: Negative for chest pain and palpitations.   Musculoskeletal: Positive for joint pain. Negative for myalgias.   Skin: Positive for rash.   All other systems reviewed and are negative.      Medications:    • Alirocumab Soaj  • amoxicillin Caps  • CENTRUM SILVER PO  • coenzyme Q-10  • estradiol  • ICaps Areds 2 Caps  • metoprolol tartrate Tabs  • nitrofurantoin Caps  • Vitamin D (Cholecalciferol) Caps    Allergies: Atorvastatin, Bactrim ds, Levofloxacin, Pneumococcal vaccines, Sulfa drugs, Vicodin [hydrocodone-acetaminophen], and Vytorin    Problem List: Nini Taylor has GERD (gastroesophageal reflux disease); Age-related osteoporosis without current pathological fracture; Blepharospasm syndrome; Dyslipidemia; Vitamin D deficiency disease; Female bladder prolapse, acquired; Essential hypertension; Varicose veins; Atherosclerosis of native arteries of extremity with intermittent claudication (HCC); RUY treated with BiPAP; Abdominal aortic aneurysm (HCC) - MRA 2.9 cm 9/2018 saccular aneurysm; SVT (supraventricular tachycardia) (HCC) - on Zio 2015 and 2019; Pancreatic cyst; Chronic insomnia; Low vitamin D level; Other headache syndrome; Recurrent UTI; and Chronic bilateral low back pain without sciatica on their problem  "list.    Surgical History:  Past Surgical History:   Procedure Laterality Date   • CYSTOCELE REPAIR  1/17/2011    Performed by GILMAR CHUNG at SURGERY SAME DAY [facility]   • RECTOCELE REPAIR  1/17/2011    Performed by GILMAR CHUNG at SURGERY SAME DAY [facility]   • BLADDER SUSPENSION  1/17/2011    Performed by GILMAR CHUNG at SURGERY SAME DAY [facility]   • CYSTOSCOPY  1/17/2011    Performed by GILMAR CHUNG at SURGERY SAME DAY [facility]   • ABDOMINAL HYSTERECTOMY TOTAL     • ARTHROSCOPY, KNEE     • CATARACT EXTRACTION WITH IOL     • HEMORRHOIDECTOMY     • HYSTERECTOMY, TOTAL ABDOMINAL     • INJECTION SCLERO HEMORROIDS     • OTHER ORTHOPEDIC SURGERY      knee scope x 2   • PB KNEE SCOPE,DIAGNOSTIC  2003;2009   • TONSILLECTOMY         Past Social Hx: Nini Taylor  reports that she has never smoked. She has never used smokeless tobacco. She reports previous alcohol use. She reports that she does not use drugs.     Past Family Hx:  Nini Taylor family history includes Cancer in her father; Heart Disease in her brother, father, and mother; No Known Problems in her maternal grandfather, maternal grandmother, paternal grandfather, paternal grandmother, and son; Non-contributory in her mother; Other in her brother; Sleep Apnea in her brother.     Problem list, medications, and allergies reviewed by myself today in Epic.     Objective:     Blood Pressure 138/80   Pulse 97   Temperature 36.6 °C (97.9 °F) (Temporal)   Respiration 14   Height 1.575 m (5' 2\")   Weight 56.7 kg (125 lb)   Oxygen Saturation 96%   Body Mass Index 22.86 kg/m²     Physical Exam  Vitals reviewed.   Constitutional:       Appearance: She is well-developed.   Cardiovascular:      Rate and Rhythm: Normal rate and regular rhythm.      Pulses: Normal pulses.           Dorsalis pedis pulses are 2+ on the right side and 2+ on the left side.        Posterior tibial pulses are 2+ on the right side and 2+ on the " left side.      Heart sounds: Normal heart sounds.   Pulmonary:      Effort: Pulmonary effort is normal.      Breath sounds: Normal breath sounds.   Musculoskeletal:         General: Tenderness present.      Cervical back: Normal range of motion and neck supple.      Right lower leg: No edema.      Left lower leg: Edema present.      Comments: PTP of the distal left ankle swelling and redness.   edema present with limited ROM compared to unaffected side.  No bony tenderness of the navicular, 5th metatarsal bones, distal fibula or tibia.      Special Tests:    Anterior Drawer: NEG  Woodard Test: NEG  Interdigital Neuroma Test: N/A   Skin:     General: Skin is warm and dry.      Capillary Refill: Capillary refill takes less than 2 seconds.      Findings: Erythema present.      Comments: Skin intact over the affected area.   Neurological:      General: No focal deficit present.      Mental Status: She is alert and oriented to person, place, and time. Mental status is at baseline.      Coordination: Coordination normal.      Comments: Antalgic gait.   Psychiatric:         Mood and Affect: Mood normal.         Behavior: Behavior normal.         Thought Content: Thought content normal.         Judgment: Judgment normal.         Assessment/Plan:     Medical Decision Making/Comments     -3 days of left ankle swelling with pain redness and edema  -US is negative likely cellulitis     Diagnosis and associated orders     1. Left leg swelling  US-EXTREMITY VENOUS LOWER UNILAT LEFT    amoxicillin (AMOXIL) 500 MG Cap   2. Cellulitis of left lower extremity  amoxicillin (AMOXIL) 500 MG Cap              Differential diagnosis, natural history, supportive care, and indications for immediate follow-up discussed.    Advised the patient to follow-up with the primary care physician for recheck, reevaluation, and consideration of further management.    Please note that this dictation was created using voice recognition software. I have  made a reasonable attempt to correct obvious errors, but I expect that there are errors of grammar and possibly content that I did not discover before finalizing the note.

## 2021-04-19 ENCOUNTER — APPOINTMENT (OUTPATIENT)
Dept: RADIOLOGY | Facility: MEDICAL CENTER | Age: 86
End: 2021-04-19
Attending: INTERNAL MEDICINE
Payer: MEDICARE

## 2021-04-19 ENCOUNTER — APPOINTMENT (OUTPATIENT)
Dept: CARDIOLOGY | Facility: MEDICAL CENTER | Age: 86
End: 2021-04-19
Attending: INTERNAL MEDICINE
Payer: MEDICARE

## 2021-04-22 ENCOUNTER — PHARMACY VISIT (OUTPATIENT)
Dept: PHARMACY | Facility: MEDICAL CENTER | Age: 86
End: 2021-04-22
Payer: COMMERCIAL

## 2021-04-22 PROCEDURE — RXMED WILLOW AMBULATORY MEDICATION CHARGE: Performed by: NURSE PRACTITIONER

## 2021-05-05 ENCOUNTER — OFFICE VISIT (OUTPATIENT)
Dept: MEDICAL GROUP | Facility: PHYSICIAN GROUP | Age: 86
End: 2021-05-05
Payer: MEDICARE

## 2021-05-05 VITALS
RESPIRATION RATE: 14 BRPM | SYSTOLIC BLOOD PRESSURE: 118 MMHG | WEIGHT: 124 LBS | BODY MASS INDEX: 22.82 KG/M2 | HEIGHT: 62 IN | DIASTOLIC BLOOD PRESSURE: 68 MMHG | TEMPERATURE: 98.1 F | OXYGEN SATURATION: 95 % | HEART RATE: 94 BPM

## 2021-05-05 DIAGNOSIS — L03.116 CELLULITIS OF LEFT LOWER EXTREMITY: ICD-10-CM

## 2021-05-05 PROBLEM — L03.90 CELLULITIS: Status: ACTIVE | Noted: 2021-05-05

## 2021-05-05 PROCEDURE — 99212 OFFICE O/P EST SF 10 MIN: CPT | Performed by: INTERNAL MEDICINE

## 2021-05-05 NOTE — PROGRESS NOTES
Subjective:     CC: Follow-up on ankle swelling    HPI:   Nini presents today for follow-up on ankle swelling.  The patient was seen in the urgent care on 4/12/2021 for left ankle pain and swelling for 3 days.  Lower extremity ultrasound was negative for DVT.  She was placed on a course of amoxicillin for presumed cellulitis.  She completed the course of amoxicillin and her ankle pain and swelling has resolved.      Past Medical History:   Diagnosis Date   • AAA (abdominal aortic aneurysm) ([facility])    • Atherosclerosis of native arteries of the extremities with intermittent claudication 10/16/2013   • Blepharospasm syndrome    • Bright red rectal bleeding 6/24/2015   • Chest pain 07/2015    Unspecified; Nuclear stress test negative    • Constipation    • Edema 5/29/2012   • Female bladder prolapse, acquired    • GERD (gastroesophageal reflux disease)    • Hyperlipidemia    • Hypertension    • Insomnia    • OSTEOPOROSIS    • Palpitations 5/29/2012   • Sleep apnea    • SVT (supraventricular tachycardia) ([facility]) - on Zio 2015 and 2019 8/17/2015   • Varicose veins 5/29/2012   • Vitamin D deficiency disease        Social History     Tobacco Use   • Smoking status: Never Smoker   • Smokeless tobacco: Never Used   Substance Use Topics   • Alcohol use: Not Currently     Alcohol/week: 0.0 oz     Comment: occasionally   • Drug use: No       Current Outpatient Medications Ordered in Epic   Medication Sig Dispense Refill   • nitrofurantoin (MACRODANTIN) 50 MG Cap Take 50 mg by mouth every day. Patient taking for daily dose to keep bacteria down     • metoprolol tartrate (LOPRESSOR) 25 MG Tab Take 1 tablet by mouth twice daily 180 tablet 3   • Alirocumab 75 MG/ML Solution Auto-injector Inject 75 mg under the skin every 14 days. 6 mL 3   • estradiol (ESTRACE) 0.1 MG/GM vaginal cream APPLY A PEA SIZED AMOUNT INTO VAGINA ONE TO TWO TIMES A WEEK     • coenzyme Q-10 30 MG capsule Take 60 mg by mouth every day.     • Vitamin D,  "Cholecalciferol, 50 MCG (2000 UT) Cap Take 2 capsules by mouth once daily 60 Cap 6   • Multiple Vitamins-Minerals (ICAPS AREDS 2) Cap Take  by mouth.     • Multiple Vitamins-Minerals (CENTRUM SILVER PO) Take 1 Tab by mouth every day.       No current [facility]-ordered facility-administered medications on file.       Allergies:  Atorvastatin, Bactrim ds, Levofloxacin, Pneumococcal vaccines, Sulfa drugs, Vicodin [hydrocodone-acetaminophen], and Vytorin      ROS   Denies any recent fevers or chills. No nausea or vomiting. No chest pains or shortness of breath.     Objective:       Exam:  /68   Pulse 94   Temp 36.7 °C (98.1 °F)   Resp 14   Ht 1.575 m (5' 2\")   Wt 56.2 kg (124 lb)   SpO2 95%   BMI 22.68 kg/m²  Body mass index is 22.68 kg/m².    Gen: Alert and oriented, No apparent distress.  Ext: Left ankle pain with mild edema and multiple varicose veins.  No erythema.  No tenderness.        Assessment & Plan:     88 y.o. female with the following -     1. Cellulitis of left lower extremity  This is an acute condition.  Resolved.  The patient completed a 7-day course of amoxicillin with resolution of her pain and swelling.  Lower extremity ultrasound was negative for DVT.    I spent a total of 18 minutes with record review, exam, communication with the patient, communication with other providers, and documentation of this encounter.      As scheduled in September.    Please note that this dictation was created using voice recognition software. I have made every reasonable attempt to correct obvious errors, but I expect that there are errors of grammar and possibly content that I did not discover before finalizing the note.      "

## 2021-05-21 ENCOUNTER — TELEPHONE (OUTPATIENT)
Dept: CARDIOLOGY | Facility: MEDICAL CENTER | Age: 86
End: 2021-05-21

## 2021-05-21 NOTE — TELEPHONE ENCOUNTER
"Received walk-in form from pt stating, \"my eyesight & hearing is my problem.  I hope I can talk to explain my problem.  Went to emergency after my final second vaccine for moderna.  Emergency diagnosis - cystitis.  I went to my primary care  And should have gone to CW.  I would like to see Dr. Kamara when you can make an appt for me.\"  Pt is requesting for latest U/S to be reviewed by MD.      Upon chart review per MD last OV note recommendations to follow up in 6 months.  "

## 2021-05-25 NOTE — TELEPHONE ENCOUNTER
Returned call and reviewed recent findings and MD recommendations.  Lengthy conversation noted.  Encourage pt to call this office [phone] if she starts having new or worsening symptoms.  She states no other concerns or questions at this time and is appreciative of information given.

## 2021-06-02 ENCOUNTER — APPOINTMENT (RX ONLY)
Dept: URBAN - METROPOLITAN AREA CLINIC 4 | Facility: CLINIC | Age: 86
Setting detail: DERMATOLOGY
End: 2021-06-02

## 2021-06-02 DIAGNOSIS — Z85.828 PERSONAL HISTORY OF OTHER MALIGNANT NEOPLASM OF SKIN: ICD-10-CM

## 2021-06-02 DIAGNOSIS — L81.4 OTHER MELANIN HYPERPIGMENTATION: ICD-10-CM

## 2021-06-02 DIAGNOSIS — L57.0 ACTINIC KERATOSIS: ICD-10-CM

## 2021-06-02 PROCEDURE — 99212 OFFICE O/P EST SF 10 MIN: CPT | Mod: 25

## 2021-06-02 PROCEDURE — ? LIQUID NITROGEN

## 2021-06-02 PROCEDURE — 17000 DESTRUCT PREMALG LESION: CPT

## 2021-06-02 PROCEDURE — ? OBSERVATION

## 2021-06-02 PROCEDURE — ? COUNSELING

## 2021-06-02 ASSESSMENT — LOCATION SIMPLE DESCRIPTION DERM
LOCATION SIMPLE: RIGHT TEMPLE
LOCATION SIMPLE: RIGHT NOSE
LOCATION SIMPLE: LEFT CHEEK
LOCATION SIMPLE: LEFT EAR

## 2021-06-02 ASSESSMENT — LOCATION ZONE DERM
LOCATION ZONE: NOSE
LOCATION ZONE: FACE
LOCATION ZONE: EAR

## 2021-06-02 ASSESSMENT — LOCATION DETAILED DESCRIPTION DERM
LOCATION DETAILED: LEFT SUPERIOR HELIX
LOCATION DETAILED: RIGHT CENTRAL TEMPLE
LOCATION DETAILED: LEFT CENTRAL MALAR CHEEK
LOCATION DETAILED: RIGHT NASAL ALA

## 2021-06-02 NOTE — PROCEDURE: LIQUID NITROGEN
Consent: The patient's consent was obtained including but not limited to risks of crusting, scabbing, blistering, scarring, darker or lighter pigmentary change, recurrence, incomplete removal and infection.
Detail Level: Detailed
Number Of Freeze-Thaw Cycles: 1 freeze-thaw cycle
Duration Of Freeze Thaw-Cycle (Seconds): 5
Render Note In Bullet Format When Appropriate: No
Post-Care Instructions: I reviewed with the patient in detail post-care instructions. Patient is to wear sunprotection, and avoid picking at any of the treated lesions. Pt may apply Vaseline to crusted or scabbing areas.

## 2021-06-24 ENCOUNTER — TELEPHONE (OUTPATIENT)
Dept: SLEEP MEDICINE | Facility: MEDICAL CENTER | Age: 86
End: 2021-06-24

## 2021-06-24 ENCOUNTER — DOCUMENTATION (OUTPATIENT)
Dept: VASCULAR LAB | Facility: MEDICAL CENTER | Age: 86
End: 2021-06-24

## 2021-06-24 DIAGNOSIS — G47.33 OSA (OBSTRUCTIVE SLEEP APNEA): ICD-10-CM

## 2021-06-24 DIAGNOSIS — E55.9 VITAMIN D DEFICIENCY DISEASE: ICD-10-CM

## 2021-06-24 RX ORDER — ACETAMINOPHEN 160 MG
TABLET,DISINTEGRATING ORAL
Qty: 60 CAPSULE | Refills: 0 | Status: SHIPPED | OUTPATIENT
Start: 2021-06-24

## 2021-06-24 NOTE — TELEPHONE ENCOUNTER
Caller: Nini    Phone Number: [phone] (home)     Message: Pt called and lm.  Requesting a RX her CPAP supplies. Her rx has .       Pended Order.

## 2021-06-24 NOTE — PROGRESS NOTES
Received MEDWATCH request from [employer] regarding an AE reported from a [employer] representative that pt was experiencing hearing and vision loss 2/2 Praluent use.     Called pt to further discuss - she states these concerns are related to aging and not at all to the medication. Pt states she is very satsified w/ Praluent and wishes to continue on w/ her therapy.    Relayed this information back to [facility] so that they may close the case.    Forms scanned into media.    Dinesh Rosen PharmD

## 2021-06-25 NOTE — TELEPHONE ENCOUNTER
Faxed order, demo, insurance cards, and OV Note to DME:  [facility] /  [phone] / fax [phone]

## 2021-06-29 PROCEDURE — RXMED WILLOW AMBULATORY MEDICATION CHARGE: Performed by: NURSE PRACTITIONER

## 2021-06-30 ENCOUNTER — PHARMACY VISIT (OUTPATIENT)
Dept: PHARMACY | Facility: MEDICAL CENTER | Age: 86
End: 2021-06-30
Payer: COMMERCIAL

## 2021-06-30 ENCOUNTER — HOSPITAL ENCOUNTER (OUTPATIENT)
Facility: MEDICAL CENTER | Age: 86
End: 2021-06-30
Attending: PHYSICIAN ASSISTANT
Payer: MEDICARE

## 2021-06-30 PROCEDURE — 87186 SC STD MICRODIL/AGAR DIL: CPT

## 2021-06-30 PROCEDURE — 87086 URINE CULTURE/COLONY COUNT: CPT

## 2021-06-30 PROCEDURE — 87077 CULTURE AEROBIC IDENTIFY: CPT

## 2021-07-16 ENCOUNTER — HOSPITAL ENCOUNTER (OUTPATIENT)
Dept: LAB | Facility: MEDICAL CENTER | Age: 86
End: 2021-07-16
Attending: NURSE PRACTITIONER
Payer: MEDICARE

## 2021-07-16 DIAGNOSIS — E78.5 DYSLIPIDEMIA: ICD-10-CM

## 2021-07-16 LAB
ALBUMIN SERPL BCP-MCNC: 3.9 G/DL (ref 3.2–4.9)
ALBUMIN/GLOB SERPL: 1.4 G/DL
ALP SERPL-CCNC: 85 U/L (ref 30–99)
ALT SERPL-CCNC: 6 U/L (ref 2–50)
ANION GAP SERPL CALC-SCNC: 11 MMOL/L (ref 7–16)
AST SERPL-CCNC: 18 U/L (ref 12–45)
BILIRUB SERPL-MCNC: 1 MG/DL (ref 0.1–1.5)
BUN SERPL-MCNC: 8 MG/DL (ref 8–22)
CALCIUM SERPL-MCNC: 8.9 MG/DL (ref 8.5–10.5)
CHLORIDE SERPL-SCNC: 102 MMOL/L (ref 96–112)
CHOLEST SERPL-MCNC: 146 MG/DL (ref 100–199)
CO2 SERPL-SCNC: 26 MMOL/L (ref 20–33)
CREAT SERPL-MCNC: 0.8 MG/DL (ref 0.5–1.4)
GLOBULIN SER CALC-MCNC: 2.8 G/DL (ref 1.9–3.5)
GLUCOSE SERPL-MCNC: 103 MG/DL (ref 65–99)
HDLC SERPL-MCNC: 62 MG/DL
LDLC SERPL CALC-MCNC: 64 MG/DL
POTASSIUM SERPL-SCNC: 4 MMOL/L (ref 3.6–5.5)
PROT SERPL-MCNC: 6.7 G/DL (ref 6–8.2)
SODIUM SERPL-SCNC: 139 MMOL/L (ref 135–145)
TRIGL SERPL-MCNC: 100 MG/DL (ref 0–149)

## 2021-07-16 PROCEDURE — 80053 COMPREHEN METABOLIC PANEL: CPT

## 2021-07-16 PROCEDURE — 80061 LIPID PANEL: CPT

## 2021-07-16 PROCEDURE — 36415 COLL VENOUS BLD VENIPUNCTURE: CPT

## 2021-07-21 ENCOUNTER — NON-PROVIDER VISIT (OUTPATIENT)
Dept: VASCULAR LAB | Facility: MEDICAL CENTER | Age: 86
End: 2021-07-21
Attending: INTERNAL MEDICINE
Payer: MEDICARE

## 2021-07-21 VITALS — DIASTOLIC BLOOD PRESSURE: 89 MMHG | SYSTOLIC BLOOD PRESSURE: 134 MMHG | HEART RATE: 114 BPM

## 2021-07-21 DIAGNOSIS — E78.5 DYSLIPIDEMIA: ICD-10-CM

## 2021-07-21 PROCEDURE — 99212 OFFICE O/P EST SF 10 MIN: CPT

## 2021-07-21 RX ORDER — NITROFURANTOIN MACROCRYSTALS 50 MG/1
50 CAPSULE ORAL DAILY
COMMUNITY
End: 2023-05-10

## 2021-07-21 NOTE — NON-PROVIDER
"[facility] - FollowUp Visit  Date of Service: 07/21/21    Nini Taylor is here for follow up of dyslipidemia.    HPI  Pertinent Interval History since last visit:   None  Current Prescription Lipid Lowering Medications - including dose:   Statin: None  Non-Statin: Praluent 75 mg every 2 weeks.   Current Lipid Lowering and Related Supplements:   Vit D  Any Current Side Effects Potentially Related to Lipid Lowering therapy?   No  Current Adherence to Lipid Lowering Therapies:  Complete  Any Previous History of Statin Intolerance?   Statin: Simvastatin Unknown dose/duration - myalgias                Crestor unkown dose, duration \"years\" - states she started getting sharp \"zings\" down her arms.              Atorvastatin unknown dose, duration of 90 days - severe UE myalgia              Rosuvastatin - low dose alternate days - myalgias    Non-Statin: States none, however MR shows possibly Vytorin use              Outcome: Unknown  Any Previous History of Statin Intolerance?   Yes, Details: See above.    Baseline Lipids Prior to Treatment:          2/6/2017 12:20     Cholesterol,Tot   274 (H)     Triglycerides   101     HDL   58     LDL   196 (H)           SOCIAL HISTORY  Social History     Tobacco Use   Smoking Status Never Smoker   Smokeless Tobacco Never Used      Change in weight: None  Exercise habits: Pt stopped walking due to leg issues   Diet: common adult    There were no vitals filed for this visit.   Physical Exam    DATA REVIEW  Most Recent Lipid Panel:   Lab Results   Component Value Date    CHOLSTRLTOT 146 07/16/2021    TRIGLYCERIDE 100 07/16/2021    HDL 62 07/16/2021    LDL 64 07/16/2021       Other Pertinent Blood Work:   Lab Results   Component Value Date    SODIUM 139 07/16/2021    POTASSIUM 4.0 07/16/2021    CHLORIDE 102 07/16/2021    CO2 26 07/16/2021    ANION 11.0 07/16/2021    GLUCOSE 103 (H) 07/16/2021    BUN 8 07/16/2021    CREATININE 0.80 07/16/2021    CALCIUM 8.9 07/16/2021    " ASTSGOT 18 07/16/2021    ALTSGPT 6 07/16/2021    ALKPHOSPHAT 85 07/16/2021    TBILIRUBIN 1.0 07/16/2021    ALBUMIN 3.9 07/16/2021    AGRATIO 1.4 07/16/2021    TSHULTRASEN 1.810 12/09/2019       Other:  NA    Recent Imaging Studies:    None since last visit    ASSESSMENT AND PLAN  Patient Type, check all that apply:    Secondary Prevention (Abdominal aortic aneurism)  Established Atherosclerotic Cardiovascular Disease (ASCVD)  AAA - most recent imaging with small AAA, continue medical management and deferfurther surveillance to Dr. 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   Statin Recommendations from Today's Visit  None,   Non-Statin Medications Recommendations from Today’s Visit:   Continue Praluent in setting of statin intolerability.   Indication for PCSK9 Inhibitor, if applicable:  AAA on maximum tolerated dose of statin therapy.    Supplements Recommended at this visit:  D/C CoQ10  Recommendations for Other Cardiovascular Risk Factors, gurpreet all that apply:   BP still high in today's visit.  Pt instructed to take BP daily and follow up at next cardiology appointment.   Other Issues:      Studies Ordered at Todays Visit:  None   Blood Work Ordered At  Today’s visit:   [facility]  Lipid panel  Follow-Up:   6 months    Chandler Brown, PharmD    CC:    Montez Kamara,   Dr Bloch

## 2021-08-03 ENCOUNTER — SLEEP CENTER VISIT (OUTPATIENT)
Dept: SLEEP MEDICINE | Facility: MEDICAL CENTER | Age: 86
End: 2021-08-03
Payer: MEDICARE

## 2021-08-03 ENCOUNTER — TELEPHONE (OUTPATIENT)
Dept: SLEEP MEDICINE | Facility: MEDICAL CENTER | Age: 86
End: 2021-08-03

## 2021-08-03 VITALS
WEIGHT: 125 LBS | BODY MASS INDEX: 23 KG/M2 | HEART RATE: 86 BPM | DIASTOLIC BLOOD PRESSURE: 60 MMHG | SYSTOLIC BLOOD PRESSURE: 116 MMHG | HEIGHT: 62 IN | OXYGEN SATURATION: 93 %

## 2021-08-03 DIAGNOSIS — G47.33 OSA (OBSTRUCTIVE SLEEP APNEA): ICD-10-CM

## 2021-08-03 DIAGNOSIS — F51.04 CHRONIC INSOMNIA: ICD-10-CM

## 2021-08-03 PROCEDURE — 99213 OFFICE O/P EST LOW 20 MIN: CPT | Performed by: NURSE PRACTITIONER

## 2021-08-03 NOTE — PROGRESS NOTES
Chief Complaint   Patient presents with   • Follow-Up     RUY- Last seen 11/18/2020       HPI:      Mrs. Taylor is a 89 y/o male patient who is in today for RUY f/u. Former [facility] patient. PMH includes SVT, GERD, osteoporosis, dyslipidemia, hypertension, arthrosclerosis of native arteries of extremities, AAA, chronic insomnia, never smoker, tonsillectomy.    Patient was set up with [email or web address] BiPAP machine in October 2015. Patient is accompanied by her .  She denies any issues with her machine or experiencing any symptoms from its use. Compliance report from 7/4/21-8/2/21 was downloaded and reviewed with the patient which showed BiPAP IPAP/EPAP 17/13 cmH2O, biflex 1, 100% compliance, 7 hrs 56 min use, AHI of 4.1. She is tolerating the pressure and mask well.  She goes to bed between 9-11 pm and wakes up at 7 am. She denies morning headache or snoring. She still feels that her sleep is broken up however this has been a chronic issue which is likely related to her prior work hours. Overall, she does  finds her sleep refreshing if she gets adequate sleep. She does not take regular naps.  Patient received both major vaccines with the first one on 3/5 and the second one on 4/10.  She reports after the second vaccine she had left lower extremity swelling which showed cellulitis rather than a DVT.  She has followed up with her PCP for this and the symptoms are stable.    Sleep Study History:   PSG titration from 9/16/15 indicated complete CPAP titration to final pressure of 18 cm of water.  The overall AHI was 38.2, mean SPO2 was 80.9% and oxygen saturations were less than or equal to 89% for 281.6 minutes.  Bilevel therapy was also tried from 14/10 cm to 16/12 cm of water.  No significant central apneas were seen.  Supplemental oxygen was not used.    ROS:    Constitutional: Denies fevers, Denies weight changes  Eyes: Denies changes in vision, no eye pain  Ears/Nose/Throat/Mouth: Denies nasal congestion  or sore throat   Cardiovascular: Denies chest pain or palpitations   Respiratory: Denies shortness of breath , Denies cough  Gastrointestinal/Hepatic: Denies abdominal pain, nausea, vomiting,   Skin/Breast: Denies rash,   Neurological: Denies headache, confusion,   Psychiatric: denies mood disorder   Sleep: denies snoring       Past Medical History:   Diagnosis Date   • AAA (abdominal aortic aneurysm) ([facility])    • Atherosclerosis of native arteries of the extremities with intermittent claudication 10/16/2013   • Blepharospasm syndrome    • Bright red rectal bleeding 6/24/2015   • Chest pain 07/2015    Unspecified; Nuclear stress test negative    • Constipation    • Edema 5/29/2012   • Female bladder prolapse, acquired    • GERD (gastroesophageal reflux disease)    • Hyperlipidemia    • Hypertension    • Insomnia    • OSTEOPOROSIS    • Palpitations 5/29/2012   • Sleep apnea    • SVT (supraventricular tachycardia) ([facility]) - on Zio 2015 and 2019 8/17/2015   • Varicose veins 5/29/2012   • Vitamin D deficiency disease        Past Surgical History:   Procedure Laterality Date   • CYSTOCELE REPAIR  1/17/2011    Performed by GILMAR CHUNG at [facility]   • RECTOCELE REPAIR  1/17/2011    Performed by GILMAR CHUNG at [facility]   • BLADDER SUSPENSION  1/17/2011    Performed by GILMAR CHUNG at [facility]   • CYSTOSCOPY  1/17/2011    Performed by GILMAR CHUNG at [facility]   • ABDOMINAL HYSTERECTOMY TOTAL     • ARTHROSCOPY, KNEE     • CATARACT EXTRACTION WITH IOL     • HEMORRHOIDECTOMY     • HYSTERECTOMY, TOTAL ABDOMINAL     • INJECTION SCLERO HEMORROIDS     • OTHER ORTHOPEDIC SURGERY      knee scope x 2   • PB KNEE SCOPE,DIAGNOSTIC  2003;2009   • TONSILLECTOMY         Family History   Problem Relation Age of Onset   • Non-contributory Mother         gall bladder surgery   • Heart Disease Mother    • Cancer Father         colon rectal    • Heart Disease Father         rheumatic heart disease   • Other Brother         HIT BY CAR   • Heart Disease Brother    • Sleep Apnea Brother    • No Known Problems Maternal Grandmother    • No Known Problems Maternal Grandfather    • No Known Problems Paternal Grandmother    • No Known Problems Paternal Grandfather    • No Known Problems Son        Social History     Socioeconomic History   • Marital status:      Spouse name: Not on file   • Number of children: Not on file   • Years of education: Not on file   • Highest education level: Not on file   Occupational History   • Not on file   Tobacco Use   • Smoking status: Never Smoker   • Smokeless tobacco: Never Used   Vaping Use   • Vaping Use: Never used   Substance and Sexual Activity   • Alcohol use: Not Currently     Alcohol/week: 0.0 oz     Comment: occasionally   • Drug use: No   • Sexual activity: Never   Other Topics Concern   • Not on file   Social History Narrative   • Not on file     Social Determinants of Health     Financial Resource Strain:    • Difficulty of Paying Living Expenses:    Food Insecurity:    • Worried About Running Out of Food in the Last Year:    • Ran Out of Food in the Last Year:    Transportation Needs:    • Lack of Transportation (Medical):    • Lack of Transportation (Non-Medical):    Physical Activity:    • Days of Exercise per Week:    • Minutes of Exercise per Session:    Stress:    • Feeling of Stress :    Social Connections:    • Frequency of Communication with Friends and Family:    • Frequency of Social Gatherings with Friends and Family:    • Attends Hoahaoism Services:    • Active Member of Clubs or Organizations:    • Attends Club or Organization Meetings:    • Marital Status:    Intimate Partner Violence:    • Fear of Current or Ex-Partner:    • Emotionally Abused:    • Physically Abused:    • Sexually Abused:        Allergies as of 08/03/2021 - Reviewed 08/03/2021   Allergen Reaction Noted   • Atorvastatin   10/25/2017   • Bactrim ds Hives 05/11/2017   • Levofloxacin  04/07/2021   • Pneumococcal vaccines Swelling 07/27/2018   • Sulfa drugs Hives 05/11/2017   • Vicodin [hydrocodone-acetaminophen] Rash 12/09/2010   • Vytorin  12/15/2008        Vitals:  Vitals:    08/03/21 0754   BP: 116/60   Pulse: 86   SpO2: 93%       Current medications as of today   Current Outpatient Medications   Medication Sig Dispense Refill   • nitrofurantoin (MACRODANTIN) 50 MG Cap Take 50 mg by mouth every day.     • Cholecalciferol (VITAMIN D3) 2000 UNIT Cap Take 2 capsules by mouth once daily 60 capsule 0   • metoprolol tartrate (LOPRESSOR) 25 MG Tab Take 1 tablet by mouth twice daily 180 tablet 3   • Alirocumab 75 MG/ML Solution Auto-injector Inject 75 mg under the skin every 14 days. 6 mL 3   • estradiol (ESTRACE) 0.1 MG/GM vaginal cream APPLY A PEA SIZED AMOUNT INTO VAGINA ONE TO TWO TIMES A WEEK     • Multiple Vitamins-Minerals (ICAPS AREDS 2) Cap Take  by mouth.     • Multiple Vitamins-Minerals (CENTRUM SILVER PO) Take 1 Tab by mouth every day.       No current facility-administered medications for this visit.         Physical Exam: Limited by COVID-19 precautions.  Appearance: Well developed, well nourished, no acute distress  Eyes: PERRL, EOM intact, sclera white, conjunctiva moist  Ears: no lesions or deformities  Hearing: grossly intact  Nose: no lesions or deformities  Respiratory effort: no intercostal retractions or use of accessory muscles  Extremities: no cyanosis or edema  Abdomen: soft   Gait and Station: normal  Digits and nails: no clubbing, cyanosis, petechiae or nodes.  Cranial nerves: grossly intact  Skin: no visible rashes, lesions or ulcers noted  Orientation: Oriented to time, person and place  Mood and affect: mood and affect appropriate, normal interaction with examiner  Judgement: Intact    Assessment:  1. RUY (obstructive sleep apnea)  DME BiPAP   2. Chronic insomnia           Plan  Discussed the cardiovascular and  neuropsychiatric risks of untreated RUY; including but not limited to: HTN, DM, MI, ASCVD, CVA, CHF, traffic accidents.     1. Compliance report from 7/4/21-8/2/21 was downloaded and reviewed with the patient which showed BiPAP IPAP/EPAP 17/13 cmH2O, biflex 1, 100% compliance, 7 hrs 56 min use, AHI of 4.1.  Patient is compliant and benefiting from BiPAP therapy for management of RUY.     *DME order ([facility]) for new [employer]Med BiPAP mask (Airfit F20 mask or MOC) and supplies was provided today. Continue to clean mask and supplies weekly with soap and water, and change supplies per insurance guidelines.     **Recall of [employer] BiPAP machines was reviewed with the patient today.    What I have been recommending to the patients is to call their supplying companies to figure out exact model of their machine. You can self register your machine on [employer] website listed below. We do not know if [employer] will be giving out replacement machines or just repairing it. This is all new for us to let you know how this is going to pan out. The recent recall from [employer] is due to disintegration of the polyurethane foam. This is a 0.03% risk of break down of an inner foam in device, which is a small risk. Currently I recommend to continue using your machine until or unless you experience black debris/particles within the air path or experiencing headaches, upper airway irritation, cough, chest pressure, sinus infection, irritation to skin/eyes/respiratory tract, inflammatory response, asthma, nausea/vomiting to stop using the PAP therapy immediately and contact the office. It is ultimately your decision on whether you choose to continue using the BiPAP machine. Patient is also recommended to reach out to their DME to let them know that they are using [employer] machine, therefore when and if needed those machines can be replaced by their DME's. We will keep you posted as we get more information from the   for the DME.     [place] your device on the recall website at www.[employer]/src-update     2. Sleep hygiene discussed. Recommend keeping a set sleep/wake schedule. Logging enough hours of sleep. Limiting/Avoiding naps. No caffeine after noon and no heavy meals in the evening.   Chronic insomnia: stable.   3. Follow up with the appropriate healthcare practitioners for all other medical problems.  4. F/u in 3 months for RUY.       FABRIZIO Feliciano.      This dictation was created using voice recognition software. The accuracy of the dictation is limited to the abilities of the software. I expect there may be some errors of grammar and possibly content.

## 2021-08-03 NOTE — TELEPHONE ENCOUNTER
Called patient to clarify that Milka RUVACLABA placed an order for BiPAP and will faxed to DME:  [facility] /  [phone] / fax [phone]

## 2021-08-13 ENCOUNTER — TELEPHONE (OUTPATIENT)
Dept: CARDIOLOGY | Facility: MEDICAL CENTER | Age: 86
End: 2021-08-13

## 2021-08-13 NOTE — TELEPHONE ENCOUNTER
Called patient. She has had a hard time with her leg swelling and wants to make sure her Cardiologist know. She has not done the testing Dr Kamara ordered back in February yet.

## 2021-08-17 ENCOUNTER — OFFICE VISIT (OUTPATIENT)
Dept: CARDIOLOGY | Facility: MEDICAL CENTER | Age: 86
End: 2021-08-17
Payer: MEDICARE

## 2021-08-17 VITALS
OXYGEN SATURATION: 91 % | HEIGHT: 62 IN | HEART RATE: 72 BPM | DIASTOLIC BLOOD PRESSURE: 78 MMHG | RESPIRATION RATE: 14 BRPM | BODY MASS INDEX: 23.19 KG/M2 | SYSTOLIC BLOOD PRESSURE: 124 MMHG | WEIGHT: 126 LBS

## 2021-08-17 DIAGNOSIS — I35.0 NONRHEUMATIC AORTIC VALVE STENOSIS: ICD-10-CM

## 2021-08-17 DIAGNOSIS — I10 ESSENTIAL HYPERTENSION: ICD-10-CM

## 2021-08-17 DIAGNOSIS — I83.12 VARICOSE VEINS OF BOTH LOWER EXTREMITIES WITH INFLAMMATION: ICD-10-CM

## 2021-08-17 DIAGNOSIS — I83.11 VARICOSE VEINS OF BOTH LOWER EXTREMITIES WITH INFLAMMATION: ICD-10-CM

## 2021-08-17 PROCEDURE — 99214 OFFICE O/P EST MOD 30 MIN: CPT | Performed by: PHYSICIAN ASSISTANT

## 2021-08-17 RX ORDER — NITROFURANTOIN 25; 75 MG/1; MG/1
CAPSULE ORAL
COMMUNITY
Start: 2021-07-02 | End: 2021-08-17

## 2021-08-17 RX ORDER — SULFAMETHOXAZOLE AND TRIMETHOPRIM 800; 160 MG/1; MG/1
TABLET ORAL
COMMUNITY
Start: 2021-06-30 | End: 2021-08-17

## 2021-08-17 RX ORDER — LEVOFLOXACIN 500 MG/1
TABLET, FILM COATED ORAL
COMMUNITY
Start: 2021-07-02 | End: 2021-08-17

## 2021-08-17 NOTE — PROGRESS NOTES
Cardiology Clinic Follow-up Note    Date of note:    8/17/2021  Primary Care Provider: Arianna Cabrera M.D.    Name:             Nini Taylor  YOB: 1932  MRN:               [id number]    CC: SVT, LE swelling    Primary Cardiologist: Dr. Kamara     Patient HPI:   Nini Taylor is a 89 y.o. female with PMH including PSVT, AAA 2.9 cm, PAD, RUY.      Interim History:  Ms. Taylor was last seen in this cardiology office by Dr. Kamara in 2/2021.     4/2021 after second covid.  L leg pain and swelling. R/o for DVT 4/12/21.  Dx with cellulitis, receive tx.     She notes now the L ankle is swelling more than the R.  Has hx of injury when young.    ROS   Denies CP, shortness of breath or palpitations.   No N/V/D  Otherwise normal    Past medical history, social history and family history reviewed.  No changes other than what is outlined in HPI.    Current Outpatient Medications   Medication Sig Dispense Refill   • nitrofurantoin (MACRODANTIN) 50 MG Cap Take 50 mg by mouth every day.     • Cholecalciferol (VITAMIN D3) 2000 UNIT Cap Take 2 capsules by mouth once daily 60 capsule 0   • metoprolol tartrate (LOPRESSOR) 25 MG Tab Take 1 tablet by mouth twice daily 180 tablet 3   • Alirocumab 75 MG/ML Solution Auto-injector Inject 75 mg under the skin every 14 days. 6 mL 3   • estradiol (ESTRACE) 0.1 MG/GM vaginal cream APPLY A PEA SIZED AMOUNT INTO VAGINA ONE TO TWO TIMES A WEEK     • Multiple Vitamins-Minerals (ICAPS AREDS 2) Cap Take  by mouth.     • Multiple Vitamins-Minerals (CENTRUM SILVER PO) Take 1 Tab by mouth every day.       No current facility-administered medications for this visit.       Allergies   Allergen Reactions   • Atorvastatin    • Bactrim Ds Hives   • Levofloxacin    • Pneumococcal Vaccines Swelling   • Sulfa Drugs Hives   • Vicodin [Hydrocodone-Acetaminophen] Rash   • Vytorin          Physical Exam:  Ambulatory Vitals  /78 (BP Location: Left arm, Patient Position:  "Sitting, BP Cuff Size: Adult)   Pulse 72   Resp 14   Ht 1.575 m (5' 2\")   Wt 57.2 kg (126 lb)   SpO2 91%    BP Readings from Last 4 Encounters:   08/17/21 124/78   08/03/21 116/60   07/21/21 134/89   05/05/21 118/68       Weight/BMI: Body mass index is 23.05 kg/m².  Wt Readings from Last 4 Encounters:   08/17/21 57.2 kg (126 lb)   08/03/21 56.7 kg (125 lb)   05/05/21 56.2 kg (124 lb)   04/12/21 56.7 kg (125 lb)       General: no apparent distress  Lungs: normal effort, withou crackles, no wheezing or rhonchi.  Heart: normal rate, regular rhythm, 3/6 systolic murmur loudest at the LUSB, no rub  Ext:  BLE puffy without pitting edema, L>R.  Venous varicosities noted bilaterally, again L>R.  Neurological: No focal deficits, no facial asymmetry.  Normal speech.  Psychiatric: Appropriate affect, alert and oriented x 3.   Skin: Warm extremities, no rash.      Lab Data Review:  Lab Results   Component Value Date/Time    CHOLSTRLTOT 146 07/16/2021 09:50 AM    LDL 64 07/16/2021 09:50 AM    HDL 62 07/16/2021 09:50 AM    TRIGLYCERIDE 100 07/16/2021 09:50 AM       Lab Results   Component Value Date/Time    SODIUM 139 07/16/2021 09:50 AM    POTASSIUM 4.0 07/16/2021 09:50 AM    CHLORIDE 102 07/16/2021 09:50 AM    CO2 26 07/16/2021 09:50 AM    GLUCOSE 103 (H) 07/16/2021 09:50 AM    BUN 8 07/16/2021 09:50 AM    CREATININE 0.80 07/16/2021 09:50 AM    CREATININE 0.94 12/20/2011 10:00 AM    BUNCREATRAT 17 12/20/2011 10:00 AM    GLOMRATE 47 (L) 10/28/2010 02:14 PM     Lab Results   Component Value Date/Time    ALKPHOSPHAT 85 07/16/2021 09:50 AM    ASTSGOT 18 07/16/2021 09:50 AM    ALTSGPT 6 07/16/2021 09:50 AM    TBILIRUBIN 1.0 07/16/2021 09:50 AM      Lab Results   Component Value Date/Time    WBC 4.8 07/24/2018 02:28 PM    WBC 4.8 12/20/2011 10:00 AM         Cardiac Imaging and Procedures Review:      Echo 11/6/2019:  Compared to the images of the prior study done 12/01/2016 - there is no   significant change.  Normal left " ventricular systolic function.  Left ventricular ejection fraction is visually estimated to be 55%.  Normal diastolic function.  The right ventricle was normal in size and function.  Mild aortic stenosis.  Mild aortic insufficiency.    Carotid duplex :  CONCLUSIONS   Mild bilateral internal carotid artery stenosis (<50%).    Flow within both subclavian arteries appears to be within normal limits.    Antegrade flow, bilateral vertebral arteries.         Assessment and Clinical Decision Makin   LE swelling  -    Most certainly related to varicose veins  -    Ruled out for DVT in 2021 when this started.    2   Aortic stenosis  -    Her murmur is pretty loud and last echo in 2019  -    Will get repeat echo  -    Do no suspect LE is due to HF or valvular heart disease    3   PSVT  -   No palps, continue low dose metoprolol    Will follow up with patient by phone on results of echocardiogram.    Future Appointments   Date Time Provider Department Center   2021 10:15 AM [facility] EXAM 10 ECHO [facility]   2021  1:00 PM Arianna Cabrera M.D. [facility]   11/3/2021 10:40 AM [facility]ician[facility] None   2021  8:30 AM Montez Kamara M.D. [facility] None   2021 11:00 AM [facility] EXAM 5 [facility] None         Mercedes Vasquez PA-C     [facility]

## 2021-08-20 ENCOUNTER — TELEPHONE (OUTPATIENT)
Dept: SLEEP MEDICINE | Facility: MEDICAL CENTER | Age: 86
End: 2021-08-20

## 2021-08-20 DIAGNOSIS — G47.33 OSA (OBSTRUCTIVE SLEEP APNEA): ICD-10-CM

## 2021-08-20 NOTE — TELEPHONE ENCOUNTER
Patient CPAP machine is too strong for her when she use it and she wants to know if  can change the pressures for her,she would like a call back to bring her machine in.

## 2021-08-24 NOTE — TELEPHONE ENCOUNTER
Order is placed to decrease BiPAP IPAP/EPAP to 15/11 due to pressure intolerance. Please make changes.     Thank you  FABRIZIO Feliciano.

## 2021-08-24 NOTE — TELEPHONE ENCOUNTER
Pt brought in her new Resmed that was order per Milka RUVALCABA and informed me that the pressure on the machine too high and the air is blowing her mask off which is not allowing her to use the machine and she can not sleep as well. I download a 30 day compliance report and it is scan into media please advise. The pt also wanted me to inform you that hopefully the pressure on her machine can be lowered because she will not be able to use the machine at the current setting.

## 2021-08-26 NOTE — TELEPHONE ENCOUNTER
Responding to a voice message. After a long conversation with patient it was determined that her pressures were decreased already and she is willing to give her Bi-pap another try. Tips on proper mask placement and tightness of her head gear were reviewed. Patient states she will let us know if any issue continue.

## 2021-09-06 ENCOUNTER — HOSPITAL ENCOUNTER (OUTPATIENT)
Dept: CARDIOLOGY | Facility: MEDICAL CENTER | Age: 86
End: 2021-09-06
Attending: PHYSICIAN ASSISTANT
Payer: MEDICARE

## 2021-09-06 DIAGNOSIS — I35.0 NONRHEUMATIC AORTIC VALVE STENOSIS: ICD-10-CM

## 2021-09-06 LAB
LV EJECT FRACT  99904: 60
LV EJECT FRACT MOD 2C 99903: 72.35
LV EJECT FRACT MOD 4C 99902: 48.53
LV EJECT FRACT MOD BP 99901: 62.03

## 2021-09-06 PROCEDURE — 93306 TTE W/DOPPLER COMPLETE: CPT | Mod: 26 | Performed by: INTERNAL MEDICINE

## 2021-09-06 PROCEDURE — 93306 TTE W/DOPPLER COMPLETE: CPT

## 2021-09-07 ENCOUNTER — TELEPHONE (OUTPATIENT)
Dept: CARDIOLOGY | Facility: MEDICAL CENTER | Age: 86
End: 2021-09-07

## 2021-09-07 NOTE — LETTER
9/7/2021       Nini Taylor  [address] [place] [address]          Dear Nini,    We have received the results of your recent: echocardiogram    Your heart is strong, beating normally.   There are 2 valve problems.  Both of these valves are on the left side of the heart.   The aortic valve is a little narrow and a little leaky.  The mitral valve is a little leaky.   Neither of these are issues you need to worry about right now.         Sincerely,          Mercedes Vasquez PA-C  9/7/2021

## 2021-09-08 NOTE — TELEPHONE ENCOUNTER
Echo 9/6/2021  CONCLUSIONS  Compared to the images of the study done 11/6/19 - there has been no   significant change.  Normal left ventricular size, thickness, systolic function, and   diastolic function.  Left ventricular ejection fraction is visually estimated to be 60%.  Normal right ventricular size and systolic function.  Mild mitral regurgitation.  Mild aortic stenosis.  Mild aortic insufficiency.    Her echo appears stable.   No new issues.   She also requests a letter  Of the results, which I will have mailed to her.     Mercedes Vasquez PA-C  9/7/2021

## 2021-09-16 ENCOUNTER — OFFICE VISIT (OUTPATIENT)
Dept: MEDICAL GROUP | Facility: PHYSICIAN GROUP | Age: 86
End: 2021-09-16
Payer: MEDICARE

## 2021-09-16 VITALS
TEMPERATURE: 98.2 F | BODY MASS INDEX: 23 KG/M2 | DIASTOLIC BLOOD PRESSURE: 78 MMHG | HEART RATE: 82 BPM | WEIGHT: 125 LBS | HEIGHT: 62 IN | RESPIRATION RATE: 14 BRPM | SYSTOLIC BLOOD PRESSURE: 118 MMHG | OXYGEN SATURATION: 97 %

## 2021-09-16 DIAGNOSIS — R73.01 ELEVATED FASTING GLUCOSE: ICD-10-CM

## 2021-09-16 DIAGNOSIS — Z13.29 THYROID DISORDER SCREEN: ICD-10-CM

## 2021-09-16 DIAGNOSIS — I71.40 ABDOMINAL AORTIC ANEURYSM (AAA) WITHOUT RUPTURE (HCC): ICD-10-CM

## 2021-09-16 DIAGNOSIS — Z13.21 ENCOUNTER FOR VITAMIN DEFICIENCY SCREENING: ICD-10-CM

## 2021-09-16 DIAGNOSIS — N39.0 RECURRENT UTI: ICD-10-CM

## 2021-09-16 DIAGNOSIS — G47.33 OSA TREATED WITH BIPAP: ICD-10-CM

## 2021-09-16 DIAGNOSIS — G44.89 OTHER HEADACHE SYNDROME: ICD-10-CM

## 2021-09-16 DIAGNOSIS — M81.0 AGE-RELATED OSTEOPOROSIS WITHOUT CURRENT PATHOLOGICAL FRACTURE: ICD-10-CM

## 2021-09-16 DIAGNOSIS — K76.0 FATTY LIVER: ICD-10-CM

## 2021-09-16 DIAGNOSIS — I47.10 SVT (SUPRAVENTRICULAR TACHYCARDIA) (HCC): Chronic | ICD-10-CM

## 2021-09-16 DIAGNOSIS — I10 ESSENTIAL HYPERTENSION: ICD-10-CM

## 2021-09-16 DIAGNOSIS — Z13.0 SCREENING FOR DEFICIENCY ANEMIA: ICD-10-CM

## 2021-09-16 DIAGNOSIS — I70.219 ATHEROSCLEROSIS OF NATIVE ARTERY OF EXTREMITY WITH INTERMITTENT CLAUDICATION, UNSPECIFIED EXTREMITY (HCC): ICD-10-CM

## 2021-09-16 DIAGNOSIS — K86.2 PANCREATIC CYST: ICD-10-CM

## 2021-09-16 DIAGNOSIS — E78.5 DYSLIPIDEMIA: ICD-10-CM

## 2021-09-16 PROBLEM — L03.90 CELLULITIS: Status: RESOLVED | Noted: 2021-05-05 | Resolved: 2021-09-16

## 2021-09-16 PROBLEM — F51.01 PRIMARY INSOMNIA: Status: RESOLVED | Noted: 2017-01-10 | Resolved: 2021-09-16

## 2021-09-16 PROBLEM — F51.01 PRIMARY INSOMNIA: Status: ACTIVE | Noted: 2017-01-10

## 2021-09-16 PROBLEM — R79.89 LOW VITAMIN D LEVEL: Status: RESOLVED | Noted: 2018-04-20 | Resolved: 2021-09-16

## 2021-09-16 PROCEDURE — 99214 OFFICE O/P EST MOD 30 MIN: CPT | Performed by: INTERNAL MEDICINE

## 2021-09-16 NOTE — PROGRESS NOTES
Subjective:      CC: Follow-up on lab work and medical issues    HPI:   Nini presents today to discuss the following issues:    The patient feels well.  She states she realizes she is now nearly 89 years old.  She does not wish to spend all of her time at the doctor, but she is happy to have a follow-up visit with me.  She is now wearing compression stockings for her venous stasis changes.  She states she has follow-up with urology and hopes to be able to be taken off of the nitrofurantoin for her recurrent UTIs.    Past Medical History:   Diagnosis Date   • AAA (abdominal aortic aneurysm) ([facility])    • Atherosclerosis of native arteries of the extremities with intermittent claudication 10/16/2013   • Blepharospasm syndrome    • Blepharospasm syndrome      IMO load March 2020   • Bright red rectal bleeding 6/24/2015   • Cellulitis 5/5/2021   • Chest pain 07/2015    Unspecified; Nuclear stress test negative    • Constipation    • Edema 5/29/2012   • Female bladder prolapse, acquired    • GERD (gastroesophageal reflux disease)    • Hyperlipidemia    • Hypertension    • Insomnia    • Low vitamin D level 4/20/2018   • OSTEOPOROSIS    • Palpitations 5/29/2012   • Primary insomnia 1/10/2017   • Sleep apnea    • SVT (supraventricular tachycardia) ([facility]) - on Zio 2015 and 2019 8/17/2015   • Varicose veins 5/29/2012   • Vitamin D deficiency disease    • Vitamin D deficiency disease      IMO load March 2020       Social History     Tobacco Use   • Smoking status: Never Smoker   • Smokeless tobacco: Never Used   Vaping Use   • Vaping Use: Never used   Substance Use Topics   • Alcohol use: Not Currently     Alcohol/week: 0.0 oz     Comment: occasionally   • Drug use: No       Current Outpatient Medications Ordered in Epic   Medication Sig Dispense Refill   • nitrofurantoin (MACRODANTIN) 50 MG Cap Take 50 mg by mouth every day.     • Cholecalciferol (VITAMIN D3) 2000 UNIT Cap Take 2 capsules by mouth once daily 60 capsule 0   •  "metoprolol tartrate (LOPRESSOR) 25 MG Tab Take 1 tablet by mouth twice daily 180 tablet 3   • Alirocumab 75 MG/ML Solution Auto-injector Inject 75 mg under the skin every 14 days. 6 mL 3   • estradiol (ESTRACE) 0.1 MG/GM vaginal cream APPLY A PEA SIZED AMOUNT INTO VAGINA ONE TO TWO TIMES A WEEK     • Multiple Vitamins-Minerals (ICAPS AREDS 2) Cap Take  by mouth.     • Multiple Vitamins-Minerals (CENTRUM SILVER PO) Take 1 Tab by mouth every day.       No current [facility]-ordered facility-administered medications on file.       Allergies:  Atorvastatin, Bactrim ds, Levofloxacin, Pneumococcal vaccines, Sulfa drugs, Vicodin [hydrocodone-acetaminophen], and Vytorin    Health Maintenance: Completed    ROS:   Denies any recent fevers or chills. No nausea or vomiting. No chest pains or shortness of breath.    Objective:       Exam:  /78   Pulse 82   Temp 36.8 °C (98.2 °F)   Resp 14   Ht 1.575 m (5' 2\")   Wt 56.7 kg (125 lb)   SpO2 97%   BMI 22.86 kg/m²  Body mass index is 22.86 kg/m².    Gen: Alert and oriented, No apparent distress.  Lungs: Normal effort, CTA bilaterally, no wheezes, rhonchi, or rales  CV: Regular rate and rhythm.  3/6 systolic murmur  Ext: No clubbing, cyanosis, edema.      Assessment & Plan:     89 y.o. female with the following -     SVT (supraventricular tachycardia) (HCC) - on Zio 2015 and 2019  Essential hypertension  This is a chronic condition.  Well-controlled.  The patient is followed by cardiology, Dr. Kamara.  She is on metoprolol 25 mg twice daily.  Blood pressure is normal today's visit.  -Continue metoprolol 25 mg twice daily    Abdominal aortic aneurysm (AAA) without rupture (HCC)  Atherosclerosis of native artery of extremity with intermittent claudication, unspecified extremity (HCC)  Dyslipidemia  This is a chronic condition.  Well-controlled. The patient is followed by cardiology, Dr. Kamara.  Echocardiogram on 9/6/2021 showed normal ventricular function, with an EF of 60%, " mild MR, mild AS, mild AI.  Aortic ultrasound was ordered, but never performed.  The patient is on Alirocumab 75 mg subcutaneously every 14 days.  Lipid panel from 7/16/2021 showed a total cholesterol 146, LDL 64, HDL 62, triglycerides 100.  -Continue Alirocumab 75 mg subcutaneously every 14 days  - Comp Metabolic Panel; Future  - Lipid Profile; Future    Elevated fasting glucose  This is a chronic condition.  Stable.  Labs from 7/16/2021 showed an elevated fasting glucose of 103.  - Comp Metabolic Panel; Future  - HEMOGLOBIN A1C; Future    Age-related osteoporosis without current pathological fracture  This is a chronic condition.  Stable.  Bone density study on 11/7/2018 showed osteoporosis of the lumbar and femoral regions.  The patient has no longer on treatment for this condition.     Recurrent UTI   This is a chronic condition.  Recurrent.  The patient is on nitrofurantoin 50 mg daily for suppressive therapy.  -Continue nitrofurantoin 50 mg daily for suppressive therapy    RUY on BiPAP  This is a chronic condition.  Well-controlled.  The patient is followed by pulmonary.  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  Stable.  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  This is no longer being monitored with serial imaging.    Fatty liver  This is a chronic condition.  Stable.  MRI of the abdomen 8/26/2016 showed diffuse hepatic steatosis.  Liver enzymes are normal.  -Continue to monitor    Other headache syndrome  This is a chronic condition.  Recurrent.  Patient uses occasional Tylenol 3 which is the only thing that has been able to effectively control her headaches.  She was given warning precautions about increased risk of falls on this medication and that she should not be driving while taking this medication.     Review of the  shows that she was last given her prescription for Tylenol 3 300-30 mg, dispo #20, on 3/11/2021. Obtained and reviewed patient utilization report from [facility] pharmacy database on 9/16/2021 5:01 AM  prior to writing prescription for controlled substance II, III or IV per [place] bill . Based on assessment of the report, the prescription is medically necessary.   -Continue Tylenol 3 300-30 mg as needed for headache pain    Screening for deficiency anemia  - CBC WITH DIFFERENTIAL; Future    Thyroid disorder screen  - TSH WITH REFLEX TO FT4; Future    Encounter for vitamin deficiency screening  - VITAMIN D,25 HYDROXY; Future      Return in about 3 months (around 12/16/2021) for f/u labs.    Please note that this dictation was created using voice recognition software. I have made every reasonable attempt to correct obvious errors, but I expect that there are errors of grammar and possibly content that I did not discover before finalizing the note.

## 2021-10-06 ENCOUNTER — ANTICOAGULATION VISIT (OUTPATIENT)
Dept: VASCULAR LAB | Facility: MEDICAL CENTER | Age: 86
End: 2021-10-06
Attending: INTERNAL MEDICINE
Payer: MEDICARE

## 2021-10-06 DIAGNOSIS — Z23 NEED FOR VACCINATION: ICD-10-CM

## 2021-10-06 PROCEDURE — 90662 IIV NO PRSV INCREASED AG IM: CPT

## 2021-10-06 NOTE — PROGRESS NOTES
[facility]    Pt consented to receiving immunization today.    Chandler Brown, PharmD

## 2021-10-13 ENCOUNTER — PHARMACY VISIT (OUTPATIENT)
Dept: PHARMACY | Facility: MEDICAL CENTER | Age: 86
End: 2021-10-13
Payer: COMMERCIAL

## 2021-10-13 PROCEDURE — RXMED WILLOW AMBULATORY MEDICATION CHARGE: Performed by: NURSE PRACTITIONER

## 2021-10-19 NOTE — PROGRESS NOTES
"[facility] - FollowUp Visit  Date of Service: 06/12/19    Nini Taylor is here for follow up of dyslipidemia.    HPI  Pertinent Interval History since last visit:   Pt had some \"zings\" of electricity in her arms, but it appears to have resolved.   Current Prescription Lipid Lowering Medications - including dose:   Statin: None  Non-Statin: Zetia 10 mg  Current Lipid Lowering and Related Supplements:   Vit D  Pt states she stopped CoQ10.  Any Current Side Effects Potentially Related to Lipid Lowering therapy?   No  Current Adherence to Lipid Lowering Therapies:     Complete  Statin: Simvastatin Unknown dose/duration - myalgias                Crestor unkown dose, duration \"years\" - states she started getting sharp \"zings\" down her arms.              Atorvastatin unknown dose, duration of 90 days - severe UE myalgia              Rosuvastatin - low dose alternate days - myalgias    Non-Statin: States none, however MR shows possibly Vytorin use              Outcome: Unknown  Any Previous History of Statin Intolerance?   Yes, Details: See above.    Baseline Lipids Prior to Treatment:          2/6/2017 12:20     Cholesterol,Tot   274 (H)     Triglycerides   101     HDL   58     LDL   196 (H)         SOCIAL HISTORY  History   Smoking Status   • Never Smoker   Smokeless Tobacco   • Never Used      Change in weight: Stable  Exercise habits: moderate regular exercise program   Diet: low fat    ROS  Physical Exam    DATA REVIEW  Most Recent Lipid Panel:   Lab Results   Component Value Date    CHOLSTRLTOT 227 (H) 06/10/2019    TRIGLYCERIDE 129 06/10/2019    HDL 51 06/10/2019     (H) 06/10/2019       Other Pertinent Blood Work:   Lab Results   Component Value Date    SODIUM 139 06/10/2019    POTASSIUM 3.9 06/10/2019    CHLORIDE 103 06/10/2019    CO2 27 06/10/2019    ANION 9.0 06/10/2019    GLUCOSE 94 06/10/2019    BUN 14 06/10/2019    CREATININE 1.01 06/10/2019    CALCIUM 9.3 06/10/2019    ASTSGOT 24 " 06/10/2019    ALTSGPT 14 06/10/2019    ALKPHOSPHAT 68 06/10/2019    TBILIRUBIN 1.2 06/10/2019    ALBUMIN 4.0 06/10/2019    AGRATIO 1.2 06/10/2019    LIPOPROTA 114 (H) 12/03/2018    TSHULTRASEN 1.740 06/10/2019    CPKTOTAL 50 10/12/2017       Other:  NA    Recent Imaging Studies:    None since last visit    ASSESSMENT AND PLAN  Patient Type, check all that apply:    Secondary Prevention (Abdominal aortic aneurism)  Established Atherosclerotic Cardiovascular Disease (ASCVD)  AAA - most recent imaging with small AAA, continue medical management and deferfurther surveillance to Dr. 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):   Likely - based on baseline LDL-C and +FH of premature CAD in brother - recommended cascade screening  ACC/AHA Indication for Statin Therapy, gurpreet all that apply:  LDL-C at baseline >190 mg/dl: Indication for High intensity statin    Calculated Risk for ASCVD, if applicable    N/A  Other Significant Risk Markers, if any, gurpreet all that apply   + Family History of premature CAD  High lp(a)  National Lipid Association (NLA) Goal  LDL-C:   <70 mg/dL  - Would probably be satisfied if we can get LDL <130 given age and poor tolerabilityLifestyle Recommendations From Today’s Visit:   Continue with good eating habits.  Pt does have 2 sodas a day, willing to reduce to 1/day.   Pt is very active, reports walking miles each day with her .   Statin Recommendations from Today's Visit  none  Non-Statin Medications Recommendations from Today’s Visit:   Continue Zetia, tolerating well.  Willing to trial Welchol. Given MOA, this is not optimal, but likely will give greatest reduction of LDL.   Indication for PCSK9 Inhibitor, if applicable:  Not currently indicated, but might be indicated in the future.    Supplements Recommended at this visit:  None  Recommendations for Other Cardiovascular Risk Factors, gurpreet all that apply:   none  Other Issues:  none    Studies Ordered at Todays Visit:  None   Blood Work Ordered At Today’s visit:   Lipid panel  Follow-Up:   6 months    Addendum:    Pt cannot afford BAS, will begin with PCSK9 therapy.  Pt is open to trying Praluent 75 mg every 2 weeks.  Will sent Rx to [facility].  Pt is aware this may take a while to get the medication to her.  Her and her  feel comfortable with injections and will contact the clinic if they have any questions.     Once the pt has the medication in hand, we will set up appropriate follow up.    Chandler Brown, PharmD    CC:  Amber L Hayes, M.D. Bloch, Michael J, M.D.     cough

## 2021-11-03 ENCOUNTER — OFFICE VISIT (OUTPATIENT)
Dept: SLEEP MEDICINE | Facility: MEDICAL CENTER | Age: 86
End: 2021-11-03
Payer: MEDICARE

## 2021-11-03 VITALS
HEART RATE: 71 BPM | HEIGHT: 62 IN | SYSTOLIC BLOOD PRESSURE: 122 MMHG | BODY MASS INDEX: 23.55 KG/M2 | OXYGEN SATURATION: 95 % | DIASTOLIC BLOOD PRESSURE: 74 MMHG | WEIGHT: 128 LBS

## 2021-11-03 DIAGNOSIS — I10 ESSENTIAL HYPERTENSION: ICD-10-CM

## 2021-11-03 DIAGNOSIS — F51.04 CHRONIC INSOMNIA: ICD-10-CM

## 2021-11-03 DIAGNOSIS — G47.33 OSA (OBSTRUCTIVE SLEEP APNEA): ICD-10-CM

## 2021-11-03 PROCEDURE — 99213 OFFICE O/P EST LOW 20 MIN: CPT | Performed by: NURSE PRACTITIONER

## 2021-11-03 NOTE — PROGRESS NOTES
Chief Complaint   Patient presents with   • Follow-Up     RUY- Last seen 08/03/2021       HPI:      Mrs. Taylor is a 88 y/o female patient who is in today for RUY f/u. Former [facility] patient. PMH includes SVT, GERD, osteoporosis, dyslipidemia, hypertension, arthrosclerosis of native arteries of extremities, AAA, chronic insomnia, never smoker, tonsillectomy.    Received a new ResMed BiPAP machine in September 2021. First compliance report from 10/3/21-11/1/21 was downloaded and reviewed with the patient which showed BiPAP IPAP/EPAP 15/11 cmH2O, 100% compliance, 8 hrs 49 min use, AHI of 2.4, mild mask leak. She is tolerating the pressure and mask well, but does sometimes feel that she has to breath too much because the machine pushes air in. She goes to bed between 9-11 pm and wakes up at 7 am. She denies morning headache or snoring. She still feels that her sleep is broken up however this has been a chronic issue which is likely related to her prior work hours. Overall, she does  find her sleep refreshing if she gets adequate sleep. She does not take regular naps. She is starting to lose her hearing and eye sight. She follows up with her specialists for this.       Sleep Study History:   PSG titration from 9/16/15 indicated complete CPAP titration to final pressure of 18 cm of water.  The overall AHI was 38.2, mean SPO2 was 80.9% and oxygen saturations were less than or equal to 89% for 281.6 minutes.  Bilevel therapy was also tried from 14/10 cm to 16/12 cm of water.  No significant central apneas were seen.  Supplemental oxygen was not used.    ROS:    Constitutional: Denies fevers, Denies weight changes  Eyes: Denies changes in vision, no eye pain  Ears/Nose/Throat/Mouth: Denies nasal congestion or sore throat   Cardiovascular: Denies chest pain or palpitations   Respiratory: Denies shortness of breath , Denies cough  Gastrointestinal/Hepatic: Denies abdominal pain, nausea, vomiting,   Skin/Breast: Denies rash,    Neurological: Denies headache, confusion,   Psychiatric: denies mood disorder   Sleep: denies snoring       Past Medical History:   Diagnosis Date   • AAA (abdominal aortic aneurysm) ([facility])    • Atherosclerosis of native arteries of the extremities with intermittent claudication 10/16/2013   • Blepharospasm syndrome    • Blepharospasm syndrome      IMO load March 2020   • Bright red rectal bleeding 6/24/2015   • Cellulitis 5/5/2021   • Chest pain 07/2015    Unspecified; Nuclear stress test negative    • Constipation    • Edema 5/29/2012   • Female bladder prolapse, acquired    • GERD (gastroesophageal reflux disease)    • Hyperlipidemia    • Hypertension    • Insomnia    • Low vitamin D level 4/20/2018   • OSTEOPOROSIS    • Palpitations 5/29/2012   • Primary insomnia 1/10/2017   • Sleep apnea    • SVT (supraventricular tachycardia) ([facility]) - on Zio 2015 and 2019 8/17/2015   • Varicose veins 5/29/2012   • Vitamin D deficiency disease    • Vitamin D deficiency disease      IMO load March 2020       Past Surgical History:   Procedure Laterality Date   • CYSTOCELE REPAIR  1/17/2011    Performed by GILMAR CHUNG at [facility]   • RECTOCELE REPAIR  1/17/2011    Performed by GILMAR CHUNG at [facility]   • BLADDER SUSPENSION  1/17/2011    Performed by GILMAR CHUNG at [facility]   • CYSTOSCOPY  1/17/2011    Performed by GILMAR CHUNG at [facility]   • ABDOMINAL HYSTERECTOMY TOTAL     • ARTHROSCOPY, KNEE     • CATARACT EXTRACTION WITH IOL     • HEMORRHOIDECTOMY     • HYSTERECTOMY, TOTAL ABDOMINAL     • INJECTION SCLERO HEMORROIDS     • OTHER ORTHOPEDIC SURGERY      knee scope x 2   • PB KNEE SCOPE,DIAGNOSTIC  2003;2009   • TONSILLECTOMY         Family History   Problem Relation Age of Onset   • Non-contributory Mother         gall bladder surgery   • Heart Disease Mother    • Cancer Father         colon rectal   • Heart Disease  Father         rheumatic heart disease   • Other Brother         HIT BY CAR   • Heart Disease Brother    • Sleep Apnea Brother    • No Known Problems Maternal Grandmother    • No Known Problems Maternal Grandfather    • No Known Problems Paternal Grandmother    • No Known Problems Paternal Grandfather    • No Known Problems Son        Social History     Socioeconomic History   • Marital status:      Spouse name: Not on file   • Number of children: Not on file   • Years of education: Not on file   • Highest education level: Not on file   Occupational History   • Not on file   Tobacco Use   • Smoking status: Never Smoker   • Smokeless tobacco: Never Used   Vaping Use   • Vaping Use: Never used   Substance and Sexual Activity   • Alcohol use: Not Currently     Alcohol/week: 0.0 oz     Comment: occasionally   • Drug use: No   • Sexual activity: Never   Other Topics Concern   • Not on file   Social History Narrative   • Not on file     Social Determinants of Health     Financial Resource Strain:    • Difficulty of Paying Living Expenses:    Food Insecurity:    • Worried About Running Out of Food in the Last Year:    • Ran Out of Food in the Last Year:    Transportation Needs:    • Lack of Transportation (Medical):    • Lack of Transportation (Non-Medical):    Physical Activity:    • Days of Exercise per Week:    • Minutes of Exercise per Session:    Stress:    • Feeling of Stress :    Social Connections:    • Frequency of Communication with Friends and Family:    • Frequency of Social Gatherings with Friends and Family:    • Attends [employer] Services:    • Active Member of Clubs or Organizations:    • Attends Club or Organization Meetings:    • Marital Status:    Intimate Partner Violence:    • Fear of Current or Ex-Partner:    • Emotionally Abused:    • Physically Abused:    • Sexually Abused:        Allergies as of 11/03/2021 - Reviewed 11/03/2021   Allergen Reaction Noted   • Atorvastatin  10/25/2017   •  Bactrim ds Hives 05/11/2017   • Levofloxacin  04/07/2021   • Pneumococcal vaccines Swelling 07/27/2018   • Sulfa drugs Hives 05/11/2017   • Vicodin [hydrocodone-acetaminophen] Rash 12/09/2010   • Vytorin  12/15/2008        Vitals:  Vitals:    11/03/21 1047   BP: 122/74   Pulse: 71   SpO2: 95%       Current medications as of today   Current Outpatient Medications   Medication Sig Dispense Refill   • Cholecalciferol (VITAMIN D3) 2000 UNIT Cap Take 2 capsules by mouth once daily 60 capsule 0   • metoprolol tartrate (LOPRESSOR) 25 MG Tab Take 1 tablet by mouth twice daily 180 tablet 3   • Alirocumab 75 MG/ML Solution Auto-injector Inject 75 mg under the skin every 14 days. 6 mL 3   • Multiple Vitamins-Minerals (ICAPS AREDS 2) Cap Take  by mouth.     • nitrofurantoin (MACRODANTIN) 50 MG Cap Take 50 mg by mouth every day.     • estradiol (ESTRACE) 0.1 MG/GM vaginal cream APPLY A PEA SIZED AMOUNT INTO VAGINA ONE TO TWO TIMES A WEEK     • Multiple Vitamins-Minerals (CENTRUM SILVER PO) Take 1 Tab by mouth every day. (Patient not taking: Reported on 11/3/2021)       No current facility-administered medications for this visit.         Physical Exam: Limited by COVID-19 precautions.  Appearance: Well developed, well nourished, no acute distress  Eyes: PERRL, EOM intact, sclera white, conjunctiva moist  Ears: no lesions or deformities  Hearing: grossly intact  Nose: no lesions or deformities  Respiratory effort: no intercostal retractions or use of accessory muscles  Extremities: no cyanosis or edema  Abdomen: soft   Gait and Station: normal  Digits and nails: no clubbing, cyanosis, petechiae or nodes.  Cranial nerves: grossly intact  Skin: no visible rashes, lesions or ulcers noted  Orientation: Oriented to time, person and place  Mood and affect: mood and affect appropriate, normal interaction with examiner  Judgement: Intact    Assessment:  1. RUY (obstructive sleep apnea)  DME Mask and Supplies    DME Other   2. Essential  hypertension     3. Chronic insomnia           Plan  Discussed the cardiovascular and neuropsychiatric risks of untreated RUY; including but not limited to: HTN, DM, MI, ASCVD, CVA, CHF, traffic accidents.     1. First compliance report from 10/3/21-11/1/21 was downloaded and reviewed with the patient which showed BiPAP IPAP/EPAP 15/11 cmH2O, 100% compliance, 8 hrs 49 min use, AHI of 2.4, mild mask leak. Continue BiPAP. Patient is compliant and benefiting from BiPAP therapy for management of RUY. Advised patient to continue to use the CPAP every night for more than four hours for optimal health benefit and to meet the health insurance 70% compliance guideline.    *DME order ([facility]) for mask (Airfit F20 mask or MOC) and supplies was provided today. Continue to clean mask and supplies weekly with soap and water, and change supplies per insurance guidelines.     *DME order to decrease BiPAP IPAP/EPAP 14/10 cmH2O due to slight pressure intolerance.     2. Continue to f/u with PCP for BP management.   3. Sleep hygiene discussed. Recommend keeping a set sleep/wake schedule. Logging enough hours of sleep. Limiting/Avoiding naps. No caffeine after noon and no heavy meals in the evening.   Chronic insomnia: stable.   4. Follow up with the appropriate healthcare practitioners for all other medical problems.  5. F/u in 4 months for RUY, sooner if needed.       FABRIZIO Feliciano.      This dictation was created using voice recognition software. The accuracy of the dictation is limited to the abilities of the software. I expect there may be some errors of grammar and possibly content.

## 2021-11-24 ENCOUNTER — HOSPITAL ENCOUNTER (OUTPATIENT)
Dept: LAB | Facility: MEDICAL CENTER | Age: 86
End: 2021-11-24
Attending: INTERNAL MEDICINE
Payer: MEDICARE

## 2021-11-24 DIAGNOSIS — Z13.29 THYROID DISORDER SCREEN: ICD-10-CM

## 2021-11-24 DIAGNOSIS — I10 ESSENTIAL HYPERTENSION: ICD-10-CM

## 2021-11-24 DIAGNOSIS — R73.01 ELEVATED FASTING GLUCOSE: ICD-10-CM

## 2021-11-24 DIAGNOSIS — E78.5 DYSLIPIDEMIA: ICD-10-CM

## 2021-11-24 DIAGNOSIS — Z13.0 SCREENING FOR DEFICIENCY ANEMIA: ICD-10-CM

## 2021-11-24 DIAGNOSIS — Z13.21 ENCOUNTER FOR VITAMIN DEFICIENCY SCREENING: ICD-10-CM

## 2021-11-24 LAB
ALBUMIN SERPL BCP-MCNC: 4.5 G/DL (ref 3.2–4.9)
ALBUMIN/GLOB SERPL: 1.6 G/DL
ALP SERPL-CCNC: 91 U/L (ref 30–99)
ALT SERPL-CCNC: 9 U/L (ref 2–50)
ANION GAP SERPL CALC-SCNC: 11 MMOL/L (ref 7–16)
AST SERPL-CCNC: 16 U/L (ref 12–45)
BASOPHILS # BLD AUTO: 0.7 % (ref 0–1.8)
BASOPHILS # BLD: 0.03 K/UL (ref 0–0.12)
BILIRUB SERPL-MCNC: 0.9 MG/DL (ref 0.1–1.5)
BUN SERPL-MCNC: 13 MG/DL (ref 8–22)
CALCIUM SERPL-MCNC: 9.7 MG/DL (ref 8.5–10.5)
CHLORIDE SERPL-SCNC: 106 MMOL/L (ref 96–112)
CHOLEST SERPL-MCNC: 149 MG/DL (ref 100–199)
CO2 SERPL-SCNC: 25 MMOL/L (ref 20–33)
CREAT SERPL-MCNC: 0.87 MG/DL (ref 0.5–1.4)
EOSINOPHIL # BLD AUTO: 0.05 K/UL (ref 0–0.51)
EOSINOPHIL NFR BLD: 1.2 % (ref 0–6.9)
ERYTHROCYTE [DISTWIDTH] IN BLOOD BY AUTOMATED COUNT: 49.4 FL (ref 35.9–50)
EST. AVERAGE GLUCOSE BLD GHB EST-MCNC: 117 MG/DL
GLOBULIN SER CALC-MCNC: 2.8 G/DL (ref 1.9–3.5)
GLUCOSE SERPL-MCNC: 89 MG/DL (ref 65–99)
HBA1C MFR BLD: 5.7 % (ref 4–5.6)
HCT VFR BLD AUTO: 46.7 % (ref 37–47)
HDLC SERPL-MCNC: 62 MG/DL
HGB BLD-MCNC: 15.1 G/DL (ref 12–16)
IMM GRANULOCYTES # BLD AUTO: 0.01 K/UL (ref 0–0.11)
IMM GRANULOCYTES NFR BLD AUTO: 0.2 % (ref 0–0.9)
LDLC SERPL CALC-MCNC: 69 MG/DL
LYMPHOCYTES # BLD AUTO: 1.55 K/UL (ref 1–4.8)
LYMPHOCYTES NFR BLD: 35.9 % (ref 22–41)
MCH RBC QN AUTO: 29 PG (ref 27–33)
MCHC RBC AUTO-ENTMCNC: 32.3 G/DL (ref 33.6–35)
MCV RBC AUTO: 89.8 FL (ref 81.4–97.8)
MONOCYTES # BLD AUTO: 0.44 K/UL (ref 0–0.85)
MONOCYTES NFR BLD AUTO: 10.2 % (ref 0–13.4)
NEUTROPHILS # BLD AUTO: 2.24 K/UL (ref 2–7.15)
NEUTROPHILS NFR BLD: 51.8 % (ref 44–72)
NRBC # BLD AUTO: 0 K/UL
NRBC BLD-RTO: 0 /100 WBC
PLATELET # BLD AUTO: 212 K/UL (ref 164–446)
PMV BLD AUTO: 10.3 FL (ref 9–12.9)
POTASSIUM SERPL-SCNC: 4.5 MMOL/L (ref 3.6–5.5)
PROT SERPL-MCNC: 7.3 G/DL (ref 6–8.2)
RBC # BLD AUTO: 5.2 M/UL (ref 4.2–5.4)
SODIUM SERPL-SCNC: 142 MMOL/L (ref 135–145)
TRIGL SERPL-MCNC: 88 MG/DL (ref 0–149)
TSH SERPL DL<=0.005 MIU/L-ACNC: 1.84 UIU/ML (ref 0.38–5.33)
WBC # BLD AUTO: 4.3 K/UL (ref 4.8–10.8)

## 2021-11-24 PROCEDURE — 80061 LIPID PANEL: CPT

## 2021-11-24 PROCEDURE — 84443 ASSAY THYROID STIM HORMONE: CPT

## 2021-11-24 PROCEDURE — 80053 COMPREHEN METABOLIC PANEL: CPT

## 2021-11-24 PROCEDURE — 83036 HEMOGLOBIN GLYCOSYLATED A1C: CPT | Mod: GA

## 2021-11-24 PROCEDURE — 36415 COLL VENOUS BLD VENIPUNCTURE: CPT

## 2021-11-24 PROCEDURE — 85025 COMPLETE CBC W/AUTO DIFF WBC: CPT

## 2021-11-30 ENCOUNTER — OFFICE VISIT (OUTPATIENT)
Dept: CARDIOLOGY | Facility: MEDICAL CENTER | Age: 86
End: 2021-11-30
Payer: MEDICARE

## 2021-11-30 ENCOUNTER — TELEPHONE (OUTPATIENT)
Dept: MEDICAL GROUP | Facility: PHYSICIAN GROUP | Age: 86
End: 2021-11-30

## 2021-11-30 VITALS
DIASTOLIC BLOOD PRESSURE: 82 MMHG | HEART RATE: 80 BPM | RESPIRATION RATE: 16 BRPM | HEIGHT: 62 IN | WEIGHT: 128 LBS | BODY MASS INDEX: 23.55 KG/M2 | SYSTOLIC BLOOD PRESSURE: 128 MMHG | OXYGEN SATURATION: 94 %

## 2021-11-30 DIAGNOSIS — E78.5 DYSLIPIDEMIA: ICD-10-CM

## 2021-11-30 DIAGNOSIS — I83.11 VARICOSE VEINS OF BOTH LOWER EXTREMITIES WITH INFLAMMATION: ICD-10-CM

## 2021-11-30 DIAGNOSIS — I83.12 VARICOSE VEINS OF BOTH LOWER EXTREMITIES WITH INFLAMMATION: ICD-10-CM

## 2021-11-30 DIAGNOSIS — I47.10 SVT (SUPRAVENTRICULAR TACHYCARDIA) (HCC): Chronic | ICD-10-CM

## 2021-11-30 DIAGNOSIS — I70.219 ATHEROSCLEROSIS OF NATIVE ARTERY OF EXTREMITY WITH INTERMITTENT CLAUDICATION, UNSPECIFIED EXTREMITY (HCC): ICD-10-CM

## 2021-11-30 DIAGNOSIS — I71.40 ABDOMINAL AORTIC ANEURYSM (AAA) WITHOUT RUPTURE (HCC): ICD-10-CM

## 2021-11-30 PROCEDURE — 99214 OFFICE O/P EST MOD 30 MIN: CPT | Performed by: INTERNAL MEDICINE

## 2021-11-30 ASSESSMENT — FIBROSIS 4 INDEX: FIB4 SCORE: 2.24

## 2021-11-30 NOTE — TELEPHONE ENCOUNTER
----- Message from Martinez Pitt M.D. sent at 11/29/2021  4:37 PM PST -----  Please notify patient about their result(s)     My name is Martinez Pitt MD.  I am covering for your primary care provider Arianna Cabrera M.D.  who is out of the office this week.     Anemia test, electrolytes, kidney function test, liver enzyme, thyroid test: Are within acceptable levels    Blood test A1c showed prediabetes.  Please continue with low sweet  low carb diet ,  continue to exercise / walking regularly.        Please follow up with your pcp GANESH Jade MD

## 2021-11-30 NOTE — TELEPHONE ENCOUNTER
Called the pt regarding her lab results, patient understood and said she had an imaging appt on 12/03/2021 and has a f/u appt on 11/16/2021 with Dr Cabrera to answer any questuons and to see someone face-to face

## 2021-12-02 NOTE — PROGRESS NOTES
Chief Complaint   Patient presents with   • Supraventricular Tachycardia (SVT)   • Hypertension   • Dyslipidemia       Subjective     Nini Taylor is a 89 y.o. female who presents today for follow-up of her history of peripheral arterial disease dyslipidemia on PCSK9's SVT    She has swelling in the left greater than right leg thought it was related to the Covid vaccination negative DVT  Past Medical History:   Diagnosis Date   • AAA (abdominal aortic aneurysm) (HCC)    • Atherosclerosis of native arteries of the extremities with intermittent claudication 10/16/2013   • Blepharospasm syndrome    • Blepharospasm syndrome      IMO load March 2020   • Bright red rectal bleeding 6/24/2015   • Cellulitis 5/5/2021   • Chest pain 07/2015    Unspecified; Nuclear stress test negative    • Constipation    • Edema 5/29/2012   • Female bladder prolapse, acquired    • GERD (gastroesophageal reflux disease)    • Hyperlipidemia    • Hypertension    • Insomnia    • Low vitamin D level 4/20/2018   • OSTEOPOROSIS    • Palpitations 5/29/2012   • Primary insomnia 1/10/2017   • Sleep apnea    • SVT (supraventricular tachycardia) (HCC) - on Zio 2015 and 2019 8/17/2015   • Varicose veins 5/29/2012   • Vitamin D deficiency disease    • Vitamin D deficiency disease      IMO load March 2020     Past Surgical History:   Procedure Laterality Date   • CYSTOCELE REPAIR  1/17/2011    Performed by GILMAR CHUNG at [facility]   • RECTOCELE REPAIR  1/17/2011    Performed by GILMAR CHUNG at [facility]   • BLADDER SUSPENSION  1/17/2011    Performed by GILMAR CHUNG at [facility]   • CYSTOSCOPY  1/17/2011    Performed by GILMAR CHUNG at [facility]   • ABDOMINAL HYSTERECTOMY TOTAL     • ARTHROSCOPY, KNEE     • CATARACT EXTRACTION WITH IOL     • HEMORRHOIDECTOMY     • HYSTERECTOMY, TOTAL ABDOMINAL     • INJECTION SCLERO HEMORROIDS     • OTHER ORTHOPEDIC  SURGERY      knee scope x 2   • PB KNEE SCOPE,DIAGNOSTIC  2003;2009   • TONSILLECTOMY       Family History   Problem Relation Age of Onset   • Non-contributory Mother         gall bladder surgery   • Heart Disease Mother    • Cancer Father         colon rectal   • Heart Disease Father         rheumatic heart disease   • Other Brother         HIT BY CAR   • Heart Disease Brother    • Sleep Apnea Brother    • No Known Problems Maternal Grandmother    • No Known Problems Maternal Grandfather    • No Known Problems Paternal Grandmother    • No Known Problems Paternal Grandfather    • No Known Problems Son      Social History     Socioeconomic History   • Marital status:      Spouse name: Not on file   • Number of children: Not on file   • Years of education: Not on file   • Highest education level: Not on file   Occupational History   • Not on file   Tobacco Use   • Smoking status: Never Smoker   • Smokeless tobacco: Never Used   Vaping Use   • Vaping Use: Never used   Substance and Sexual Activity   • Alcohol use: Not Currently     Alcohol/week: 0.0 oz     Comment: occasionally   • Drug use: No   • Sexual activity: Never   Other Topics Concern   • Not on file   Social History Narrative   • Not on file     Social Determinants of Health     Financial Resource Strain:    • Difficulty of Paying Living Expenses: Not on file   Food Insecurity:    • Worried About Running Out of Food in the Last Year: Not on file   • Ran Out of Food in the Last Year: Not on file   Transportation Needs:    • Lack of Transportation (Medical): Not on file   • Lack of Transportation (Non-Medical): Not on file   Physical Activity:    • Days of Exercise per Week: Not on file   • Minutes of Exercise per Session: Not on file   Stress:    • Feeling of Stress : Not on file   Social Connections:    • Frequency of Communication with Friends and Family: Not on file   • Frequency of Social Gatherings with Friends and Family: Not on file   • Attends  "Denominational Services: Not on file   • Active Member of Clubs or Organizations: Not on file   • Attends Club or Organization Meetings: Not on file   • Marital Status: Not on file   Intimate Partner Violence:    • Fear of Current or Ex-Partner: Not on file   • Emotionally Abused: Not on file   • Physically Abused: Not on file   • Sexually Abused: Not on file   Housing Stability:    • Unable to Pay for Housing in the Last Year: Not on file   • Number of Places Lived in the Last Year: Not on file   • Unstable Housing in the Last Year: Not on file     Allergies   Allergen Reactions   • Atorvastatin    • Bactrim Ds Hives   • Levofloxacin    • Pneumococcal Vaccines Swelling   • Sulfa Drugs Hives   • Vicodin [Hydrocodone-Acetaminophen] Rash   • Vytorin      Outpatient Encounter Medications as of 11/30/2021   Medication Sig Dispense Refill   • nitrofurantoin (MACRODANTIN) 50 MG Cap Take 50 mg by mouth every day.     • Cholecalciferol (VITAMIN D3) 2000 UNIT Cap Take 2 capsules by mouth once daily 60 capsule 0   • metoprolol tartrate (LOPRESSOR) 25 MG Tab Take 1 tablet by mouth twice daily 180 tablet 3   • Alirocumab 75 MG/ML Solution Auto-injector Inject 75 mg under the skin every 14 days. 6 mL 3   • estradiol (ESTRACE) 0.1 MG/GM vaginal cream APPLY A PEA SIZED AMOUNT INTO VAGINA ONE TO TWO TIMES A WEEK     • Multiple Vitamins-Minerals (ICAPS AREDS 2) Cap Take  by mouth.     • Multiple Vitamins-Minerals (CENTRUM SILVER PO) Take 1 Tablet by mouth every day.       No facility-administered encounter medications on file as of 11/30/2021.     ROS           Objective     /82 (BP Location: Left arm, Patient Position: Sitting, BP Cuff Size: Adult)   Pulse 80   Resp 16   Ht 1.575 m (5' 2\")   Wt 58.1 kg (128 lb)   SpO2 94%   BMI 23.41 kg/m²     Physical Exam  Constitutional:       General: She is not in acute distress.     Appearance: She is not diaphoretic.   Eyes:      General: No scleral icterus.  Neck:      Vascular: No " JVD.   Cardiovascular:      Rate and Rhythm: Normal rate.      Heart sounds: Normal heart sounds. No murmur heard.  No friction rub. No gallop.    Pulmonary:      Effort: No respiratory distress.      Breath sounds: No wheezing or rales.   Abdominal:      General: Bowel sounds are normal.      Palpations: Abdomen is soft.   Skin:     Findings: No rash.   Neurological:      Mental Status: She is alert.            We reviewed in person the most recent labs  Recent Results (from the past 5040 hour(s))   URINE CULTURE(NEW)    Collection Time: 06/30/21  9:30 AM    Specimen: Urine   Result Value Ref Range    Significant Indicator POS (POS)     Source UR     Site -     Culture Result - (A)     Culture Result Escherichia coli  >100,000 cfu/mL   (A)        Susceptibility    Escherichia coli - SUZANNE     Ampicillin >16 Resistant mcg/mL     Ceftriaxone <=1 Sensitive mcg/mL     Cefazolin <=2 Sensitive mcg/mL     Ciprofloxacin >2 Resistant mcg/mL     Cefepime <=2 Sensitive mcg/mL     Cefuroxime <=4 Sensitive mcg/mL     Ampicillin/sulbactam 16/8 Intermediate mcg/mL     Tobramycin >8 Resistant mcg/mL     Nitrofurantoin <=32 Sensitive mcg/mL     Gentamicin >8 Resistant mcg/mL     Levofloxacin >4 Resistant mcg/mL     Pip/Tazobactam <=8 Sensitive mcg/mL     Trimeth/Sulfa >2/38 Resistant mcg/mL     Tigecycline <=2 Sensitive mcg/mL   Comp Metabolic Panel    Collection Time: 07/16/21  9:50 AM   Result Value Ref Range    Sodium 139 135 - 145 mmol/L    Potassium 4.0 3.6 - 5.5 mmol/L    Chloride 102 96 - 112 mmol/L    Co2 26 20 - 33 mmol/L    Anion Gap 11.0 7.0 - 16.0    Glucose 103 (H) 65 - 99 mg/dL    Bun 8 8 - 22 mg/dL    Creatinine 0.80 0.50 - 1.40 mg/dL    Calcium 8.9 8.5 - 10.5 mg/dL    AST(SGOT) 18 12 - 45 U/L    ALT(SGPT) 6 2 - 50 U/L    Alkaline Phosphatase 85 30 - 99 U/L    Total Bilirubin 1.0 0.1 - 1.5 mg/dL    Albumin 3.9 3.2 - 4.9 g/dL    Total Protein 6.7 6.0 - 8.2 g/dL    Globulin 2.8 1.9 - 3.5 g/dL    A-G Ratio 1.4 g/dL    Lipid Profile    Collection Time: 07/16/21  9:50 AM   Result Value Ref Range    Cholesterol,Tot 146 100 - 199 mg/dL    Triglycerides 100 0 - 149 mg/dL    HDL 62 >=40 mg/dL    LDL 64 <100 mg/dL   ESTIMATED GFR    Collection Time: 07/16/21  9:50 AM   Result Value Ref Range    GFR If African American >60 >60 mL/min/1.73 m 2    GFR If Non African American >60 >60 mL/min/1.73 m 2   EC-ECHOCARDIOGRAM COMPLETE W/O CONT    Collection Time: 09/06/21 12:02 PM   Result Value Ref Range    Eject.Frac. MOD BP 62.03     Eject.Frac. MOD 4C 48.53     Eject.Frac. MOD 2C 72.35     Left Ventrical Ejection Fraction 60    TSH WITH REFLEX TO FT4    Collection Time: 11/24/21  1:44 PM   Result Value Ref Range    TSH 1.840 0.380 - 5.330 uIU/mL   Lipid Profile    Collection Time: 11/24/21  1:44 PM   Result Value Ref Range    Cholesterol,Tot 149 100 - 199 mg/dL    Triglycerides 88 0 - 149 mg/dL    HDL 62 >=40 mg/dL    LDL 69 <100 mg/dL   HEMOGLOBIN A1C    Collection Time: 11/24/21  1:44 PM   Result Value Ref Range    Glycohemoglobin 5.7 (H) 4.0 - 5.6 %    Est Avg Glucose 117 mg/dL   Comp Metabolic Panel    Collection Time: 11/24/21  1:44 PM   Result Value Ref Range    Sodium 142 135 - 145 mmol/L    Potassium 4.5 3.6 - 5.5 mmol/L    Chloride 106 96 - 112 mmol/L    Co2 25 20 - 33 mmol/L    Anion Gap 11.0 7.0 - 16.0    Glucose 89 65 - 99 mg/dL    Bun 13 8 - 22 mg/dL    Creatinine 0.87 0.50 - 1.40 mg/dL    Calcium 9.7 8.5 - 10.5 mg/dL    AST(SGOT) 16 12 - 45 U/L    ALT(SGPT) 9 2 - 50 U/L    Alkaline Phosphatase 91 30 - 99 U/L    Total Bilirubin 0.9 0.1 - 1.5 mg/dL    Albumin 4.5 3.2 - 4.9 g/dL    Total Protein 7.3 6.0 - 8.2 g/dL    Globulin 2.8 1.9 - 3.5 g/dL    A-G Ratio 1.6 g/dL   CBC WITH DIFFERENTIAL    Collection Time: 11/24/21  1:44 PM   Result Value Ref Range    WBC 4.3 (L) 4.8 - 10.8 K/uL    RBC 5.20 4.20 - 5.40 M/uL    Hemoglobin 15.1 12.0 - 16.0 g/dL    Hematocrit 46.7 37.0 - 47.0 %    MCV 89.8 81.4 - 97.8 fL    MCH 29.0 27.0 - 33.0 pg     MCHC 32.3 (L) 33.6 - 35.0 g/dL    RDW 49.4 35.9 - 50.0 fL    Platelet Count 212 164 - 446 K/uL    MPV 10.3 9.0 - 12.9 fL    Neutrophils-Polys 51.80 44.00 - 72.00 %    Lymphocytes 35.90 22.00 - 41.00 %    Monocytes 10.20 0.00 - 13.40 %    Eosinophils 1.20 0.00 - 6.90 %    Basophils 0.70 0.00 - 1.80 %    Immature Granulocytes 0.20 0.00 - 0.90 %    Nucleated RBC 0.00 /100 WBC    Neutrophils (Absolute) 2.24 2.00 - 7.15 K/uL    Lymphs (Absolute) 1.55 1.00 - 4.80 K/uL    Monos (Absolute) 0.44 0.00 - 0.85 K/uL    Eos (Absolute) 0.05 0.00 - 0.51 K/uL    Baso (Absolute) 0.03 0.00 - 0.12 K/uL    Immature Granulocytes (abs) 0.01 0.00 - 0.11 K/uL    NRBC (Absolute) 0.00 K/uL   ESTIMATED GFR    Collection Time: 11/24/21  1:44 PM   Result Value Ref Range    GFR If African American >60 >60 mL/min/1.73 m 2    GFR If Non African American >60 >60 mL/min/1.73 m 2       Assessment & Plan     1. Abdominal aortic aneurysm (AAA) without rupture (HCC)     2. Atherosclerosis of native artery of extremity with intermittent claudication, unspecified extremity (HCC)     3. Dyslipidemia     4. SVT (supraventricular tachycardia) (HCC) - on Zio 2015 and 2019     5. Varicose veins of both lower extremities with inflammation         Medical Decision Making: Today's Assessment/Status/Plan:        It was my pleasure to meet with Ms. Taylor.    We addressed the management of hypertension at today's visit. Blood pressure is well controlled.  We specifically assessed the labs on hypertension treatment    We addressed the management of dyslipidemia at today's visit. She is on appropriate PCSK9    I will see Ms. Taylor back in 6 months time and encouraged her to follow up with us over the phone or electronically using my MyChart as issues arise.    It is my pleasure to participate in the care of Ms. Taylor.  Please do not hesitate to contact me with questions or concerns.    Montez Kamara MD PhD FAC  Cardiologist [facility]  [facility]    Please note that this dictation was created using voice recognition software. There may be errors I did not discover before finalizing the note.

## 2021-12-03 ENCOUNTER — HOSPITAL ENCOUNTER (OUTPATIENT)
Dept: RADIOLOGY | Facility: MEDICAL CENTER | Age: 86
End: 2021-12-03
Attending: UROLOGY
Payer: MEDICARE

## 2021-12-03 DIAGNOSIS — N39.0 URINARY TRACT INFECTION WITHOUT HEMATURIA, SITE UNSPECIFIED: ICD-10-CM

## 2021-12-03 PROCEDURE — 700117 HCHG RX CONTRAST REV CODE 255: Performed by: UROLOGY

## 2021-12-03 PROCEDURE — 74178 CT ABD&PLV WO CNTR FLWD CNTR: CPT | Mod: MH

## 2021-12-03 RX ADMIN — IOHEXOL 90 ML: 350 INJECTION, SOLUTION INTRAVENOUS at 10:15

## 2021-12-14 ENCOUNTER — NON-PROVIDER VISIT (OUTPATIENT)
Dept: VASCULAR LAB | Facility: MEDICAL CENTER | Age: 86
End: 2021-12-14
Attending: INTERNAL MEDICINE
Payer: MEDICARE

## 2021-12-14 ENCOUNTER — PHARMACY VISIT (OUTPATIENT)
Dept: PHARMACY | Facility: MEDICAL CENTER | Age: 86
End: 2021-12-14
Payer: COMMERCIAL

## 2021-12-14 VITALS — DIASTOLIC BLOOD PRESSURE: 90 MMHG | SYSTOLIC BLOOD PRESSURE: 145 MMHG | HEART RATE: 70 BPM

## 2021-12-14 DIAGNOSIS — E78.5 DYSLIPIDEMIA: ICD-10-CM

## 2021-12-14 PROCEDURE — RXMED WILLOW AMBULATORY MEDICATION CHARGE: Performed by: INTERNAL MEDICINE

## 2021-12-14 PROCEDURE — 99212 OFFICE O/P EST SF 10 MIN: CPT

## 2021-12-14 RX ORDER — CX-024414 0.2 MG/ML
0.25 INJECTION, SUSPENSION INTRAMUSCULAR
Qty: 0.25 ML | Refills: 0 | Status: SHIPPED | OUTPATIENT
Start: 2021-12-14 | End: 2022-05-25

## 2021-12-14 NOTE — PROGRESS NOTES
"[facility] - FollowUp Visit  Date of Service: 12/14/21    Nini Taylor is here for follow up of dyslipidemia.    HPI  Pertinent Interval History since last visit:   Patient continues to tolerate praluent. No significant changes.    Current Prescription Lipid Lowering Medications - including dose:   Statin: None  Non-Statin: Praluent 75 mg every 2 weeks.   Current Lipid Lowering and Related Supplements:   Vit D  Any Current Side Effects Potentially Related to Lipid Lowering therapy?   No  Current Adherence to Lipid Lowering Therapies:  Complete  Any Previous History of Statin Intolerance?   Statin: Simvastatin Unknown dose/duration - myalgias                Crestor unkown dose, duration \"years\" - states she started getting sharp \"zings\" down her arms.              Atorvastatin unknown dose, duration of 90 days - severe UE myalgia              Rosuvastatin - low dose alternate days - myalgias    Non-Statin: States none, however MR shows possibly Vytorin use              Outcome: Unknown  Any Previous History of Statin Intolerance?   Yes, Details: See above.    Baseline Lipids Prior to Treatment:          2/6/2017 12:20     Cholesterol,Tot   274 (H)     Triglycerides   101     HDL   58     LDL   196 (H)         SOCIAL HISTORY  Social History     Tobacco Use   Smoking Status Never Smoker   Smokeless Tobacco Never Used      Change in weight: Stable  Exercise habits: No formal regimen.  Diet: low calorie    ROS  Physical Exam    DATA REVIEW  Most Recent Lipid Panel:   Lab Results   Component Value Date    CHOLSTRLTOT 149 11/24/2021    TRIGLYCERIDE 88 11/24/2021    HDL 62 11/24/2021    LDL 69 11/24/2021       Other Pertinent Blood Work:   Lab Results   Component Value Date    SODIUM 142 11/24/2021    POTASSIUM 4.5 11/24/2021    CHLORIDE 106 11/24/2021    CO2 25 11/24/2021    ANION 11.0 11/24/2021    GLUCOSE 89 11/24/2021    BUN 13 11/24/2021    CREATININE 0.87 11/24/2021    CALCIUM 9.7 11/24/2021    ASTSGOT 16 " 11/24/2021    ALTSGPT 9 11/24/2021    ALKPHOSPHAT 91 11/24/2021    TBILIRUBIN 0.9 11/24/2021    ALBUMIN 4.5 11/24/2021    AGRATIO 1.6 11/24/2021    TSHULTRASEN 1.840 11/24/2021       Other:  NA    Recent Imaging Studies:    None since last visit    ASSESSMENT AND PLAN  Patient Type, check all that apply:    Secondary Prevention (Abdominal aortic aneurism)  Established Atherosclerotic Cardiovascular Disease (ASCVD)  AAA - most recent imaging with small AAA, continue medical management and deferfurther surveillance to Dr. 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  Continue with walking 25 min/day and increase as able or tolerate   Statin Recommendations from Today's Visit  none  Non-Statin Medications Recommendations from Today’s Visit:   Continue Praluent.  Confirmed she is not taking Zetia.   Indication for PCSK9 Inhibitor, if applicable:  FH with suboptimal control of LDL-C despite maximally tolerated statin  Supplements Recommended at this visit:  None  Recommendations for Other Cardiovascular Risk Factors, gurpreet all that apply:   None  Other Issues:  None    Patient remains at LDL goal of <70. Current LDL reading of 69 is slightly increased from previous reading of 65.  Patient is tolerating Praluent. Will continue current management. Encouraged patient to resume exercising at the gym.     Patient blood pressure was elevated in the office. Recommended patient start taking her blood pressure at home and follow-up with her PCP. Messaged referring provider (Dr. Kamara) offering our hypertension services.      Studies Ordered at Todays Visit:  None   Blood Work Ordered At Today’s visit:   CMP  Lipid panel  Follow-Up:   6 months    Nahum Jo, PharmD    CC:  Christopher Wilson, MD Michael Bloch, MD Jason Dent, KeikoD

## 2021-12-14 NOTE — Clinical Note
Nini's blood pressure was elevated at her lipid visit today. I told her to follow-up with her PCP. We are more than happy to see her for hypertension as well if you want to send a referral.

## 2021-12-16 ENCOUNTER — OFFICE VISIT (OUTPATIENT)
Dept: MEDICAL GROUP | Facility: PHYSICIAN GROUP | Age: 86
End: 2021-12-16
Payer: MEDICARE

## 2021-12-16 VITALS
BODY MASS INDEX: 23.37 KG/M2 | OXYGEN SATURATION: 98 % | RESPIRATION RATE: 14 BRPM | DIASTOLIC BLOOD PRESSURE: 72 MMHG | SYSTOLIC BLOOD PRESSURE: 120 MMHG | HEIGHT: 62 IN | WEIGHT: 127 LBS | TEMPERATURE: 98 F | HEART RATE: 116 BPM

## 2021-12-16 DIAGNOSIS — N39.0 RECURRENT UTI: ICD-10-CM

## 2021-12-16 DIAGNOSIS — I71.40 ABDOMINAL AORTIC ANEURYSM (AAA) WITHOUT RUPTURE (HCC): ICD-10-CM

## 2021-12-16 PROCEDURE — 99213 OFFICE O/P EST LOW 20 MIN: CPT | Performed by: INTERNAL MEDICINE

## 2021-12-16 ASSESSMENT — FIBROSIS 4 INDEX: FIB4 SCORE: 2.24

## 2021-12-16 NOTE — PROGRESS NOTES
Subjective:     CC:   Chief Complaint   Patient presents with   • Follow-Up     imaging results         HPI:   Nini presents today to discuss the following issues:    The patient states she is here to follow-up on imaging results.  I was unaware of imaging that was performed.  The patient was seen by urology and had a CT abdomen and pelvis urogram performed on 12/3/2021.  The imaging showed no hydronephrosis, nephrolithiasis or renal masses.  It did show an interval increase in size of a simple cyst in the left kidney measuring 4.4 cm up from 3.4 cm.  It also showed an increase in size of her distal abdominal aorta aneurysm, measuring 3.6 cm, up from 2 cm.  The patient reports that her urologist is having her stop the nitrofurantoin and switch over to a d-mannose supplement.  She has not yet done this.      Past Medical History:   Diagnosis Date   • AAA (abdominal aortic aneurysm) ([facility])    • Atherosclerosis of native arteries of the extremities with intermittent claudication 10/16/2013   • Blepharospasm syndrome    • Blepharospasm syndrome      IMO load March 2020   • Bright red rectal bleeding 6/24/2015   • Cellulitis 5/5/2021   • Chest pain 07/2015    Unspecified; Nuclear stress test negative    • Constipation    • Edema 5/29/2012   • Female bladder prolapse, acquired    • GERD (gastroesophageal reflux disease)    • Hyperlipidemia    • Hypertension    • Insomnia    • Low vitamin D level 4/20/2018   • OSTEOPOROSIS    • Palpitations 5/29/2012   • Primary insomnia 1/10/2017   • Sleep apnea    • SVT (supraventricular tachycardia) ([facility]) - on Zio 2015 and 2019 8/17/2015   • Varicose veins 5/29/2012   • Vitamin D deficiency disease    • Vitamin D deficiency disease      IMO load March 2020       Social History     Tobacco Use   • Smoking status: Never Smoker   • Smokeless tobacco: Never Used   Vaping Use   • Vaping Use: Never used   Substance Use Topics   • Alcohol use: Not Currently     Alcohol/week: 0.0 oz      "Comment: occasionally   • Drug use: No       Current Outpatient Medications Ordered in Epic   Medication Sig Dispense Refill   • nitrofurantoin (MACRODANTIN) 50 MG Cap Take 50 mg by mouth every day.     • Cholecalciferol (VITAMIN D3) 2000 UNIT Cap Take 2 capsules by mouth once daily 60 capsule 0   • metoprolol tartrate (LOPRESSOR) 25 MG Tab Take 1 tablet by mouth twice daily 180 tablet 3   • Alirocumab 75 MG/ML Solution Auto-injector Inject 75 mg under the skin every 14 days. 6 mL 3   • estradiol (ESTRACE) 0.1 MG/GM vaginal cream APPLY A PEA SIZED AMOUNT INTO VAGINA ONE TO TWO TIMES A WEEK     • Multiple Vitamins-Minerals (ICAPS AREDS 2) Cap Take  by mouth.     • Multiple Vitamins-Minerals (CENTRUM SILVER PO) Take 1 Tablet by mouth every day.     • COVID-19 mRNA vaccine, Moderna, (MODERNA COVID-19 VACCINE) 100 MCG/0.5ML Suspension injection Inject 0.25 mL into the shoulder, thigh, or buttocks. 0.25 mL 0     No current Epic-ordered facility-administered medications on file.       Allergies:  Atorvastatin, Bactrim ds, Levofloxacin, Pneumococcal vaccines, Sulfa drugs, Vicodin [hydrocodone-acetaminophen], and Vytorin    Health Maintenance: Completed    ROS:   Denies any recent fevers or chills. No nausea or vomiting. No chest pains or shortness of breath.    Objective:       Exam:  /72   Pulse (!) 116   Temp 36.7 °C (98 °F)   Resp 14   Ht 1.575 m (5' 2\")   Wt 57.6 kg (127 lb)   SpO2 98%   BMI 23.23 kg/m²  Body mass index is 23.23 kg/m².    Gen: Alert and oriented, No apparent distress.  Lungs: Normal effort, CTA bilaterally, no wheezes, rhonchi, or rales  CV: Regular rate and rhythm. No murmurs, rubs, or gallops.  Ext: No clubbing, cyanosis, edema.        Assessment & Plan:     89 y.o. female with the following -     Recurrent UTI  This is a chronic condition.  Improved.  The patient is followed by urology.  She is currently on daily suppression therapy with nitrofurantoin, with plans to switch over to a " d-mannose supplement.  CT abdomen and pelvis urogram performed on 12/3/2021 showed no hydronephrosis, nephrolithiasis or renal masses.  It did show an interval increase in size of a simple cyst in the left kidney measuring 4.4 cm up from 3.4 cm.     Abdominal aortic aneurysm (AAA) without rupture (HCC)  This is a chronic condition.  Progressive.  The patient is followed by cardiology, Dr. Kamara.  CT abdomen and pelvis urogram on 12/3/2021 showed an increase in size of her distal abdominal aorta aneurysm, measuring 3.6 cm, up from 2 cm.  The patient has a follow-up with with cardiology in May.      I spent a total of 26 minutes with record review, exam, communication with the patient, communication with other providers, and documentation of this encounter.      Return in about 3 months (around 3/16/2022) for f/u labs.    Please note that this dictation was created using voice recognition software. I have made every reasonable attempt to correct obvious errors, but I expect that there are errors of grammar and possibly content that I did not discover before finalizing the note.

## 2022-01-06 DIAGNOSIS — E78.5 DYSLIPIDEMIA: ICD-10-CM

## 2022-01-06 RX ORDER — ALIROCUMAB 75 MG/ML
75 INJECTION, SOLUTION SUBCUTANEOUS
Qty: 6 ML | Refills: 3 | Status: SHIPPED | OUTPATIENT
Start: 2022-01-06 | End: 2022-12-06 | Stop reason: SDUPTHER

## 2022-01-07 ENCOUNTER — TELEPHONE (OUTPATIENT)
Dept: VASCULAR LAB | Facility: MEDICAL CENTER | Age: 87
End: 2022-01-07

## 2022-01-07 NOTE — TELEPHONE ENCOUNTER
Contacted patient on [phone] to discuss [facility] and services/benefits offered. No answer, left voicemail

## 2022-01-10 PROCEDURE — RXMED WILLOW AMBULATORY MEDICATION CHARGE: Performed by: NURSE PRACTITIONER

## 2022-01-13 ENCOUNTER — PHARMACY VISIT (OUTPATIENT)
Dept: PHARMACY | Facility: MEDICAL CENTER | Age: 87
End: 2022-01-13
Payer: COMMERCIAL

## 2022-02-23 ENCOUNTER — HOSPITAL ENCOUNTER (OUTPATIENT)
Dept: LAB | Facility: MEDICAL CENTER | Age: 87
End: 2022-02-23
Attending: PHYSICIAN ASSISTANT
Payer: MEDICARE

## 2022-02-23 PROCEDURE — 87077 CULTURE AEROBIC IDENTIFY: CPT

## 2022-02-23 PROCEDURE — 87186 SC STD MICRODIL/AGAR DIL: CPT

## 2022-02-23 PROCEDURE — 87086 URINE CULTURE/COLONY COUNT: CPT

## 2022-03-15 DIAGNOSIS — I47.10 SVT (SUPRAVENTRICULAR TACHYCARDIA) (HCC): ICD-10-CM

## 2022-03-20 PROCEDURE — RXMED WILLOW AMBULATORY MEDICATION CHARGE: Performed by: NURSE PRACTITIONER

## 2022-03-25 ENCOUNTER — PHARMACY VISIT (OUTPATIENT)
Dept: PHARMACY | Facility: MEDICAL CENTER | Age: 87
End: 2022-03-25
Payer: COMMERCIAL

## 2022-05-17 ENCOUNTER — TELEPHONE (OUTPATIENT)
Dept: CARDIOLOGY | Facility: MEDICAL CENTER | Age: 87
End: 2022-05-17
Payer: MEDICARE

## 2022-05-17 DIAGNOSIS — I10 ESSENTIAL HYPERTENSION: ICD-10-CM

## 2022-05-17 NOTE — TELEPHONE ENCOUNTER
JOSE    Caller: Nini Taylor    Where labs will be completed: [facility]    Fax/Phone number for lab:N/A    Upcoming Appointment Date: 05-    Callback Number: [phone]    Thank You  Nini ALLRED

## 2022-05-17 NOTE — TELEPHONE ENCOUNTER
Lipid and CMP lab slip printed and mailed to pt mailing address.    Called pt to review findings.  She verbalizes understanding and states no other concerns or questions at this time.  Nini is appreciative of information given.

## 2022-05-23 ENCOUNTER — HOSPITAL ENCOUNTER (OUTPATIENT)
Dept: LAB | Facility: MEDICAL CENTER | Age: 87
End: 2022-05-23
Attending: NURSE PRACTITIONER
Payer: MEDICARE

## 2022-05-23 DIAGNOSIS — E78.5 DYSLIPIDEMIA: ICD-10-CM

## 2022-05-23 LAB
ALBUMIN SERPL BCP-MCNC: 3.8 G/DL (ref 3.2–4.9)
ALBUMIN/GLOB SERPL: 1.2 G/DL
ALP SERPL-CCNC: 92 U/L (ref 30–99)
ALT SERPL-CCNC: 9 U/L (ref 2–50)
ANION GAP SERPL CALC-SCNC: 12 MMOL/L (ref 7–16)
AST SERPL-CCNC: 21 U/L (ref 12–45)
BILIRUB SERPL-MCNC: 0.9 MG/DL (ref 0.1–1.5)
BUN SERPL-MCNC: 12 MG/DL (ref 8–22)
CALCIUM SERPL-MCNC: 9.2 MG/DL (ref 8.5–10.5)
CHLORIDE SERPL-SCNC: 103 MMOL/L (ref 96–112)
CHOLEST SERPL-MCNC: 125 MG/DL (ref 100–199)
CO2 SERPL-SCNC: 23 MMOL/L (ref 20–33)
CREAT SERPL-MCNC: 0.88 MG/DL (ref 0.5–1.4)
GFR SERPLBLD CREATININE-BSD FMLA CKD-EPI: 62 ML/MIN/1.73 M 2
GLOBULIN SER CALC-MCNC: 3.2 G/DL (ref 1.9–3.5)
GLUCOSE SERPL-MCNC: 88 MG/DL (ref 65–99)
HDLC SERPL-MCNC: 53 MG/DL
LDLC SERPL CALC-MCNC: 57 MG/DL
POTASSIUM SERPL-SCNC: 4.2 MMOL/L (ref 3.6–5.5)
PROT SERPL-MCNC: 7 G/DL (ref 6–8.2)
SODIUM SERPL-SCNC: 138 MMOL/L (ref 135–145)
TRIGL SERPL-MCNC: 76 MG/DL (ref 0–149)

## 2022-05-23 PROCEDURE — 36415 COLL VENOUS BLD VENIPUNCTURE: CPT

## 2022-05-23 PROCEDURE — 80053 COMPREHEN METABOLIC PANEL: CPT

## 2022-05-23 PROCEDURE — 80061 LIPID PANEL: CPT

## 2022-05-25 ENCOUNTER — OFFICE VISIT (OUTPATIENT)
Dept: CARDIOLOGY | Facility: MEDICAL CENTER | Age: 87
End: 2022-05-25
Payer: MEDICARE

## 2022-05-25 VITALS
HEIGHT: 62 IN | BODY MASS INDEX: 22.63 KG/M2 | SYSTOLIC BLOOD PRESSURE: 130 MMHG | RESPIRATION RATE: 16 BRPM | OXYGEN SATURATION: 96 % | WEIGHT: 123 LBS | HEART RATE: 72 BPM | DIASTOLIC BLOOD PRESSURE: 80 MMHG

## 2022-05-25 DIAGNOSIS — I83.813 VARICOSE VEINS OF BOTH LOWER EXTREMITIES WITH PAIN: ICD-10-CM

## 2022-05-25 DIAGNOSIS — I71.40 ABDOMINAL AORTIC ANEURYSM (AAA) WITHOUT RUPTURE (HCC): ICD-10-CM

## 2022-05-25 DIAGNOSIS — E78.5 DYSLIPIDEMIA: ICD-10-CM

## 2022-05-25 DIAGNOSIS — I47.10 SVT (SUPRAVENTRICULAR TACHYCARDIA) (HCC): Chronic | ICD-10-CM

## 2022-05-25 DIAGNOSIS — I10 ESSENTIAL HYPERTENSION: ICD-10-CM

## 2022-05-25 PROCEDURE — 99214 OFFICE O/P EST MOD 30 MIN: CPT | Performed by: INTERNAL MEDICINE

## 2022-05-25 ASSESSMENT — FIBROSIS 4 INDEX: FIB4 SCORE: 2.94

## 2022-05-25 NOTE — PROGRESS NOTES
Chief Complaint   Patient presents with   • Tachycardia     F/V Dx: SVT (supraventricular tachycardia) (HCC) - on Zio 2015 and 2019       Subjective     Nini Taylor is a 89 y.o. female who presents today for follow up of PVD and dyslipidemia HTN    She's done well past year still has some left leg edema    Past Medical History:   Diagnosis Date   • AAA (abdominal aortic aneurysm) (HCC)    • Atherosclerosis of native arteries of the extremities with intermittent claudication 10/16/2013   • Blepharospasm syndrome    • Blepharospasm syndrome      IMO load March 2020   • Bright red rectal bleeding 6/24/2015   • Cellulitis 5/5/2021   • Chest pain 07/2015    Unspecified; Nuclear stress test negative    • Constipation    • Edema 5/29/2012   • Female bladder prolapse, acquired    • GERD (gastroesophageal reflux disease)    • Hyperlipidemia    • Hypertension    • Insomnia    • Low vitamin D level 4/20/2018   • OSTEOPOROSIS    • Palpitations 5/29/2012   • Primary insomnia 1/10/2017   • Sleep apnea    • SVT (supraventricular tachycardia) (HCC) - on Zio 2015 and 2019 8/17/2015   • Varicose veins 5/29/2012   • Vitamin D deficiency disease    • Vitamin D deficiency disease      IMO load March 2020     Past Surgical History:   Procedure Laterality Date   • CYSTOCELE REPAIR  1/17/2011    Performed by GILMAR CHUNG at [facility]   • RECTOCELE REPAIR  1/17/2011    Performed by GILMAR CHUNG at [facility]   • BLADDER SUSPENSION  1/17/2011    Performed by GILMAR CHUNG at [facility]   • CYSTOSCOPY  1/17/2011    Performed by GILMAR CHUNG at [facility]   • ABDOMINAL HYSTERECTOMY TOTAL     • ARTHROSCOPY, KNEE     • CATARACT EXTRACTION WITH IOL     • HEMORRHOIDECTOMY     • HYSTERECTOMY, TOTAL ABDOMINAL     • INJECTION SCLERO HEMORROIDS     • OTHER ORTHOPEDIC SURGERY      knee scope x 2   • PB KNEE SCOPE,DIAGNOSTIC  2003;2009   •  TONSILLECTOMY       Family History   Problem Relation Age of Onset   • Non-contributory Mother         gall bladder surgery   • Heart Disease Mother    • Cancer Father         colon rectal   • Heart Disease Father         rheumatic heart disease   • Other Brother         HIT BY CAR   • Heart Disease Brother    • Sleep Apnea Brother    • No Known Problems Maternal Grandmother    • No Known Problems Maternal Grandfather    • No Known Problems Paternal Grandmother    • No Known Problems Paternal Grandfather    • No Known Problems Son      Social History     Socioeconomic History   • Marital status:      Spouse name: Not on file   • Number of children: Not on file   • Years of education: Not on file   • Highest education level: Not on file   Occupational History   • Not on file   Tobacco Use   • Smoking status: Never Smoker   • Smokeless tobacco: Never Used   Vaping Use   • Vaping Use: Never used   Substance and Sexual Activity   • Alcohol use: Not Currently     Alcohol/week: 0.0 oz     Comment: occasionally   • Drug use: No   • Sexual activity: Never   Other Topics Concern   • Not on file   Social History Narrative   • Not on file     Social Determinants of Health     Financial Resource Strain: Not on file   Food Insecurity: Not on file   Transportation Needs: Not on file   Physical Activity: Not on file   Stress: Not on file   Social Connections: Not on file   Intimate Partner Violence: Not on file   Housing Stability: Not on file     Allergies   Allergen Reactions   • Atorvastatin    • Bactrim Ds Hives   • Levofloxacin    • Pneumococcal Vaccines Swelling   • Sulfa Drugs Hives   • Vicodin [Hydrocodone-Acetaminophen] Rash   • Vytorin      Outpatient Encounter Medications as of 5/25/2022   Medication Sig Dispense Refill   • metoprolol tartrate (LOPRESSOR) 25 MG Tab Take 1 tablet by mouth twice daily 180 Tablet 2   • Alirocumab (PRALUENT) 75 MG/ML Solution Auto-injector Inject 75 mg under the skin every 14 days. 6  "mL 3   • nitrofurantoin (MACRODANTIN) 50 MG Cap Take 50 mg by mouth every day.     • Cholecalciferol (VITAMIN D3) 2000 UNIT Cap Take 2 capsules by mouth once daily 60 capsule 0   • estradiol (ESTRACE) 0.1 MG/GM vaginal cream APPLY A PEA SIZED AMOUNT INTO VAGINA ONE TO TWO TIMES A WEEK     • Multiple Vitamins-Minerals (CENTRUM SILVER PO) Take 1 Tablet by mouth every day.     • [DISCONTINUED] COVID-19 mRNA vaccine, Moderna, (MODERNA COVID-19 VACCINE) 100 MCG/0.5ML Suspension injection Inject 0.25 mL into the shoulder, thigh, or buttocks. (Patient not taking: Reported on 5/25/2022) 0.25 mL 0   • [DISCONTINUED] Multiple Vitamins-Minerals (ICAPS AREDS 2) Cap Take  by mouth. (Patient not taking: Reported on 5/25/2022)       No facility-administered encounter medications on file as of 5/25/2022.     ROS           Objective     /80 (BP Location: Left arm, Patient Position: Sitting, BP Cuff Size: Adult)   Pulse 72   Resp 16   Ht 1.575 m (5' 2\")   Wt 55.8 kg (123 lb)   SpO2 96%   BMI 22.50 kg/m²     Physical Exam  Constitutional:       General: She is not in acute distress.     Appearance: She is not diaphoretic.   Eyes:      General: No scleral icterus.  Neck:      Vascular: No JVD.   Cardiovascular:      Rate and Rhythm: Normal rate.      Heart sounds: Murmur heard.     No friction rub. No gallop.   Pulmonary:      Effort: No respiratory distress.      Breath sounds: No wheezing or rales.   Abdominal:      General: Bowel sounds are normal.      Palpations: Abdomen is soft.   Skin:     Findings: No rash.   Neurological:      Mental Status: She is alert.             We reviewed in person the most recent labs  Recent Results (from the past 5040 hour(s))   TSH WITH REFLEX TO FT4    Collection Time: 11/24/21  1:44 PM   Result Value Ref Range    TSH 1.840 0.380 - 5.330 uIU/mL   Lipid Profile    Collection Time: 11/24/21  1:44 PM   Result Value Ref Range    Cholesterol,Tot 149 100 - 199 mg/dL    Triglycerides 88 0 - " 149 mg/dL    HDL 62 >=40 mg/dL    LDL 69 <100 mg/dL   HEMOGLOBIN A1C    Collection Time: 11/24/21  1:44 PM   Result Value Ref Range    Glycohemoglobin 5.7 (H) 4.0 - 5.6 %    Est Avg Glucose 117 mg/dL   Comp Metabolic Panel    Collection Time: 11/24/21  1:44 PM   Result Value Ref Range    Sodium 142 135 - 145 mmol/L    Potassium 4.5 3.6 - 5.5 mmol/L    Chloride 106 96 - 112 mmol/L    Co2 25 20 - 33 mmol/L    Anion Gap 11.0 7.0 - 16.0    Glucose 89 65 - 99 mg/dL    Bun 13 8 - 22 mg/dL    Creatinine 0.87 0.50 - 1.40 mg/dL    Calcium 9.7 8.5 - 10.5 mg/dL    AST(SGOT) 16 12 - 45 U/L    ALT(SGPT) 9 2 - 50 U/L    Alkaline Phosphatase 91 30 - 99 U/L    Total Bilirubin 0.9 0.1 - 1.5 mg/dL    Albumin 4.5 3.2 - 4.9 g/dL    Total Protein 7.3 6.0 - 8.2 g/dL    Globulin 2.8 1.9 - 3.5 g/dL    A-G Ratio 1.6 g/dL   CBC WITH DIFFERENTIAL    Collection Time: 11/24/21  1:44 PM   Result Value Ref Range    WBC 4.3 (L) 4.8 - 10.8 K/uL    RBC 5.20 4.20 - 5.40 M/uL    Hemoglobin 15.1 12.0 - 16.0 g/dL    Hematocrit 46.7 37.0 - 47.0 %    MCV 89.8 81.4 - 97.8 fL    MCH 29.0 27.0 - 33.0 pg    MCHC 32.3 (L) 33.6 - 35.0 g/dL    RDW 49.4 35.9 - 50.0 fL    Platelet Count 212 164 - 446 K/uL    MPV 10.3 9.0 - 12.9 fL    Neutrophils-Polys 51.80 44.00 - 72.00 %    Lymphocytes 35.90 22.00 - 41.00 %    Monocytes 10.20 0.00 - 13.40 %    Eosinophils 1.20 0.00 - 6.90 %    Basophils 0.70 0.00 - 1.80 %    Immature Granulocytes 0.20 0.00 - 0.90 %    Nucleated RBC 0.00 /100 WBC    Neutrophils (Absolute) 2.24 2.00 - 7.15 K/uL    Lymphs (Absolute) 1.55 1.00 - 4.80 K/uL    Monos (Absolute) 0.44 0.00 - 0.85 K/uL    Eos (Absolute) 0.05 0.00 - 0.51 K/uL    Baso (Absolute) 0.03 0.00 - 0.12 K/uL    Immature Granulocytes (abs) 0.01 0.00 - 0.11 K/uL    NRBC (Absolute) 0.00 K/uL   ESTIMATED GFR    Collection Time: 11/24/21  1:44 PM   Result Value Ref Range    GFR If African American >60 >60 mL/min/1.73 m 2    GFR If Non African American >60 >60 mL/min/1.73 m 2   URINE  CULTURE(NEW)    Collection Time: 02/23/22  2:31 PM    Specimen: Urine   Result Value Ref Range    Significant Indicator POS (POS)     Source UR     Site -     Culture Result Usual urogenital leigha 10-50,000 cfu/mL (A)     Culture Result Escherichia coli  >100,000 cfu/mL   (A)        Susceptibility    Escherichia coli - SUZANNE     Ampicillin >16 Resistant mcg/mL     Ceftriaxone <=1 Sensitive mcg/mL     Cefazolin* <=2 Sensitive mcg/mL      * Breakpoints when Cefazolin is used for therapy of infectionsother than uncomplicated UTIs due to Enterobacterales are asfollows:SUZANNE and Interpretation:<=2 S4 I>=8 R     Ciprofloxacin >2 Resistant mcg/mL     Cefepime <=2 Sensitive mcg/mL     Cefuroxime <=4 Sensitive mcg/mL     Ampicillin/sulbactam 16/8 Intermediate mcg/mL     Tobramycin 8 Intermediate mcg/mL     Nitrofurantoin <=32 Sensitive mcg/mL     Gentamicin >8 Resistant mcg/mL     Levofloxacin >4 Resistant mcg/mL     Minocycline <=4 Sensitive mcg/mL     Pip/Tazobactam <=8 Sensitive mcg/mL     Trimeth/Sulfa >2/38 Resistant mcg/mL     Tigecycline <=2 Sensitive mcg/mL   Lipid Profile    Collection Time: 05/23/22 11:36 AM   Result Value Ref Range    Cholesterol,Tot 125 100 - 199 mg/dL    Triglycerides 76 0 - 149 mg/dL    HDL 53 >=40 mg/dL    LDL 57 <100 mg/dL   Comp Metabolic Panel    Collection Time: 05/23/22 11:36 AM   Result Value Ref Range    Sodium 138 135 - 145 mmol/L    Potassium 4.2 3.6 - 5.5 mmol/L    Chloride 103 96 - 112 mmol/L    Co2 23 20 - 33 mmol/L    Anion Gap 12.0 7.0 - 16.0    Glucose 88 65 - 99 mg/dL    Bun 12 8 - 22 mg/dL    Creatinine 0.88 0.50 - 1.40 mg/dL    Calcium 9.2 8.5 - 10.5 mg/dL    AST(SGOT) 21 12 - 45 U/L    ALT(SGPT) 9 2 - 50 U/L    Alkaline Phosphatase 92 30 - 99 U/L    Total Bilirubin 0.9 0.1 - 1.5 mg/dL    Albumin 3.8 3.2 - 4.9 g/dL    Total Protein 7.0 6.0 - 8.2 g/dL    Globulin 3.2 1.9 - 3.5 g/dL    A-G Ratio 1.2 g/dL   ESTIMATED GFR    Collection Time: 05/23/22 11:36 AM   Result Value Ref Range     GFR (CKD-EPI) 62 >60 mL/min/1.73 m 2           Assessment & Plan     1. Abdominal aortic aneurysm (AAA) without rupture (HCC)     2. Dyslipidemia     3. Essential hypertension     4. SVT (supraventricular tachycardia) (HCC) - on Zio 2015 and 2019     5. Varicose veins of both lower extremities with pain  US-EXTREMITY VENOUS LOWER BILAT       Medical Decision Making: Today's Assessment/Status/Plan:       It was my pleasure to meet with Ms. Taylor.    We addressed the management of hypertension at today's visit. Blood pressure is well controlled.  We specifically assessed the labs on hypertension treatment    On pcsk9 doing well    Check venous reflux    I will see Ms. Taylor back in 6 months to 1 year time and encouraged her to follow up with us over the phone or electronically using my MyChart as issues arise.    It is my pleasure to participate in the care of Ms. Taylor.  Please do not hesitate to contact me with questions or concerns.    Montez Kamara MD PhD [facility]  Cardiologist [facility]    Please note that this dictation was created using voice recognition software. There may be errors I did not discover before finalizing the note.

## 2022-06-14 ENCOUNTER — NON-PROVIDER VISIT (OUTPATIENT)
Dept: VASCULAR LAB | Facility: MEDICAL CENTER | Age: 87
End: 2022-06-14
Attending: INTERNAL MEDICINE
Payer: MEDICARE

## 2022-06-14 VITALS — SYSTOLIC BLOOD PRESSURE: 117 MMHG | HEART RATE: 65 BPM | DIASTOLIC BLOOD PRESSURE: 80 MMHG

## 2022-06-14 DIAGNOSIS — E78.5 DYSLIPIDEMIA: ICD-10-CM

## 2022-06-14 PROCEDURE — 99212 OFFICE O/P EST SF 10 MIN: CPT

## 2022-06-14 NOTE — PROGRESS NOTES
"[facility] - FollowUp Visit  Date of Service: 06/14/22    Nini Taylor is here for follow up of dyslipidemia.    Subjective    HPI  Pertinent Interval History since last visit:   Patient continues to tolerate praluent. No significant changes.    Current Prescription Lipid Lowering Medications - including dose:   Statin: None  Non-Statin: Praluent 75 mg every 2 weeks.   Current Lipid Lowering and Related Supplements:   Vit D  Any Current Side Effects Potentially Related to Lipid Lowering therapy?   No  Current Adherence to Lipid Lowering Therapies:  Complete  Any Previous History of Statin Intolerance?   Statin: Simvastatin Unknown dose/duration - myalgias                Crestor unkown dose, duration \"years\" - states she started getting sharp \"zings\" down her arms.              Atorvastatin unknown dose, duration of 90 days - severe UE myalgia              Rosuvastatin - low dose alternate days - myalgias    Non-Statin: States none, however MR shows possibly Vytorin use              Outcome: Unknown  Any Previous History of Statin Intolerance?   Yes, Details: See above.    Baseline Lipids Prior to Treatment:          2/6/2017 12:20     Cholesterol,Tot   274 (H)     Triglycerides   101     HDL   58     LDL   196 (H)           SOCIAL HISTORY  Social History     Tobacco Use   Smoking Status Never Smoker   Smokeless Tobacco Never Used      Change in weight: Stable  Exercise habits: Walking as tolerated  Diet: Focus on portion control      Objective    Vitals:    06/14/22 1022   BP: 117/80   Pulse: 65      Physical Exam    DATA REVIEW  Most Recent Lipid Panel:   Lab Results   Component Value Date    CHOLSTRLTOT 125 05/23/2022    TRIGLYCERIDE 76 05/23/2022    HDL 53 05/23/2022    LDL 57 05/23/2022       Other Pertinent Blood Work:   Lab Results   Component Value Date    SODIUM 138 05/23/2022    POTASSIUM 4.2 05/23/2022    CHLORIDE 103 05/23/2022    CO2 23 05/23/2022    ANION 12.0 05/23/2022    GLUCOSE 88 " 05/23/2022    BUN 12 05/23/2022    CREATININE 0.88 05/23/2022    CALCIUM 9.2 05/23/2022    ASTSGOT 21 05/23/2022    ALTSGPT 9 05/23/2022    ALKPHOSPHAT 92 05/23/2022    TBILIRUBIN 0.9 05/23/2022    ALBUMIN 3.8 05/23/2022    AGRATIO 1.2 05/23/2022    TSHULTRASEN 1.840 11/24/2021       Other:  NA    Recent Imaging Studies:    None since last visit          ASSESSMENT AND PLAN  Patient Type, check all that apply:    Secondary Prevention (Abdominal aortic aneurism)  Established Atherosclerotic Cardiovascular Disease (ASCVD)  AAA - most recent imaging with small AAA, continue medical management and deferfurther surveillance to Dr. 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  Continue with walking 25 min/day and increase as able or tolerate   Statin Recommendations from Today's Visit  none  Non-Statin Medications Recommendations from Today’s Visit:   Continue Praluent.  Confirmed she is not taking Zetia.   Indication for PCSK9 Inhibitor, if applicable:  FH with suboptimal control of LDL-C despite maximally tolerated statin  Supplements Recommended at this visit:  None  Recommendations for Other Cardiovascular Risk Factors, gurpreet all that apply:   None  Other  Issues:  None    Overall    This very kind and appreciative lady is doing very well.  She had multiple questions regarding covid and vaccination which were a focus today.  Pt is well controlled with PCSK9.  Pt likes to continue to follow our clinic even though she has been very stable.      BP much better controlled at this visit compared to last visit.     Pt to call our clinic with any questions or concerns.   Studies Ordered at Todays Visit:  None   Blood Work Ordered At Today’s visit:   CMP  Lipid panel  Follow-Up:   6 months    Chandler Brown, KeikoD    CC:  Allison A Morgan, M.D. Michael Bloch, MD

## 2022-06-14 NOTE — PATIENT INSTRUCTIONS
Latest Reference Range & Units 05/23/22 11:36   Cholesterol,Tot 100 - 199 mg/dL 125   Triglycerides 0 - 149 mg/dL 76   HDL >=40 mg/dL 53   LDL <100 mg/dL 57

## 2022-06-16 ENCOUNTER — PHARMACY VISIT (OUTPATIENT)
Dept: PHARMACY | Facility: MEDICAL CENTER | Age: 87
End: 2022-06-16
Payer: MEDICARE

## 2022-06-16 PROCEDURE — RXMED WILLOW AMBULATORY MEDICATION CHARGE: Performed by: INTERNAL MEDICINE

## 2022-06-16 RX ORDER — CX-024414 0.2 MG/ML
INJECTION, SUSPENSION INTRAMUSCULAR
Qty: 0.25 ML | Refills: 0 | Status: SHIPPED | OUTPATIENT
Start: 2022-06-16 | End: 2022-08-26

## 2022-06-22 ENCOUNTER — TELEPHONE (OUTPATIENT)
Dept: CARDIOLOGY | Facility: MEDICAL CENTER | Age: 87
End: 2022-06-22
Payer: MEDICARE

## 2022-06-22 NOTE — TELEPHONE ENCOUNTER
Returned pt call who states concerns is in regards to her .  See pt husbands chart for further documentation.

## 2022-06-23 PROCEDURE — RXMED WILLOW AMBULATORY MEDICATION CHARGE: Performed by: NURSE PRACTITIONER

## 2022-06-24 ENCOUNTER — PHARMACY VISIT (OUTPATIENT)
Dept: PHARMACY | Facility: MEDICAL CENTER | Age: 87
End: 2022-06-24
Payer: COMMERCIAL

## 2022-07-14 ENCOUNTER — OFFICE VISIT (OUTPATIENT)
Dept: URGENT CARE | Facility: PHYSICIAN GROUP | Age: 87
End: 2022-07-14
Payer: MEDICARE

## 2022-07-14 ENCOUNTER — HOSPITAL ENCOUNTER (OUTPATIENT)
Dept: RADIOLOGY | Facility: MEDICAL CENTER | Age: 87
End: 2022-07-14
Attending: PHYSICIAN ASSISTANT
Payer: MEDICARE

## 2022-07-14 VITALS
HEIGHT: 62 IN | WEIGHT: 130 LBS | SYSTOLIC BLOOD PRESSURE: 118 MMHG | DIASTOLIC BLOOD PRESSURE: 72 MMHG | HEART RATE: 85 BPM | RESPIRATION RATE: 18 BRPM | OXYGEN SATURATION: 97 % | TEMPERATURE: 98.2 F | BODY MASS INDEX: 23.92 KG/M2

## 2022-07-14 DIAGNOSIS — S09.90XA CLOSED HEAD INJURY WITHOUT LOSS OF CONSCIOUSNESS, INITIAL ENCOUNTER: ICD-10-CM

## 2022-07-14 DIAGNOSIS — S05.12XA ORBITAL CONTUSION, LEFT, INITIAL ENCOUNTER: ICD-10-CM

## 2022-07-14 DIAGNOSIS — S09.93XA FACIAL INJURY, INITIAL ENCOUNTER: ICD-10-CM

## 2022-07-14 DIAGNOSIS — M25.552 LEFT HIP PAIN: ICD-10-CM
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
## 2022-07-14 PROCEDURE — 73501 X-RAY EXAM HIP UNI 1 VIEW: CPT | Mod: LT

## 2022-07-14 PROCEDURE — 70480 CT ORBIT/EAR/FOSSA W/O DYE: CPT | Mod: ME

## 2022-07-14 PROCEDURE — 70450 CT HEAD/BRAIN W/O DYE: CPT | Mod: ME

## 2022-07-14 PROCEDURE — 99214 OFFICE O/P EST MOD 30 MIN: CPT | Performed by: PHYSICIAN ASSISTANT

## 2022-07-14 ASSESSMENT — ENCOUNTER SYMPTOMS
PALPITATIONS: 0
NUMBNESS: 0
TINGLING: 0
FOCAL WEAKNESS: 0
VISUAL CHANGE: 0
SHORTNESS OF BREATH: 0
LOSS OF CONSCIOUSNESS: 0
DIZZINESS: 0
HEADACHES: 1
BOWEL INCONTINENCE: 0
ABDOMINAL PAIN: 0

## 2022-07-14 ASSESSMENT — VISUAL ACUITY: OU: 1

## 2022-07-14 ASSESSMENT — FIBROSIS 4 INDEX: FIB4 SCORE: 2.97

## 2022-07-14 NOTE — PROGRESS NOTES
Subjective:   Nini Taylor is a 89 y.o. female who presents today with   Chief Complaint   Patient presents with   • Fall      Fainted Saturday, hit head and hip. Wants xray.        Fall  The accident occurred 5 to 7 days ago. The fall occurred while standing. She fell from a height of 1 to 2 ft. The volume of blood lost was minimal. The point of impact was the head and left hip. The pain is present in the head and left hip. The pain is mild. Associated symptoms include headaches and hearing loss (at baseline). Pertinent negatives include no abdominal pain, bowel incontinence, hematuria, loss of consciousness, numbness, tingling or visual change. She has tried nothing for the symptoms. The treatment provided no relief.     Patient states she got up to go to the bathroom early morning and when she was getting up from the toilet she states that she must of had a syncopal episode as she fell hitting her head and her  came to her and he states that she was still talking to him and did not lose consciousness.  She did have some bleeding from the left temple area.  Patient states she has had syncopal episodes like this in the past.  Denies any symptoms at this time.  Patient states she is not on any blood thinners.    PMH:  has a past medical history of AAA (abdominal aortic aneurysm) ([facility]), Atherosclerosis of native arteries of the extremities with intermittent claudication (10/16/2013), Blepharospasm syndrome, Blepharospasm syndrome, Bright red rectal bleeding (6/24/2015), Cellulitis (5/5/2021), Chest pain (07/2015), Constipation, Edema (5/29/2012), Female bladder prolapse, acquired, GERD (gastroesophageal reflux disease), Hyperlipidemia, Hypertension, Insomnia, Low vitamin D level (4/20/2018), OSTEOPOROSIS, Palpitations (5/29/2012), Primary insomnia (1/10/2017), Sleep apnea, SVT (supraventricular tachycardia) ([facility]) - on Zio 2015 and 2019 (8/17/2015), Varicose veins (5/29/2012), Vitamin D deficiency  disease, and Vitamin D deficiency disease.  MEDS:   Current Outpatient Medications:   •  COVID-19 mRNA vaccine, Moderna, (MODERNA COVID-19 VACCINE) 100 MCG/0.5ML Suspension injection, Inject  into the shoulder, thigh, or buttocks., Disp: 0.25 mL, Rfl: 0  •  metoprolol tartrate (LOPRESSOR) 25 MG Tab, Take 1 tablet by mouth twice daily, Disp: 180 Tablet, Rfl: 2  •  Alirocumab (PRALUENT) 75 MG/ML Solution Auto-injector, Inject 75 mg under the skin every 14 days., Disp: 6 mL, Rfl: 3  •  nitrofurantoin (MACRODANTIN) 50 MG Cap, Take 50 mg by mouth every day., Disp: , Rfl:   •  Cholecalciferol (VITAMIN D3) 2000 UNIT Cap, Take 2 capsules by mouth once daily, Disp: 60 capsule, Rfl: 0  •  estradiol (ESTRACE) 0.1 MG/GM vaginal cream, APPLY A PEA SIZED AMOUNT INTO VAGINA ONE TO TWO TIMES A WEEK, Disp: , Rfl:   •  Multiple Vitamins-Minerals (CENTRUM SILVER PO), Take 1 Tablet by mouth every day., Disp: , Rfl:   ALLERGIES:   Allergies   Allergen Reactions   • Atorvastatin    • Bactrim Ds Hives   • Levofloxacin    • Pneumococcal Vaccines Swelling   • Sulfa Drugs Hives   • Vicodin [Hydrocodone-Acetaminophen] Rash   • Vytorin      SURGHX:   Past Surgical History:   Procedure Laterality Date   • CYSTOCELE REPAIR  1/17/2011    Performed by GILMAR CHUNG at SURGERY SAME DAY [facility] ORS   • RECTOCELE REPAIR  1/17/2011    Performed by GILMAR CHUNG at SURGERY SAME DAY [facility] ORS   • BLADDER SUSPENSION  1/17/2011    Performed by GILMAR CHUNG at SURGERY SAME DAY [facility] ORS   • CYSTOSCOPY  1/17/2011    Performed by GILMAR CHUNG at SURGERY SAME DAY [facility] ORS   • ABDOMINAL HYSTERECTOMY TOTAL     • ARTHROSCOPY, KNEE     • CATARACT EXTRACTION WITH IOL     • HEMORRHOIDECTOMY     • HYSTERECTOMY, TOTAL ABDOMINAL     • INJECTION SCLERO HEMORROIDS     • OTHER ORTHOPEDIC SURGERY      knee scope x 2   • PB KNEE SCOPE,DIAGNOSTIC  2003;2009   • TONSILLECTOMY       SOCHX:  reports that she has never smoked. She has never used  "smokeless tobacco. She reports previous alcohol use. She reports that she does not use drugs.  FH: Reviewed with patient, not pertinent to this visit.       Review of Systems   Eyes:        Decreased vision at baseline secondary to macular degeneration.   Respiratory: Negative for shortness of breath.    Cardiovascular: Negative for chest pain and palpitations.   Gastrointestinal: Negative for abdominal pain and bowel incontinence.   Genitourinary: Negative for hematuria.   Neurological: Positive for headaches. Negative for dizziness, tingling, focal weakness, loss of consciousness and numbness.        Objective:   /72   Pulse 85   Temp 36.8 °C (98.2 °F)   Resp 18   Ht 1.575 m (5' 2\")   Wt 59 kg (130 lb)   SpO2 97%   BMI 23.78 kg/m²   Physical Exam  Vitals and nursing note reviewed.   Constitutional:       General: She is not in acute distress.     Appearance: Normal appearance. She is well-developed. She is not ill-appearing or toxic-appearing.   HENT:      Head: Normocephalic and atraumatic.      Right Ear: Decreased hearing noted.      Left Ear: Decreased hearing noted.      Ears:      Comments: Hearing aids in place bilaterally  Eyes:      General: Vision grossly intact.      Extraocular Movements: Extraocular movements intact.      Conjunctiva/sclera: Conjunctivae normal.      Pupils: Pupils are equal, round, and reactive to light.        Comments: Patient decreased secondary to macular degeneration at baseline and patient states this is not changed from her baseline after the fall.  No significant pressure or hemorrhage appreciated to the left eye.  Tenderness to palpation to the orbital bone area.   Cardiovascular:      Rate and Rhythm: Normal rate and regular rhythm.      Heart sounds: Normal heart sounds.   Pulmonary:      Effort: Pulmonary effort is normal.      Breath sounds: Normal breath sounds.   Musculoskeletal:      Comments: Normal ambulation and full range of motion of the left hip but " she is tender to palpation to the left hip and does have some bruising.    Skin:     General: Skin is warm and dry.             Comments: Small scabbed healed area to the left temple.   Neurological:      General: No focal deficit present.      Mental Status: She is alert and oriented to person, place, and time.      GCS: GCS eye subscore is 4. GCS verbal subscore is 5. GCS motor subscore is 6.      Coordination: Coordination normal.   Psychiatric:         Mood and Affect: Mood normal.     CT HEAD  FINDINGS:     There is no evidence of extra-axial hemorrhage. No intra-axial hemorrhage is noted. There is no brain swelling or edema. No mass effect or midline shift is noted.  Ventricles and sulci appear prominent.  There is decreased attenuation in the periventricular white matter.     IMPRESSION:        NO ACUTE ABNORMALITIES ARE NOTED ON CT SCAN OF THE HEAD.     Findings are consistent with atrophy.  Decreased attenuation in the periventricular white matter likely indicates microvascular ischemic disease.    CT ORBITS  FINDINGS:  There is no evidence of orbital fracture. There is no evidence of intra or extraconal hematoma. No fluid accumulation within the paranasal sinuses. There is minimal foraminal soft tissue swelling. No evidence of optic globe rupture.     IMPRESSION:     No evidence of orbital fracture.    DX HIP     FINDINGS:  Bones are mildly osteopenic.     There is no evidence of an acute displaced fracture of the left proximal femur.     The bony pelvis is intact. No displaced fractures are seen.     SI joints are symmetric. Pubic symphysis is maintained.     The right proximal femur is normal.     There are pelvic phleboliths.     IMPRESSION:     1.  No radiographic evidence of acute traumatic injury.    Assessment/Plan:   Assessment    1. Fall, initial encounter    2. Facial injury, initial encounter  - CT-HEAD W/O; Future    3. Left hip pain  - DX-HIP-UNILATERAL-WITH PELVIS-1 VIEW LEFT; Future    4.  Closed head injury without loss of consciousness, initial encounter  - CT-HEAD W/O; Future    5. Orbital contusion, left, initial encounter  - [id number] W/O; Future  Headaches have improved patient states but recommend obtaining CT of the head and orbits to rule out any fractures around the orbital bone.  Called and discussed negative results of imaging with patient and she is fully understanding and appreciative of my call.  No concerning findings today on imaging.    Differential diagnosis, natural history, supportive care, and indications for immediate follow-up discussed.   Patient given instructions and understanding of medications and treatment.    If not improving in 3-5 days, F/U with PCP or return to  if symptoms worsen.    Patient agreeable to plan.      Please note that this dictation was created using voice recognition software. I have made every reasonable attempt to correct obvious errors, but I expect that there are errors of grammar and possibly content that I did not discover before finalizing the note.    Anil Santiago PA-C

## 2022-08-03 ENCOUNTER — HOSPITAL ENCOUNTER (OUTPATIENT)
Dept: RADIOLOGY | Facility: MEDICAL CENTER | Age: 87
End: 2022-08-03
Attending: INTERNAL MEDICINE
Payer: MEDICARE

## 2022-08-03 DIAGNOSIS — I83.813 VARICOSE VEINS OF BOTH LOWER EXTREMITIES WITH PAIN: ICD-10-CM

## 2022-08-03 PROCEDURE — 93970 EXTREMITY STUDY: CPT

## 2022-08-04 PROCEDURE — 93970 EXTREMITY STUDY: CPT | Mod: 26 | Performed by: INTERNAL MEDICINE

## 2022-08-04 NOTE — TELEPHONE ENCOUNTER
JOSE    Pt calling and is asking if her and her  Kingsley can both go in for there appts together. Both appt are with Dr GARCIA on Friday 2/26, Broderick starts at 3:30 and Drew is after at 4:15. Please call back to verify at [phone].     Thank you    Where Is Your Acne Located?: mostly face but some on the chest and back

## 2022-08-05 ENCOUNTER — TELEPHONE (OUTPATIENT)
Dept: SCHEDULING | Facility: IMAGING CENTER | Age: 87
End: 2022-08-05
Payer: MEDICARE

## 2022-08-08 ENCOUNTER — TELEPHONE (OUTPATIENT)
Dept: CARDIOLOGY | Facility: MEDICAL CENTER | Age: 87
End: 2022-08-08
Payer: MEDICARE

## 2022-08-08 DIAGNOSIS — I47.10 SVT (SUPRAVENTRICULAR TACHYCARDIA) (HCC): ICD-10-CM

## 2022-08-08 NOTE — TELEPHONE ENCOUNTER
"To CW, pt had syncopal episode, getting up from the toilet early morning on 7/9 and was evaluated by Urgent Care a few days after.  She states this is the 3rd time in 2 years.  She states no other cardiac symptoms since episode. please advise, thank you     ======================    Called pt to review u/s results and MD recommendations.  She states she's been \"wearing (compression stockings) for over a year.  It does not hinder in any way. I was concerned about hidden clots from moderna shots.\"    Nini states on 7/9 she had a syncopal episode.  She states, \"I got up around 1am to use restroom.  I was sitting on the toilet and flushed and I remember putting my hand out to flush and the next thing I know my  was dabbing my head.  I was bleeding.  I passed out from getting up from toilet.  Went to emergency and took xrays.  I didn't bother Dr. Kamara.\"  Nini states she asked the provider if she should follow up with our office and advised if she did not have any symptoms she did not need to. Advised pt to move slowly when changing positions from sitting to standing.  SSusan states she has not had any dizziness, lightheadedness, or feeling of having syncope since last episode but states this is the 3rd time in 2 years she experienced syncopal episode. .  Encouraged pt to contact our office if she has any concerns or questions and not to wait to call.  She states she calls [phone] number and has a hard time calling.  [phone] given to pt giving reassurance option 3 is to connect to the RN and she may call during after hours as on call provider will be able to advise.      Reassurance given that findings will be relayed to MD for advise.  She verbalizes understanding and states no other concerns or questions at this time.  Nini is appreciative of information given.      "

## 2022-08-08 NOTE — TELEPHONE ENCOUNTER
----- Message from Montez Kamara M.D. sent at 8/5/2022 12:37 PM PDT -----  Her leg swelling is made worse by leaky veins in the legs, compression socks are the best treatment, if she were interested in procedural approach she could see Dr Gotti.

## 2022-08-08 NOTE — TELEPHONE ENCOUNTER
Called pt, [phone], to review MD recommendations.  Pt verbalizes understanding and states no other concerns or questions at this time.  [phone] given to pt to follow up to schedule if she has not heard from office schedulers.  Nini is appreciative of information given.    Task deferred to schedulers to schedule biotel placement via staff message.

## 2022-08-09 ENCOUNTER — TELEPHONE (OUTPATIENT)
Dept: CARDIOLOGY | Facility: MEDICAL CENTER | Age: 87
End: 2022-08-09
Payer: MEDICARE

## 2022-08-09 NOTE — TELEPHONE ENCOUNTER
----- Message from Ailyn Máruqez R.N. sent at 8/8/2022 11:54 AM PDT -----  Regarding: [employer]  Please schedule pt cardiac event monitor.  Pt states ok to leave a voicemail and leave a phone number to return call.  Thank you!

## 2022-08-16 ENCOUNTER — NON-PROVIDER VISIT (OUTPATIENT)
Dept: CARDIOLOGY | Facility: MEDICAL CENTER | Age: 87
End: 2022-08-16
Attending: INTERNAL MEDICINE
Payer: MEDICARE

## 2022-08-16 DIAGNOSIS — I47.10 SVT (SUPRAVENTRICULAR TACHYCARDIA) (HCC): ICD-10-CM

## 2022-08-16 DIAGNOSIS — I49.3 PVCS (PREMATURE VENTRICULAR CONTRACTIONS): ICD-10-CM

## 2022-08-16 NOTE — PROGRESS NOTES
Patient enrolled in the 30 day [employer] [facility] monitoring program per Montez Kamara MD.  >Office hook-up with Baseline transmitted, serial # [id number].  >This type of monitor was not a good fit for this patient. She was only able to tolerate wearing it for about 5 days. She and her  put in a very good effort She came here and left it with me. I've sent it back to [facility] along with a request for an end of service report.  Dr. Kamara will be advised of what happened and if he needs more data, we will hook up something simpler.

## 2022-08-16 NOTE — LETTER
August 26, 2022        Nini Taylor  [email or web address]  [place] [address]          Dear Nini,    We have received the results of your recent:    Cardiac event monitor.     Your within normal limits. Per  results look good. Please follow up as previously discussed with your physician.      Feel free to call us with any questions.        Sincerely,          Heather VARGAS, Medical Assistant for Dr. Kamara.    Electronically Signed

## 2022-08-24 ENCOUNTER — TELEPHONE (OUTPATIENT)
Dept: CARDIOLOGY | Facility: MEDICAL CENTER | Age: 87
End: 2022-08-24
Payer: MEDICARE

## 2022-08-25 NOTE — PROGRESS NOTES
Subjective:     CC: No chief complaint on file.      HPI:   Nini presents today for follow-up visit and to discuss the following issues:      Past Medical History:   Diagnosis Date    AAA (abdominal aortic aneurysm) ([facility])     Atherosclerosis of native arteries of the extremities with intermittent claudication 10/16/2013    Blepharospasm syndrome     Blepharospasm syndrome      IMO load March 2020    Bright red rectal bleeding 6/24/2015    Cellulitis 5/5/2021    Chest pain 07/2015    Unspecified; Nuclear stress test negative     Constipation     Edema 5/29/2012    Female bladder prolapse, acquired     GERD (gastroesophageal reflux disease)     Hyperlipidemia     Hypertension     Insomnia     Low vitamin D level 4/20/2018    OSTEOPOROSIS     Palpitations 5/29/2012    Primary insomnia 1/10/2017    Sleep apnea     SVT (supraventricular tachycardia) ([facility]) - on Zio 2015 and 2019 8/17/2015    Varicose veins 5/29/2012    Vitamin D deficiency disease     Vitamin D deficiency disease      IMO load March 2020       Social History     Tobacco Use    Smoking status: Never    Smokeless tobacco: Never   Vaping Use    Vaping Use: Never used   Substance Use Topics    Alcohol use: Not Currently     Alcohol/week: 0.0 oz     Comment: occasionally    Drug use: No       Current Outpatient Medications Ordered in Epic   Medication Sig Dispense Refill    COVID-19 mRNA vaccine, Moderna, (MODERNA COVID-19 VACCINE) 100 MCG/0.5ML Suspension injection Inject  into the shoulder, thigh, or buttocks. 0.25 mL 0    metoprolol tartrate (LOPRESSOR) 25 MG Tab Take 1 tablet by mouth twice daily 180 Tablet 2    Alirocumab (PRALUENT) 75 MG/ML Solution Auto-injector Inject 75 mg under the skin every 14 days. 6 mL 3    nitrofurantoin (MACRODANTIN) 50 MG Cap Take 50 mg by mouth every day.      Cholecalciferol (VITAMIN D3) 2000 UNIT Cap Take 2 capsules by mouth once daily 60 capsule 0    estradiol (ESTRACE) 0.1 MG/GM vaginal cream APPLY A PEA SIZED AMOUNT  INTO VAGINA ONE TO TWO TIMES A WEEK      Multiple Vitamins-Minerals (CENTRUM SILVER PO) Take 1 Tablet by mouth every day.       No current Epic-ordered facility-administered medications on file.       Allergies:  Atorvastatin, Atorvastatin calcium, Bactrim ds, Hydrocodone-acetaminophen, Levaquin, Levofloxacin, Pneumococcal vaccines, Sulfa drugs, Vicodin [hydrocodone-acetaminophen], and Vytorin    Health Maintenance: Completed    Review of Systems:   Denies any recent fevers or chills. No nausea or vomiting. No chest pains or shortness of breath.      Objective:       Exam:  There were no vitals taken for this visit. There is no height or weight on file to calculate BMI.    Gen: Alert and oriented, No apparent distress.  Lungs: Normal effort, CTA bilaterally, no wheezes, rhonchi, or rales  CV: Regular rate and rhythm. No murmurs, rubs, or gallops.  Ext: No clubbing, cyanosis, edema.        Assessment & Plan:     89 y.o. female with the following -       I spent a total of *** minutes with record review, exam, communication with the patient, communication with other providers, and documentation of this encounter.      No follow-ups on file.    Please note that this dictation was created using voice recognition software. I have made every reasonable attempt to correct obvious errors, but I expect that there are errors of grammar and possibly content that I did not discover before finalizing the note.

## 2022-08-26 ENCOUNTER — OFFICE VISIT (OUTPATIENT)
Dept: MEDICAL GROUP | Facility: PHYSICIAN GROUP | Age: 87
End: 2022-08-26
Payer: MEDICARE

## 2022-08-26 VITALS
HEIGHT: 62 IN | WEIGHT: 121.5 LBS | TEMPERATURE: 97.8 F | RESPIRATION RATE: 18 BRPM | OXYGEN SATURATION: 97 % | DIASTOLIC BLOOD PRESSURE: 70 MMHG | HEART RATE: 73 BPM | BODY MASS INDEX: 22.36 KG/M2 | SYSTOLIC BLOOD PRESSURE: 130 MMHG

## 2022-08-26 DIAGNOSIS — I47.10 SVT (SUPRAVENTRICULAR TACHYCARDIA) (HCC): Chronic | ICD-10-CM

## 2022-08-26 DIAGNOSIS — I10 PRIMARY HYPERTENSION: ICD-10-CM

## 2022-08-26 DIAGNOSIS — E78.2 MIXED HYPERLIPIDEMIA: ICD-10-CM

## 2022-08-26 DIAGNOSIS — R73.01 ELEVATED FASTING GLUCOSE: ICD-10-CM

## 2022-08-26 DIAGNOSIS — Z91.81 RISK FOR FALLS: ICD-10-CM

## 2022-08-26 DIAGNOSIS — Z00.00 ENCOUNTER FOR MEDICARE ANNUAL WELLNESS EXAM: ICD-10-CM

## 2022-08-26 DIAGNOSIS — I71.40 ABDOMINAL AORTIC ANEURYSM (AAA) WITHOUT RUPTURE (HCC): ICD-10-CM

## 2022-08-26 DIAGNOSIS — N81.10 BLADDER PROLAPSE, FEMALE, ACQUIRED: ICD-10-CM

## 2022-08-26 DIAGNOSIS — I70.219 ATHEROSCLEROSIS OF NATIVE ARTERY OF EXTREMITY WITH INTERMITTENT CLAUDICATION, UNSPECIFIED EXTREMITY (HCC): ICD-10-CM

## 2022-08-26 PROCEDURE — G0439 PPPS, SUBSEQ VISIT: HCPCS | Performed by: INTERNAL MEDICINE

## 2022-08-26 PROCEDURE — 93268 ECG RECORD/REVIEW: CPT | Performed by: INTERNAL MEDICINE

## 2022-08-26 ASSESSMENT — ACTIVITIES OF DAILY LIVING (ADL): BATHING_REQUIRES_ASSISTANCE: 0

## 2022-08-26 ASSESSMENT — FIBROSIS 4 INDEX: FIB4 SCORE: 2.97

## 2022-08-26 ASSESSMENT — PATIENT HEALTH QUESTIONNAIRE - PHQ9: CLINICAL INTERPRETATION OF PHQ2 SCORE: 0

## 2022-08-26 ASSESSMENT — ENCOUNTER SYMPTOMS: GENERAL WELL-BEING: GOOD

## 2022-08-26 NOTE — PROGRESS NOTES
Normal results letter created and mailed to patient's home address. Patient is not active on [employer].

## 2022-08-26 NOTE — PROGRESS NOTES
Chief Complaint   Patient presents with    Annual Exam    Follow-Up       HPI:  Nini Taylor is a 89 y.o. here for Medicare Annual Wellness Visit     Patient Active Problem List    Diagnosis Date Noted    Elevated fasting glucose 09/16/2021    Fatty liver 09/16/2021    Recurrent UTI 04/09/2021    Chronic bilateral low back pain without sciatica 04/09/2021    Other headache syndrome 03/11/2021    Pancreatic cyst 12/22/2016    SVT (supraventricular tachycardia) (HCC) - on Zio 2015 and 2019 08/17/2015    Abdominal aortic aneurysm (HCC) - MRA 2.9 cm 9/2018 saccular aneurysm 07/17/2015    RUY treated with BiPAP 04/17/2014    Atherosclerosis of native arteries of extremity with intermittent claudication (HCC) 10/16/2013    Varicose veins of both lower extremities 05/29/2012    Female bladder prolapse, acquired     Essential hypertension     Dyslipidemia     Age-related osteoporosis without current pathological fracture        Current Outpatient Medications   Medication Sig Dispense Refill    metoprolol tartrate (LOPRESSOR) 25 MG Tab Take 1 tablet by mouth twice daily 180 Tablet 2    Alirocumab (PRALUENT) 75 MG/ML Solution Auto-injector Inject 75 mg under the skin every 14 days. 6 mL 3    nitrofurantoin (MACRODANTIN) 50 MG Cap Take 50 mg by mouth every day.      Cholecalciferol (VITAMIN D3) 2000 UNIT Cap Take 2 capsules by mouth once daily 60 capsule 0    estradiol (ESTRACE) 0.1 MG/GM vaginal cream APPLY A PEA SIZED AMOUNT INTO VAGINA ONE TO TWO TIMES A WEEK      Multiple Vitamins-Minerals (CENTRUM SILVER PO) Take 1 Tablet by mouth every day.       No current facility-administered medications for this visit.          Current supplements as per medication list.     Allergies: Atorvastatin, Atorvastatin calcium, Bactrim ds, Hydrocodone-acetaminophen, Levaquin, Levofloxacin, Pneumococcal vaccines, Sulfa drugs, Vicodin [hydrocodone-acetaminophen], and Vytorin    Current social contact/activities:     She  reports that she  has never smoked. She has never used smokeless tobacco. She reports that she does not currently use alcohol. She reports that she does not use drugs.  Counseling given: Not Answered      DPA/Advanced Directive:  Patient has Living Will, but it is not on file. Instructed to bring in a copy to scan into their chart.    ROS:    Gait: Uses no assistive device  Ostomy: No  Other tubes: No  Amputations: No  Chronic oxygen use: No  Last eye exam: 07-15-22  Wears hearing aids: Yes   : Reports urinary leakage during the last 6 months that has somewhat interfered with their daily activities or sleep.    Screening:    Depression Screening  Little interest or pleasure in doing things?  0 - not at all  Feeling down, depressed , or hopeless? 0 - not at all  Patient Health Questionnaire Score: 0     If depressive symptoms identified deferred to follow up visit unless specifically addressed in assessment and plan.    Interpretation of PHQ-9 Total Score   Score Severity   1-4 No Depression   5-9 Mild Depression   10-14 Moderate Depression   15-19 Moderately Severe Depression   20-27 Severe Depression    Screening for Cognitive Impairment  Three Minute Recall (daughter, heaven, mountain) 3/3    Judson clock face with all 12 numbers and set the hands to show 10 past 11.  No    Cognitive concerns identified deferred for follow up unless specifically addressed in assessment and plan.    Fall Risk Assessment  Has the patient had two or more falls in the last year or any fall with injury in the last year?  Yes    Safety Assessment  Throw rugs on floor.  Yes  Handrails on all stairs.  No  Good lighting in all hallways.  Yes  Difficulty hearing.  Yes  Patient counseled about all safety risks that were identified.    Functional Assessment ADLs  Are there any barriers preventing you from cooking for yourself or meeting nutritional needs?  No.    Are there any barriers preventing you from driving safely or obtaining transportation?  No.    Are  there any barriers preventing you from using a telephone or calling for help?  No.    Are there any barriers preventing you from shopping?  No.    Are there any barriers preventing you from taking care of your own finances?  No.    Are there any barriers preventing you from managing your medications?  No.    Are there any barriers preventing you from showering, bathing or dressing yourself?  No.    Are you currently engaging in any exercise or physical activity?  No.     What is your perception of your health?  Good.      Health Maintenance Summary            Overdue - IMM HEP B VACCINE (1 of 3 - 3-dose series) Overdue - never done      No completion history exists for this topic.              Overdue - IMM DTaP/Tdap/Td Vaccine (1 - Tdap) Overdue - never done      No completion history exists for this topic.              Overdue - IMM ZOSTER VACCINES (1 of 2) Overdue - never done      No completion history exists for this topic.              Overdue - IMM INFLUENZA (1) Overdue since 9/1/2022      10/06/2021  Imm Admin: Influenza Vaccine Adult HD     09/23/2020  Imm Admin: Influenza Vaccine Adult HD     11/25/2019  Imm Admin: Influenza Vaccine Adult HD     10/12/2018  Imm Admin: Influenza Vaccine Adult HD     09/29/2017  Imm Admin: Influenza Vaccine Adult HD     Only the first 5 history entries have been loaded, but more history exists.            COLORECTAL CANCER SCREENING (COLONOSCOPY - Every 10 Years) Next due on 2/19/2023 02/19/2013  COLONOSCOPY (Done)     10/28/2010  POCT Stool             Annual Wellness Visit (Every 366 Days) Next due on 9/7/2023 09/06/2022  Subsequent Annual Wellness Visit - Includes PPPS ()     08/26/2022  Visit Dx: Encounter for Medicare annual wellness exam     05/09/2014  Done             BONE DENSITY (Every 5 Years) Next due on 11/7/2023 11/07/2018  DS-BONE DENSITY STUDY (DEXA)             IMM PNEUMOCOCCAL VACCINE: 65+ Years (Series Information) Completed       07/26/2018  Imm Admin: Pneumococcal polysaccharide vaccine (PPSV-23)     10/19/2016  Imm Admin: Pneumococcal Conjugate Vaccine (Prevnar/PCV-13)     10/01/2009  Imm Admin: Pneumococcal Vaccine (UF) - HISTORICAL DATA             COVID-19 Vaccine (Series Information) Completed      06/16/2022  Imm Admin: Moderna SARS-CoV-2 Vaccine     12/14/2021  Imm Admin: Moderna SARS-CoV-2 Vaccine     04/10/2021  Imm Admin: Moderna SARS-CoV-2 Vaccine     03/05/2021  Imm Admin: Moderna SARS-CoV-2 Vaccine             IMM MENINGOCOCCAL ACWY VACCINE (Series Information) Aged Out      No completion history exists for this topic.              Discontinued - MAMMOGRAM  Discontinued      11/07/2018  [id number]     10/18/2016  [id number]             Discontinued - PAP SMEAR  Discontinued      10/28/2010  PAP IG, RFX HPV ASCU                   Patient Care Team:  Arianna Cabrera M.D. as PCP - General (Internal Medicine)  GIANNI Weaver as Consulting Physician (Urology)  Montez Kamara M.D. as Consulting Physician (Cardiovascular Disease (Cardiology))  Geovani Bradley M.D. as Consulting Physician (Allergy and Immunology)  Yonatan Shahid M.D. as Consulting Physician (Ophthalmology)  Angel Malagon M.D. (Sleep Medicine)  [facility] (DME Supplier)        Social History     Tobacco Use    Smoking status: Never    Smokeless tobacco: Never   Vaping Use    Vaping Use: Never used   Substance Use Topics    Alcohol use: Not Currently     Alcohol/week: 0.0 oz     Comment: occasionally    Drug use: No     Family History   Problem Relation Age of Onset    Non-contributory Mother         gall bladder surgery    Heart Disease Mother     Cancer Father         colon rectal    Heart Disease Father         rheumatic heart disease    Other Brother         HIT BY CAR    Heart Disease Brother     Sleep Apnea Brother     No Known Problems Maternal Grandmother     No Known Problems Maternal Grandfather     No Known  "Problems Paternal Grandmother     No Known Problems Paternal Grandfather     No Known Problems Son      She  has a past medical history of AAA (abdominal aortic aneurysm) ([facility]ly [facility]), Atherosclerosis of native arteries of the extremities with intermittent claudication (10/16/2013), Blepharospasm syndrome, Blepharospasm syndrome, Bright red rectal bleeding (6/24/2015), Cellulitis (5/5/2021), Chest pain (07/2015), Constipation, Edema (5/29/2012), Female bladder prolapse, acquired, GERD (gastroesophageal reflux disease), Hyperlipidemia, Hypertension, Insomnia, Low vitamin D level (4/20/2018), OSTEOPOROSIS, Palpitations (5/29/2012), Primary insomnia (1/10/2017), Sleep apnea, SVT (supraventricular tachycardia) (Formerly [facility]) - on Zio 2015 and 2019 (8/17/2015), Varicose veins (5/29/2012), Vitamin D deficiency disease, and Vitamin D deficiency disease.   Past Surgical History:   Procedure Laterality Date    CYSTOCELE REPAIR  1/17/2011    [facility]    RECTOCELE REPAIR  1/17/2011    Performed by GILMAR CHUNG at SURGERY SAME DAY [facility] ORS    BLADDER SUSPENSION  1/17/2011    Performed by GILMAR CHUNG at SURGERY SAME DAY [facility] ORS    CYSTOSCOPY  1/17/2011    [facility]    ABDOMINAL HYSTERECTOMY TOTAL      ARTHROSCOPY, KNEE      CATARACT EXTRACTION WITH IOL      HEMORRHOIDECTOMY      HYSTERECTOMY, TOTAL ABDOMINAL      INJECTION SCLERO HEMORROIDS      OTHER ORTHOPEDIC SURGERY      knee scope x 2    PB KNEE SCOPE,DIAGNOSTIC  2003;2009    TONSILLECTOMY         Exam:   /70 (BP Location: Right arm, Patient Position: Sitting, BP Cuff Size: Adult)   Pulse 73   Temp 36.6 °C (97.8 °F) (Temporal)   Resp 18   Ht 1.575 m (5' 2\")   Wt 55.1 kg (121 lb 8 oz)   SpO2 97%  Body mass index is 22.22 kg/m².    Hearing poor.    Dentition poor  Alert, oriented in no acute distress.  Eye contact is good, speech goal directed, " affect calm    Assessment and Plan. The following treatment and monitoring plan is recommended:      Encounter for Medicare annual wellness exam  - Subsequent Annual Wellness Visit - Includes PPPS ()    Abdominal aortic aneurysm (AAA) without rupture (HCC)  Atherosclerosis of native artery of extremity with intermittent claudication, unspecified extremity (HCC)  Mixed hyperlipidemia  SVT (supraventricular tachycardia) (HCC) - on Zio 2015 and 2019  Primary hypertension  Chronic medical conditions.  Stable.   The patient is managed by cardiology, Dr. Kamara.    Echocardiogram on 9/6/2021 showed normal ventricular function, with an EF of 60%, mild MR, mild AS, mild AI.  Aortic ultrasound was ordered, but never performed.    CT abdomen and pelvis urogram on 12/3/2021 showed an increase in size of her distal abdominal aorta aneurysm, measuring 3.6 cm, up from 2 cm.    Blood pressure today's visit is 130/70, continue current regimen of metoprolol 25 mg twice daily for her blood pressure and SVT.  Lipid panel on 5/23/2022 showed a total cholesterol 125, LDL 57, HDL 53, triglycerides 76, continue current regimen of Praluent 75 mg subcutaneously every 14 days.    Recurrent UTI   Bladder prolapse, female, acquired  Chronic medical condition.  Recurrent.    The patient is managed by urology [place].    Continue current regimen of nitrofurantoin 50 mg daily and vaginal estradiol 0.1 mg/g twice weekly.       Elevated fasting glucose  Chronic medical condition.  Stable.  Diet controlled.  Hemoglobin A1c from 11/24/2021 was slightly elevated at 5.7.     Risk for falls  - Patient identified as fall risk.  Appropriate orders and counseling given.    Services suggested: No services needed at this time  Health Care Screening: Age-appropriate preventive services recommended by USPTF and ACIP covered by Medicare were discussed today. Services ordered if indicated and agreed upon by the patient.  Referrals offered: Community-based  lifestyle interventions to reduce health risks and promote self-management and wellness, fall prevention, nutrition, physical activity, tobacco-use cessation, weight loss, and mental health services as per orders if indicated.    Discussion today about general wellness and lifestyle habits:    Prevent falls and reduce trip hazards; Cautioned about securing or removing rugs.  Have a working fire alarm and carbon monoxide detector;   Engage in regular physical activity and social activities     Follow-up: Return in about 6 months (around 2/26/2023) for 6-month f/u visit.    Please note that this dictation was created using voice recognition software. I have made every reasonable attempt to correct obvious errors, but I expect that there are errors of grammar and possibly content that I did not discover before finalizing the note.

## 2022-09-06 PROBLEM — Z91.81 RISK FOR FALLS: Status: ACTIVE | Noted: 2022-09-06

## 2022-09-07 ENCOUNTER — HOSPITAL ENCOUNTER (OUTPATIENT)
Facility: MEDICAL CENTER | Age: 87
End: 2022-09-07
Attending: PHYSICIAN ASSISTANT
Payer: MEDICARE

## 2022-09-07 PROCEDURE — 87186 SC STD MICRODIL/AGAR DIL: CPT

## 2022-09-07 PROCEDURE — 87086 URINE CULTURE/COLONY COUNT: CPT

## 2022-09-07 PROCEDURE — 87077 CULTURE AEROBIC IDENTIFY: CPT

## 2022-09-22 ENCOUNTER — PHARMACY VISIT (OUTPATIENT)
Dept: PHARMACY | Facility: MEDICAL CENTER | Age: 87
End: 2022-09-22
Payer: COMMERCIAL

## 2022-09-22 PROCEDURE — RXMED WILLOW AMBULATORY MEDICATION CHARGE: Performed by: NURSE PRACTITIONER

## 2022-09-26 ENCOUNTER — HOSPITAL ENCOUNTER (OUTPATIENT)
Dept: RADIOLOGY | Facility: MEDICAL CENTER | Age: 87
End: 2022-09-26
Attending: SURGERY
Payer: MEDICARE

## 2022-09-26 DIAGNOSIS — I71.40 ABDOMINAL AORTIC ANEURYSM WITHOUT RUPTURE (HCC): ICD-10-CM

## 2022-09-26 PROCEDURE — 74176 CT ABD & PELVIS W/O CONTRAST: CPT

## 2022-10-20 ENCOUNTER — HOSPITAL ENCOUNTER (OUTPATIENT)
Facility: MEDICAL CENTER | Age: 87
End: 2022-10-20
Attending: PHYSICIAN ASSISTANT
Payer: MEDICARE

## 2022-10-20 PROCEDURE — 87077 CULTURE AEROBIC IDENTIFY: CPT

## 2022-10-20 PROCEDURE — 87186 SC STD MICRODIL/AGAR DIL: CPT

## 2022-10-20 PROCEDURE — 87086 URINE CULTURE/COLONY COUNT: CPT

## 2022-10-26 ENCOUNTER — OFFICE VISIT (OUTPATIENT)
Dept: SLEEP MEDICINE | Facility: MEDICAL CENTER | Age: 87
End: 2022-10-26
Payer: MEDICARE

## 2022-10-26 VITALS
OXYGEN SATURATION: 97 % | HEIGHT: 62 IN | BODY MASS INDEX: 22.82 KG/M2 | HEART RATE: 87 BPM | DIASTOLIC BLOOD PRESSURE: 74 MMHG | WEIGHT: 124 LBS | SYSTOLIC BLOOD PRESSURE: 128 MMHG

## 2022-10-26 DIAGNOSIS — I47.10 SVT (SUPRAVENTRICULAR TACHYCARDIA) (HCC): Chronic | ICD-10-CM

## 2022-10-26 DIAGNOSIS — G47.33 OSA (OBSTRUCTIVE SLEEP APNEA): ICD-10-CM

## 2022-10-26 DIAGNOSIS — F51.04 CHRONIC INSOMNIA: ICD-10-CM

## 2022-10-26 DIAGNOSIS — I10 ESSENTIAL HYPERTENSION: ICD-10-CM

## 2022-10-26 PROBLEM — R30.0 DYSURIA: Status: ACTIVE | Noted: 2022-10-20

## 2022-10-26 PROCEDURE — 99213 OFFICE O/P EST LOW 20 MIN: CPT | Performed by: NURSE PRACTITIONER

## 2022-10-26 ASSESSMENT — FIBROSIS 4 INDEX: FIB4 SCORE: 2.97

## 2022-10-26 NOTE — PROGRESS NOTES
"Chief Complaint   Patient presents with    Apnea     RUY- Last seen 11/03/2021       HPI:      Mrs. Taylor is a 89 y/o female patient who is in today for annual RUY f/u.  Former [facility] patient. PMH includes SVT, GERD, osteoporosis, dyslipidemia, hypertension, arthrosclerosis of native arteries of extremities, AAA, chronic insomnia, never smoker, tonsillectomy, hard of hearing.    Patient received a new ResMed BiPAP machine in September 2021. Compliance report from 9/26/22-10/25/22 was downloaded and reviewed with the patient which showed BiPAP IPAP/EPAP 14/10 cmH2O, 100% compliance, 8 hrs 27 min use, AHI of 1.5. She is tolerating the pressure and mask well. She goes to bed at 10 pm and wakes up at 7 am. She denies morning headache or snoring. . She still feels that her sleep is broken up however this has been a chronic issue which is likely related to her prior work hours. Overall, she does  find her sleep refreshing if she gets adequate sleep. She does not take regular naps.  She continues to follow-up with her ophthalmologist because she is \"losing her eyesight.  She is also losing her hearing and needs to obtain a new hearing aid.  She continues to follow-up with her cardiologist Dr. Kamara.  Otherwise she denies any new health problems.      Sleep/Card Study History:   Echo from 9/6/21 indicated Normal left ventricular size, thickness, systolic function, and diastolic function. Left ventricular ejection fraction is visually estimated to be 60%. Normal right ventricular size and systolic function.  Mild mitral regurgitation. Mild aortic stenosis. Mild aortic insufficiency. Right ventricular systolic pressure is estimated to be 35 mmHg.    PSG titration from 9/16/15 indicated complete CPAP titration to final pressure of 18 cm of water.  The overall AHI was 38.2, mean SPO2 was 80.9% and oxygen saturations were less than or equal to 89% for 281.6 minutes.  Bilevel therapy was also tried from 14/10 cm to 16/12 cm of " water.  No significant central apneas were seen.  Supplemental oxygen was not used.    ROS:    Constitutional: Denies fevers, Denies weight changes  Eyes: + changes in vision, no eye pain  Ears/Nose/Throat/Mouth: Denies nasal congestion or sore throat   Cardiovascular: Denies chest pain or palpitations   Respiratory: Denies shortness of breath , Denies cough  Gastrointestinal/Hepatic: Denies abdominal pain, nausea, vomiting,   Skin/Breast: Denies rash,   Neurological: Denies headache, confusion,   Psychiatric: denies mood disorder   Sleep: denies snoring       Past Medical History:   Diagnosis Date    AAA (abdominal aortic aneurysm)     Atherosclerosis of native arteries of the extremities with intermittent claudication 10/16/2013    Blepharospasm syndrome     Blepharospasm syndrome      IMO load March 2020    Bright red rectal bleeding 6/24/2015    Cellulitis 5/5/2021    Chest pain 07/2015    Unspecified; Nuclear stress test negative     Constipation     Edema 5/29/2012    Female bladder prolapse, acquired     GERD (gastroesophageal reflux disease)     Hyperlipidemia     Hypertension     Insomnia     Low vitamin D level 4/20/2018    OSTEOPOROSIS     Palpitations 5/29/2012    Primary insomnia 1/10/2017    Sleep apnea     SVT (supraventricular tachycardia) (HCC) - on Zio 2015 and 2019 8/17/2015    Varicose veins 5/29/2012    Vitamin D deficiency disease     Vitamin D deficiency disease      IMO load March 2020       Past Surgical History:   Procedure Laterality Date    CYSTOCELE REPAIR  1/17/2011    Performed by GILMAR CHUNG at SURGERY SAME DAY [facility] ORS    RECTOCELE REPAIR  1/17/2011    Performed by GILMAR CHUNG at SURGERY SAME DAY [facility] ORS    BLADDER SUSPENSION  1/17/2011    Performed by GILMAR CHUNG at SURGERY SAME DAY [facility] ORS    CYSTOSCOPY  1/17/2011    Performed by GILMAR CHUNG at SURGERY SAME DAY [facility] ORS    ABDOMINAL HYSTERECTOMY TOTAL      ARTHROSCOPY, KNEE       CATARACT EXTRACTION WITH IOL      HEMORRHOIDECTOMY      HYSTERECTOMY, TOTAL ABDOMINAL      INJECTION SCLERO HEMORROIDS      OTHER ORTHOPEDIC SURGERY      knee scope x 2    PB KNEE SCOPE,DIAGNOSTIC  2003;2009    TONSILLECTOMY         Family History   Problem Relation Age of Onset    Non-contributory Mother         gall bladder surgery    Heart Disease Mother     Cancer Father         colon rectal    Heart Disease Father         rheumatic heart disease    Other Brother         HIT BY CAR    Heart Disease Brother     Sleep Apnea Brother     No Known Problems Maternal Grandmother     No Known Problems Maternal Grandfather     No Known Problems Paternal Grandmother     No Known Problems Paternal Grandfather     No Known Problems Son        Social History     Socioeconomic History    Marital status:      Spouse name: Not on file    Number of children: Not on file    Years of education: Not on file    Highest education level: Not on file   Occupational History    Not on file   Tobacco Use    Smoking status: Never    Smokeless tobacco: Never   Vaping Use    Vaping Use: Never used   Substance and Sexual Activity    Alcohol use: Not Currently     Alcohol/week: 0.0 oz     Comment: occasionally    Drug use: No    Sexual activity: Never   Other Topics Concern    Not on file   Social History Narrative    Not on file     Social Determinants of Health     Financial Resource Strain: Not on file   Food Insecurity: Not on file   Transportation Needs: Not on file   Physical Activity: Not on file   Stress: Not on file   Social Connections: Not on file   Intimate Partner Violence: Not on file   Housing Stability: Not on file       Allergies as of 10/26/2022 - Reviewed 10/26/2022   Allergen Reaction Noted    Atorvastatin  10/25/2017    Atorvastatin calcium  10/25/2017    Bactrim ds Hives 05/11/2017    Hydrocodone-acetaminophen  08/25/2022    Levaquin  08/25/2022    Levofloxacin  04/07/2021    Pneumococcal vaccines Swelling  07/27/2018    Sulfa drugs Hives 05/11/2017    Vicodin [hydrocodone-acetaminophen] Rash 12/09/2010    Vytorin  12/15/2008        Vitals:  Vitals:    10/26/22 1058   BP: 128/74   Pulse: 87   SpO2: 97%       Current medications as of today   Current Outpatient Medications   Medication Sig Dispense Refill    metoprolol tartrate (LOPRESSOR) 25 MG Tab Take 1 tablet by mouth twice daily 180 Tablet 2    Alirocumab (PRALUENT) 75 MG/ML Solution Auto-injector Inject 75 mg under the skin every 14 days. 6 mL 3    nitrofurantoin (MACRODANTIN) 50 MG Cap Take 50 mg by mouth every day.      Cholecalciferol (VITAMIN D3) 2000 UNIT Cap Take 2 capsules by mouth once daily 60 capsule 0    estradiol (ESTRACE) 0.1 MG/GM vaginal cream APPLY A PEA SIZED AMOUNT INTO VAGINA ONE TO TWO TIMES A WEEK      Multiple Vitamins-Minerals (CENTRUM SILVER PO) Take 1 Tablet by mouth every day.       No current facility-administered medications for this visit.         Physical Exam: Limited by COVID-19 precautions.  Appearance: Well developed, well nourished, no acute distress  Eyes: PERRL, EOM intact, sclera white, conjunctiva moist  Ears: no lesions or deformities  Hearing: grossly intact  Nose: no lesions or deformities  Respiratory effort: no intercostal retractions or use of accessory muscles  Extremities: no cyanosis or edema  Abdomen: soft   Gait and Station: normal  Digits and nails: no clubbing, cyanosis, petechiae or nodes.  Cranial nerves: grossly intact  Skin: no visible rashes, lesions or ulcers noted  Orientation: Oriented to time, person and place  Mood and affect: mood and affect appropriate, normal interaction with examiner  Judgement: Intact    Assessment:  1. RUY (obstructive sleep apnea)  DME Mask and Supplies      2. Chronic insomnia        3. SVT (supraventricular tachycardia) (HCC) - on Zio 2015 and 2019        4. Essential hypertension              Plan  Discussed the cardiovascular and neuropsychiatric risks of untreated RUY;  including but not limited to: HTN, DM, MI, ASCVD, CVA, CHF, traffic accidents.     1. Compliance report from 9/26/22-10/25/22 was downloaded and reviewed with the patient which showed BiPAP IPAP/EPAP 14/10 cmH2O, 100% compliance, 8 hrs 27 min use, AHI of 1.5.  Patient is compliant and benefiting from BiPAP therapy for management of RUY. Advised patient to continue to use the BiPAP every night for more than four hours for optimal health benefit.     *DME order ([facility]) for mask (Airfit F20 mask or MOC) and supplies was placed today. Continue to clean mask and supplies weekly with soap and water, and change supplies per insurance guidelines.     2. Sleep hygiene discussed. Recommend keeping a set sleep/wake schedule. Logging enough hours of sleep. Limiting/Avoiding naps. No caffeine after noon and no heavy meals in the evening.   Chronic insomnia: Stable.  Has seen cognitive behavioral therapist in the past.  3-4. Continue to f/u with PCP for BP management.  5. Follow up with appropriate healthcare providers for all other medical problems.  6. F/u in 6 months for RUY per patient request, sooner if needed.       FABRIZIO Feliciano.      This dictation was created using voice recognition software. The accuracy of the dictation is limited to the abilities of the software. I expect there may be some errors of grammar and possibly content.

## 2022-11-02 ENCOUNTER — PHARMACY VISIT (OUTPATIENT)
Dept: PHARMACY | Facility: MEDICAL CENTER | Age: 87
End: 2022-11-02
Payer: MEDICARE

## 2022-11-02 PROCEDURE — RXMED WILLOW AMBULATORY MEDICATION CHARGE: Performed by: INTERNAL MEDICINE

## 2022-11-16 ENCOUNTER — OFFICE VISIT (OUTPATIENT)
Dept: CARDIOLOGY | Facility: MEDICAL CENTER | Age: 87
End: 2022-11-16
Payer: MEDICARE

## 2022-11-16 VITALS
BODY MASS INDEX: 22.26 KG/M2 | SYSTOLIC BLOOD PRESSURE: 112 MMHG | HEART RATE: 70 BPM | HEIGHT: 62 IN | OXYGEN SATURATION: 96 % | WEIGHT: 121 LBS | DIASTOLIC BLOOD PRESSURE: 70 MMHG | RESPIRATION RATE: 18 BRPM

## 2022-11-16 DIAGNOSIS — I71.43 INFRARENAL ABDOMINAL AORTIC ANEURYSM (AAA) WITHOUT RUPTURE (HCC): ICD-10-CM

## 2022-11-16 DIAGNOSIS — I47.10 SVT (SUPRAVENTRICULAR TACHYCARDIA) (HCC): Chronic | ICD-10-CM

## 2022-11-16 DIAGNOSIS — I70.219 ATHEROSCLEROSIS OF NATIVE ARTERY OF EXTREMITY WITH INTERMITTENT CLAUDICATION, UNSPECIFIED EXTREMITY (HCC): ICD-10-CM

## 2022-11-16 DIAGNOSIS — E78.2 MIXED HYPERLIPIDEMIA: ICD-10-CM

## 2022-11-16 PROCEDURE — 99214 OFFICE O/P EST MOD 30 MIN: CPT | Performed by: INTERNAL MEDICINE

## 2022-11-16 ASSESSMENT — FIBROSIS 4 INDEX: FIB4 SCORE: 2.97

## 2022-11-16 NOTE — PROGRESS NOTES
Chief Complaint   Patient presents with    Abdominal Aortic Aneurysm     F/V Dx: Abdominal aortic aneurysm (AAA) without rupture (HCC)       Subjective     Nini Taylor is a 89 y.o. female who presents today for follow-up of her history of peripheral arterial disease dyslipidemia hypertension      Has had CT scan stable saccular aneurysm    Us shows venous inssufficiency    She was worried about COVID booster shot as her  cannot get COVID shots and last time she had swelling of her leg      Past Medical History:   Diagnosis Date    AAA (abdominal aortic aneurysm)     Atherosclerosis of native arteries of the extremities with intermittent claudication 10/16/2013    Blepharospasm syndrome     Blepharospasm syndrome      IMO load March 2020    Bright red rectal bleeding 6/24/2015    Cellulitis 5/5/2021    Chest pain 07/2015    Unspecified; Nuclear stress test negative     Constipation     Edema 5/29/2012    Female bladder prolapse, acquired     GERD (gastroesophageal reflux disease)     Hyperlipidemia     Hypertension     Insomnia     Low vitamin D level 4/20/2018    OSTEOPOROSIS     Palpitations 5/29/2012    Primary insomnia 1/10/2017    Sleep apnea     SVT (supraventricular tachycardia) (HCC) - on Zio 2015 and 2019 8/17/2015    Varicose veins 5/29/2012    Vitamin D deficiency disease     Vitamin D deficiency disease      IMO load March 2020     Past Surgical History:   Procedure Laterality Date    CYSTOCELE REPAIR  1/17/2011    [facility]    RECTOCELE REPAIR  1/17/2011    [facility]    BLADDER SUSPENSION  1/17/2011    [facility]    CYSTOSCOPY  1/17/2011    Per[facility]IL[facility]    ABDOMINAL HYSTERECTOMY TOTAL      ARTHROSCOPY, KNEE      CATARACT EXTRACTION WITH IOL      HEMORRHOIDECTOMY      HYSTERECTOMY,  TOTAL ABDOMINAL      INJECTION SCLERO HEMORROIDS      OTHER ORTHOPEDIC SURGERY      knee scope x 2    PB KNEE SCOPE,DIAGNOSTIC  2003;2009    TONSILLECTOMY       Family History   Problem Relation Age of Onset    Non-contributory Mother         gall bladder surgery    Heart Disease Mother     Cancer Father         colon rectal    Heart Disease Father         rheumatic heart disease    Other Brother         HIT BY CAR    Heart Disease Brother     Sleep Apnea Brother     No Known Problems Maternal Grandmother     No Known Problems Maternal Grandfather     No Known Problems Paternal Grandmother     No Known Problems Paternal Grandfather     No Known Problems Son      Social History     Socioeconomic History    Marital status:      Spouse name: Not on file    Number of children: Not on file    Years of education: Not on file    Highest education level: Not on file   Occupational History    Not on file   Tobacco Use    Smoking status: Never    Smokeless tobacco: Never   Vaping Use    Vaping Use: Never used   Substance and Sexual Activity    Alcohol use: Not Currently     Alcohol/week: 0.0 oz     Comment: occasionally    Drug use: No    Sexual activity: Never   Other Topics Concern    Not on file   Social History Narrative    Not on file     Social Determinants of Health     Financial Resource Strain: Not on file   Food Insecurity: Not on file   Transportation Needs: Not on file   Physical Activity: Not on file   Stress: Not on file   Social Connections: Not on file   Intimate Partner Violence: Not on file   Housing Stability: Not on file     Allergies   Allergen Reactions    Atorvastatin     Atorvastatin Calcium     Bactrim Ds Hives    Hydrocodone-Acetaminophen     Levaquin     Levofloxacin     Pneumococcal Vaccines Swelling    Sulfa Drugs Hives    Vicodin [Hydrocodone-Acetaminophen] Rash    Vytorin      Outpatient Encounter Medications as of 11/16/2022   Medication Sig Dispense Refill    influenza Vac High-Dose  "Quad (FLUZONE HIGH-DOSE QUADRIVALENT) 0.7 ML Suspension Prefilled Syringe injection Inject  into the shoulder, thigh, or buttocks. 0.7 mL 0    metoprolol tartrate (LOPRESSOR) 25 MG Tab Take 1 tablet by mouth twice daily 180 Tablet 2    Alirocumab (PRALUENT) 75 MG/ML Solution Auto-injector Inject 75 mg under the skin every 14 days. 6 mL 3    nitrofurantoin (MACRODANTIN) 50 MG Cap Take 50 mg by mouth every day.      Cholecalciferol (VITAMIN D3) 2000 UNIT Cap Take 2 capsules by mouth once daily 60 capsule 0    estradiol (ESTRACE) 0.1 MG/GM vaginal cream APPLY A PEA SIZED AMOUNT INTO VAGINA ONE TO TWO TIMES A WEEK      Multiple Vitamins-Minerals (CENTRUM SILVER PO) Take 1 Tablet by mouth every day.       No facility-administered encounter medications on file as of 11/16/2022.     ROS           Objective     /70 (BP Location: Left arm, Patient Position: Sitting, BP Cuff Size: Adult long)   Pulse 70   Resp 18   Ht 1.575 m (5' 2\")   Wt 54.9 kg (121 lb)   SpO2 96%   BMI 22.13 kg/m²     Physical Exam  Constitutional:       General: She is not in acute distress.     Appearance: She is not diaphoretic.   HENT:      Mouth/Throat:      Comments: Wearing a mask for COVID protocol  Eyes:      General: No scleral icterus.  Neck:      Vascular: No JVD.   Cardiovascular:      Rate and Rhythm: Normal rate.      Heart sounds: Murmur heard.     No friction rub. No gallop.   Pulmonary:      Effort: No respiratory distress.      Breath sounds: No wheezing or rales.   Abdominal:      General: Bowel sounds are normal.      Palpations: Abdomen is soft.   Musculoskeletal:      Right lower leg: No edema.      Left lower leg: No edema.   Skin:     Findings: No rash.   Neurological:      Mental Status: She is alert. Mental status is at baseline.   Psychiatric:         Mood and Affect: Mood normal.     We reviewed in person the most recent labs  Recent Results (from the past 5040 hour(s))   Lipid Profile    Collection Time: 05/23/22 " 11:36 AM   Result Value Ref Range    Cholesterol,Tot 125 100 - 199 mg/dL    Triglycerides 76 0 - 149 mg/dL    HDL 53 >=40 mg/dL    LDL 57 <100 mg/dL   Comp Metabolic Panel    Collection Time: 05/23/22 11:36 AM   Result Value Ref Range    Sodium 138 135 - 145 mmol/L    Potassium 4.2 3.6 - 5.5 mmol/L    Chloride 103 96 - 112 mmol/L    Co2 23 20 - 33 mmol/L    Anion Gap 12.0 7.0 - 16.0    Glucose 88 65 - 99 mg/dL    Bun 12 8 - 22 mg/dL    Creatinine 0.88 0.50 - 1.40 mg/dL    Calcium 9.2 8.5 - 10.5 mg/dL    AST(SGOT) 21 12 - 45 U/L    ALT(SGPT) 9 2 - 50 U/L    Alkaline Phosphatase 92 30 - 99 U/L    Total Bilirubin 0.9 0.1 - 1.5 mg/dL    Albumin 3.8 3.2 - 4.9 g/dL    Total Protein 7.0 6.0 - 8.2 g/dL    Globulin 3.2 1.9 - 3.5 g/dL    A-G Ratio 1.2 g/dL   ESTIMATED GFR    Collection Time: 05/23/22 11:36 AM   Result Value Ref Range    GFR (CKD-EPI) 62 >60 mL/min/1.73 m 2   URINE CULTURE(NEW)    Collection Time: 09/07/22 11:40 AM    Specimen: Urine   Result Value Ref Range    Significant Indicator POS (POS)     Source UR     Site Voided     Culture Result - (A)     Culture Result Escherichia coli  >100,000 cfu/mL   (A)        Susceptibility    Escherichia coli - SUZANNE     Ampicillin >16 Resistant mcg/mL     Ceftriaxone <=1 Sensitive mcg/mL     Cefazolin* <=2 Sensitive mcg/mL      * Breakpoints when Cefazolin is used for therapy of infections  other than uncomplicated UTIs due to Enterobacterales are as  follows:  SUZANNE and Interpretation:  <=2 S  4 I  >=8 R       Ciprofloxacin >2 Resistant mcg/mL     Cefepime <=2 Sensitive mcg/mL     Cefuroxime <=4 Sensitive mcg/mL     Ampicillin/sulbactam >16/8 Resistant mcg/mL     Tobramycin 8 Intermediate mcg/mL     Nitrofurantoin >64 Resistant mcg/mL     Gentamicin >8 Resistant mcg/mL     Levofloxacin >4 Resistant mcg/mL     Minocycline <=4 Sensitive mcg/mL     Pip/Tazobactam <=8 Sensitive mcg/mL     Trimeth/Sulfa >2/38 Resistant mcg/mL     Tigecycline <=2 Sensitive mcg/mL   URINE  CULTURE(NEW)    Collection Time: 10/20/22  1:00 PM    Specimen: Urine   Result Value Ref Range    Significant Indicator POS (POS)     Source UR     Site -     Culture Result Usual urogenital leigha 10-50,000 cfu/mL (A)     Culture Result Escherichia coli  >100,000 cfu/mL   (A)        Susceptibility    Escherichia coli - SUZANNE     Ampicillin >16 Resistant mcg/mL     Ceftriaxone <=1 Sensitive mcg/mL     Cefazolin* <=2 Sensitive mcg/mL      * Breakpoints when Cefazolin is used for therapy of infections  other than uncomplicated UTIs due to Enterobacterales are as  follows:  SUZANNE and Interpretation:  <=2 S  4 I  >=8 R       Ciprofloxacin >2 Resistant mcg/mL     Cefepime <=2 Sensitive mcg/mL     Cefuroxime <=4 Sensitive mcg/mL     Ampicillin/sulbactam 16/8 Intermediate mcg/mL     Tobramycin 8 Intermediate mcg/mL     Nitrofurantoin >64 Resistant mcg/mL     Gentamicin >8 Resistant mcg/mL     Levofloxacin >4 Resistant mcg/mL     Minocycline <=4 Sensitive mcg/mL     Pip/Tazobactam <=8 Sensitive mcg/mL     Trimeth/Sulfa >2/38 Resistant mcg/mL     Tigecycline <=2 Sensitive mcg/mL        Last echo 2021 mild disease has a remarkable murmur      Assessment & Plan     1. Infrarenal abdominal aortic aneurysm (AAA) without rupture        2. Atherosclerosis of native artery of extremity with intermittent claudication, unspecified extremity (HCC)        3. Mixed hyperlipidemia        4. SVT (supraventricular tachycardia) (HCC) - on Zio 2015 and 2019            Medical Decision Making: Today's Assessment/Status/Plan:        It was my pleasure to meet with Ms. Taylor.    We addressed the management of hypertension at today's visit. Blood pressure is well controlled.  We specifically assessed the labs on hypertension treatment    We addressed the management of dyslipidemia and atherosclerosis at today's visit. She is on appropriate statin.    We addressed the management of atherosclerotic artery disease.  She is on proper antiplatelet,  cholesterol management and beta-blockers as appropriate.  We addressed the potential side effects and laboratory follow-up for these medications.    I will see Ms. Taylor back in 1 year time and encouraged her to follow up with us over the phone or electronically using my MyChart as issues arise.    It is my pleasure to participate in the care of Ms. Taylor.  Please do not hesitate to contact me with questions or concerns.    Montez Kamara MD PhD [facility]  Cardiologist [facility]    Please note that this dictation was created using voice recognition software. There may be errors I did not discover before finalizing the note.

## 2022-11-21 ENCOUNTER — HOSPITAL ENCOUNTER (OUTPATIENT)
Dept: LAB | Facility: MEDICAL CENTER | Age: 87
End: 2022-11-21
Attending: PHYSICIAN ASSISTANT
Payer: MEDICARE

## 2022-11-21 PROCEDURE — 87186 SC STD MICRODIL/AGAR DIL: CPT

## 2022-11-21 PROCEDURE — 87077 CULTURE AEROBIC IDENTIFY: CPT

## 2022-11-21 PROCEDURE — 87086 URINE CULTURE/COLONY COUNT: CPT

## 2022-11-30 ENCOUNTER — PHARMACY VISIT (OUTPATIENT)
Dept: PHARMACY | Facility: MEDICAL CENTER | Age: 87
End: 2022-11-30
Payer: MEDICARE

## 2022-11-30 PROCEDURE — RXMED WILLOW AMBULATORY MEDICATION CHARGE: Performed by: INTERNAL MEDICINE

## 2022-12-06 DIAGNOSIS — E78.5 DYSLIPIDEMIA: ICD-10-CM

## 2022-12-06 RX ORDER — ALIROCUMAB 75 MG/ML
75 INJECTION, SOLUTION SUBCUTANEOUS
Qty: 6 ML | Refills: 3 | Status: SHIPPED | OUTPATIENT
Start: 2022-12-06 | End: 2023-09-27 | Stop reason: SDUPTHER

## 2022-12-07 ENCOUNTER — HOSPITAL ENCOUNTER (OUTPATIENT)
Dept: LAB | Facility: MEDICAL CENTER | Age: 87
End: 2022-12-07
Attending: NURSE PRACTITIONER
Payer: MEDICARE

## 2022-12-07 DIAGNOSIS — E78.5 DYSLIPIDEMIA: ICD-10-CM

## 2022-12-07 LAB
ALBUMIN SERPL BCP-MCNC: 3.6 G/DL (ref 3.2–4.9)
ALBUMIN/GLOB SERPL: 1.2 G/DL
ALP SERPL-CCNC: 89 U/L (ref 30–99)
ALT SERPL-CCNC: 7 U/L (ref 2–50)
ANION GAP SERPL CALC-SCNC: 9 MMOL/L (ref 7–16)
AST SERPL-CCNC: 21 U/L (ref 12–45)
BILIRUB SERPL-MCNC: 1 MG/DL (ref 0.1–1.5)
BUN SERPL-MCNC: 15 MG/DL (ref 8–22)
CALCIUM SERPL-MCNC: 9.3 MG/DL (ref 8.5–10.5)
CHLORIDE SERPL-SCNC: 102 MMOL/L (ref 96–112)
CHOLEST SERPL-MCNC: 120 MG/DL (ref 100–199)
CO2 SERPL-SCNC: 26 MMOL/L (ref 20–33)
CREAT SERPL-MCNC: 0.9 MG/DL (ref 0.5–1.4)
FASTING STATUS PATIENT QL REPORTED: NORMAL
GFR SERPLBLD CREATININE-BSD FMLA CKD-EPI: 61 ML/MIN/1.73 M 2
GLOBULIN SER CALC-MCNC: 3 G/DL (ref 1.9–3.5)
GLUCOSE SERPL-MCNC: 90 MG/DL (ref 65–99)
HDLC SERPL-MCNC: 52 MG/DL
LDLC SERPL CALC-MCNC: 54 MG/DL
POTASSIUM SERPL-SCNC: 4.2 MMOL/L (ref 3.6–5.5)
PROT SERPL-MCNC: 6.6 G/DL (ref 6–8.2)
SODIUM SERPL-SCNC: 137 MMOL/L (ref 135–145)
TRIGL SERPL-MCNC: 68 MG/DL (ref 0–149)

## 2022-12-07 PROCEDURE — 80061 LIPID PANEL: CPT

## 2022-12-07 PROCEDURE — 80053 COMPREHEN METABOLIC PANEL: CPT

## 2022-12-07 PROCEDURE — 36415 COLL VENOUS BLD VENIPUNCTURE: CPT

## 2022-12-08 ENCOUNTER — TELEPHONE (OUTPATIENT)
Dept: VASCULAR LAB | Facility: MEDICAL CENTER | Age: 87
End: 2022-12-08
Payer: MEDICARE

## 2022-12-08 PROCEDURE — RXMED WILLOW AMBULATORY MEDICATION CHARGE: Performed by: NURSE PRACTITIONER

## 2022-12-08 NOTE — TELEPHONE ENCOUNTER
Contacted patient at [phone] to discuss [facility] and services/benefits offered. No answer, call didn't connect. Unable to leave message.      Chikis Donaldson  Rx Coordinator   [phone]

## 2022-12-13 ENCOUNTER — NON-PROVIDER VISIT (OUTPATIENT)
Dept: VASCULAR LAB | Facility: MEDICAL CENTER | Age: 87
End: 2022-12-13
Attending: INTERNAL MEDICINE
Payer: MEDICARE

## 2022-12-13 VITALS — SYSTOLIC BLOOD PRESSURE: 124 MMHG | HEART RATE: 89 BPM | DIASTOLIC BLOOD PRESSURE: 74 MMHG

## 2022-12-13 DIAGNOSIS — E78.5 DYSLIPIDEMIA: ICD-10-CM

## 2022-12-13 PROCEDURE — 99212 OFFICE O/P EST SF 10 MIN: CPT

## 2022-12-13 NOTE — PROGRESS NOTES
"[facility] - FollowUp Visit  Date of Service: 12/13/22    Nini Taylor is here for follow up of dyslipidemia.    Subjective    HPI  Pertinent Interval History since last visit:   None, continues to tolerate all medications.   Current Prescription Lipid Lowering Medications - including dose:   Statin: None  Non-Statin: Praluent 75 mg every 2 weeks.   Current Lipid Lowering and Related Supplements:   Vit D  Any Current Side Effects Potentially Related to Lipid Lowering therapy?   No  Current Adherence to Lipid Lowering Therapies:  Complete  Any Previous History of Statin Intolerance?   Statin: Simvastatin Unknown dose/duration - myalgias                Crestor unkown dose, duration \"years\" - states she started getting sharp \"zings\" down her arms.              Atorvastatin unknown dose, duration of 90 days - severe UE myalgia              Rosuvastatin - low dose alternate days - myalgias    Non-Statin: States none, however MR shows possibly Vytorin use              Outcome: Unknown  Any Previous History of Statin Intolerance?   Yes, Details: See above.    Baseline Lipids Prior to Treatment:          2/6/2017 12:20     Cholesterol,Tot   274 (H)     Triglycerides   101     HDL   58     LDL   196 (H)            SOCIAL HISTORY  Social History     Tobacco Use   Smoking Status Never   Smokeless Tobacco Never      Change in weight: Stable  Exercise habits: minimal exercise   Diet: common adult      Objective    Vitals:    12/13/22 1114   BP: 124/74   Pulse: 89      Physical Exam    DATA REVIEW  Most Recent Lipid Panel:   Lab Results   Component Value Date    CHOLSTRLTOT 120 12/07/2022    TRIGLYCERIDE 68 12/07/2022    HDL 52 12/07/2022    LDL 54 12/07/2022       Other Pertinent Blood Work:   Lab Results   Component Value Date    SODIUM 137 12/07/2022    POTASSIUM 4.2 12/07/2022    CHLORIDE 102 12/07/2022    CO2 26 12/07/2022    ANION 9.0 12/07/2022    GLUCOSE 90 12/07/2022    BUN 15 12/07/2022    CREATININE 0.90 " 12/07/2022    CALCIUM 9.3 12/07/2022    ASTSGOT 21 12/07/2022    ALTSGPT 7 12/07/2022    ALKPHOSPHAT 89 12/07/2022    TBILIRUBIN 1.0 12/07/2022    ALBUMIN 3.6 12/07/2022    AGRATIO 1.2 12/07/2022    TSHULTRASEN 1.840 11/24/2021       Other:  NA    Recent Imaging Studies:    None since last visit           23 05/23/2022      ANION 12.0 05/23/2022     GLUCOSE 88 05/23/2022     BUN 12 05/23/2022     CREATININE 0.88 05/23/2022     CALCIUM 9.2 05/23/2022     ASTSGOT 21 05/23/2022     ALTSGPT 9 05/23/2022     ALKPHOSPHAT 92 05/23/2022     TBILIRUBIN 0.9 05/23/2022     ALBUMIN 3.8 05/23/2022     AGRATIO 1.2 05/23/2022     TSHULTRASEN 1.840 11/24/2021         Other:  NA     Recent Imaging Studies:    None since last visit              ASSESSMENT AND PLAN  Patient Type, check all that apply:    Secondary Prevention (Abdominal aortic aneurism)  Established Atherosclerotic Cardiovascular Disease (ASCVD)  AAA - most recent imaging with small AAA, continue medical management and deferfurther surveillance to Dr. 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):   Likely - based on baseline LDL-C and +FH of premature CAD in brother - recommended cascade screening at a previous visit  ACC/AHA Indication for Statin Therapy, gurpreet all that apply:  LDL-C at baseline >190 mg/dl: Indication for High intensity statin    Calculated Risk for ASCVD, if applicable    N/A  Other Significant Risk Markers, if any, gurpreet all that apply   + Family History of premature CAD  High lp(a)  National Lipid Association (NLA) Goal  LDL-C:   <70 mg/dL  Lifestyle Recommendations From Today’s Visit:   She was on a low calorie diet in the past, however she was losing too much weight so she is back to a normal diet.   Exercise: She was walking 25 min/day in the past but  had a fall so she reduced her exercise duration.   Statin Recommendations from Today's Visit  None, unable to tolerate 3 different statins at varying intensities.   Non-Statin Medications Recommendations from Today’s Visit:   Continue Praluent.  Indication for PCSK9 Inhibitor, if applicable:  FH with suboptimal control of LDL-C despite maximally tolerated statin  Supplements Recommended at this visit:  None  Recommendations for Other Cardiovascular Risk Factors, gurpreet all that apply:   None  Other Issues:  None    Pt at goal with Praluent, continue current therapy.   Pt would like to follow with our services.      Studies Ordered at Todays Visit:  None   Blood Work Ordered At Today’s visit:   Lipid panel, CMP  Follow-Up:   6 months    Chandler Brown, PharmD    CC:  Arianna Cabrera M.D.  Dr Bloch

## 2022-12-15 ENCOUNTER — PHARMACY VISIT (OUTPATIENT)
Dept: PHARMACY | Facility: MEDICAL CENTER | Age: 87
End: 2022-12-15
Payer: COMMERCIAL

## 2022-12-19 DIAGNOSIS — I47.10 SVT (SUPRAVENTRICULAR TACHYCARDIA) (HCC): ICD-10-CM

## 2022-12-21 NOTE — TELEPHONE ENCOUNTER
Is the patient due for a refill? Yes    Was the patient seen the past year? Yes    Date of last office visit: 11/16/22    Does the patient have an upcoming appointment?  Yes   If yes, When? 5/10/23    Provider to refill: CW     Does the patients insurance require a 100 day supply?  No

## 2023-01-04 PROCEDURE — RXMED WILLOW AMBULATORY MEDICATION CHARGE: Performed by: NURSE PRACTITIONER

## 2023-01-06 ENCOUNTER — PHARMACY VISIT (OUTPATIENT)
Dept: PHARMACY | Facility: MEDICAL CENTER | Age: 88
End: 2023-01-06
Payer: COMMERCIAL

## 2023-01-16 ENCOUNTER — TELEPHONE (OUTPATIENT)
Dept: SLEEP MEDICINE | Facility: MEDICAL CENTER | Age: 88
End: 2023-01-16
Payer: MEDICARE

## 2023-01-16 NOTE — TELEPHONE ENCOUNTER
Pt LVM stating that she contacted [facility] and was informed that they did not receive any information from us regarding her order for cpap supplies. Called pt back and informed her that I received her VM and that the order for cpap supplies was faxed to [facility].

## 2023-01-25 NOTE — TELEPHONE ENCOUNTER
LVM for the pt inform her that I received her message that she left with Chantal LOPEZ PATIENT IS BACK STATING THAT [facility] TOLD HER THAT WE NEED TO FAX IT TO THE [facility] AND YOU NEED TO PUT ON THERE THAT IT NEEDS TO BE SENT TO [place] SO THEY CAN SEND IT OVER TO THEM.    LVM for the pt informing her that the order was faxed to [facility] and I wrote on the order to send to [place] confirmation is scan into media.

## 2023-03-01 ENCOUNTER — TELEPHONE (OUTPATIENT)
Dept: SLEEP MEDICINE | Facility: MEDICAL CENTER | Age: 88
End: 2023-03-01
Payer: MEDICARE

## 2023-03-01 DIAGNOSIS — G47.33 OSA (OBSTRUCTIVE SLEEP APNEA): ICD-10-CM

## 2023-03-01 NOTE — TELEPHONE ENCOUNTER
Order to decrease BiPAP IPAP/EPAP from 14/10 cm H2O to 13/9 cm H2O due to pressure intolerance.  Recent 30-day compliance was reviewed and it does indicate an elevated AHI which could be related to severe mask leak.  AHI was previously well controlled on 14/10 cm H2O therefore there is room to decrease pressure.  Patient is however advised to change her mask cushion every 30 days to decrease mask leak which should also make it more tolerable for pressure.  Patient is advised to follow-up in 4 weeks.    FABRIZIO Feliciano.

## 2023-03-03 ENCOUNTER — OFFICE VISIT (OUTPATIENT)
Dept: SLEEP MEDICINE | Facility: MEDICAL CENTER | Age: 88
End: 2023-03-03
Attending: NURSE PRACTITIONER
Payer: MEDICARE

## 2023-03-03 ENCOUNTER — HOSPITAL ENCOUNTER (OUTPATIENT)
Dept: LAB | Facility: MEDICAL CENTER | Age: 88
End: 2023-03-03
Attending: STUDENT IN AN ORGANIZED HEALTH CARE EDUCATION/TRAINING PROGRAM
Payer: MEDICARE

## 2023-03-03 VITALS
SYSTOLIC BLOOD PRESSURE: 108 MMHG | HEART RATE: 95 BPM | WEIGHT: 116.7 LBS | HEIGHT: 61 IN | DIASTOLIC BLOOD PRESSURE: 68 MMHG | OXYGEN SATURATION: 96 % | BODY MASS INDEX: 22.03 KG/M2 | RESPIRATION RATE: 16 BRPM

## 2023-03-03 DIAGNOSIS — G47.33 OSA (OBSTRUCTIVE SLEEP APNEA): ICD-10-CM

## 2023-03-03 LAB
APPEARANCE UR: ABNORMAL
BACTERIA #/AREA URNS HPF: ABNORMAL /HPF
BILIRUB UR QL STRIP.AUTO: NEGATIVE
COLOR UR: YELLOW
EPI CELLS #/AREA URNS HPF: ABNORMAL /HPF
GLUCOSE UR STRIP.AUTO-MCNC: NEGATIVE MG/DL
HYALINE CASTS #/AREA URNS LPF: ABNORMAL /LPF
KETONES UR STRIP.AUTO-MCNC: NEGATIVE MG/DL
LEUKOCYTE ESTERASE UR QL STRIP.AUTO: ABNORMAL
MICRO URNS: ABNORMAL
NITRITE UR QL STRIP.AUTO: POSITIVE
PH UR STRIP.AUTO: 6 [PH] (ref 5–8)
PROT UR QL STRIP: 30 MG/DL
RBC # URNS HPF: ABNORMAL /HPF
RBC UR QL AUTO: ABNORMAL
SP GR UR STRIP.AUTO: 1.02
TRANS CELLS #/AREA URNS HPF: ABNORMAL /HPF
UROBILINOGEN UR STRIP.AUTO-MCNC: 0.2 MG/DL
WBC #/AREA URNS HPF: ABNORMAL /HPF

## 2023-03-03 PROCEDURE — 87077 CULTURE AEROBIC IDENTIFY: CPT

## 2023-03-03 PROCEDURE — 87186 SC STD MICRODIL/AGAR DIL: CPT

## 2023-03-03 PROCEDURE — 99212 OFFICE O/P EST SF 10 MIN: CPT | Performed by: NURSE PRACTITIONER

## 2023-03-03 PROCEDURE — 81001 URINALYSIS AUTO W/SCOPE: CPT

## 2023-03-03 PROCEDURE — 99213 OFFICE O/P EST LOW 20 MIN: CPT | Performed by: NURSE PRACTITIONER

## 2023-03-03 PROCEDURE — 87086 URINE CULTURE/COLONY COUNT: CPT

## 2023-03-03 RX ORDER — DOXYCYCLINE HYCLATE 100 MG/1
CAPSULE ORAL
COMMUNITY
End: 2023-05-10

## 2023-03-03 RX ORDER — GRANULES FOR ORAL 3 G/1
POWDER ORAL
COMMUNITY
End: 2023-05-10

## 2023-03-03 RX ORDER — GRANULES FOR ORAL 3 G/1
POWDER ORAL
COMMUNITY
Start: 2022-12-22 | End: 2023-05-10

## 2023-03-03 ASSESSMENT — PATIENT HEALTH QUESTIONNAIRE - PHQ9: CLINICAL INTERPRETATION OF PHQ2 SCORE: 0

## 2023-03-03 ASSESSMENT — FIBROSIS 4 INDEX: FIB4 SCORE: 3.37

## 2023-03-03 NOTE — PROGRESS NOTES
No chief complaint on file.      HPI:  Nini Taylor is a 89 y.o. year old female here today for follow-up on RUY.  Last seen 10/26/2022 by PEG Feliciano. Former [facility] patient. PMH includes SVT, GERD, osteoporosis, dyslipidemia, hypertension, arthrosclerosis of native arteries of extremities, AAA, chronic insomnia, never smoker, tonsillectomy, hard of hearing.    Just recently, She experienced pressure intolerance therefore pressure was reduced to 13 over 9 cm/H2O.  There was also evidence of mask leak at previous pressure of 14/10.    Patient is currently using a ResMed F20 fullface mask with heated tubing.  She also has a ramp set for 45 minutes.  Patient drinks lots of water and wakes up proximately every 2-3 hours to use the bathroom.  She also notes mask leak with excessive dry mouth.  Her  has increased the humidification and this seems to help.  Previously she was complaining of pressure intolerance.  After adjustment, patient is tolerating pressure fairly well.  Patient also states that the mask leak has gotten better.  She goes to sleep at 9:30 PM and wakes up around 7:30 AM.  She does note waking up approximately 6 times during the night to use the bathroom.  She also has a busy mind and has difficulty falling asleep.  She has completed CBT-I.  Compliance after pressure adjustment is only for 1 day of use with an average time of 9 hours and 2 minutes on BiPAP at 13 over 9 cm.  AHI is 5.5 mask leak has improved.    Sleep/Card Study History:   Echo from 9/6/21 indicated Normal left ventricular size, thickness, systolic function, and diastolic function. Left ventricular ejection fraction is visually estimated to be 60%. Normal right ventricular size and systolic function.  Mild mitral regurgitation. Mild aortic stenosis. Mild aortic insufficiency. Right ventricular systolic pressure is estimated to be 35 mmHg.     PSG titration from 9/16/15 indicated complete CPAP titration to final pressure of  18 cm of water.  The overall AHI was 38.2, mean SPO2 was 80.9% and oxygen saturations were less than or equal to 89% for 281.6 minutes.  Bilevel therapy was also tried from 14/10 cm to 16/12 cm of water.  No significant central apneas were seen.  Supplemental oxygen was not used.    ROS: As per HPI and otherwise negative if not stated.    Past Medical History:   Diagnosis Date    AAA (abdominal aortic aneurysm)     Atherosclerosis of native arteries of the extremities with intermittent claudication 10/16/2013    Blepharospasm syndrome     Blepharospasm syndrome      IMO load March 2020    Bright red rectal bleeding 6/24/2015    Cellulitis 5/5/2021    Chest pain 07/2015    Unspecified; Nuclear stress test negative     Constipation     Edema 5/29/2012    Female bladder prolapse, acquired     GERD (gastroesophageal reflux disease)     Hyperlipidemia     Hypertension     Insomnia     Low vitamin D level 4/20/2018    OSTEOPOROSIS     Palpitations 5/29/2012    Primary insomnia 1/10/2017    Sleep apnea     SVT (supraventricular tachycardia) (HCC) - on Zio 2015 and 2019 8/17/2015    Varicose veins 5/29/2012    Vitamin D deficiency disease     Vitamin D deficiency disease      IMO load March 2020       Past Surgical History:   Procedure Laterality Date    CYSTOCELE REPAIR  1/17/2011    [facility]    RECTOCELE REPAIR  1/17/2011    [facility]    BLADDER SUSPENSION  1/17/2011    Performed by GILMAR CHUNG at SURGERY [facility] DAY [facility]    CYSTOSCOPY  1/17/2011    [facility]    ABDOMINAL HYSTERECTOMY TOTAL      ARTHROSCOPY, KNEE      CATARACT EXTRACTION WITH IOL      HEMORRHOIDECTOMY      HYSTERECTOMY, TOTAL ABDOMINAL      INJECTION SCLERO HEMORROIDS      OTHER ORTHOPEDIC SURGERY      knee scope x 2    PB KNEE SCOPE,DIAGNOSTIC  2003;2009    TONSILLECTOMY          Family History   Problem Relation Age of Onset    Non-contributory Mother         gall bladder surgery    Heart Disease Mother     Cancer Father         colon rectal    Heart Disease Father         rheumatic heart disease    Other Brother         HIT BY CAR    Heart Disease Brother     Sleep Apnea Brother     No Known Problems Maternal Grandmother     No Known Problems Maternal Grandfather     No Known Problems Paternal Grandmother     No Known Problems Paternal Grandfather     No Known Problems Son        Allergies as of 03/03/2023 - Reviewed 11/16/2022   Allergen Reaction Noted    Atorvastatin  10/25/2017    Atorvastatin calcium  10/25/2017    Bactrim ds Hives 05/11/2017    Hydrocodone-acetaminophen  08/25/2022    Levaquin  08/25/2022    Levofloxacin  04/07/2021    Pneumococcal vaccines Swelling 07/27/2018    Sulfa drugs Hives 05/11/2017    Vicodin [hydrocodone-acetaminophen] Rash 12/09/2010    Vytorin  12/15/2008        Vitals:  There were no vitals taken for this visit.    Current medications as of today   Current Outpatient Medications   Medication Sig Dispense Refill    metoprolol tartrate (LOPRESSOR) 25 MG Tab Take 1 tablet by mouth twice daily 180 Tablet 3    Alirocumab (PRALUENT) 75 MG/ML Solution Auto-injector Inject 75 mg under the skin every 14 days. 6 mL 3    nitrofurantoin (MACRODANTIN) 50 MG Cap Take 1 Capsule by mouth every day. (Patient not taking: Reported on 12/13/2022)      Cholecalciferol (VITAMIN D3) 2000 UNIT Cap Take 2 capsules by mouth once daily 60 capsule 0    estradiol (ESTRACE) 0.1 MG/GM vaginal cream APPLY A PEA SIZED AMOUNT INTO VAGINA ONE TO TWO TIMES A WEEK      Multiple Vitamins-Minerals (CENTRUM SILVER PO) Take 1 Tablet by mouth every day.       No current facility-administered medications for this visit.         Physical Exam:   Gen:           Alert and oriented, No apparent distress. Mood and affect appropriate, normal interaction with examiner.  Eyes:          PERRL, EOM  intact, sclere white, conjunctive moist.  Ears:          Not examined.   Hearing:     Grossly intact.  Nose:          Normal, no lesions or deformities.  Dentition:    Good dentition.  Oropharynx:   Tongue normal, posterior pharynx without erythema or exudate.  Neck:        Supple, trachea midline, no masses.  Respiratory Effort: No intercostal retractions or use of accessory muscles.   Lung Auscultation:      Clear to auscultation bilaterally; no rales, rhonchi or wheezing.  CV:            Regular rate and rhythm. No murmurs, rubs or gallops.  Abd:           Not examined.   Lymphadenopathy: Not examined.  Gait and Station: Normal.  Digits and Nails: No clubbing, cyanosis, petechiae, or nodes.   Cranial Nerves: II-XII grossly intact.  Skin:        No rashes, lesions or ulcers noted.               Ext:           No cyanosis or edema.      Assessment:  1. RUY (obstructive sleep apnea)            Plan:  Patient is using and benefiting from BiPAP therapy.  She is tolerating mask fit and pressure reduction much better.  Patient does complain of waking up frequently to use the bathroom.  She drinks a lot of water to treat her bladder problem.  I did advise patient that she may want to cut down the water consumption by 1 glass daily.  Compliance shows adequate use and control of RUY on BiPAP.  Patient will follow-up in 6 months for annual RUY.  Seen sooner if needed.    Please note that this dictation was created using voice recognition software. I have made every reasonable attempt to correct obvious errors, but it is possible there are errors of grammar and possibly content that I did not discover before finalizing the note.

## 2023-04-03 ENCOUNTER — HOSPITAL ENCOUNTER (OUTPATIENT)
Facility: MEDICAL CENTER | Age: 88
End: 2023-04-03
Attending: PHYSICIAN ASSISTANT
Payer: MEDICARE

## 2023-04-03 ENCOUNTER — OFFICE VISIT (OUTPATIENT)
Dept: URGENT CARE | Facility: PHYSICIAN GROUP | Age: 88
End: 2023-04-03
Payer: MEDICARE

## 2023-04-03 VITALS
SYSTOLIC BLOOD PRESSURE: 124 MMHG | TEMPERATURE: 97.5 F | WEIGHT: 120 LBS | HEIGHT: 62 IN | DIASTOLIC BLOOD PRESSURE: 64 MMHG | HEART RATE: 102 BPM | OXYGEN SATURATION: 95 % | RESPIRATION RATE: 18 BRPM | BODY MASS INDEX: 22.08 KG/M2

## 2023-04-03 DIAGNOSIS — J34.89 RHINORRHEA: ICD-10-CM

## 2023-04-03 PROCEDURE — U0003 INFECTIOUS AGENT DETECTION BY NUCLEIC ACID (DNA OR RNA); SEVERE ACUTE RESPIRATORY SYNDROME CORONAVIRUS 2 (SARS-COV-2) (CORONAVIRUS DISEASE [COVID-19]), AMPLIFIED PROBE TECHNIQUE, MAKING USE OF HIGH THROUGHPUT TECHNOLOGIES AS DESCRIBED BY CMS-2020-01-R: HCPCS

## 2023-04-03 PROCEDURE — U0005 INFEC AGEN DETEC AMPLI PROBE: HCPCS

## 2023-04-03 PROCEDURE — 99213 OFFICE O/P EST LOW 20 MIN: CPT | Performed by: PHYSICIAN ASSISTANT

## 2023-04-03 ASSESSMENT — FIBROSIS 4 INDEX: FIB4 SCORE: 3.37

## 2023-04-04 DIAGNOSIS — J34.89 RHINORRHEA: ICD-10-CM

## 2023-04-04 LAB — AMBIGUOUS DTTM AMBI4: NORMAL

## 2023-04-05 LAB
SARS-COV-2 RNA RESP QL NAA+PROBE: NOTDETECTED
SPECIMEN SOURCE: NORMAL

## 2023-04-06 ENCOUNTER — PHARMACY VISIT (OUTPATIENT)
Dept: PHARMACY | Facility: MEDICAL CENTER | Age: 88
End: 2023-04-06
Payer: COMMERCIAL

## 2023-04-06 PROCEDURE — RXMED WILLOW AMBULATORY MEDICATION CHARGE: Performed by: NURSE PRACTITIONER

## 2023-04-06 ASSESSMENT — ENCOUNTER SYMPTOMS
MYALGIAS: 0
DIARRHEA: 0
SHORTNESS OF BREATH: 0
CHILLS: 0
ABDOMINAL PAIN: 0
DIAPHORESIS: 0
VOMITING: 0
SORE THROAT: 0
HEADACHES: 0
NAUSEA: 0
PALPITATIONS: 0
SINUS PAIN: 0
DIZZINESS: 0
WHEEZING: 0
FEVER: 0
SPUTUM PRODUCTION: 0
COUGH: 1

## 2023-04-06 NOTE — PROGRESS NOTES
Subjective:     CHIEF COMPLAINT  Chief Complaint   Patient presents with    Other     This morning woke up with runny nose, cough. Would like Covid swab test       HPI  Nini Taylor is a very pleasant 89 y.o. female who presents to the clinic requesting COVID testing.  Patient states she woke up this morning with a runny nose and a slight cough.  States she otherwise feels well.  She spoke with a nurse over the phone who recommended she present for testing.  No ill contacts.  No fevers, chills or myalgias.  No GI complaints.  No history of allergies.    REVIEW OF SYSTEMS  Review of Systems   Constitutional:  Negative for chills, diaphoresis, fever and malaise/fatigue.   HENT:  Negative for congestion, ear pain, sinus pain and sore throat.         Clear runny nose starting this morning   Respiratory:  Positive for cough (Infrequent cough). Negative for sputum production, shortness of breath and wheezing.    Cardiovascular:  Negative for chest pain and palpitations.   Gastrointestinal:  Negative for abdominal pain, diarrhea, nausea and vomiting.   Musculoskeletal:  Negative for myalgias.   Neurological:  Negative for dizziness and headaches.   Endo/Heme/Allergies:  Negative for environmental allergies.     PAST MEDICAL HISTORY  Patient Active Problem List    Diagnosis Date Noted    Dysuria 10/20/2022    Risk for falls 09/06/2022    Elevated fasting glucose 09/16/2021    Fatty liver 09/16/2021    Recurrent UTI 04/09/2021    Chronic bilateral low back pain without sciatica 04/09/2021    Other headache syndrome 03/11/2021    Pancreatic cyst 12/22/2016    SVT (supraventricular tachycardia) (HCC) - on Zio 2015 and 2019 08/17/2015    Abdominal aortic aneurysm (HCC) - MRA 2.9 cm 9/2018 saccular aneurysm 07/17/2015    RUY treated with BiPAP 04/17/2014    Atherosclerosis of native arteries of extremity with intermittent claudication (HCC) 10/16/2013    Varicose veins of both lower extremities 05/29/2012    Bladder  prolapse, female, acquired     Primary hypertension     Mixed hyperlipidemia     Age-related osteoporosis without current pathological fracture        SURGICAL HISTORY   has a past surgical history that includes abdominal hysterectomy total; injection sclero hemorroids; hemorrhoidectomy; hysterectomy, total abdominal; arthroscopy, knee; tonsillectomy; knee scope,diagnostic (2003;2009); other orthopedic surgery; cystocele repair (1/17/2011); rectocele repair (1/17/2011); bladder suspension (1/17/2011); cystoscopy (1/17/2011); and cataract extraction with iol.    ALLERGIES  Allergies   Allergen Reactions    Atorvastatin     Atorvastatin Calcium     Bactrim Ds Hives    Hydrocodone-Acetaminophen     Levaquin     Levofloxacin     Pneumococcal Vaccines Swelling    Sulfa Drugs Hives    Vicodin [Hydrocodone-Acetaminophen] Rash    Vytorin        CURRENT MEDICATIONS  Home Medications       Reviewed by Can Tsai P.A.-C. (Physician Assistant) on 04/03/23 at 1902  Med List Status: <None>     Medication Last Dose Status   Alirocumab (PRALUENT) 75 MG/ML Solution Auto-injector Taking Active   Cholecalciferol (VITAMIN D3) 2000 UNIT Cap Taking Active   doxycycline (VIBRAMYCIN) 100 MG Cap Taking Active   estradiol (ESTRACE) 0.1 MG/GM vaginal cream Taking Active   fosfomycin (MONUROL) 3 GM Pack Taking Active   fosfomycin (MONUROL) 3 GM Pack Taking Active   metoprolol tartrate (LOPRESSOR) 25 MG Tab Taking Active   Multiple Vitamins-Minerals (CENTRUM SILVER PO) Taking Active   nitrofurantoin (MACRODANTIN) 50 MG Cap Taking Active                    SOCIAL HISTORY  Social History     Tobacco Use    Smoking status: Never    Smokeless tobacco: Never   Vaping Use    Vaping Use: Never used   Substance and Sexual Activity    Alcohol use: Not Currently     Alcohol/week: 0.0 oz     Comment: occasionally    Drug use: No    Sexual activity: Never       FAMILY HISTORY  Family History   Problem Relation Age of Onset    Non-contributory Mother   "       gall bladder surgery    Heart Disease Mother     Cancer Father         colon rectal    Heart Disease Father         rheumatic heart disease    Other Brother         HIT BY CAR    Heart Disease Brother     Sleep Apnea Brother     No Known Problems Maternal Grandmother     No Known Problems Maternal Grandfather     No Known Problems Paternal Grandmother     No Known Problems Paternal Grandfather     No Known Problems Son           Objective:     VITAL SIGNS: /64 (BP Location: Left arm, Patient Position: Sitting, BP Cuff Size: Adult)   Pulse (!) 102   Temp 36.4 °C (97.5 °F) (Temporal)   Resp 18   Ht 1.575 m (5' 2\")   Wt 54.4 kg (120 lb)   SpO2 95%   BMI 21.95 kg/m²     PHYSICAL EXAM  Physical Exam  Constitutional:       General: She is not in acute distress.     Appearance: Normal appearance. She is not ill-appearing, toxic-appearing or diaphoretic.   HENT:      Head: Normocephalic and atraumatic.      Right Ear: Tympanic membrane, ear canal and external ear normal.      Left Ear: Tympanic membrane, ear canal and external ear normal.      Nose: Rhinorrhea (clear) present. No congestion.      Mouth/Throat:      Mouth: Mucous membranes are moist.      Pharynx: No oropharyngeal exudate or posterior oropharyngeal erythema.   Eyes:      Conjunctiva/sclera: Conjunctivae normal.   Cardiovascular:      Rate and Rhythm: Normal rate and regular rhythm.      Pulses: Normal pulses.      Heart sounds: Normal heart sounds.   Pulmonary:      Effort: Pulmonary effort is normal.      Breath sounds: Normal breath sounds. No wheezing.   Musculoskeletal:      Cervical back: Normal range of motion. No muscular tenderness.   Lymphadenopathy:      Cervical: No cervical adenopathy.   Skin:     General: Skin is warm and dry.      Capillary Refill: Capillary refill takes less than 2 seconds.   Neurological:      Mental Status: She is alert.   Psychiatric:         Mood and Affect: Mood normal.         Thought Content: Thought " content normal.       Assessment/Plan:     1. Rhinorrhea  - SARS-CoV-2, PCR (In-House); Future      MDM/Comments:    Very well appearing and pleasant 89-year-old female presented to clinic for COVID testing.  Patient woke up this morning with a runny nose and occasional cough.  On exam all findings within normal limits.  Lung sounds clear to auscultation.  No wheezes rhonchi rales.  SPO2 95% on room air.  We sent out for PCR testing and this did return negative.  Spoke with the patient and she is feeling well.  Encouraged to call with any additional questions or concerns.    Differential diagnosis, natural history, supportive care, and indications for immediate follow-up discussed. All questions answered. Patient agrees with the plan of care.    Follow-up as needed if symptoms worsen or fail to improve to PCP, Urgent care or Emergency Room.    I have personally reviewed prior external notes and test results pertinent to today's visit.  I have independently reviewed and interpreted all diagnostics ordered during this urgent care acute visit.   Discussed management options (risks,benefits, and alternatives to treatment). Pt expresses understanding and the treatment plan was agreed upon. Questions were encouraged and answered to pt's satisfaction.    Please note that this dictation was created using voice recognition software. I have made a reasonable attempt to correct obvious errors, but I expect that there are errors of grammar and possibly content that I did not discover before finalizing the note.

## 2023-04-20 ENCOUNTER — TELEPHONE (OUTPATIENT)
Dept: CARDIOLOGY | Facility: MEDICAL CENTER | Age: 88
End: 2023-04-20
Payer: MEDICARE

## 2023-04-20 NOTE — TELEPHONE ENCOUNTER
Spoke w/pt and clarified she had standing labs ordered by Pharm/PCP for a lipid panel and CMP to be completed when able. Reviewed upcoming appt w/CW on 5/10/23

## 2023-04-20 NOTE — TELEPHONE ENCOUNTER
JOSE     Caller: [facility]    Topic/issue: [facility] is calling on behalf of the patient to confirm if pending lab work needs to be completed before the next visit in our cardiology office on 5/10/23. Please follow up with the patient. Please advise.     Callback Number: [phone] (home)     Thank you,   Marysol ARRIAZA

## 2023-04-26 ENCOUNTER — APPOINTMENT (OUTPATIENT)
Dept: SLEEP MEDICINE | Facility: MEDICAL CENTER | Age: 88
End: 2023-04-26
Attending: NURSE PRACTITIONER
Payer: MEDICARE

## 2023-05-08 ENCOUNTER — HOSPITAL ENCOUNTER (OUTPATIENT)
Dept: LAB | Facility: MEDICAL CENTER | Age: 88
End: 2023-05-08
Attending: NURSE PRACTITIONER
Payer: MEDICARE

## 2023-05-08 DIAGNOSIS — E78.5 DYSLIPIDEMIA: ICD-10-CM

## 2023-05-08 LAB
ALBUMIN SERPL BCP-MCNC: 3.7 G/DL (ref 3.2–4.9)
ALBUMIN/GLOB SERPL: 1.2 G/DL
ALP SERPL-CCNC: 86 U/L (ref 30–99)
ALT SERPL-CCNC: 10 U/L (ref 2–50)
ANION GAP SERPL CALC-SCNC: 13 MMOL/L (ref 7–16)
AST SERPL-CCNC: 19 U/L (ref 12–45)
BILIRUB SERPL-MCNC: 0.8 MG/DL (ref 0.1–1.5)
BUN SERPL-MCNC: 12 MG/DL (ref 8–22)
CALCIUM ALBUM COR SERPL-MCNC: 9.1 MG/DL (ref 8.5–10.5)
CALCIUM SERPL-MCNC: 8.9 MG/DL (ref 8.5–10.5)
CHLORIDE SERPL-SCNC: 104 MMOL/L (ref 96–112)
CHOLEST SERPL-MCNC: 130 MG/DL (ref 100–199)
CO2 SERPL-SCNC: 24 MMOL/L (ref 20–33)
CREAT SERPL-MCNC: 0.9 MG/DL (ref 0.5–1.4)
FASTING STATUS PATIENT QL REPORTED: NORMAL
GFR SERPLBLD CREATININE-BSD FMLA CKD-EPI: 60 ML/MIN/1.73 M 2
GLOBULIN SER CALC-MCNC: 3 G/DL (ref 1.9–3.5)
GLUCOSE SERPL-MCNC: 95 MG/DL (ref 65–99)
HDLC SERPL-MCNC: 52 MG/DL
LDLC SERPL CALC-MCNC: 61 MG/DL
POTASSIUM SERPL-SCNC: 4.4 MMOL/L (ref 3.6–5.5)
PROT SERPL-MCNC: 6.7 G/DL (ref 6–8.2)
SODIUM SERPL-SCNC: 141 MMOL/L (ref 135–145)
TRIGL SERPL-MCNC: 86 MG/DL (ref 0–149)

## 2023-05-08 PROCEDURE — 80061 LIPID PANEL: CPT

## 2023-05-08 PROCEDURE — 36415 COLL VENOUS BLD VENIPUNCTURE: CPT

## 2023-05-08 PROCEDURE — 80053 COMPREHEN METABOLIC PANEL: CPT

## 2023-05-10 ENCOUNTER — OFFICE VISIT (OUTPATIENT)
Dept: CARDIOLOGY | Facility: MEDICAL CENTER | Age: 88
End: 2023-05-10
Payer: MEDICARE

## 2023-05-10 VITALS
HEART RATE: 76 BPM | HEIGHT: 62 IN | SYSTOLIC BLOOD PRESSURE: 116 MMHG | RESPIRATION RATE: 12 BRPM | WEIGHT: 120 LBS | OXYGEN SATURATION: 98 % | DIASTOLIC BLOOD PRESSURE: 74 MMHG | BODY MASS INDEX: 22.08 KG/M2

## 2023-05-10 DIAGNOSIS — I70.219 ATHEROSCLEROSIS OF NATIVE ARTERY OF EXTREMITY WITH INTERMITTENT CLAUDICATION, UNSPECIFIED EXTREMITY (HCC): ICD-10-CM

## 2023-05-10 DIAGNOSIS — I35.0 NONRHEUMATIC AORTIC VALVE STENOSIS: ICD-10-CM

## 2023-05-10 DIAGNOSIS — E78.2 MIXED HYPERLIPIDEMIA: ICD-10-CM

## 2023-05-10 DIAGNOSIS — I10 PRIMARY HYPERTENSION: ICD-10-CM

## 2023-05-10 DIAGNOSIS — I71.43 INFRARENAL ABDOMINAL AORTIC ANEURYSM (AAA) WITHOUT RUPTURE (HCC): ICD-10-CM

## 2023-05-10 DIAGNOSIS — I47.10 SVT (SUPRAVENTRICULAR TACHYCARDIA) (HCC): Chronic | ICD-10-CM

## 2023-05-10 PROCEDURE — 99214 OFFICE O/P EST MOD 30 MIN: CPT | Performed by: INTERNAL MEDICINE

## 2023-05-10 RX ORDER — FUROSEMIDE 20 MG/1
20 TABLET ORAL
Qty: 90 TABLET | Refills: 3 | Status: SHIPPED | OUTPATIENT
Start: 2023-05-10 | End: 2023-11-29 | Stop reason: SDUPTHER

## 2023-05-10 ASSESSMENT — FIBROSIS 4 INDEX: FIB4 SCORE: 2.55

## 2023-05-10 NOTE — PATIENT INSTRUCTIONS
You may benefit from compression socks to help reduce the need for medications over time for swelling of the legs    Local resources may be     [facility] [address] [phone]  [facility] [address] [facility] [phone]    Compression devices can also be found online with multiple vendors     Work on at least 1.5 hours a week of moderate exercise (typical brisk walking or similar activity)    Please look into the following diets and incorporate them into your diet    LOW SALT DIET   KEEP YOUR SODIUM EQUAL TO CALORIES AND NO MORE THAN DOUBLE THE CALORIES FOR A LOW SALT DIET    [employer]org - great resource for [employer] College of [employer]iology on heart disease prevention and treatment    Please work on increasing these foods in your diet to increase your potassium levels    Highest potassium foods  Dried figs, molasses, seaweed    High potassium foods  Dried fruits (dates, prunes), nuts, avocados, bran cereal, wheat germ, lima beans    Potassium-rich food  Vegetables-spinach, tomatoes, broccoli, winter squash, beets, carrots, cauliflower, potatoes    Fruits-bananas, cantaloupe, kiwis, oranges, mangoes    Meats-ground beef, steak, pork, veal, lamb    *Adapted: Mike RICHARDS. Hypokalemia. [place] Journal of Medicine 1998; 339:451.

## 2023-05-11 NOTE — PROGRESS NOTES
Chief Complaint   Patient presents with    Follow-Up     F/V Dx: Infrarenal abdominal aortic aneurysm (AAA) without rupture (HCC)       Subjective     Nini Taylor is a 89 y.o. female who presents today for follow-up of her history of peripheral arterial disease dyslipidemia    She has done well she has had some lower extremity edema and what seems to be neuropathy    Unfortunately she has had progression of macular degeneration and has significant hearing loss as well    Past Medical History:   Diagnosis Date    AAA (abdominal aortic aneurysm) (HCC)     Atherosclerosis of native arteries of the extremities with intermittent claudication 10/16/2013    Blepharospasm syndrome     Blepharospasm syndrome      IMO load March 2020    Bright red rectal bleeding 6/24/2015    Cellulitis 5/5/2021    Chest pain 07/2015    Unspecified; Nuclear stress test negative     Constipation     Edema 5/29/2012    Female bladder prolapse, acquired     GERD (gastroesophageal reflux disease)     Hyperlipidemia     Hypertension     Insomnia     Low vitamin D level 4/20/2018    OSTEOPOROSIS     Palpitations 5/29/2012    Primary insomnia 1/10/2017    Sleep apnea     SVT (supraventricular tachycardia) ([facility]) - on Zio 2015 and 2019 8/17/2015    Varicose veins 5/29/2012    Vitamin D deficiency disease     Vitamin D deficiency disease      IMO load March 2020     Past Surgical History:   Procedure Laterality Date    CYSTOCELE REPAIR  1/17/2011    Performed by GILMAR CHUNG at [facility]    RECTOCELE REPAIR  1/17/2011    Performed by GILMAR CHUNG at [facility]    BLADDER SUSPENSION  1/17/2011    Performed by GILMAR CHUNG at [facility]    CYSTOSCOPY  1/17/2011    Performed by GILMAR CHUNG at [facility]    ABDOMINAL HYSTERECTOMY TOTAL      ARTHROSCOPY, KNEE      CATARACT EXTRACTION WITH IOL      HEMORRHOIDECTOMY      HYSTERECTOMY, TOTAL ABDOMINAL       INJECTION SCLERO HEMORROIDS      OTHER ORTHOPEDIC SURGERY      knee scope x 2    PB KNEE SCOPE,DIAGNOSTIC  2003;2009    TONSILLECTOMY       Family History   Problem Relation Age of Onset    Non-contributory Mother         gall bladder surgery    Heart Disease Mother     Cancer Father         colon rectal    Heart Disease Father         rheumatic heart disease    Other Brother         HIT BY CAR    Heart Disease Brother     Sleep Apnea Brother     No Known Problems Maternal Grandmother     No Known Problems Maternal Grandfather     No Known Problems Paternal Grandmother     No Known Problems Paternal Grandfather     No Known Problems Son      Social History     Socioeconomic History    Marital status:      Spouse name: Not on file    Number of children: Not on file    Years of education: Not on file    Highest education level: Not on file   Occupational History    Not on file   Tobacco Use    Smoking status: Never    Smokeless tobacco: Never   Vaping Use    Vaping Use: Never used   Substance and Sexual Activity    Alcohol use: Not Currently     Alcohol/week: 0.0 oz     Comment: occasionally    Drug use: No    Sexual activity: Never   Other Topics Concern    Not on file   Social History Narrative    Not on file     Social Determinants of Health     Financial Resource Strain: Not on file   Food Insecurity: Not on file   Transportation Needs: Not on file   Physical Activity: Not on file   Stress: Not on file   Social Connections: Not on file   Intimate Partner Violence: Not on file   Housing Stability: Not on file     Allergies   Allergen Reactions    Atorvastatin     Atorvastatin Calcium     Bactrim Ds Hives    Hydrocodone-Acetaminophen     Levaquin     Levofloxacin     Pneumococcal Vaccines Swelling    Sulfa Drugs Hives    Vicodin [Hydrocodone-Acetaminophen] Rash    Vytorin      Outpatient Encounter Medications as of 5/10/2023   Medication Sig Dispense Refill    furosemide (LASIX) 20 MG Tab Take 1 Tablet by  "mouth 1 time a day as needed (swelling). 90 Tablet 3    metoprolol tartrate (LOPRESSOR) 25 MG Tab Take 1 tablet by mouth twice daily 180 Tablet 3    Alirocumab (PRALUENT) 75 MG/ML Solution Auto-injector Inject 75 mg under the skin every 14 days. 6 mL 3    Cholecalciferol (VITAMIN D3) 2000 UNIT Cap Take 2 capsules by mouth once daily 60 capsule 0    estradiol (ESTRACE) 0.1 MG/GM vaginal cream APPLY A PEA SIZED AMOUNT INTO VAGINA ONE TO TWO TIMES A WEEK      Multiple Vitamins-Minerals (CENTRUM SILVER PO) Take 1 Tablet by mouth every day.      [DISCONTINUED] doxycycline (VIBRAMYCIN) 100 MG Cap doxycycline hyclate 100 mg capsule (Patient not taking: Reported on 5/10/2023)      [DISCONTINUED] fosfomycin (MONUROL) 3 GM Pack fosfomycin tromethamine 3 gram oral packet   MIX AND TAKE 1 PACKET BY MOUTH EVERY OTHER DAY (Patient not taking: Reported on 5/10/2023)      [DISCONTINUED] fosfomycin (MONUROL) 3 GM Pack MIX AND TAKE 1 PACKET BY MOUTH EVERY OTHER DAY (Patient not taking: Reported on 5/10/2023)      [DISCONTINUED] nitrofurantoin (MACRODANTIN) 50 MG Cap Take 50 mg by mouth every day. (Patient not taking: Reported on 5/10/2023)       No facility-administered encounter medications on file as of 5/10/2023.     ROS           Objective     /74 (BP Location: Left arm, Patient Position: Sitting, BP Cuff Size: Adult)   Pulse 76   Resp 12   Ht 1.575 m (5' 2\")   Wt 54.4 kg (120 lb)   SpO2 98%   BMI 21.95 kg/m²     Physical Exam  Constitutional:       General: She is not in acute distress.     Appearance: She is not diaphoretic.   Eyes:      General: No scleral icterus.  Neck:      Vascular: No JVD.   Cardiovascular:      Rate and Rhythm: Normal rate.      Heart sounds: Murmur heard.      No friction rub. No gallop.   Pulmonary:      Effort: No respiratory distress.      Breath sounds: No wheezing or rales.   Abdominal:      General: Bowel sounds are normal.      Palpations: Abdomen is soft.   Musculoskeletal:      " Right lower leg: No edema.      Left lower leg: No edema.   Skin:     Findings: No rash.   Neurological:      Mental Status: She is alert. Mental status is at baseline.   Psychiatric:         Mood and Affect: Mood normal.            We reviewed in person the most recent labs  Recent Results (from the past 5040 hour(s))   URINE CULTURE(NEW)    Collection Time: 10/20/22  1:00 PM    Specimen: Urine   Result Value Ref Range    Significant Indicator POS (POS)     Source UR     Site -     Culture Result Usual urogenital leigha 10-50,000 cfu/mL (A)     Culture Result Escherichia coli  >100,000 cfu/mL   (A)        Susceptibility    Escherichia coli - SUZANNE     Ampicillin >16 Resistant mcg/mL     Ceftriaxone <=1 Sensitive mcg/mL     Cefazolin* <=2 Sensitive mcg/mL      * Breakpoints when Cefazolin is used for therapy of infections  other than uncomplicated UTIs due to Enterobacterales are as  follows:  SUZANNE and Interpretation:  <=2 S  4 I  >=8 R       Ciprofloxacin >2 Resistant mcg/mL     Cefepime <=2 Sensitive mcg/mL     Cefuroxime <=4 Sensitive mcg/mL     Ampicillin/sulbactam 16/8 Intermediate mcg/mL     Tobramycin 8 Intermediate mcg/mL     Nitrofurantoin >64 Resistant mcg/mL     Gentamicin >8 Resistant mcg/mL     Levofloxacin >4 Resistant mcg/mL     Minocycline <=4 Sensitive mcg/mL     Pip/Tazobactam <=8 Sensitive mcg/mL     Trimeth/Sulfa >2/38 Resistant mcg/mL     Tigecycline <=2 Sensitive mcg/mL   URINE CULTURE(NEW)    Collection Time: 11/21/22  2:04 PM    Specimen: Urine   Result Value Ref Range    Significant Indicator POS (POS)     Source UR     Site -     Culture Result - (A)     Culture Result Escherichia coli  >100,000 cfu/mL   (A)        Susceptibility    Escherichia coli - SUZANNE     Ampicillin >16 Resistant mcg/mL     Ceftriaxone <=1 Sensitive mcg/mL     Cefazolin* <=2 Sensitive mcg/mL      * Breakpoints when Cefazolin is used for therapy of infections  other than uncomplicated UTIs due to Enterobacterales are  as  follows:  SUZANNE and Interpretation:  <=2 S  4 I  >=8 R       Ciprofloxacin >2 Resistant mcg/mL     Cefepime <=2 Sensitive mcg/mL     Cefuroxime <=4 Sensitive mcg/mL     Ampicillin/sulbactam >16/8 Resistant mcg/mL     Tobramycin 8 Intermediate mcg/mL     Nitrofurantoin <=32 Sensitive mcg/mL     Gentamicin >8 Resistant mcg/mL     Levofloxacin >4 Resistant mcg/mL     Minocycline <=4 Sensitive mcg/mL     Pip/Tazobactam <=8 Sensitive mcg/mL     Trimeth/Sulfa <=0.5/9.5 Sensitive mcg/mL     Tigecycline <=2 Sensitive mcg/mL   Lipid Profile    Collection Time: 12/07/22 12:31 PM   Result Value Ref Range    Cholesterol,Tot 120 100 - 199 mg/dL    Triglycerides 68 0 - 149 mg/dL    HDL 52 >=40 mg/dL    LDL 54 <100 mg/dL   Comp Metabolic Panel    Collection Time: 12/07/22 12:31 PM   Result Value Ref Range    Sodium 137 135 - 145 mmol/L    Potassium 4.2 3.6 - 5.5 mmol/L    Chloride 102 96 - 112 mmol/L    Co2 26 20 - 33 mmol/L    Anion Gap 9.0 7.0 - 16.0    Glucose 90 65 - 99 mg/dL    Bun 15 8 - 22 mg/dL    Creatinine 0.90 0.50 - 1.40 mg/dL    Calcium 9.3 8.5 - 10.5 mg/dL    AST(SGOT) 21 12 - 45 U/L    ALT(SGPT) 7 2 - 50 U/L    Alkaline Phosphatase 89 30 - 99 U/L    Total Bilirubin 1.0 0.1 - 1.5 mg/dL    Albumin 3.6 3.2 - 4.9 g/dL    Total Protein 6.6 6.0 - 8.2 g/dL    Globulin 3.0 1.9 - 3.5 g/dL    A-G Ratio 1.2 g/dL   FASTING STATUS    Collection Time: 12/07/22 12:31 PM   Result Value Ref Range    Fasting Status Fasting    ESTIMATED GFR    Collection Time: 12/07/22 12:31 PM   Result Value Ref Range    GFR (CKD-EPI) 61 >60 mL/min/1.73 m 2   URINALYSIS    Collection Time: 03/03/23 12:59 PM   Result Value Ref Range    Color Yellow     Character Turbid (A)     Specific Gravity 1.025 <1.035    Ph 6.0 5.0 - 8.0    Glucose Negative Negative mg/dL    Ketones Negative Negative mg/dL    Protein 30 (A) Negative mg/dL    Bilirubin Negative Negative    Urobilinogen, Urine 0.2 Negative    Nitrite Positive (A) Negative    Leukocyte Esterase  Large (A) Negative    Occult Blood Moderate (A) Negative    Micro Urine Req Microscopic    URINE CULTURE(NEW)    Collection Time: 03/03/23 12:59 PM    Specimen: Urine   Result Value Ref Range    Significant Indicator POS (POS)     Source UR     Site Clean Catch     Culture Result - (A)     Culture Result Escherichia coli  >100,000 cfu/mL   (A)        Susceptibility    Escherichia coli - SUZANNE     Ampicillin >16 Resistant mcg/mL     Ceftriaxone <=1 Sensitive mcg/mL     Cefazolin* <=2 Sensitive mcg/mL      * Breakpoints when Cefazolin is used for therapy of infections  other than uncomplicated UTIs due to Enterobacterales are as  follows:  SUZANNE and Interpretation:  <=2 S  4 I  >=8 R       Ciprofloxacin >2 Resistant mcg/mL     Cefepime <=2 Sensitive mcg/mL     Cefuroxime <=4 Sensitive mcg/mL     Ampicillin/sulbactam 16/8 Intermediate mcg/mL     Tobramycin >8 Resistant mcg/mL     Nitrofurantoin <=32 Sensitive mcg/mL     Gentamicin >8 Resistant mcg/mL     Levofloxacin >4 Resistant mcg/mL     Minocycline <=4 Sensitive mcg/mL     Pip/Tazobactam <=8 Sensitive mcg/mL     Trimeth/Sulfa >2/38 Resistant mcg/mL     Tigecycline <=2 Sensitive mcg/mL   URINE MICROSCOPIC (W/UA)    Collection Time: 03/03/23 12:59 PM   Result Value Ref Range    WBC Packed (A) /hpf    RBC 10-20 (A) /hpf    Bacteria Many (A) None /hpf    Epithelial Cells Few /hpf    Trans Epithelial Cells Few /hpf    Hyaline Cast 3-5 (A) /lpf   SARS-CoV-2, PCR (In-House)    Collection Time: 04/03/23 12:00 PM    Specimen: Respirate   Result Value Ref Range    SARS-CoV-2 Source Nasal Swab     SARS-CoV-2 by PCR NotDetected    AMBIGUOUS DATE/TIME    Collection Time: 04/04/23 11:06 PM   Result Value Ref Range    Ambiguous Date/Time See Below:    Lipid Profile    Collection Time: 05/08/23 12:26 PM   Result Value Ref Range    Cholesterol,Tot 130 100 - 199 mg/dL    Triglycerides 86 0 - 149 mg/dL    HDL 52 >=40 mg/dL    LDL 61 <100 mg/dL   Comp Metabolic Panel    Collection Time:  05/08/23 12:26 PM   Result Value Ref Range    Sodium 141 135 - 145 mmol/L    Potassium 4.4 3.6 - 5.5 mmol/L    Chloride 104 96 - 112 mmol/L    Co2 24 20 - 33 mmol/L    Anion Gap 13.0 7.0 - 16.0    Glucose 95 65 - 99 mg/dL    Bun 12 8 - 22 mg/dL    Creatinine 0.90 0.50 - 1.40 mg/dL    Calcium 8.9 8.5 - 10.5 mg/dL    AST(SGOT) 19 12 - 45 U/L    ALT(SGPT) 10 2 - 50 U/L    Alkaline Phosphatase 86 30 - 99 U/L    Total Bilirubin 0.8 0.1 - 1.5 mg/dL    Albumin 3.7 3.2 - 4.9 g/dL    Total Protein 6.7 6.0 - 8.2 g/dL    Globulin 3.0 1.9 - 3.5 g/dL    A-G Ratio 1.2 g/dL   FASTING STATUS    Collection Time: 05/08/23 12:26 PM   Result Value Ref Range    Fasting Status Fasting    CORRECTED CALCIUM    Collection Time: 05/08/23 12:26 PM   Result Value Ref Range    Correct Calcium 9.1 8.5 - 10.5 mg/dL   ESTIMATED GFR    Collection Time: 05/08/23 12:26 PM   Result Value Ref Range    GFR (CKD-EPI) 60 >60 mL/min/1.73 m 2         Assessment & Plan     1. SVT (supraventricular tachycardia) (HCC) - on Zio 2015 and 2019        2. Infrarenal abdominal aortic aneurysm (AAA) without rupture (HCC)        3. Atherosclerosis of native artery of extremity with intermittent claudication, unspecified extremity (HCC)        4. Nonrheumatic aortic valve stenosis  EC-ECHOCARDIOGRAM COMPLETE W/O CONT          Medical Decision Making: Today's Assessment/Status/Plan:        It was my pleasure to meet with Ms. Taylor.    We addressed the management of hypertension at today's visit. Blood pressure is well controlled.  We specifically assessed the labs on hypertension treatment    We addressed the management of dyslipidemia and atherosclerosis at today's visit. She is on PCSK9 inhibitor with great result    We addressed the management of atherosclerotic artery disease.  She is on proper antiplatelet, cholesterol management and beta-blockers as appropriate.  We addressed the potential side effects and laboratory follow-up for these medications.    We  will update her echocardiogram    I will see Ms. Taylor back in 6 months to 1 year time and encouraged her to follow up with us over the phone or electronically using my MyChart as issues arise.    It is my pleasure to participate in the care of Ms. Taylor.  Please do not hesitate to contact me with questions or concerns.    Montez Kamara MD PhD [facility]  Cardiologist [facility]    Please note that this dictation was created using voice recognition software. There may be errors I did not discover before finalizing the note.

## 2023-06-13 ENCOUNTER — NON-PROVIDER VISIT (OUTPATIENT)
Dept: VASCULAR LAB | Facility: MEDICAL CENTER | Age: 88
End: 2023-06-13
Attending: INTERNAL MEDICINE
Payer: MEDICARE

## 2023-06-13 VITALS — DIASTOLIC BLOOD PRESSURE: 89 MMHG | HEART RATE: 75 BPM | SYSTOLIC BLOOD PRESSURE: 144 MMHG

## 2023-06-13 DIAGNOSIS — E78.5 DYSLIPIDEMIA: ICD-10-CM

## 2023-06-13 PROCEDURE — 99212 OFFICE O/P EST SF 10 MIN: CPT

## 2023-06-13 NOTE — PROGRESS NOTES
"[facility] - FollowUp Visit  Date of Service: 06/13/23    Nini Taylor is here for follow up of dyslipidemia.    Subjective    HPI  Pertinent Interval History since last visit:   None, continues to tolerate all medications.   Current Prescription Lipid Lowering Medications - including dose:   Statin: None  Non-Statin: Praluent 75 mg every 2 weeks.   Current Lipid Lowering and Related Supplements:   Vit D  Any Current Side Effects Potentially Related to Lipid Lowering therapy?   No  Current Adherence to Lipid Lowering Therapies:  Complete  Any Previous History of Statin Intolerance?   Statin: Simvastatin Unknown dose/duration - myalgias                Crestor unkown dose, duration \"years\" - states she started getting sharp \"zings\" down her arms.              Atorvastatin unknown dose, duration of 90 days - severe UE myalgia              Rosuvastatin - low dose alternate days - myalgias    Non-Statin: States none, however MR shows possibly Vytorin use              Outcome: Unknown  Any Previous History of Statin Intolerance?   Yes, Details: See above.    Baseline Lipids Prior to Treatment:          2/6/2017 12:20     Cholesterol,Tot   274 (H)     Triglycerides   101     HDL   58     LDL   196 (H)            SOCIAL HISTORY  Social History     Tobacco Use   Smoking Status Never   Smokeless Tobacco Never   Vaping Use    Vaping Use: Never used      Change in weight: Stable  Exercise habits: no regular exercise program   Diet: common adult      Objective    Vitals:    06/13/23 1111 06/13/23 1113   BP: (!) 146/90 (!) 144/89   Pulse: 77 75      Physical Exam    DATA REVIEW  Most Recent Lipid Panel:   Lab Results   Component Value Date    CHOLSTRLTOT 130 05/08/2023    TRIGLYCERIDE 86 05/08/2023    HDL 52 05/08/2023    LDL 61 05/08/2023       Other Pertinent Blood Work:   Lab Results   Component Value Date    SODIUM 141 05/08/2023    POTASSIUM 4.4 05/08/2023    CHLORIDE 104 05/08/2023    CO2 24 05/08/2023    ANION " 13.0 05/08/2023    GLUCOSE 95 05/08/2023    BUN 12 05/08/2023    CREATININE 0.90 05/08/2023    CALCIUM 8.9 05/08/2023    ASTSGOT 19 05/08/2023    ALTSGPT 10 05/08/2023    ALKPHOSPHAT 86 05/08/2023    TBILIRUBIN 0.8 05/08/2023    ALBUMIN 3.7 05/08/2023    AGRATIO 1.2 05/08/2023    TSHULTRASEN 1.840 11/24/2021       Other:  NA    Recent Imaging Studies:    None since last visit          ASSESSMENT AND PLAN  Patient Type, check all that apply:    Secondary Prevention (Abdominal aortic aneurism)  Established Atherosclerotic Cardiovascular Disease (ASCVD)  AAA - most recent imaging with small AAA, continue medical management and deferfurther surveillance to Dr. 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):   Likely - based on baseline LDL-C and +FH of premature CAD in brother - recommended cascade screening at a previous visit  ACC/AHA Indication for Statin Therapy, gurpreet all that apply:  LDL-C at baseline >190 mg/dl: Indication for High intensity statin    Calculated Risk for ASCVD, if applicable    N/A  Other Significant Risk Markers, if any, gurpreet all that apply   + Family History of premature CAD  High lp(a)  National Lipid Association (NLA) Goal  LDL-C:   <70 mg/dL  Lifestyle Recommendations From Today’s Visit:   Exercise: pt reports she isn't able to walk as much as she did in the past but is determined to stay active.   Diet: Neglected to discuss.  At age 89 I suggest she doesn't need to focus on this.   Statin Recommendations from Today's Visit  Continue to avoid given inablity to tolerate.   Non-Statin Medications Recommendations from Today’s Visit:   Continue Praluent.  Indication for PCSK9 Inhibitor, if applicable:  FH with suboptimal control of LDL-C despite maximally tolerated statin  Supplements Recommended at this  visit:  None  Recommendations for Other Cardiovascular Risk Factors, gurpreet all that apply:   HTN- BP elevated in clinic today. Pt will test BP at home and monitor. If it stays high she is to contact our clinic.   Other Issues:  None    Studies Ordered at Todays Visit:  None   Blood Work Ordered At Today’s visit:   CMP lipid panel  Follow-Up:   Suggest 12 months, pt prefers 6 mo.     Chandler Brown, PharmD    CC:  Allison A Morgan, M.D. Michael Bloch MD

## 2023-06-29 ENCOUNTER — PHARMACY VISIT (OUTPATIENT)
Dept: PHARMACY | Facility: MEDICAL CENTER | Age: 88
End: 2023-06-29
Payer: COMMERCIAL

## 2023-06-29 PROCEDURE — RXMED WILLOW AMBULATORY MEDICATION CHARGE: Performed by: NURSE PRACTITIONER

## 2023-08-03 ENCOUNTER — OFFICE VISIT (OUTPATIENT)
Dept: SLEEP MEDICINE | Facility: MEDICAL CENTER | Age: 88
End: 2023-08-03
Attending: NURSE PRACTITIONER
Payer: MEDICARE

## 2023-08-03 VITALS
WEIGHT: 117 LBS | DIASTOLIC BLOOD PRESSURE: 62 MMHG | OXYGEN SATURATION: 94 % | SYSTOLIC BLOOD PRESSURE: 112 MMHG | HEIGHT: 62 IN | HEART RATE: 79 BPM | BODY MASS INDEX: 21.53 KG/M2 | RESPIRATION RATE: 16 BRPM

## 2023-08-03 DIAGNOSIS — G47.33 OSA (OBSTRUCTIVE SLEEP APNEA): ICD-10-CM

## 2023-08-03 PROCEDURE — 3074F SYST BP LT 130 MM HG: CPT | Performed by: NURSE PRACTITIONER

## 2023-08-03 PROCEDURE — 99212 OFFICE O/P EST SF 10 MIN: CPT | Performed by: NURSE PRACTITIONER

## 2023-08-03 PROCEDURE — 3078F DIAST BP <80 MM HG: CPT | Performed by: NURSE PRACTITIONER

## 2023-08-03 PROCEDURE — 99214 OFFICE O/P EST MOD 30 MIN: CPT | Performed by: NURSE PRACTITIONER

## 2023-08-03 ASSESSMENT — FIBROSIS 4 INDEX: FIB4 SCORE: 2.58

## 2023-08-03 NOTE — PROGRESS NOTES
Chief Complaint   Patient presents with    Apnea       HPI:  Nini Taylor is a 89 y.o. year old female here today for follow-up on RUY on BiPAP.  Last seen by me on 3/3/2023.  Patient presents with her spouse. Former [facility] patient. PMH includes SVT, GERD, osteoporosis, dyslipidemia, hypertension, arthrosclerosis of native arteries of extremities, AAA, chronic insomnia, never smoker, tonsillectomy, hard of hearing.    Currently on BiPAP 13 over 9 cm/H2O.  Currently using ResMed F20 fullface mask.  Patient also uses heated tubing.  Patient rinses persistent mask leak and  feels that the cushion is too large.She also has a ramp set for 45 minutes.  Patient drinks lots of water and wakes up proximately every 2-3 hours to use the bathroom.  She also notes mask leak with excessive dry mouth.  Her  has increased the humidification and this seems to help.  Previously she was complaining of pressure intolerance.  After adjustment, patient is tolerating pressure fairly well.  Patient also states that the mask leak has gotten better.  She goes to sleep at 9:30 PM and wakes up around 7:30 AM.  She does note waking up approximately 6 times during the night to use the bathroom.  She also has a busy mind and has difficulty falling asleep.  She has completed CBT-I.      30-day compliance reviewed with patient shows 100% use with an average time of 7 hours and 27 minutes and a resultant AHI of 3.4.  There is evidence of persistent mask leak.     Sleep/Card Study History:   Echo from 9/6/21 indicated Normal left ventricular size, thickness, systolic function, and diastolic function. Left ventricular ejection fraction is visually estimated to be 60%. Normal right ventricular size and systolic function.  Mild mitral regurgitation. Mild aortic stenosis. Mild aortic insufficiency. Right ventricular systolic pressure is estimated to be 35 mmHg.     PSG titration from 9/16/15 indicated complete CPAP titration to final  pressure of 18 cm of water.  The overall AHI was 38.2, mean SPO2 was 80.9% and oxygen saturations were less than or equal to 89% for 281.6 minutes.  Bilevel therapy was also tried from 14/10 cm to 16/12 cm of water.  No significant central apneas were seen.  Supplemental oxygen was not used.          ROS: As per HPI and otherwise negative if not stated.    Past Medical History:   Diagnosis Date    AAA (abdominal aortic aneurysm) ([facility])     Atherosclerosis of native arteries of the extremities with intermittent claudication 10/16/2013    Blepharospasm syndrome     Blepharospasm syndrome      IMO load March 2020    Bright red rectal bleeding 6/24/2015    Cellulitis 5/5/2021    Chest pain 07/2015    Unspecified; Nuclear stress test negative     Constipation     Edema 5/29/2012    Female bladder prolapse, acquired     GERD (gastroesophageal reflux disease)     Hyperlipidemia     Hypertension     Insomnia     Low vitamin D level 4/20/2018    OSTEOPOROSIS     Palpitations 5/29/2012    Primary insomnia 1/10/2017    Sleep apnea     SVT (supraventricular tachycardia) ([facility]) - on Zio 2015 and 2019 8/17/2015    Varicose veins 5/29/2012    Vitamin D deficiency disease     Vitamin D deficiency disease      IMO load March 2020       Past Surgical History:   Procedure Laterality Date    CYSTOCELE REPAIR  1/17/2011    Performed by GILMAR CHUNG at [facility]    RECTOCELE REPAIR  1/17/2011    Performed by GILMAR CHUNG [facility]    BLADDER SUSPENSION  1/17/2011    [facility]    CYSTOSCOPY  1/17/2011    [facility]    ABDOMINAL HYSTERECTOMY TOTAL      ARTHROSCOPY, KNEE      CATARACT EXTRACTION WITH IOL      HEMORRHOIDECTOMY      HYSTERECTOMY, TOTAL ABDOMINAL      INJECTION SCLERO HEMORROIDS      OTHER ORTHOPEDIC SURGERY      knee scope x 2    PB KNEE SCOPE,DIAGNOSTIC  2003;2009  "   TONSILLECTOMY         Family History   Problem Relation Age of Onset    Non-contributory Mother         gall bladder surgery    Heart Disease Mother     Cancer Father         colon rectal    Heart Disease Father         rheumatic heart disease    Other Brother         HIT BY CAR    Heart Disease Brother     Sleep Apnea Brother     No Known Problems Maternal Grandmother     No Known Problems Maternal Grandfather     No Known Problems Paternal Grandmother     No Known Problems Paternal Grandfather     No Known Problems Son        Allergies as of 08/03/2023 - Reviewed 08/03/2023   Allergen Reaction Noted    Atorvastatin  10/25/2017    Atorvastatin calcium  10/25/2017    Bactrim ds Hives 05/11/2017    Hydrocodone-acetaminophen  08/25/2022    Levaquin  08/25/2022    Levofloxacin  04/07/2021    Pneumococcal vaccines Swelling 07/27/2018    Sulfa drugs Hives 05/11/2017    Vicodin [hydrocodone-acetaminophen] Rash 12/09/2010    Vytorin  12/15/2008        Vitals:  /62 (BP Location: Left arm, Patient Position: Sitting, BP Cuff Size: Adult)   Pulse 79   Resp 16   Ht 1.575 m (5' 2\")   Wt 53.1 kg (117 lb)   SpO2 94%     Current medications as of today   Current Outpatient Medications   Medication Sig Dispense Refill    furosemide (LASIX) 20 MG Tab Take 1 Tablet by mouth 1 time a day as needed (swelling). 90 Tablet 3    metoprolol tartrate (LOPRESSOR) 25 MG Tab Take 1 tablet by mouth twice daily 180 Tablet 3    Alirocumab (PRALUENT) 75 MG/ML Solution Auto-injector Inject 75 mg under the skin every 14 days. 6 mL 3    Cholecalciferol (VITAMIN D3) 2000 UNIT Cap Take 2 capsules by mouth once daily 60 capsule 0    Multiple Vitamins-Minerals (CENTRUM SILVER PO) Take 1 Tablet by mouth every day.       No current facility-administered medications for this visit.         Physical Exam:   Gen:           Alert and oriented, No apparent distress. Mood and affect appropriate, normal interaction with examiner.  Eyes:        "   PERRL, EOM intact, sclere white, conjunctive moist.  Ears:          Not examined.   Hearing:     Grossly intact.  Nose:          Normal, no lesions or deformities.  Dentition:    Good dentition.  Oropharynx:   Tongue normal, posterior pharynx without erythema or exudate.  Neck:        Supple, trachea midline, no masses.  Respiratory Effort: No intercostal retractions or use of accessory muscles.   Lung Auscultation:      Clear to auscultation bilaterally; no rales, rhonchi or wheezing.  CV:            Regular rate and rhythm. No murmurs, rubs or gallops.  Abd:           Not examined.   Lymphadenopathy: Not examined.  Gait and Station: Normal.  Digits and Nails: No clubbing, cyanosis, petechiae, or nodes.   Cranial Nerves: II-XII grossly intact.  Skin:        No rashes, lesions or ulcers noted.               Ext:           No cyanosis or edema.      Assessment:  1. RUY (obstructive sleep apnea)  DME Mask and Supplies          Plan:  Patient is using and benefiting from BiPAP therapy.  Compliance shows adequate use and control of RUY.  Patient is currently using a small AirFit F20 mask.  There is evidence of persistent mask leak.  Patient given mask sample for Sm Simplus mask.  Patient will assess efficacy and resolution of mask leak.  Patient will follow-up in 6 months, but can be seen sooner if needed.  Order placed for mask and supplies which will be good for 1 year.    Please note that this dictation was created using voice recognition software. I have made every reasonable attempt to correct obvious errors, but it is possible there are errors of grammar and possibly content that I did not discover before finalizing the note.

## 2023-09-13 ENCOUNTER — APPOINTMENT (RX ONLY)
Dept: URBAN - METROPOLITAN AREA CLINIC 4 | Facility: CLINIC | Age: 88
Setting detail: DERMATOLOGY
End: 2023-09-13

## 2023-09-13 DIAGNOSIS — L82.1 OTHER SEBORRHEIC KERATOSIS: ICD-10-CM

## 2023-09-13 DIAGNOSIS — D22 MELANOCYTIC NEVI: ICD-10-CM

## 2023-09-13 DIAGNOSIS — L81.4 OTHER MELANIN HYPERPIGMENTATION: ICD-10-CM

## 2023-09-13 DIAGNOSIS — D18.0 HEMANGIOMA: ICD-10-CM

## 2023-09-13 DIAGNOSIS — L30.9 DERMATITIS, UNSPECIFIED: ICD-10-CM

## 2023-09-13 DIAGNOSIS — Z85.828 PERSONAL HISTORY OF OTHER MALIGNANT NEOPLASM OF SKIN: ICD-10-CM

## 2023-09-13 DIAGNOSIS — Z71.89 OTHER SPECIFIED COUNSELING: ICD-10-CM

## 2023-09-13 PROBLEM — D18.01 HEMANGIOMA OF SKIN AND SUBCUTANEOUS TISSUE: Status: ACTIVE | Noted: 2023-09-13

## 2023-09-13 PROBLEM — D22.5 MELANOCYTIC NEVI OF TRUNK: Status: ACTIVE | Noted: 2023-09-13

## 2023-09-13 PROBLEM — D48.5 NEOPLASM OF UNCERTAIN BEHAVIOR OF SKIN: Status: ACTIVE | Noted: 2023-09-13

## 2023-09-13 PROCEDURE — ? SUNSCREEN RECOMMENDATIONS

## 2023-09-13 PROCEDURE — ? BIOPSY BY SHAVE METHOD

## 2023-09-13 PROCEDURE — ? OBSERVATION

## 2023-09-13 PROCEDURE — ? PRESCRIPTION

## 2023-09-13 PROCEDURE — 99213 OFFICE O/P EST LOW 20 MIN: CPT | Mod: 25

## 2023-09-13 PROCEDURE — ? COUNSELING

## 2023-09-13 PROCEDURE — 11102 TANGNTL BX SKIN SINGLE LES: CPT

## 2023-09-13 RX ORDER — TRIAMCINOLONE ACETONIDE 1 MG/G
CREAM TOPICAL
Qty: 30 | Refills: 3 | Status: ERX

## 2023-09-13 ASSESSMENT — LOCATION DETAILED DESCRIPTION DERM
LOCATION DETAILED: RIGHT NASAL ALA
LOCATION DETAILED: SUPERIOR LUMBAR SPINE
LOCATION DETAILED: EPIGASTRIC SKIN
LOCATION DETAILED: RIGHT POSTERIOR SHOULDER
LOCATION DETAILED: RIGHT LATERAL UPPER BACK
LOCATION DETAILED: INFERIOR THORACIC SPINE
LOCATION DETAILED: RIGHT CENTRAL TEMPLE
LOCATION DETAILED: LEFT POSTERIOR SHOULDER

## 2023-09-13 ASSESSMENT — LOCATION ZONE DERM
LOCATION ZONE: TRUNK
LOCATION ZONE: NOSE
LOCATION ZONE: ARM
LOCATION ZONE: FACE

## 2023-09-13 ASSESSMENT — LOCATION SIMPLE DESCRIPTION DERM
LOCATION SIMPLE: RIGHT SHOULDER
LOCATION SIMPLE: LOWER BACK
LOCATION SIMPLE: RIGHT UPPER BACK
LOCATION SIMPLE: LEFT SHOULDER
LOCATION SIMPLE: RIGHT TEMPLE
LOCATION SIMPLE: UPPER BACK
LOCATION SIMPLE: ABDOMEN
LOCATION SIMPLE: RIGHT NOSE

## 2023-09-26 ENCOUNTER — PHARMACY VISIT (OUTPATIENT)
Dept: PHARMACY | Facility: MEDICAL CENTER | Age: 88
End: 2023-09-26
Payer: COMMERCIAL

## 2023-09-26 PROCEDURE — RXMED WILLOW AMBULATORY MEDICATION CHARGE: Performed by: NURSE PRACTITIONER

## 2023-09-27 DIAGNOSIS — E78.5 DYSLIPIDEMIA: ICD-10-CM

## 2023-09-28 RX ORDER — ALIROCUMAB 75 MG/ML
75 INJECTION, SOLUTION SUBCUTANEOUS
Qty: 6 ML | Refills: 3 | Status: SHIPPED | OUTPATIENT
Start: 2023-09-28 | End: 2024-01-11 | Stop reason: SDUPTHER

## 2023-10-25 ENCOUNTER — APPOINTMENT (RX ONLY)
Dept: URBAN - METROPOLITAN AREA CLINIC 36 | Facility: CLINIC | Age: 88
Setting detail: DERMATOLOGY
End: 2023-10-25

## 2023-10-25 PROBLEM — C44.91 BASAL CELL CARCINOMA OF SKIN, UNSPECIFIED: Status: ACTIVE | Noted: 2023-10-25

## 2023-10-25 PROCEDURE — ? INJECTION

## 2023-10-25 PROCEDURE — 11900 INJECT SKIN LESIONS </W 7: CPT

## 2023-10-25 NOTE — PROCEDURE: INJECTION
Consent: The risks of the medication was reviewed with the patient.
Dose Administered (Numbers Only - Mg, G, Mcg, Units, Cc): 0.2
Hide Second Medication?: No
Type Of Vial Used?: Multi-Dose
Type Of Vial Used?: Single Dose
Medication (1) And Associated J-Code Units: Bleomycin, 15 units
Dose Administered (Numbers Only - Mg, G, Mcg, Units, Cc): 0
Procedure Information: Please note that the numeric value listed in the Medication (1) and associated J-code units and Medication (2) and associated J-code units variables are j-code amounts and do not represent either the concentration or the total amount of the medications injected.  I strongly recommend selecting no to the Render J-code information in note question. This will allow your note to be more clear. If you are billing j-codes with your injection codes you need to document the total amount of the medication injected. This amount should match the j-code units. For example, if you are injecting Triamcinolone 40mg as an intramuscular injection you would select 40 for the dose field.. This would allow you to document  with 4 units (40mg = 10mg x 4). The total volume is not used to calculate j-codes only the amount of the medication administered.
Treatment Number: 1
Post-Care Instructions: I reviewed with the patient in detail post-care instructions. Patient understands to keep the injection sites clean and call the clinic if there is any redness, swelling or pain.
Render J-Code Information In Note?: yes
Route: IL
Detail Level: None

## 2023-11-03 ENCOUNTER — PHARMACY VISIT (OUTPATIENT)
Dept: PHARMACY | Facility: MEDICAL CENTER | Age: 88
End: 2023-11-03
Payer: MEDICARE

## 2023-11-03 PROCEDURE — RXMED WILLOW AMBULATORY MEDICATION CHARGE: Performed by: INTERNAL MEDICINE

## 2023-11-03 RX ORDER — COVID-19 VACCINE, MRNA 0.23 MG/2.25ML
0.3 INJECTION, SUSPENSION INTRAMUSCULAR
Qty: 0.3 ML | Refills: 0 | Status: SHIPPED | OUTPATIENT
Start: 2023-11-03 | End: 2023-11-29

## 2023-11-09 ENCOUNTER — PHARMACY VISIT (OUTPATIENT)
Dept: PHARMACY | Facility: MEDICAL CENTER | Age: 88
End: 2023-11-09
Payer: MEDICARE

## 2023-11-09 ENCOUNTER — TELEPHONE (OUTPATIENT)
Dept: VASCULAR LAB | Facility: MEDICAL CENTER | Age: 88
End: 2023-11-09
Payer: MEDICARE

## 2023-11-09 PROCEDURE — RXMED WILLOW AMBULATORY MEDICATION CHARGE: Performed by: INTERNAL MEDICINE

## 2023-11-09 RX ORDER — INFLUENZA A VIRUS A/MICHIGAN/45/2015 X-275 (H1N1) ANTIGEN (FORMALDEHYDE INACTIVATED), INFLUENZA A VIRUS A/SINGAPORE/INFIMH-16-0019/2016 IVR-186 (H3N2) ANTIGEN (FORMALDEHYDE INACTIVATED), INFLUENZA B VIRUS B/PHUKET/3073/2013 ANTIGEN (FORMALDEHYDE INACTIVATED), AND INFLUENZA B VIRUS B/MARYLAND/15/2016 BX-69A ANTIGEN (FORMALDEHYDE INACTIVATED) 60; 60; 60; 60 UG/.7ML; UG/.7ML; UG/.7ML; UG/.7ML
INJECTION, SUSPENSION INTRAMUSCULAR
Qty: 0.7 ML | Refills: 0 | Status: SHIPPED | OUTPATIENT
Start: 2023-11-09 | End: 2023-11-29

## 2023-11-09 NOTE — TELEPHONE ENCOUNTER
Received Refill PA request via [employer]  for PRALUENT 75MG/ML SOLUTION AUTO INJECTOR. (Quantity:6 mls, Day Supply:84)     Insurance: [employer]  Member ID:  [id number]  BIN: [id number]  PCN: [id number]  Group: [id number]     Ran Test claim via [place] [facility] medication   Pays for a $243.93/84DS ; $35/28DS copay. Pt has been filling through [facility] pharmacy.     This liaison was able to meet and talk to the pt in person. Per insurance, Pt may qualify for the IM-Sense for hypercholesterolemia. However, Pt's insurance falls under commercial/private insurance, Pt must enroll to medicare in order to be qualified for the program.   This liaison called Pt's son, Kingsley and discussed the denial for the [employer]. Pt's son verbalized understanding and will relay the message to the Pt.     Will release Rx to [facility].     TANIYA Rubalcava, PhT  Pharmacy Liaison (Rx Coordinator)  P: [phone]  11/9/2023 11:05 AM

## 2023-11-14 ENCOUNTER — PHARMACY VISIT (OUTPATIENT)
Dept: PHARMACY | Facility: MEDICAL CENTER | Age: 88
End: 2023-11-14
Payer: COMMERCIAL

## 2023-11-14 PROCEDURE — RXMED WILLOW AMBULATORY MEDICATION CHARGE: Performed by: NURSE PRACTITIONER

## 2023-11-15 ENCOUNTER — APPOINTMENT (RX ONLY)
Dept: URBAN - METROPOLITAN AREA CLINIC 36 | Facility: CLINIC | Age: 88
Setting detail: DERMATOLOGY
End: 2023-11-15

## 2023-11-15 ENCOUNTER — HOSPITAL ENCOUNTER (OUTPATIENT)
Dept: CARDIOLOGY | Facility: MEDICAL CENTER | Age: 88
End: 2023-11-15
Attending: INTERNAL MEDICINE
Payer: MEDICARE

## 2023-11-15 ENCOUNTER — TELEPHONE (OUTPATIENT)
Dept: HEALTH INFORMATION MANAGEMENT | Facility: OTHER | Age: 88
End: 2023-11-15

## 2023-11-15 ENCOUNTER — HOSPITAL ENCOUNTER (OUTPATIENT)
Dept: LAB | Facility: MEDICAL CENTER | Age: 88
End: 2023-11-15
Attending: INTERNAL MEDICINE
Payer: MEDICARE

## 2023-11-15 DIAGNOSIS — I35.0 NONRHEUMATIC AORTIC VALVE STENOSIS: ICD-10-CM

## 2023-11-15 DIAGNOSIS — E78.5 DYSLIPIDEMIA: ICD-10-CM

## 2023-11-15 PROBLEM — C44.91 BASAL CELL CARCINOMA OF SKIN, UNSPECIFIED: Status: ACTIVE | Noted: 2023-11-15

## 2023-11-15 LAB
ALBUMIN SERPL BCP-MCNC: 4 G/DL (ref 3.2–4.9)
ALBUMIN/GLOB SERPL: 1.3 G/DL
ALP SERPL-CCNC: 81 U/L (ref 30–99)
ALT SERPL-CCNC: 10 U/L (ref 2–50)
ANION GAP SERPL CALC-SCNC: 11 MMOL/L (ref 7–16)
AST SERPL-CCNC: 20 U/L (ref 12–45)
BILIRUB SERPL-MCNC: 0.9 MG/DL (ref 0.1–1.5)
BUN SERPL-MCNC: 12 MG/DL (ref 8–22)
CALCIUM ALBUM COR SERPL-MCNC: 9.4 MG/DL (ref 8.5–10.5)
CALCIUM SERPL-MCNC: 9.4 MG/DL (ref 8.5–10.5)
CHLORIDE SERPL-SCNC: 103 MMOL/L (ref 96–112)
CHOLEST SERPL-MCNC: 135 MG/DL (ref 100–199)
CO2 SERPL-SCNC: 27 MMOL/L (ref 20–33)
CREAT SERPL-MCNC: 0.87 MG/DL (ref 0.5–1.4)
FASTING STATUS PATIENT QL REPORTED: NORMAL
GFR SERPLBLD CREATININE-BSD FMLA CKD-EPI: 63 ML/MIN/1.73 M 2
GLOBULIN SER CALC-MCNC: 3.2 G/DL (ref 1.9–3.5)
GLUCOSE SERPL-MCNC: 81 MG/DL (ref 65–99)
HDLC SERPL-MCNC: 56 MG/DL
LDLC SERPL CALC-MCNC: 61 MG/DL
LV EJECT FRACT  99904: 60
POTASSIUM SERPL-SCNC: 4.3 MMOL/L (ref 3.6–5.5)
PROT SERPL-MCNC: 7.2 G/DL (ref 6–8.2)
SODIUM SERPL-SCNC: 141 MMOL/L (ref 135–145)
TRIGL SERPL-MCNC: 90 MG/DL (ref 0–149)

## 2023-11-15 PROCEDURE — 93306 TTE W/DOPPLER COMPLETE: CPT | Mod: 26 | Performed by: INTERNAL MEDICINE

## 2023-11-15 PROCEDURE — ? INJECTION

## 2023-11-15 PROCEDURE — 36415 COLL VENOUS BLD VENIPUNCTURE: CPT

## 2023-11-15 PROCEDURE — 93306 TTE W/DOPPLER COMPLETE: CPT

## 2023-11-15 PROCEDURE — 11900 INJECT SKIN LESIONS </W 7: CPT

## 2023-11-15 PROCEDURE — 80053 COMPREHEN METABOLIC PANEL: CPT

## 2023-11-15 PROCEDURE — 80061 LIPID PANEL: CPT

## 2023-11-17 ENCOUNTER — TELEPHONE (OUTPATIENT)
Dept: CARDIOLOGY | Facility: MEDICAL CENTER | Age: 88
End: 2023-11-17
Payer: MEDICARE

## 2023-11-17 NOTE — TELEPHONE ENCOUNTER
----- Message from Montez Kamara M.D. sent at 11/15/2023  6:58 PM PST -----  The echo looks stable so overall good, please let her know     Thank you

## 2023-11-17 NOTE — LETTER
2023        Nini Taylor  [address]  [place] [address]          Dear Nini,    We have received the results of your recent: Echocardiogram      Your test came back stable.  Please follow up as previously discussed with your physician.      Feel free to call us with any questions.        Sincerely,          Ailyn ACOSTA  Electronically Signed                              Resulted Orders   EC-ECHOCARDIOGRAM COMPLETE W/O CONT   Result Value Ref Range    Left Ventrical Ejection Fraction 60     Narrative    Transthoracic  Echo Report      Echocardiography Laboratory    CONCLUSIONS  Compared to the images of the prior study on 21 - there has been   progression of aortic stenosis, previously mild.  Normal left ventricular systolic function.  The left ventricular ejection fraction is visually estimated to be 60%.  Normal right ventricular size and systolic function.  Mild mitral regurgitation.  Moderate aortic valve stenosis.  Mild aortic insufficiency.    NINI TAYLOR  Exam Date:         11/15/2023                      12:20  Exam Location:     Out Patient  Priority:          Routine    Ordering Physician:        MIRNA WREN  Referring Physician:       [id number] Barcenas  Sonographer:               Clarissa FINK    Age:    89     Gender:    F  MRN:    [id number]  :    [id number]  BSA:    1.52   Ht (in):    62     Wt (lb):    117  Exam Type:     Complete    Indications:     Aortic stenosis  ICD Codes:       424.1    CPT Codes:       54257    BP:   112    /   62     HR:  Technical Quality:       Fair    MEASUREMENTS  (Male / Female) Normal Values  2D ECHO  LV Diastolic Diameter PLAX        3.8 cm                4.2 - 5.9 / 3.9 - 5.3   cm  LV Systolic Diameter PLAX         2.3 cm                2.1 - 4.0 cm  IVS Diastolic Thickness           0.99 cm                 LVPW Diastolic Thickness          1.2 cm                   LVOT Diameter                     1.9 cm                  Estimated LV Ejection Fraction    60 %                      DOPPLER  AV Peak Velocity                  3 m/s                   AV Peak Gradient                  34.9 mmHg               AV Mean Gradient                  19.9 mmHg               LVOT Peak Velocity                0.74 m/s                AV Area Cont Eq vti               0.76 cm2                Mitral E Point Velocity           0.71 m/s                Mitral E to A Ratio               0.85                    MV Pressure Half Time             69.6 ms                 MV Area PHT                       3.2 cm2                 MV Deceleration Time              240 ms                  TR Peak Velocity                  268 cm/s                PV Peak Velocity                  1.2 m/s                 PV Peak Gradient                  5.5 mmHg                MV E' Velocity                    4.5 cm/s                  * Indicates values subject to auto-interpretation  LV EF:  60    %    FINDINGS  Left Ventricle  Normal left ventricular chamber size. Mild concentric left ventricular   hypertrophy. Normal left ventricular systolic function. The left   ventricular ejection fraction is visually estimated to be 60%.   Indeterminate diastolic function. Normal regional wall motion.    Right Ventricle  Normal right ventricular size and systolic function.    Right Atrium  Normal right atrial size. Normal inferior vena cava size and   inspiratory collapse.    Left Atrium  Severely dilated left atrium. Left atrial volume index is 48 mL/sq m.    Mitral Valve  Mild mitral annular calcification. No mitral stenosis. Mild mitral   regurgitation.    Aortic Valve  The aortic valve appears trileaflet. Moderate aortic valve stenosis.   Transvalvular gradients are - Peak: 35 mmHg, Mean: 20 mmHg. Vmax is    3.0m/s. Mild aortic insufficiency.    Tricuspid Valve  Structurally normal tricuspid valve without significant stenosis.  Mild   tricuspid regurgitation. Right atrial pressure is estimated to be 3   mmHg. Estimated right ventricular systolic pressure is 30 mmHg.    Pulmonic Valve  The pulmonic valve is not well visualized.    Pericardium  No pericardial effusion.    Aorta  Normal aortic root for body surface area.                      Montez Kamara MD  (Electronically Signed)  Final Date:     15 November 2023                   17:04

## 2023-11-29 ENCOUNTER — OFFICE VISIT (OUTPATIENT)
Dept: CARDIOLOGY | Facility: MEDICAL CENTER | Age: 88
End: 2023-11-29
Attending: INTERNAL MEDICINE
Payer: MEDICARE

## 2023-11-29 VITALS
SYSTOLIC BLOOD PRESSURE: 126 MMHG | DIASTOLIC BLOOD PRESSURE: 82 MMHG | OXYGEN SATURATION: 95 % | HEIGHT: 62 IN | WEIGHT: 115 LBS | BODY MASS INDEX: 21.16 KG/M2 | RESPIRATION RATE: 16 BRPM | HEART RATE: 72 BPM

## 2023-11-29 DIAGNOSIS — I71.43 INFRARENAL ABDOMINAL AORTIC ANEURYSM (AAA) WITHOUT RUPTURE (HCC): ICD-10-CM

## 2023-11-29 DIAGNOSIS — I70.219 ATHEROSCLEROSIS OF NATIVE ARTERY OF EXTREMITY WITH INTERMITTENT CLAUDICATION, UNSPECIFIED EXTREMITY (HCC): ICD-10-CM

## 2023-11-29 DIAGNOSIS — I35.0 NONRHEUMATIC AORTIC VALVE STENOSIS: ICD-10-CM

## 2023-11-29 DIAGNOSIS — I47.10 SVT (SUPRAVENTRICULAR TACHYCARDIA) (HCC): Chronic | ICD-10-CM

## 2023-11-29 DIAGNOSIS — E78.2 MIXED HYPERLIPIDEMIA: ICD-10-CM

## 2023-11-29 PROCEDURE — 99211 OFF/OP EST MAY X REQ PHY/QHP: CPT | Performed by: INTERNAL MEDICINE

## 2023-11-29 PROCEDURE — 3079F DIAST BP 80-89 MM HG: CPT | Performed by: INTERNAL MEDICINE

## 2023-11-29 PROCEDURE — 3074F SYST BP LT 130 MM HG: CPT | Performed by: INTERNAL MEDICINE

## 2023-11-29 PROCEDURE — 99214 OFFICE O/P EST MOD 30 MIN: CPT | Performed by: INTERNAL MEDICINE

## 2023-11-29 RX ORDER — FUROSEMIDE 20 MG/1
20 TABLET ORAL
Qty: 90 TABLET | Refills: 3 | Status: SHIPPED | OUTPATIENT
Start: 2023-11-29

## 2023-11-30 NOTE — PROGRESS NOTES
Chief Complaint   Patient presents with    Supraventricular Tachycardia (SVT)    Aortic Stenosis     F/v dx: Nonrheumatic aortic valve stenosis    Hyperlipidemia       Subjective     Nini Taylor is a 89 y.o. 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 10/16/2013    Blepharospasm syndrome     Blepharospasm syndrome      IMO load March 2020    Bright red rectal bleeding 6/24/2015    Cellulitis 5/5/2021    Chest pain 07/2015    Unspecified; Nuclear stress test negative     Constipation     Edema 5/29/2012    Female bladder prolapse, acquired     GERD (gastroesophageal reflux disease)     Hyperlipidemia     Hypertension     Insomnia     Low vitamin D level 4/20/2018    OSTEOPOROSIS     Palpitations 5/29/2012    Primary insomnia 1/10/2017    Sleep apnea     SVT (supraventricular tachycardia) (HCC) - on Zio 2015 and 2019 8/17/2015    Varicose veins 5/29/2012    Vitamin D deficiency disease     Vitamin D deficiency disease      IMO load March 2020     Past Surgical History:   Procedure Laterality Date    CYSTOCELE REPAIR  1/17/2011    Performed by GILMAR CHUNG at [facility]    RECTOCELE REPAIR  1/17/2011    Performed by GILMAR CHUNG at [facility]    BLADDER SUSPENSION  1/17/2011    Performed by GILMAR CHUNG at [facility]    CYSTOSCOPY  1/17/2011    Performed by GILMAR CHUNG at [facility]    ABDOMINAL HYSTERECTOMY TOTAL      ARTHROSCOPY, KNEE      CATARACT EXTRACTION WITH IOL      HEMORRHOIDECTOMY      HYSTERECTOMY, TOTAL ABDOMINAL      INJECTION SCLERO HEMORROIDS      OTHER ORTHOPEDIC SURGERY      knee scope x 2    PB KNEE SCOPE,DIAGNOSTIC   2003;2009    TONSILLECTOMY       Family History   Problem Relation Age of Onset    Non-contributory Mother         gall bladder surgery    Heart Disease Mother     Cancer Father         colon rectal    Heart Disease Father         rheumatic heart disease    Other Brother         HIT BY CAR    Heart Disease Brother     Sleep Apnea Brother     No Known Problems Maternal Grandmother     No Known Problems Maternal Grandfather     No Known Problems Paternal Grandmother     No Known Problems Paternal Grandfather     No Known Problems Son      Social History     Socioeconomic History    Marital status:      Spouse name: Not on file    Number of children: Not on file    Years of education: Not on file    Highest education level: Not on file   Occupational History    Not on file   Tobacco Use    Smoking status: Never    Smokeless tobacco: Never   Vaping Use    Vaping Use: Never used   Substance and Sexual Activity    Alcohol use: Not Currently     Alcohol/week: 0.0 oz     Comment: occasionally    Drug use: No    Sexual activity: Never   Other Topics Concern    Not on file   Social History Narrative    Not on file     Social Determinants of Health     Financial Resource Strain: Not on file   Food Insecurity: Not on file   Transportation Needs: Not on file   Physical Activity: Not on file   Stress: Not on file   Social Connections: Not on file   Intimate Partner Violence: Not on file   Housing Stability: Not on file     Allergies   Allergen Reactions    Atorvastatin     Atorvastatin Calcium     Bactrim Ds Hives    Hydrocodone-Acetaminophen     Levaquin     Levofloxacin     Pneumococcal Vaccines Swelling    Sulfa Drugs Hives    Vicodin [Hydrocodone-Acetaminophen] Rash    Vytorin      Outpatient Encounter Medications as of 11/29/2023   Medication Sig Dispense Refill    metoprolol tartrate (LOPRESSOR) 25 MG Tab Take 1 Tablet by mouth 2 times a day. 180 Tablet 3    furosemide (LASIX) 20 MG Tab Take 1 Tablet by mouth 1 time  "a day as needed (swelling). 90 Tablet 3    Alirocumab (PRALUENT) 75 MG/ML Solution Auto-injector Inject 75 mg under the skin every 14 days. 6 mL 3    Cholecalciferol (VITAMIN D3) 2000 UNIT Cap Take 2 capsules by mouth once daily 60 capsule 0    Multiple Vitamins-Minerals (CENTRUM SILVER PO) Take 1 Tablet by mouth every day.      [DISCONTINUED] influenza Vac High-Dose Quad (FLUZONE HIGH-DOSE QUADRIVALENT) 0.7 ML Suspension Prefilled Syringe injection Inject  into the shoulder, thigh, or buttocks. (Patient not taking: Reported on 11/29/2023) 0.7 mL 0    [DISCONTINUED] COVID-19 mRNA Vac-Tyrone,Pfizer, (COMIRNATY) 30 MCG/0.3ML Suspension injection Inject 0.3 mL into the shoulder, thigh, or buttocks. (Patient not taking: Reported on 11/29/2023) 0.3 mL 0    [DISCONTINUED] furosemide (LASIX) 20 MG Tab Take 1 Tablet by mouth 1 time a day as needed (swelling). 90 Tablet 3    [DISCONTINUED] metoprolol tartrate (LOPRESSOR) 25 MG Tab Take 1 tablet by mouth twice daily 180 Tablet 3     No facility-administered encounter medications on file as of 11/29/2023.     ROS           Objective     /82 (BP Location: Left arm, Patient Position: Sitting, BP Cuff Size: Adult)   Pulse 72   Resp 16   Ht 1.575 m (5' 2\")   Wt 52.2 kg (115 lb)   SpO2 95%   BMI 21.03 kg/m²     Physical Exam  Constitutional:       General: She is not in acute distress.     Appearance: She is not diaphoretic.   Eyes:      General: No scleral icterus.  Neck:      Vascular: No JVD.   Cardiovascular:      Rate and Rhythm: Normal rate.      Heart sounds: Murmur heard.      No friction rub. No gallop.   Pulmonary:      Effort: No respiratory distress.      Breath sounds: No wheezing or rales.   Abdominal:      General: Bowel sounds are normal.      Palpations: Abdomen is soft.   Musculoskeletal:      Right lower leg: No edema.      Left lower leg: No edema.   Skin:     Findings: No rash.   Neurological:      Mental Status: She is alert. Mental status is at " baseline.   Psychiatric:         Mood and Affect: Mood normal.            We reviewed in person the most recent labs  Recent Results (from the past 5040 hour(s))   Lipid Profile    Collection Time: 05/08/23 12:26 PM   Result Value Ref Range    Cholesterol,Tot 130 100 - 199 mg/dL    Triglycerides 86 0 - 149 mg/dL    HDL 52 >=40 mg/dL    LDL 61 <100 mg/dL   Comp Metabolic Panel    Collection Time: 05/08/23 12:26 PM   Result Value Ref Range    Sodium 141 135 - 145 mmol/L    Potassium 4.4 3.6 - 5.5 mmol/L    Chloride 104 96 - 112 mmol/L    Co2 24 20 - 33 mmol/L    Anion Gap 13.0 7.0 - 16.0    Glucose 95 65 - 99 mg/dL    Bun 12 8 - 22 mg/dL    Creatinine 0.90 0.50 - 1.40 mg/dL    Calcium 8.9 8.5 - 10.5 mg/dL    AST(SGOT) 19 12 - 45 U/L    ALT(SGPT) 10 2 - 50 U/L    Alkaline Phosphatase 86 30 - 99 U/L    Total Bilirubin 0.8 0.1 - 1.5 mg/dL    Albumin 3.7 3.2 - 4.9 g/dL    Total Protein 6.7 6.0 - 8.2 g/dL    Globulin 3.0 1.9 - 3.5 g/dL    A-G Ratio 1.2 g/dL   FASTING STATUS    Collection Time: 05/08/23 12:26 PM   Result Value Ref Range    Fasting Status Fasting    CORRECTED CALCIUM    Collection Time: 05/08/23 12:26 PM   Result Value Ref Range    Correct Calcium 9.1 8.5 - 10.5 mg/dL   ESTIMATED GFR    Collection Time: 05/08/23 12:26 PM   Result Value Ref Range    GFR (CKD-EPI) 60 >60 mL/min/1.73 m 2   EC-ECHOCARDIOGRAM COMPLETE W/O CONT    Collection Time: 11/15/23  1:05 PM   Result Value Ref Range    Left Ventrical Ejection Fraction 60    Lipid Profile    Collection Time: 11/15/23  1:41 PM   Result Value Ref Range    Cholesterol,Tot 135 100 - 199 mg/dL    Triglycerides 90 0 - 149 mg/dL    HDL 56 >=40 mg/dL    LDL 61 <100 mg/dL   Comp Metabolic Panel    Collection Time: 11/15/23  1:41 PM   Result Value Ref Range    Sodium 141 135 - 145 mmol/L    Potassium 4.3 3.6 - 5.5 mmol/L    Chloride 103 96 - 112 mmol/L    Co2 27 20 - 33 mmol/L    Anion Gap 11.0 7.0 - 16.0    Glucose 81 65 - 99 mg/dL    Bun 12 8 - 22 mg/dL     Creatinine 0.87 0.50 - 1.40 mg/dL    Calcium 9.4 8.5 - 10.5 mg/dL    Correct Calcium 9.4 8.5 - 10.5 mg/dL    AST(SGOT) 20 12 - 45 U/L    ALT(SGPT) 10 2 - 50 U/L    Alkaline Phosphatase 81 30 - 99 U/L    Total Bilirubin 0.9 0.1 - 1.5 mg/dL    Albumin 4.0 3.2 - 4.9 g/dL    Total Protein 7.2 6.0 - 8.2 g/dL    Globulin 3.2 1.9 - 3.5 g/dL    A-G Ratio 1.3 g/dL   FASTING STATUS    Collection Time: 11/15/23  1:41 PM   Result Value Ref Range    Fasting Status Fasting    ESTIMATED GFR    Collection Time: 11/15/23  1:41 PM   Result Value Ref Range    GFR (CKD-EPI) 63 >60 mL/min/1.73 m 2     We reviewed her echo she has stable moderate aortic stenosis mean gradient 20    Assessment & Plan     1. SVT (supraventricular tachycardia) (HCC) - on Zio 2015 and 2019  metoprolol tartrate (LOPRESSOR) 25 MG Tab      2. Nonrheumatic aortic valve stenosis  furosemide (LASIX) 20 MG Tab      3. Mixed hyperlipidemia        4. Atherosclerosis of native artery of extremity with intermittent claudication, unspecified extremity (HCC)        5. Infrarenal abdominal aortic aneurysm (AAA) without rupture (HCC)            Medical Decision Making: Today's Assessment/Status/Plan:        It was my pleasure to meet with Ms. Taylor.    We addressed the management of hypertension at today's visit. Blood pressure is well controlled.  We specifically assessed the labs on hypertension treatment    We addressed the management of dyslipidemia and atherosclerosis at today's visit. She is on appropriate statin.    We addressed the management of atherosclerotic artery disease.  She is on proper antiplatelet, cholesterol management and beta-blockers as appropriate.  We addressed the potential side effects and laboratory follow-up for these medications.    I will see Ms. Taylor back in 1 year time and encouraged her to follow up with us over the phone or electronically using my MyChart as issues arise.    It is my pleasure to participate in the care of Ms.  Claudia.  Please do not hesitate to contact me with questions or concerns.    Montez Kamara MD PhD [facility]  Cardiologist [facility]    Please note that this dictation was created using voice recognition software. There may be errors I did not discover before finalizing the note.

## 2023-12-07 ENCOUNTER — TELEPHONE (OUTPATIENT)
Dept: SLEEP MEDICINE | Facility: MEDICAL CENTER | Age: 88
End: 2023-12-07
Payer: MEDICARE

## 2023-12-07 ENCOUNTER — DOCUMENTATION (OUTPATIENT)
Dept: CARDIOLOGY | Facility: MEDICAL CENTER | Age: 88
End: 2023-12-07
Payer: MEDICARE

## 2023-12-07 DIAGNOSIS — G47.33 OSA (OBSTRUCTIVE SLEEP APNEA): ICD-10-CM

## 2023-12-07 NOTE — TELEPHONE ENCOUNTER
Nini and spouse, Kingsley, presented in clinic today regarding a mask issue. States that a new mask was ordered by [employer]FA[facility] Morales in August of 2023, states mask and supplies were on back order. When mask was finally received, they were unable to obtain any cushions, Nini has continued to use her old Resmed mask and cushions. Nini went to [facility] to obtain supplies and have mask put together for use and were told an new order was needed and they were unable to help with putting the mask together.   I took the mask and cushions to [facility] to put together and it was discovered there was a piece missing and no parts were on hand to put it together.   Due to issues obtaining supplies and customer service experienced by patients, RAFAEL Morales put in a new order to [facility] for a mask fit, mask, and supplies. Per RAFAEL Morales, patient was informed to contact [facility] in a week or so to schedule the fitting an supply , but for anything needed immediately they may contact them today for out of pocket purchase for possible reimbursement from insurance company. Nini also instructed to continue use of Resmed mask until new mask is received.   Additionally, Nini and Kingsley were provided with [employer] contact information, and an appointment reminder for 02/2024 w/RAFAEL Morales.

## 2023-12-07 NOTE — PROGRESS NOTES
This patient has been flagged through our echocardiogram surveillance with the following echocardiogram results:    Moderate Aortic Stenosis    Cardiologist: Dr. Kamara    Current Plan of Care for Structural Heart Disease: Added to surveillance tracking list    Next Echo date needed by: 11/15/2024    Next Follow up appointment: 5/28/2024 with Dr. Kamara

## 2023-12-12 ENCOUNTER — NON-PROVIDER VISIT (OUTPATIENT)
Dept: VASCULAR LAB | Facility: MEDICAL CENTER | Age: 88
End: 2023-12-12
Attending: INTERNAL MEDICINE
Payer: MEDICARE

## 2023-12-12 VITALS — SYSTOLIC BLOOD PRESSURE: 130 MMHG | DIASTOLIC BLOOD PRESSURE: 73 MMHG | HEART RATE: 75 BPM

## 2023-12-12 DIAGNOSIS — E78.5 DYSLIPIDEMIA: ICD-10-CM

## 2023-12-12 PROCEDURE — 99212 OFFICE O/P EST SF 10 MIN: CPT

## 2023-12-12 NOTE — PROGRESS NOTES
"[facility] - FollowUp Visit  Date of Service: 12/12/23    Nini Taylor is here for follow up of dyslipidemia.    Subjective    HPI  Pertinent Interval History since last visit:   None, continues to tolerate all medications.   Current Prescription Lipid Lowering Medications - including dose:   Statin: None  Non-Statin: Praluent 75 mg every 2 weeks.   Current Lipid Lowering and Related Supplements:   Vit D  Any Current Side Effects Potentially Related to Lipid Lowering therapy?   No  Current Adherence to Lipid Lowering Therapies:  Complete  Any Previous History of Statin Intolerance?   Statin: Simvastatin Unknown dose/duration - myalgias                Crestor unkown dose, duration \"years\" - states she started getting sharp \"zings\" down her arms.              Atorvastatin unknown dose, duration of 90 days - severe UE myalgia              Rosuvastatin - low dose alternate days - myalgias    Non-Statin: States none, however MR shows possibly Vytorin use              Outcome: Unknown  Any Previous History of Statin Intolerance?   Yes, Details: See above.    Baseline Lipids Prior to Treatment:          2/6/2017 12:20     Cholesterol,Tot   274 (H)     Triglycerides   101     HDL   58     LDL   196 (H)          SOCIAL HISTORY  Social History     Tobacco Use   Smoking Status Never   Smokeless Tobacco Never      Change in weight: Stable  Exercise habits: Walking at [employer].  She reports 4 falls but all were > 6 months ago.  Discussed getting a walker but pt declines.   Diet: common adult      Objective    Vitals:    12/12/23 1118   BP: 130/73   Pulse: 75      Physical Exam    DATA REVIEW  Most Recent Lipid Panel:   Lab Results   Component Value Date    CHOLSTRLTOT 135 11/15/2023    TRIGLYCERIDE 90 11/15/2023    HDL 56 11/15/2023    LDL 61 11/15/2023       Other Pertinent Blood Work:   Lab Results   Component Value Date    SODIUM 141 11/15/2023    POTASSIUM 4.3 11/15/2023    CHLORIDE 103 11/15/2023    CO2 27 " 11/15/2023    ANION 11.0 11/15/2023    GLUCOSE 81 11/15/2023    BUN 12 11/15/2023    CREATININE 0.87 11/15/2023    CALCIUM 9.4 11/15/2023    ASTSGOT 20 11/15/2023    ALTSGPT 10 11/15/2023    ALKPHOSPHAT 81 11/15/2023    TBILIRUBIN 0.9 11/15/2023    ALBUMIN 4.0 11/15/2023    AGRATIO 1.3 11/15/2023       Other:  NA    Recent Imaging Studies:    None since last visit          ASSESSMENT AND PLAN  Patient Type, check all that apply:    Secondary Prevention (Abdominal aortic aneurism)  Established Atherosclerotic Cardiovascular Disease (ASCVD)  AAA - most recent imaging with small AAA, continue medical management and continue to defer further surveillance to Dr. 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  Discussed using a walker but pt declines.   Diet:I suggest no changes.  Lipids well controlled and pt is 89.  I think she should focus on maintaining her weight and not losing any more of it.   Statin Recommendations from Today's Visit  Continue to avoid given inablity to tolerate.   Non-Statin Medications Recommendations from Today’s Visit:   Continue Praluent 75 mg every 14 days.  Pt reports no issues or barriers with treatment.   Indication for PCSK9 Inhibitor, if  applicable:  FH with suboptimal control of LDL-C despite maximally tolerated statin  Supplements Recommended at this visit:  None  Recommendations for Other Cardiovascular Risk Factors, gurpreet all that apply:   HTN- BP controlled at today's visit.  Continue to monitor.   Other Issues:  None    Studies Ordered at Todays Visit:  None   Blood Work Ordered At Today’s visit:   CMP, lipid panel  Follow-Up:   6 months    Chandler Brown, PharmD    CC:  Arianna Cabrera M.D.  Dr Bloch

## 2024-01-11 DIAGNOSIS — E78.5 DYSLIPIDEMIA: ICD-10-CM

## 2024-01-11 PROCEDURE — RXMED WILLOW AMBULATORY MEDICATION CHARGE: Performed by: NURSE PRACTITIONER

## 2024-01-17 ENCOUNTER — PHARMACY VISIT (OUTPATIENT)
Dept: PHARMACY | Facility: MEDICAL CENTER | Age: 89
End: 2024-01-17
Payer: COMMERCIAL

## 2024-01-17 ENCOUNTER — APPOINTMENT (RX ONLY)
Dept: URBAN - METROPOLITAN AREA CLINIC 36 | Facility: CLINIC | Age: 89
Setting detail: DERMATOLOGY
End: 2024-01-17

## 2024-01-17 DIAGNOSIS — L72.0 EPIDERMAL CYST: ICD-10-CM

## 2024-01-17 PROBLEM — C44.311 BASAL CELL CARCINOMA OF SKIN OF NOSE: Status: ACTIVE | Noted: 2024-01-17

## 2024-01-17 PROCEDURE — ? OBSERVATION

## 2024-01-17 PROCEDURE — ? ADDITIONAL NOTES

## 2024-01-17 PROCEDURE — ? COUNSELING

## 2024-01-17 PROCEDURE — 99212 OFFICE O/P EST SF 10 MIN: CPT

## 2024-01-17 ASSESSMENT — LOCATION DETAILED DESCRIPTION DERM: LOCATION DETAILED: NASAL ROOT

## 2024-01-17 ASSESSMENT — LOCATION ZONE DERM: LOCATION ZONE: NOSE

## 2024-01-17 ASSESSMENT — LOCATION SIMPLE DESCRIPTION DERM: LOCATION SIMPLE: NOSE

## 2024-01-17 NOTE — PROCEDURE: ADDITIONAL NOTES
Additional Notes: Extracted using 18G needle and q tips
Detail Level: Simple
Render Risk Assessment In Note?: no

## 2024-01-19 RX ORDER — ALIROCUMAB 75 MG/ML
75 INJECTION, SOLUTION SUBCUTANEOUS
Qty: 6 ML | Refills: 3 | Status: SHIPPED | OUTPATIENT
Start: 2024-01-19

## 2024-02-08 ENCOUNTER — OFFICE VISIT (OUTPATIENT)
Dept: URGENT CARE | Facility: PHYSICIAN GROUP | Age: 89
End: 2024-02-08
Payer: MEDICARE

## 2024-02-08 VITALS
TEMPERATURE: 98.1 F | HEART RATE: 88 BPM | WEIGHT: 118 LBS | BODY MASS INDEX: 23.16 KG/M2 | HEIGHT: 60 IN | SYSTOLIC BLOOD PRESSURE: 116 MMHG | DIASTOLIC BLOOD PRESSURE: 74 MMHG | OXYGEN SATURATION: 95 % | RESPIRATION RATE: 14 BRPM

## 2024-02-08 DIAGNOSIS — K59.00 CONSTIPATION, UNSPECIFIED CONSTIPATION TYPE: ICD-10-CM

## 2024-02-08 PROCEDURE — 99214 OFFICE O/P EST MOD 30 MIN: CPT | Performed by: PHYSICIAN ASSISTANT

## 2024-02-08 PROCEDURE — 3078F DIAST BP <80 MM HG: CPT | Performed by: PHYSICIAN ASSISTANT

## 2024-02-08 PROCEDURE — 3074F SYST BP LT 130 MM HG: CPT | Performed by: PHYSICIAN ASSISTANT

## 2024-02-08 ASSESSMENT — ENCOUNTER SYMPTOMS
SHORTNESS OF BREATH: 0
DIARRHEA: 0
WEAKNESS: 0
PALPITATIONS: 0
ABDOMINAL PAIN: 1
CONSTIPATION: 1
NAUSEA: 0
VOMITING: 0
BLOOD IN STOOL: 0
DIZZINESS: 0
FEVER: 0
CHILLS: 0

## 2024-02-09 NOTE — PROGRESS NOTES
Subjective:   Nini Taylor is a 89 y.o. female who presents today with   Chief Complaint   Patient presents with    Bowel Problem     No bowel movement in 2 days      Constipation  This is a new problem. Episode onset: 2 days. The problem is unchanged. There has Been adequate water intake. Associated symptoms include abdominal pain. Pertinent negatives include no diarrhea, fever, melena, nausea or vomiting. Treatments tried: 2 doses of miralax. The treatment provided mild relief.     Patient is a patient of Dr. Baxter and states she just started on Augmentin 2 days ago for urinary tract infection.  Patient states she has only been taking a teaspoon of MiraLAX for couple days but has not had that for the last couple days.  She states she is paranoid about not having a bowel movement because she does not want to have a blockage.  Patient states she has noticed a couple episodes of minimal abdominal discomfort over the last couple of days along with her constipation.  Does not describe the pain as tearing or ripping. No pain on exam.    PMH:  has a past medical history of AAA (abdominal aortic aneurysm) ([facility]), Atherosclerosis of native arteries of the extremities with intermittent claudication (10/16/2013), Blepharospasm syndrome, Blepharospasm syndrome, Bright red rectal bleeding (6/24/2015), Cellulitis (5/5/2021), Chest pain (07/2015), Constipation, Edema (5/29/2012), Female bladder prolapse, acquired, GERD (gastroesophageal reflux disease), Hyperlipidemia, Hypertension, Insomnia, Low vitamin D level (4/20/2018), OSTEOPOROSIS, Palpitations (5/29/2012), Primary insomnia (1/10/2017), Sleep apnea, SVT (supraventricular tachycardia) ([facility]) - on Zio 2015 and 2019 (8/17/2015), Varicose veins (5/29/2012), Vitamin D deficiency disease, and Vitamin D deficiency disease.  MEDS:   Current Outpatient Medications:     Alirocumab (PRALUENT) 75 MG/ML Solution Auto-injector, Inject 75 mg under the skin every 14 days., Disp: 6  mL, Rfl: 3    metoprolol tartrate (LOPRESSOR) 25 MG Tab, Take 1 Tablet by mouth 2 times a day., Disp: 180 Tablet, Rfl: 3    furosemide (LASIX) 20 MG Tab, Take 1 Tablet by mouth 1 time a day as needed (swelling)., Disp: 90 Tablet, Rfl: 3    Cholecalciferol (VITAMIN D3) 2000 UNIT Cap, Take 2 capsules by mouth once daily, Disp: 60 capsule, Rfl: 0    Multiple Vitamins-Minerals (CENTRUM SILVER PO), Take 1 Tablet by mouth every day., Disp: , Rfl:   ALLERGIES:   Allergies   Allergen Reactions    Atorvastatin     Atorvastatin Calcium     Bactrim Ds Hives    Hydrocodone-Acetaminophen     Levaquin     Levofloxacin     Pneumococcal Vaccines Swelling    Sulfa Drugs Hives    Vicodin [Hydrocodone-Acetaminophen] Rash    Vytorin      SURGHX:   Past Surgical History:   Procedure Laterality Date    CYSTOCELE REPAIR  1/17/2011    Performed by GILMAR CHUNG at [facility]    RECTOCELE REPAIR  1/17/2011    Performed by GILMAR CHUNG at [facility]    BLADDER SUSPENSION  1/17/2011    Performed by GILMAR CHUNG at [facility]    CYSTOSCOPY  1/17/2011    Performed by GILMAR CHUNG at [facility]    ABDOMINAL HYSTERECTOMY TOTAL      ARTHROSCOPY, KNEE      CATARACT EXTRACTION WITH IOL      HEMORRHOIDECTOMY      HYSTERECTOMY, TOTAL ABDOMINAL      INJECTION SCLERO HEMORROIDS      OTHER ORTHOPEDIC SURGERY      knee scope x 2    PB KNEE SCOPE,DIAGNOSTIC  2003;2009    TONSILLECTOMY       SOCHX:  reports that she has never smoked. She has never used smokeless tobacco. She reports that she does not currently use alcohol. She reports that she does not use drugs.  FH: Reviewed with patient, not pertinent to this visit.     Review of Systems   Constitutional:  Negative for chills and fever.   Respiratory:  Negative for shortness of breath.    Cardiovascular:  Negative for chest pain and palpitations.   Gastrointestinal:  Positive for abdominal pain and  constipation. Negative for blood in stool, diarrhea, melena, nausea and vomiting.   Genitourinary:  Positive for dysuria. Negative for frequency, hematuria and urgency.   Neurological:  Negative for dizziness and weakness.      Objective:   /74   Pulse 88   Temp 36.7 °C (98.1 °F) (Temporal)   Resp 14   Ht 1.524 m (5')   Wt 53.5 kg (118 lb)   SpO2 95%   BMI 23.05 kg/m²   Physical Exam  Vitals and nursing note reviewed.   Constitutional:       General: She is not in acute distress.     Appearance: Normal appearance. She is well-developed. She is not ill-appearing or toxic-appearing.      Comments: Smiling and talkative on exam   HENT:      Head: Normocephalic and atraumatic.      Right Ear: Hearing normal.      Left Ear: Hearing normal.   Cardiovascular:      Rate and Rhythm: Normal rate and regular rhythm.      Heart sounds: Normal heart sounds.   Pulmonary:      Effort: Pulmonary effort is normal.      Breath sounds: Normal breath sounds.   Abdominal:      General: Bowel sounds are normal. There is no distension or abdominal bruit.      Palpations: Abdomen is soft. There is no mass or pulsatile mass.      Tenderness: There is no abdominal tenderness. There is no guarding or rebound.      Hernia: No hernia is present.   Musculoskeletal:      Comments: Normal movement in all 4 extremities   Skin:     General: Skin is warm and dry.   Neurological:      Mental Status: She is alert.      Coordination: Coordination normal.   Psychiatric:         Mood and Affect: Mood normal.       Assessment/Plan:   Assessment    1. Constipation, unspecified constipation type  - Referral back to PCP    Symptoms and presentation appear consistent with constipation at this time.  No acute concerning abdominal findings on my exam today.  Patient has no tenderness to palpation on my exam.  No indication for imaging at this time.  Discussed extensively my recommendations to trial MiraLAX.  Discussed proper dose of MiraLAX as well  and would recommend trying that for the next 3 to 5 days once daily.  Vital signs are stable on my exam.  Patient currently being treated for urinary tract infection.  Continue with antibiotics as previously prescribed from her women's health physician.  If no BM in the next couple of days would recommend returning for reevaluation.  She could also trial suppository over-the-counter per 's instructions.  Did consider AAA on differential only because she has history of this but given that she is not having any findings suggestive of this such as no abdominal tenderness, no pulsatile mass, no abdominal bruit, no fullness or distention of abdomen, and her vital signs are stable no indication to order any imaging at this time. More consistent with constipation at this time. But patient is understanding to return with any abdominal pain or new symptoms.    Differential diagnosis, natural history, supportive care, and indications for immediate follow-up discussed.   Patient given instructions and understanding of medications and treatment.    If not improving in 3-5 days, F/U with PCP or return to UC if symptoms worsen.  ER precautions discussed for further workup if pain returns or with any new symptoms.  Patient agreeable to plan.      Please note that this dictation was created using voice recognition software. I have made every reasonable attempt to correct obvious errors, but I expect that there are errors of grammar and possibly content that I did not discover before finalizing the note.    Anil Santiago PA-C

## 2024-02-20 ENCOUNTER — OFFICE VISIT (OUTPATIENT)
Dept: SLEEP MEDICINE | Facility: MEDICAL CENTER | Age: 89
End: 2024-02-20
Attending: NURSE PRACTITIONER
Payer: MEDICARE

## 2024-02-20 VITALS
SYSTOLIC BLOOD PRESSURE: 112 MMHG | WEIGHT: 115.8 LBS | HEART RATE: 83 BPM | BODY MASS INDEX: 21.31 KG/M2 | HEIGHT: 62 IN | DIASTOLIC BLOOD PRESSURE: 60 MMHG | RESPIRATION RATE: 16 BRPM | OXYGEN SATURATION: 94 %

## 2024-02-20 DIAGNOSIS — G47.33 OSA (OBSTRUCTIVE SLEEP APNEA): ICD-10-CM

## 2024-02-20 PROCEDURE — 3078F DIAST BP <80 MM HG: CPT | Performed by: NURSE PRACTITIONER

## 2024-02-20 PROCEDURE — 3074F SYST BP LT 130 MM HG: CPT | Performed by: NURSE PRACTITIONER

## 2024-02-20 PROCEDURE — 99214 OFFICE O/P EST MOD 30 MIN: CPT | Performed by: NURSE PRACTITIONER

## 2024-02-20 PROCEDURE — 99213 OFFICE O/P EST LOW 20 MIN: CPT | Performed by: NURSE PRACTITIONER

## 2024-02-20 ASSESSMENT — PATIENT HEALTH QUESTIONNAIRE - PHQ9: CLINICAL INTERPRETATION OF PHQ2 SCORE: 0

## 2024-02-20 NOTE — PROGRESS NOTES
Chief Complaint   Patient presents with    Follow-Up     Last seen 08/03/2023 Teddy Morales  6month. F/v RUY       HPI:  Nini Taylor is a 89 y.o. year old female here today for follow-up on RUY on BiPAP.  Last seen by me on 3/3/2023.  Patient presents with her spouse. Former [facility] patient. PMH includes SVT, GERD, osteoporosis, dyslipidemia, hypertension, arthrosclerosis of native arteries of extremities, AAA, chronic insomnia, never smoker, tonsillectomy, hard of hearing.     Currently on BiPAP 13 over 9 cm/H2O. Currently using ResMed F20 fullface mask. Patient also uses heated tubing.  Denies any difficulty tolerating mask fit or pressures.She also has a ramp set for 45 minutes.  Patient drinks lots of water and wakes up proximately every 2-3 hours to use the bathroom.  She also notes mask leak with excessive dry mouth.  Her  has increased the humidification and this seems to help.  Previously she was complaining of pressure intolerance.  After adjustment, patient is tolerating pressure fairly well.  Patient also states that the mask leak has gotten better.  She goes to sleep at 9:30 PM and wakes up around 7:30 AM.  She does note waking up approximately 6 times during the night to use the bathroom.  She also has a busy mind and has difficulty falling asleep.  She has completed CBT-I.       Today compliance reviewed with patient shows 100% use with an average time of 8 hours and 46 minutes and resultant AHI of 1.8.  There is occasional mask leak, but has improved significantly compared to prior compliance reviews.    ROS: As per HPI and otherwise negative if not stated.    Past Medical History:   Diagnosis Date    AAA (abdominal aortic aneurysm) (HCC)     Atherosclerosis of native arteries of the extremities with intermittent claudication 10/16/2013    Blepharospasm syndrome     Blepharospasm syndrome      IMO load March 2020    Bright red rectal bleeding 6/24/2015    Cellulitis 5/5/2021    Chest pain  07/2015    Unspecified; Nuclear stress test negative     Constipation     Edema 5/29/2012    Female bladder prolapse, acquired     GERD (gastroesophageal reflux disease)     Hyperlipidemia     Hypertension     Insomnia     Low vitamin D level 4/20/2018    OSTEOPOROSIS     Palpitations 5/29/2012    Primary insomnia 1/10/2017    Sleep apnea     SVT (supraventricular tachycardia) ([facility]) - on Zio 2015 and 2019 8/17/2015    Varicose veins 5/29/2012    Vitamin D deficiency disease     Vitamin D deficiency disease      IMO load March 2020       Past Surgical History:   Procedure Laterality Date    CYSTOCELE REPAIR  1/17/2011    Performed by GILMAR CHUNG at [facility]    RECTOCELE REPAIR  1/17/2011    Performed by GILMAR CHUNG at [facility]    BLADDER SUSPENSION  1/17/2011    Performed by GILMAR CHUNG at [facility]    CYSTOSCOPY  1/17/2011    Performed by GILMAR CHUNG at [facility]    ABDOMINAL HYSTERECTOMY TOTAL      ARTHROSCOPY, KNEE      CATARACT EXTRACTION WITH IOL      HEMORRHOIDECTOMY      HYSTERECTOMY, TOTAL ABDOMINAL      INJECTION SCLERO HEMORROIDS      OTHER ORTHOPEDIC SURGERY      knee scope x 2    PB KNEE SCOPE,DIAGNOSTIC  2003;2009    TONSILLECTOMY         Family History   Problem Relation Age of Onset    Non-contributory Mother         gall bladder surgery    Heart Disease Mother     Cancer Father         colon rectal    Heart Disease Father         rheumatic heart disease    Other Brother         HIT BY CAR    Heart Disease Brother     Sleep Apnea Brother     No Known Problems Maternal Grandmother     No Known Problems Maternal Grandfather     No Known Problems Paternal Grandmother     No Known Problems Paternal Grandfather     No Known Problems Son        Allergies as of 02/20/2024 - Reviewed 02/08/2024   Allergen Reaction Noted    Atorvastatin  10/25/2017    Atorvastatin calcium  10/25/2017    Bactrim ds Hives  "05/11/2017    Hydrocodone-acetaminophen  08/25/2022    Levaquin  08/25/2022    Levofloxacin  04/07/2021    Pneumococcal vaccines Swelling 07/27/2018    Sulfa drugs Hives 05/11/2017    Vicodin [hydrocodone-acetaminophen] Rash 12/09/2010    Vytorin  12/15/2008        Vitals:  /60 (BP Location: Left arm, Patient Position: Sitting, BP Cuff Size: Adult)   Pulse 83   Resp 16   Ht 1.575 m (5' 2\")   Wt 52.5 kg (115 lb 12.8 oz)   SpO2 94%     Current medications as of today   Current Outpatient Medications   Medication Sig Dispense Refill    Alirocumab (PRALUENT) 75 MG/ML Solution Auto-injector Inject 75 mg under the skin every 14 days. 6 mL 3    metoprolol tartrate (LOPRESSOR) 25 MG Tab Take 1 Tablet by mouth 2 times a day. 180 Tablet 3    furosemide (LASIX) 20 MG Tab Take 1 Tablet by mouth 1 time a day as needed (swelling). 90 Tablet 3    Cholecalciferol (VITAMIN D3) 2000 UNIT Cap Take 2 capsules by mouth once daily 60 capsule 0    Multiple Vitamins-Minerals (CENTRUM SILVER PO) Take 1 Tablet by mouth every day.       No current facility-administered medications for this visit.         Physical Exam:   Gen:           Alert and oriented, No apparent distress. Mood and affect appropriate, normal interaction with examiner.  Eyes:          PERRL, EOM intact, sclere white, conjunctive moist.  Ears:          Not examined.   Hearing:     Grossly intact.  Nose:          Normal, no lesions or deformities.  Dentition:    Good dentition.  Oropharynx:   Tongue normal, posterior pharynx without erythema or exudate.  Neck:        Supple, trachea midline, no masses.  Respiratory Effort: No intercostal retractions or use of accessory muscles.   Lung Auscultation:      Clear to auscultation bilaterally; no rales, rhonchi or wheezing.  CV:            Regular rate and rhythm. No murmurs, rubs or gallops.  Abd:           Not examined.   Lymphadenopathy: Not examined.  Gait and Station: Normal.  Digits and Nails: No clubbing, " cyanosis, petechiae, or nodes.   Cranial Nerves: II-XII grossly intact.  Skin:        No rashes, lesions or ulcers noted.               Ext:           No cyanosis or edema.      Assessment:  1. RUY (obstructive sleep apnea)          Plan:  Is using and benefiting from BiPAP therapy.  Mask leak has been improved significantly since previous.  Compliance reviewed shows adequate use and control of RUY.  Order placed for mask and supplies through [facility].  Patient advised to follow-up in 6 months, but can be seen sooner if needed.    Please note that this dictation was created using voice recognition software. I have made every reasonable attempt to correct obvious errors, but it is possible there are errors of grammar and possibly content that I did not discover before finalizing the note.

## 2024-02-22 ENCOUNTER — TELEPHONE (OUTPATIENT)
Dept: VASCULAR LAB | Facility: MEDICAL CENTER | Age: 89
End: 2024-02-22

## 2024-02-22 DIAGNOSIS — E78.5 DYSLIPIDEMIA: ICD-10-CM

## 2024-02-22 RX ORDER — ALIROCUMAB 75 MG/ML
75 INJECTION, SOLUTION SUBCUTANEOUS
Qty: 6 ML | Refills: 3 | Status: SHIPPED | OUTPATIENT
Start: 2024-02-22

## 2024-02-22 NOTE — TELEPHONE ENCOUNTER
[facility]  Caller: Eliane - [facility]    Topic/issue: Patient's Praluent prescription was written but NOT released. Eliane would also like to have her coupon card checked from [employer] so it is ready to go. Patient will be out of Praluent in two weeks.    Callback Number: [phone]    Thank you,  Fallon SELLERS

## 2024-03-01 ENCOUNTER — HOSPITAL ENCOUNTER (OUTPATIENT)
Dept: LAB | Facility: MEDICAL CENTER | Age: 89
End: 2024-03-01
Attending: FAMILY MEDICINE
Payer: MEDICARE

## 2024-03-01 ENCOUNTER — OFFICE VISIT (OUTPATIENT)
Dept: MEDICAL GROUP | Facility: PHYSICIAN GROUP | Age: 89
End: 2024-03-01
Payer: MEDICARE

## 2024-03-01 VITALS
OXYGEN SATURATION: 94 % | HEART RATE: 80 BPM | RESPIRATION RATE: 18 BRPM | DIASTOLIC BLOOD PRESSURE: 64 MMHG | WEIGHT: 118.2 LBS | HEIGHT: 62 IN | TEMPERATURE: 97.4 F | SYSTOLIC BLOOD PRESSURE: 114 MMHG | BODY MASS INDEX: 21.75 KG/M2

## 2024-03-01 DIAGNOSIS — I70.219 ATHEROSCLEROSIS OF NATIVE ARTERY OF EXTREMITY WITH INTERMITTENT CLAUDICATION, UNSPECIFIED EXTREMITY (HCC): ICD-10-CM

## 2024-03-01 DIAGNOSIS — I71.43 INFRARENAL ABDOMINAL AORTIC ANEURYSM (AAA) WITHOUT RUPTURE (HCC): ICD-10-CM

## 2024-03-01 DIAGNOSIS — G47.33 OSA TREATED WITH BIPAP: ICD-10-CM

## 2024-03-01 DIAGNOSIS — K59.00 CONSTIPATION, UNSPECIFIED CONSTIPATION TYPE: ICD-10-CM

## 2024-03-01 DIAGNOSIS — I47.10 SVT (SUPRAVENTRICULAR TACHYCARDIA) (HCC): ICD-10-CM

## 2024-03-01 PROBLEM — K59.09 OTHER CONSTIPATION: Status: ACTIVE | Noted: 2024-03-01

## 2024-03-01 PROCEDURE — 84443 ASSAY THYROID STIM HORMONE: CPT

## 2024-03-01 PROCEDURE — 99213 OFFICE O/P EST LOW 20 MIN: CPT | Performed by: FAMILY MEDICINE

## 2024-03-01 PROCEDURE — 3074F SYST BP LT 130 MM HG: CPT | Performed by: FAMILY MEDICINE

## 2024-03-01 PROCEDURE — 36415 COLL VENOUS BLD VENIPUNCTURE: CPT

## 2024-03-01 PROCEDURE — 3078F DIAST BP <80 MM HG: CPT | Performed by: FAMILY MEDICINE

## 2024-03-01 RX ORDER — PHENAZOPYRIDINE HYDROCHLORIDE 200 MG/1
TABLET, FILM COATED ORAL
COMMUNITY
Start: 2024-02-21

## 2024-03-01 RX ORDER — PHENAZOPYRIDINE HYDROCHLORIDE 100 MG/1
TABLET, FILM COATED ORAL
COMMUNITY
Start: 2023-12-13

## 2024-03-01 RX ORDER — NITROFURANTOIN MACROCRYSTALS 50 MG/1
CAPSULE ORAL
COMMUNITY
End: 2024-03-01

## 2024-03-01 RX ORDER — GRANULES FOR ORAL 3 G/1
POWDER ORAL
COMMUNITY

## 2024-03-01 RX ORDER — AMOXICILLIN AND CLAVULANATE POTASSIUM 500; 125 MG/1; MG/1
1 TABLET, FILM COATED ORAL 2 TIMES DAILY
COMMUNITY
Start: 2024-02-07 | End: 2024-03-01

## 2024-03-01 RX ORDER — ACETAMINOPHEN 160 MG
2 TABLET,DISINTEGRATING ORAL DAILY
COMMUNITY

## 2024-03-01 RX ORDER — ESTRADIOL 0.1 MG/G
CREAM VAGINAL
COMMUNITY

## 2024-03-01 RX ORDER — NITROFURANTOIN 25; 75 MG/1; MG/1
100 CAPSULE ORAL 2 TIMES DAILY
COMMUNITY
Start: 2024-02-02 | End: 2024-03-01

## 2024-03-01 NOTE — PROGRESS NOTES
Subjective:     CC:   Chief Complaint   Patient presents with    Follow-Up       HPI:   Nini presents today today due to the fact that she was seen in urgent care in early February for constipation.  Patient is taking MiraLAX daily and has been having BMs daily.  Patient just saw pulmonary and she does have appointments coming up with cardiology and vascular.  Patient was concerned due to the fact her mother had a block intestinal tract that required surgery and she is always worried if she does not have regular bowel movements.  I did explain to patient that with a intestinal blockage symptoms are not able to take in food or drink due to the fact that your GI tract not working and you are throwing it up.  Patient denies any issues with eating or abdominal pain.    Past Medical History:   Diagnosis Date    AAA (abdominal aortic aneurysm) ([facility])     Atherosclerosis of native arteries of the extremities with intermittent claudication 10/16/2013    Blepharospasm syndrome     Blepharospasm syndrome      IMO load March 2020    Bright red rectal bleeding 6/24/2015    Cellulitis 5/5/2021    Chest pain 07/2015    Unspecified; Nuclear stress test negative     Constipation     Edema 5/29/2012    Female bladder prolapse, acquired     GERD (gastroesophageal reflux disease)     Hyperlipidemia     Hypertension     Insomnia     Low vitamin D level 4/20/2018    OSTEOPOROSIS     Palpitations 5/29/2012    Primary insomnia 1/10/2017    Sleep apnea     SVT (supraventricular tachycardia) ([facility]) - on Zio 2015 and 2019 8/17/2015    Varicose veins 5/29/2012    Vitamin D deficiency disease     Vitamin D deficiency disease      IMO load March 2020       Social History     Tobacco Use    Smoking status: Never    Smokeless tobacco: Never   Vaping Use    Vaping Use: Never used   Substance Use Topics    Alcohol use: Not Currently     Alcohol/week: 0.0 oz     Comment: occasionally    Drug use: No       Current Outpatient Medications Ordered in  "Epic   Medication Sig Dispense Refill    phenazopyridine (PYRIDIUM) 100 MG Tab TAKE 1 TABLET BY MOUTH EVERY 8 HOURS AS NEEDED FOR DYSURIA      phenazopyridine (PYRIDIUM) 200 MG Tab TAKE 1 TABLET BY MOUTH ONCE DAILY AS NEEDED FOR DYSURIA      fosfomycin (MONUROL) 3 GM Pack MIX AND TAKE 1 PACKET BY MOUTH EVERY OTHER DAY      estradiol (ESTRACE) 0.1 MG/GM vaginal cream INSERT A PEA-SIZED AMOUNT (1/4 GRAM) VAGINALLY EVERY DAY      Cholecalciferol (VITAMIN D3) 2000 UNIT Cap Take 2 Capsules by mouth every day.      Alirocumab (PRALUENT) 75 MG/ML Solution Auto-injector Inject 75 mg under the skin every 14 days. 6 mL 3    Alirocumab (PRALUENT) 75 MG/ML Solution Auto-injector Inject 75 mg under the skin every 14 days. 6 mL 3    metoprolol tartrate (LOPRESSOR) 25 MG Tab Take 1 Tablet by mouth 2 times a day. 180 Tablet 3    furosemide (LASIX) 20 MG Tab Take 1 Tablet by mouth 1 time a day as needed (swelling). 90 Tablet 3    Cholecalciferol (VITAMIN D3) 2000 UNIT Cap Take 2 capsules by mouth once daily 60 capsule 0    Multiple Vitamins-Minerals (CENTRUM SILVER PO) Take 1 Tablet by mouth every day.       No current [facility]-ordered facility-administered medications on file.       Allergies:  Atorvastatin, Atorvastatin calcium, Bactrim ds, Hydrocodone-acetaminophen, Levaquin, Levofloxacin, Pneumococcal vaccines, Sulfa drugs, Vicodin [hydrocodone-acetaminophen], and Vytorin    Health Maintenance: Completed    ROS:  Gen: no fevers/chills, no changes in weight  Eyes: no changes in vision  ENT: no sore throat, no hearing loss, no bloody nose  Pulm: no sob, no cough  CV: no chest pain, no palpitations  GI: no nausea/vomiting, no diarrhea  : no dysuria  Neuro: no headaches, no numbness/tingling  Heme/Lymph: no easy bruising    Objective:     Exam:  /64 (BP Location: Left arm, Patient Position: Sitting, BP Cuff Size: Adult)   Pulse 80   Temp 36.3 °C (97.4 °F) (Temporal)   Resp 18   Ht 1.575 m (5' 2\")   Wt 53.6 kg (118 lb 3.2 " oz)   SpO2 94%   BMI 21.62 kg/m²  Body mass index is 21.62 kg/m².    Gen: Alert and oriented, No apparent distress.  Skin: Warm and dry.  No obvious lesions.  Eyes: Sclera wnl Pupils normal in size  Lungs: Normal effort, CTA bilaterally, no wheezes, rhonchi, or rales  CV: Regular rate and rhythm.  3/6 systolic murmur noted  ABD: Soft non-tender no organomegaly  Musculoskeletal: Normal gait. No extremity cyanosis, clubbing, or edema.  Neuro: Oriented to person, place and time  Psych: Mood is wnl         Assessment & Plan:     89 y.o. female with the following -     1. Constipation, unspecified constipation type  I would recommend go ahead and get another thyroid test done patient was sent to lab for this.  Patient can continue using the MiraLAX as she is doing at this point.  Patient can always cut down the MiraLAX if she is having BMs daily.  - TSH; Future    2. Atherosclerosis of native artery of extremity with intermittent claudication, unspecified extremity (HCC)    3. Infrarenal abdominal aortic aneurysm (AAA) without rupture (HCC)    4. SVT (supraventricular tachycardia)    5. RUY treated with BiPAP  HCC Gap Form    Diagnosis to address: I70.219 - Atherosclerosis of native artery of extremity with intermittent claudication, unspecified extremity (HCC)  Assessment and plan: Chronic, stable. Continue with current defined treatment plan: Patient is seeing cardiology for this. Follow-up at least annually.  Diagnosis: I71.43 - Infrarenal abdominal aortic aneurysm (AAA) without rupture (HCC)  Assessment and plan: Chronic, stable. Continue with current defined treatment plan: Patient is seeing cardiology for this. Follow-up at least annually.  Diagnosis: I47.10 - SVT (supraventricular tachycardia)  Assessment and plan: Chronic, stable. Continue with current defined treatment plan: Patient is seeing cardiology for this. Follow-up at least annually.  Last edited 03/01/24 14:16 PST by Deepa Sykes M.D.       Return  in about 3 months (around 6/1/2024), or if symptoms worsen or fail to improve.  Will contact patient concerning results of her thyroid test would recommend she follow-up with her PCP I had my MA go ahead and schedule her appointment patient to follow-up sooner if she has any more problems  Please note that this dictation was created using voice recognition software. I have made every reasonable attempt to correct obvious errors, but I expect that there are errors of grammar and possibly content that I did not discover before finalizing the note.

## 2024-03-02 LAB — TSH SERPL DL<=0.005 MIU/L-ACNC: 2.1 UIU/ML (ref 0.38–5.33)

## 2024-03-06 ENCOUNTER — PHARMACY VISIT (OUTPATIENT)
Dept: PHARMACY | Facility: MEDICAL CENTER | Age: 89
End: 2024-03-06
Payer: COMMERCIAL

## 2024-03-06 PROCEDURE — RXMED WILLOW AMBULATORY MEDICATION CHARGE

## 2024-04-26 ENCOUNTER — TELEPHONE (OUTPATIENT)
Dept: VASCULAR LAB | Facility: MEDICAL CENTER | Age: 89
End: 2024-04-26
Payer: MEDICARE

## 2024-04-26 NOTE — TELEPHONE ENCOUNTER
Received EMR that pt was unable to receive her RSV vaccine through [facility] pharmacy.     Called [facility] and spoke with Mahogany and she confirmed that their facility cannot administer vaccines. In addition to that, she also recommend to have the pt go to [facility] as they can administer vaccines.     Called [facility] and spoke to someone and she mentioned that she ran a test claim and it shows that it has to be billed under medicare B---which other local pharmacies can initiate on their system.     Notified and updated clinical staff regarding with the status.     TANIYA Rubalcava, PhT  Vascular Pharmacy Liaison (Rx Coordinator)  P: [phone]  4/26/2024 10:27 AM

## 2024-05-08 ENCOUNTER — APPOINTMENT (RX ONLY)
Dept: URBAN - METROPOLITAN AREA CLINIC 36 | Facility: CLINIC | Age: 89
Setting detail: DERMATOLOGY
End: 2024-05-08

## 2024-05-08 PROBLEM — C44.311 BASAL CELL CARCINOMA OF SKIN OF NOSE: Status: ACTIVE | Noted: 2024-05-08

## 2024-05-08 PROCEDURE — 17281 DSTR MAL LS F/E/E/N/L/M .6-1: CPT

## 2024-05-08 PROCEDURE — ? CURETTAGE AND DESTRUCTION WITH PATHOLOGY

## 2024-05-08 NOTE — PROCEDURE: CURETTAGE AND DESTRUCTION WITH PATHOLOGY
Detail Level: Detailed
Size Of Lesion In Cm: 0.4
Size Of Lesion After Curettage: 0.7
Anesthesia Type: 1% lidocaine with epinephrine and a 1:10 solution of 8.4% sodium bicarbonate
Anesthesia Volume In Cc: 4
Cautery Type: electrodesiccation
Number Of Curettages: 3
What Was Performed First?: Curettage
Additional Information: (Optional): The wound was cleaned, and a pressure dressing was applied.  The patient received detailed post-op instructions.
Lab: [id number]
Lab Facility: 
Histology Text: Following the procedure a portion of the curetted material was sent for histologic evaluation.
Biopsy Type: H and E
Render Path Notes In Note?: No
Consent was obtained from the patient. The risks, benefits and alternatives to therapy were discussed in detail. Specifically, the risks of infection, scarring, bleeding, prolonged wound healing, nerve injury, incomplete removal, allergy to anesthesia and recurrence were addressed. Alternatives to ED&C, such as: surgical removal and XRT were also discussed.  Prior to the procedure, the treatment site was clearly identified and confirmed by the patient. All components of Universal Protocol/PAUSE Rule completed.
Post-Care Instructions: I reviewed with the patient in detail post-care instructions. Patient is to keep the area dry for 48 hours, and not to engage in any swimming until the area is healed. Should the patient develop any fevers, chills, bleeding, severe pain patient will contact the office immediately.
Billing Type: Third-Party Bill
Bill As A Line Item Or As Units: Line Item

## 2024-05-16 PROCEDURE — RXMED WILLOW AMBULATORY MEDICATION CHARGE: Performed by: NURSE PRACTITIONER

## 2024-05-22 ENCOUNTER — PHARMACY VISIT (OUTPATIENT)
Dept: PHARMACY | Facility: MEDICAL CENTER | Age: 89
End: 2024-05-22
Payer: COMMERCIAL

## 2024-05-23 ENCOUNTER — HOSPITAL ENCOUNTER (OUTPATIENT)
Dept: LAB | Facility: MEDICAL CENTER | Age: 89
End: 2024-05-23
Attending: NURSE PRACTITIONER
Payer: MEDICARE

## 2024-05-23 DIAGNOSIS — E78.5 DYSLIPIDEMIA: ICD-10-CM

## 2024-05-23 LAB
ALBUMIN SERPL BCP-MCNC: 3.7 G/DL (ref 3.2–4.9)
ALBUMIN/GLOB SERPL: 1.2 G/DL
ALP SERPL-CCNC: 91 U/L (ref 30–99)
ALT SERPL-CCNC: 7 U/L (ref 2–50)
ANION GAP SERPL CALC-SCNC: 11 MMOL/L (ref 7–16)
AST SERPL-CCNC: 18 U/L (ref 12–45)
BILIRUB SERPL-MCNC: 0.9 MG/DL (ref 0.1–1.5)
BUN SERPL-MCNC: 10 MG/DL (ref 8–22)
CALCIUM ALBUM COR SERPL-MCNC: 9.2 MG/DL (ref 8.5–10.5)
CALCIUM SERPL-MCNC: 9 MG/DL (ref 8.5–10.5)
CHLORIDE SERPL-SCNC: 105 MMOL/L (ref 96–112)
CHOLEST SERPL-MCNC: 131 MG/DL (ref 100–199)
CO2 SERPL-SCNC: 25 MMOL/L (ref 20–33)
CREAT SERPL-MCNC: 0.87 MG/DL (ref 0.5–1.4)
GFR SERPLBLD CREATININE-BSD FMLA CKD-EPI: 63 ML/MIN/1.73 M 2
GLOBULIN SER CALC-MCNC: 3 G/DL (ref 1.9–3.5)
GLUCOSE SERPL-MCNC: 89 MG/DL (ref 65–99)
HDLC SERPL-MCNC: 56 MG/DL
LDLC SERPL CALC-MCNC: 57 MG/DL
POTASSIUM SERPL-SCNC: 4.4 MMOL/L (ref 3.6–5.5)
PROT SERPL-MCNC: 6.7 G/DL (ref 6–8.2)
SODIUM SERPL-SCNC: 141 MMOL/L (ref 135–145)
TRIGL SERPL-MCNC: 89 MG/DL (ref 0–149)

## 2024-05-28 ENCOUNTER — TELEPHONE (OUTPATIENT)
Dept: CARDIOLOGY | Facility: MEDICAL CENTER | Age: 89
End: 2024-05-28
Payer: MEDICARE

## 2024-05-28 ENCOUNTER — OFFICE VISIT (OUTPATIENT)
Dept: CARDIOLOGY | Facility: MEDICAL CENTER | Age: 89
End: 2024-05-28
Attending: INTERNAL MEDICINE
Payer: MEDICARE

## 2024-05-28 VITALS
RESPIRATION RATE: 16 BRPM | DIASTOLIC BLOOD PRESSURE: 78 MMHG | OXYGEN SATURATION: 96 % | HEART RATE: 84 BPM | SYSTOLIC BLOOD PRESSURE: 128 MMHG | BODY MASS INDEX: 21.35 KG/M2 | HEIGHT: 62 IN | WEIGHT: 116 LBS

## 2024-05-28 DIAGNOSIS — I71.43 INFRARENAL ABDOMINAL AORTIC ANEURYSM (AAA) WITHOUT RUPTURE (HCC): ICD-10-CM

## 2024-05-28 DIAGNOSIS — I70.219 ATHEROSCLEROSIS OF NATIVE ARTERY OF EXTREMITY WITH INTERMITTENT CLAUDICATION, UNSPECIFIED EXTREMITY (HCC): ICD-10-CM

## 2024-05-28 DIAGNOSIS — G47.33 OSA TREATED WITH BIPAP: ICD-10-CM

## 2024-05-28 DIAGNOSIS — I10 PRIMARY HYPERTENSION: ICD-10-CM

## 2024-05-28 DIAGNOSIS — E78.2 MIXED HYPERLIPIDEMIA: ICD-10-CM

## 2024-05-28 DIAGNOSIS — N39.0 RECURRENT UTI: ICD-10-CM

## 2024-05-28 DIAGNOSIS — I47.10 SVT (SUPRAVENTRICULAR TACHYCARDIA) (HCC): Chronic | ICD-10-CM

## 2024-05-28 NOTE — TELEPHONE ENCOUNTER
----- Message from Montez Kamara M.D. sent at 5/25/2024  7:21 PM PDT -----  Labs look good, please let her know     Thank you

## 2024-05-28 NOTE — LETTER
May 28, 2024        Nini Taylor  [address] [address]          Dear Nini,    We have received the results of your recent: labs    Your test came back and Dr. Kamara advised your looks good.  Please follow up as previously discussed with your physician.      Feel free to call us with any questions.        Sincerely,          Ailyn ACOSTA  Electronically Signed    Resulted Orders   Comp Metabolic Panel   Result Value Ref Range    Sodium 141 135 - 145 mmol/L    Potassium 4.4 3.6 - 5.5 mmol/L    Chloride 105 96 - 112 mmol/L    Co2 25 20 - 33 mmol/L    Anion Gap 11.0 7.0 - 16.0    Glucose 89 65 - 99 mg/dL    Bun 10 8 - 22 mg/dL    Creatinine 0.87 0.50 - 1.40 mg/dL    Calcium 9.0 8.5 - 10.5 mg/dL    Correct Calcium 9.2 8.5 - 10.5 mg/dL    AST(SGOT) 18 12 - 45 U/L    ALT(SGPT) 7 2 - 50 U/L    Alkaline Phosphatase 91 30 - 99 U/L    Total Bilirubin 0.9 0.1 - 1.5 mg/dL    Albumin 3.7 3.2 - 4.9 g/dL    Total Protein 6.7 6.0 - 8.2 g/dL    Globulin 3.0 1.9 - 3.5 g/dL    A-G Ratio 1.2 g/dL   Lipid Profile   Result Value Ref Range    Cholesterol,Tot 131 100 - 199 mg/dL    Triglycerides 89 0 - 149 mg/dL    HDL 56 >=40 mg/dL    LDL 57 <100 mg/dL   ESTIMATED GFR   Result Value Ref Range    GFR (CKD-EPI) 63 >60 mL/min/1.73 m 2      Comment:      Estimated Glomerular Filtration Rate is calculated using  race neutral CKD-EPI 2021 equation per NKF-ASN recommendations.

## 2024-05-28 NOTE — PROGRESS NOTES
Chief Complaint   Patient presents with    Supraventricular Tachycardia (SVT)    Hyperlipidemia    Hypertension       Subjective     Nini Taylor is a 89 y.o. female who presents today with PAD AAA SVT on pCSK9     Has been worried after remote bladder sling may have a prolapse would like to get a second opinion    Past Medical History:   Diagnosis Date    AAA (abdominal aortic aneurysm) (HCC)     Atherosclerosis of native arteries of the extremities with intermittent claudication 10/16/2013    Blepharospasm syndrome     Blepharospasm syndrome      IMO load March 2020    Bright red rectal bleeding 6/24/2015    Cellulitis 5/5/2021    Chest pain 07/2015    Unspecified; Nuclear stress test negative     Constipation     Edema 5/29/2012    Female bladder prolapse, acquired     GERD (gastroesophageal reflux disease)     Hyperlipidemia     Hypertension     Insomnia     Low vitamin D level 4/20/2018    OSTEOPOROSIS     Palpitations 5/29/2012    Primary insomnia 1/10/2017    Sleep apnea     SVT (supraventricular tachycardia) (HCC) - on Zio 2015 and 2019 8/17/2015    Varicose veins 5/29/2012    Vitamin D deficiency disease     Vitamin D deficiency disease      IMO load March 2020     Past Surgical History:   Procedure Laterality Date    CYSTOCELE REPAIR  1/17/2011    Performed by GILMAR CHUNG at [facility]    RECTOCELE REPAIR  1/17/2011    Performed by GILMAR CHUNG at [facility]    BLADDER SUSPENSION  1/17/2011    Performed by GILMAR CHUNG at [facility]    CYSTOSCOPY  1/17/2011    Performed by GILMAR CHUNG at [facility]    ABDOMINAL HYSTERECTOMY TOTAL      ARTHROSCOPY, KNEE      CATARACT EXTRACTION WITH IOL      HEMORRHOIDECTOMY      HYSTERECTOMY, TOTAL ABDOMINAL      INJECTION SCLERO HEMORROIDS      OTHER ORTHOPEDIC SURGERY      knee scope x 2    PB KNEE SCOPE,DIAGNOSTIC  2003;2009    TONSILLECTOMY       Family History    Problem Relation Age of Onset    Non-contributory Mother         gall bladder surgery    Heart Disease Mother     Cancer Father         colon rectal    Heart Disease Father         rheumatic heart disease    Other Brother         HIT BY CAR    Heart Disease Brother     Sleep Apnea Brother     No Known Problems Maternal Grandmother     No Known Problems Maternal Grandfather     No Known Problems Paternal Grandmother     No Known Problems Paternal Grandfather     No Known Problems Son      Social History     Socioeconomic History    Marital status:      Spouse name: Not on file    Number of children: Not on file    Years of education: Not on file    Highest education level: Not on file   Occupational History    Not on file   Tobacco Use    Smoking status: Never    Smokeless tobacco: Never   Vaping Use    Vaping status: Never Used   Substance and Sexual Activity    Alcohol use: Not Currently     Alcohol/week: 0.0 oz     Comment: occasionally    Drug use: No    Sexual activity: Never   Other Topics Concern    Not on file   Social History Narrative    Not on file     Social Determinants of Health     Financial Resource Strain: Not on file   Food Insecurity: Not on file   Transportation Needs: Not on file   Physical Activity: Not on file   Stress: Not on file   Social Connections: Not on file   Intimate Partner Violence: Not on file   Housing Stability: Not on file     Allergies   Allergen Reactions    Atorvastatin     Atorvastatin Calcium     Bactrim Ds Hives    Hydrocodone-Acetaminophen     Levaquin     Levofloxacin     Pneumococcal Vaccines Swelling    Sulfa Drugs Hives    Vicodin [Hydrocodone-Acetaminophen] Rash    Vytorin      Outpatient Encounter Medications as of 5/28/2024   Medication Sig Dispense Refill    phenazopyridine (PYRIDIUM) 100 MG Tab TAKE 1 TABLET BY MOUTH EVERY 8 HOURS AS NEEDED FOR DYSURIA      fosfomycin (MONUROL) 3 GM Pack MIX AND TAKE 1 PACKET BY MOUTH EVERY OTHER DAY      estradiol  "(ESTRACE) 0.1 MG/GM vaginal cream INSERT A PEA-SIZED AMOUNT (1/4 GRAM) VAGINALLY EVERY DAY      Alirocumab (PRALUENT) 75 MG/ML Solution Auto-injector Inject 75 mg under the skin every 14 days. 6 mL 3    metoprolol tartrate (LOPRESSOR) 25 MG Tab Take 1 Tablet by mouth 2 times a day. 180 Tablet 3    furosemide (LASIX) 20 MG Tab Take 1 Tablet by mouth 1 time a day as needed (swelling). 90 Tablet 3    Cholecalciferol (VITAMIN D3) 2000 UNIT Cap Take 2 capsules by mouth once daily 60 capsule 0    Multiple Vitamins-Minerals (CENTRUM SILVER PO) Take 1 Tablet by mouth every day.      [DISCONTINUED] injection RSV Pre-Fusion F A&B Vac Rcmb (ABRYSVO) 120 MCG/0.5ML Recon Soln Inject  into the shoulder, thigh, or buttocks. 0.5 mL 0    [DISCONTINUED] phenazopyridine (PYRIDIUM) 200 MG Tab TAKE 1 TABLET BY MOUTH ONCE DAILY AS NEEDED FOR DYSURIA      [DISCONTINUED] Cholecalciferol (VITAMIN D3) 2000 UNIT Cap Take 2 Capsules by mouth every day.      Alirocumab (PRALUENT) 75 MG/ML Solution Auto-injector Inject 75 mg under the skin every 14 days. 6 mL 3     No facility-administered encounter medications on file as of 5/28/2024.     ROS           Objective     /78 (BP Location: Left arm, Patient Position: Sitting, BP Cuff Size: Adult)   Pulse 84   Resp 16   Ht 1.575 m (5' 2\")   Wt 52.6 kg (116 lb)   SpO2 96%   BMI 21.22 kg/m²     Physical Exam  Constitutional:       General: She is not in acute distress.     Appearance: She is not diaphoretic.   Eyes:      General: No scleral icterus.  Neck:      Vascular: No JVD.   Cardiovascular:      Rate and Rhythm: Normal rate.      Heart sounds: Normal heart sounds. No murmur heard.     No friction rub. No gallop.   Pulmonary:      Effort: No respiratory distress.      Breath sounds: No wheezing or rales.   Abdominal:      General: Bowel sounds are normal.      Palpations: Abdomen is soft.   Musculoskeletal:      Right lower leg: No edema.      Left lower leg: No edema.   Skin:     " Findings: No rash.   Neurological:      Mental Status: She is alert. Mental status is at baseline.   Psychiatric:         Mood and Affect: Mood normal.            We reviewed in person the most recent labs  Recent Results (from the past 5040 hour(s))   EC-ECHOCARDIOGRAM COMPLETE W/O CONT    Collection Time: 11/15/23  1:05 PM   Result Value Ref Range    Left Ventrical Ejection Fraction 60    Lipid Profile    Collection Time: 11/15/23  1:41 PM   Result Value Ref Range    Cholesterol,Tot 135 100 - 199 mg/dL    Triglycerides 90 0 - 149 mg/dL    HDL 56 >=40 mg/dL    LDL 61 <100 mg/dL   Comp Metabolic Panel    Collection Time: 11/15/23  1:41 PM   Result Value Ref Range    Sodium 141 135 - 145 mmol/L    Potassium 4.3 3.6 - 5.5 mmol/L    Chloride 103 96 - 112 mmol/L    Co2 27 20 - 33 mmol/L    Anion Gap 11.0 7.0 - 16.0    Glucose 81 65 - 99 mg/dL    Bun 12 8 - 22 mg/dL    Creatinine 0.87 0.50 - 1.40 mg/dL    Calcium 9.4 8.5 - 10.5 mg/dL    Correct Calcium 9.4 8.5 - 10.5 mg/dL    AST(SGOT) 20 12 - 45 U/L    ALT(SGPT) 10 2 - 50 U/L    Alkaline Phosphatase 81 30 - 99 U/L    Total Bilirubin 0.9 0.1 - 1.5 mg/dL    Albumin 4.0 3.2 - 4.9 g/dL    Total Protein 7.2 6.0 - 8.2 g/dL    Globulin 3.2 1.9 - 3.5 g/dL    A-G Ratio 1.3 g/dL   FASTING STATUS    Collection Time: 11/15/23  1:41 PM   Result Value Ref Range    Fasting Status Fasting    ESTIMATED GFR    Collection Time: 11/15/23  1:41 PM   Result Value Ref Range    GFR (CKD-EPI) 63 >60 mL/min/1.73 m 2   TSH    Collection Time: 03/01/24  2:21 PM   Result Value Ref Range    TSH 2.100 0.380 - 5.330 uIU/mL   Comp Metabolic Panel    Collection Time: 05/23/24 11:23 AM   Result Value Ref Range    Sodium 141 135 - 145 mmol/L    Potassium 4.4 3.6 - 5.5 mmol/L    Chloride 105 96 - 112 mmol/L    Co2 25 20 - 33 mmol/L    Anion Gap 11.0 7.0 - 16.0    Glucose 89 65 - 99 mg/dL    Bun 10 8 - 22 mg/dL    Creatinine 0.87 0.50 - 1.40 mg/dL    Calcium 9.0 8.5 - 10.5 mg/dL    Correct Calcium 9.2 8.5 -  10.5 mg/dL    AST(SGOT) 18 12 - 45 U/L    ALT(SGPT) 7 2 - 50 U/L    Alkaline Phosphatase 91 30 - 99 U/L    Total Bilirubin 0.9 0.1 - 1.5 mg/dL    Albumin 3.7 3.2 - 4.9 g/dL    Total Protein 6.7 6.0 - 8.2 g/dL    Globulin 3.0 1.9 - 3.5 g/dL    A-G Ratio 1.2 g/dL   Lipid Profile    Collection Time: 05/23/24 11:23 AM   Result Value Ref Range    Cholesterol,Tot 131 100 - 199 mg/dL    Triglycerides 89 0 - 149 mg/dL    HDL 56 >=40 mg/dL    LDL 57 <100 mg/dL   ESTIMATED GFR    Collection Time: 05/23/24 11:23 AM   Result Value Ref Range    GFR (CKD-EPI) 63 >60 mL/min/1.73 m 2         Assessment & Plan     1. SVT (supraventricular tachycardia) (HCC)        2. Infrarenal abdominal aortic aneurysm (AAA) without rupture (HCC)        3. Mixed hyperlipidemia        4. Atherosclerosis of native artery of extremity with intermittent claudication, unspecified extremity (HCC)        5. Primary hypertension            Medical Decision Making: Today's Assessment/Status/Plan:        It was my pleasure to meet with Ms. Taylor.    Blood pressure is well controlled.  She will continue to monitor and eat hearty healthy diet.    Conservative meausres for SVT    Referral to GYN surg for eval      I will see Ms. Taylor back in 6 months time and encouraged her to follow up with us over the phone or electronically using my MyChart as issues arise.    It is my pleasure to participate in the care of Ms. Taylor.  Please do not hesitate to contact me with questions or concerns.    Montez Kamara MD PhD FACC  Cardiologist [facility]    Please note that this dictation was created using voice recognition software. There may be errors I did not discover before finalizing the note.

## 2024-06-12 ENCOUNTER — NON-PROVIDER VISIT (OUTPATIENT)
Dept: VASCULAR LAB | Facility: MEDICAL CENTER | Age: 89
End: 2024-06-12
Attending: INTERNAL MEDICINE
Payer: MEDICARE

## 2024-06-12 VITALS — HEART RATE: 76 BPM | SYSTOLIC BLOOD PRESSURE: 120 MMHG | DIASTOLIC BLOOD PRESSURE: 75 MMHG

## 2024-06-12 DIAGNOSIS — E78.5 DYSLIPIDEMIA: ICD-10-CM

## 2024-06-12 PROCEDURE — 99212 OFFICE O/P EST SF 10 MIN: CPT

## 2024-06-12 NOTE — PROGRESS NOTES
"[facility] - FollowUp Visit    Nini Taylor is here for follow up of dyslipidemia.    Subjective    HPI  Pertinent Interval History since last visit:   None, continues to tolerate all medications.   Current Prescription Lipid Lowering Medications - including dose:   Statin: None  Non-Statin: Praluent 75 mg every 2 weeks.   Current Lipid Lowering and Related Supplements:   Vit D  Any Current Side Effects Potentially Related to Lipid Lowering therapy?   No  Current Adherence to Lipid Lowering Therapies:  Complete  Any Previous History of Statin Intolerance?   Statin: Simvastatin Unknown dose/duration - myalgias                Crestor unkown dose, duration \"years\" - states she started getting sharp \"zings\" down her arms.              Atorvastatin unknown dose, duration of 90 days - severe UE myalgia              Rosuvastatin - low dose alternate days - myalgias    Non-Statin: States none, however MR shows possibly Vytorin use              Outcome: Unknown  Any Previous History of Statin Intolerance?   Yes, Details: See above.    Baseline Lipids Prior to Treatment:          2/6/2017 12:20     Cholesterol,Tot   274 (H)     Triglycerides   101     HDL   58     LDL   196 (H)          SOCIAL HISTORY  Social History     Tobacco Use   Smoking Status Never   Smokeless Tobacco Never      Change in weight: Stable  Exercise habits: Walking at [employer].  She reports 4 falls but all were > 6 months ago.  Discussed getting a walker but pt declines.   Diet: common adult      Objective    There were no vitals filed for this visit.     Physical Exam    DATA REVIEW  Most Recent Lipid Panel:   Lab Results   Component Value Date    CHOLSTRLTOT 131 05/23/2024    TRIGLYCERIDE 89 05/23/2024    HDL 56 05/23/2024    LDL 57 05/23/2024       Other Pertinent Blood Work:   Lab Results   Component Value Date    SODIUM 141 05/23/2024    POTASSIUM 4.4 05/23/2024    CHLORIDE 105 05/23/2024    CO2 25 05/23/2024    ANION 11.0 05/23/2024    " GLUCOSE 89 05/23/2024    BUN 10 05/23/2024    CREATININE 0.87 05/23/2024    CALCIUM 9.0 05/23/2024    ASTSGOT 18 05/23/2024    ALTSGPT 7 05/23/2024    ALKPHOSPHAT 91 05/23/2024    TBILIRUBIN 0.9 05/23/2024    ALBUMIN 3.7 05/23/2024    AGRATIO 1.2 05/23/2024    TSHULTRASEN 2.100 03/01/2024       Other:  NA    Recent Imaging Studies:    None since last visit          ASSESSMENT AND PLAN  Patient Type, check all that apply:    Secondary Prevention (Abdominal aortic aneurism)  Established Atherosclerotic Cardiovascular Disease (ASCVD)  AAA - most recent imaging with small AAA, continue medical management and continue to defer further surveillance to Dr. 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):   Likely - based on baseline LDL-C and +FH of premature CAD in brother - recommended cascade screening at a previous visit  ACC/AHA Indication for Statin Therapy, gurpreet all that apply:  LDL-C at baseline >190 mg/dl: Indication for High intensity statin    Calculated Risk for ASCVD, if applicable    N/A  Other Significant Risk Markers, if any, guprreet all that apply   + Family History of premature CAD  High lp(a)   Latest Reference Range & Units 12/03/18 11:51   Lipoprotein (a) <=29 mg/dL 114 (H)   (H): Data is abnormally high  National Lipid Association (NLA) Goal  LDL-C:   <70 mg/dL  Lifestyle Recommendations From Today’s Visit:   Exercise:minimal exercise, no longer walking but does chores around the house.  Diet:No suggested changes. Patient is worried she is losing weight.   Statin Recommendations from Today's Visit  Continue to avoid given inablity to tolerate.   Non-Statin Medications Recommendations from Today’s Visit:   Continue Praluent 75 mg every 14 days.  Pt reports no issues or barriers with treatment.   Indication for  PCSK9 Inhibitor, if applicable:  FH with suboptimal control of LDL-C despite maximally tolerated statin  Supplements Recommended at this visit:  None  Recommendations for Other Cardiovascular Risk Factors, gurpreet all that apply:   HTN- BP controlled at today's visit.  Continue to monitor.   Other Issues:  None    Studies Ordered at Todays Visit:  None   Blood Work Ordered At Today’s visit:   CMP, lipid panel  Follow-Up:   6 months    Rosie Sims PharmD    CC:  Arianna Cabrera M.D.  Dr Bloch

## 2024-06-26 ENCOUNTER — TELEPHONE (OUTPATIENT)
Dept: CARDIOLOGY | Facility: MEDICAL CENTER | Age: 89
End: 2024-06-26
Payer: MEDICARE

## 2024-06-26 NOTE — TELEPHONE ENCOUNTER
Pt wanted to inform CW that the appt  for Dr. Bowen will be a while as they do not have sooner appts. Pt wanted to just inform CW and if he is okay with the wait on the appt there is no need to follow up with the patient. AA 6/26

## 2024-07-03 ENCOUNTER — APPOINTMENT (OUTPATIENT)
Dept: MEDICAL GROUP | Facility: PHYSICIAN GROUP | Age: 89
End: 2024-07-03
Payer: MEDICARE

## 2024-07-31 ENCOUNTER — APPOINTMENT (OUTPATIENT)
Dept: MEDICAL GROUP | Facility: PHYSICIAN GROUP | Age: 89
End: 2024-07-31
Payer: MEDICARE

## 2024-08-06 ENCOUNTER — APPOINTMENT (RX ONLY)
Dept: URBAN - METROPOLITAN AREA CLINIC 36 | Facility: CLINIC | Age: 89
Setting detail: DERMATOLOGY
End: 2024-08-06

## 2024-08-06 PROBLEM — C44.311 BASAL CELL CARCINOMA OF SKIN OF NOSE: Status: ACTIVE | Noted: 2024-08-06

## 2024-08-06 PROCEDURE — ? OBSERVATION

## 2024-08-06 PROCEDURE — 99212 OFFICE O/P EST SF 10 MIN: CPT

## 2024-08-08 ENCOUNTER — PHARMACY VISIT (OUTPATIENT)
Dept: PHARMACY | Facility: MEDICAL CENTER | Age: 89
End: 2024-08-08
Payer: COMMERCIAL

## 2024-08-08 PROCEDURE — RXMED WILLOW AMBULATORY MEDICATION CHARGE: Performed by: NURSE PRACTITIONER

## 2024-08-15 ENCOUNTER — TELEPHONE (OUTPATIENT)
Dept: CARDIOLOGY | Facility: MEDICAL CENTER | Age: 89
End: 2024-08-15
Payer: MEDICARE

## 2024-08-15 DIAGNOSIS — I35.0 NONRHEUMATIC AORTIC VALVE STENOSIS: ICD-10-CM

## 2024-08-15 NOTE — TELEPHONE ENCOUNTER
Patient in Structural Heart Moderate Surveillance Program and has not had their annual echo. Please order echo per protocol and notify patient to schedule imaging.    Echo needed by: 11/15/2024    Office visit scheduled: 11/20/2024 with Dr. Kamara    Primary Cardiologist: Dr. Kamara    RN notified: Adelia

## 2024-08-27 ENCOUNTER — OFFICE VISIT (OUTPATIENT)
Dept: SLEEP MEDICINE | Facility: MEDICAL CENTER | Age: 89
End: 2024-08-27
Attending: NURSE PRACTITIONER
Payer: MEDICARE

## 2024-08-27 VITALS
BODY MASS INDEX: 21.16 KG/M2 | RESPIRATION RATE: 16 BRPM | HEIGHT: 62 IN | SYSTOLIC BLOOD PRESSURE: 100 MMHG | HEART RATE: 81 BPM | WEIGHT: 115 LBS | DIASTOLIC BLOOD PRESSURE: 64 MMHG | OXYGEN SATURATION: 95 %

## 2024-08-27 DIAGNOSIS — G47.33 OSA (OBSTRUCTIVE SLEEP APNEA): ICD-10-CM

## 2024-08-27 PROCEDURE — 99214 OFFICE O/P EST MOD 30 MIN: CPT | Performed by: NURSE PRACTITIONER

## 2024-08-27 PROCEDURE — 99213 OFFICE O/P EST LOW 20 MIN: CPT | Performed by: NURSE PRACTITIONER

## 2024-08-27 PROCEDURE — 3078F DIAST BP <80 MM HG: CPT | Performed by: NURSE PRACTITIONER

## 2024-08-27 PROCEDURE — 3074F SYST BP LT 130 MM HG: CPT | Performed by: NURSE PRACTITIONER

## 2024-08-27 NOTE — PROGRESS NOTES
No chief complaint on file.      HPI:  Nini Taylor is a 89 y.o. year old female here today for follow-up on RUY on BiPAP.  Last seen by me on 3/3/2023.  Patient presents with her spouse. Former [facility] patient. PMH includes SVT, GERD, osteoporosis, dyslipidemia, hypertension, arthrosclerosis of native arteries of extremities, AAA, chronic insomnia, never smoker, tonsillectomy, hard of hearing.  Patient does endorse new diagnosis of macular degeneration.  Does experience occasional mask leak with dry eyes.     Currently on BiPAP 13 over 9 cm/H2O. Currently using ResMed F20 fullface mask. Patient also uses heated tubing.  Denies any difficulty tolerating mask fit or pressures.She also has a ramp set for 45 minutes.  Patient drinks lots of water and wakes up proximately every 2-3 hours to use the bathroom.      She goes to sleep at 9:30 PM and wakes up around 7:30 AM.  She does note waking up approximately 6 times during the night to use the bathroom.  She also has a busy mind and has difficulty falling asleep.  She has completed CBT-I.       30-day compliance reviewed with patient and  show 100% use with an average time of 7 hours and 24 minutes and a resultant AHI of 2.2.  There is occasional evidence of mask leak at 52.6.  Median of 0.1 L/min.      ROS: As per HPI and otherwise negative if not stated.    Past Medical History:   Diagnosis Date    AAA (abdominal aortic aneurysm) (HCC)     Atherosclerosis of native arteries of the extremities with intermittent claudication 10/16/2013    Blepharospasm syndrome     Blepharospasm syndrome      IMO load March 2020    Bright red rectal bleeding 6/24/2015    Cellulitis 5/5/2021    Chest pain 07/2015    Unspecified; Nuclear stress test negative     Constipation     Edema 5/29/2012    Female bladder prolapse, acquired     GERD (gastroesophageal reflux disease)     Hyperlipidemia     Hypertension     Insomnia     Low vitamin D level 4/20/2018    OSTEOPOROSIS      Palpitations 5/29/2012    Primary insomnia 1/10/2017    Sleep apnea     SVT (supraventricular tachycardia) (HCC) - on Zio 2015 and 2019 8/17/2015    Varicose veins 5/29/2012    Vitamin D deficiency disease     Vitamin D deficiency disease      IMO load March 2020       Past Surgical History:   Procedure Laterality Date    CYSTOCELE REPAIR  1/17/2011    Performed by GILMAR CHUNG [facility]    RECTOCELE REPAIR  1/17/2011    Performed by GILMAR CHUNG [facility]    BLADDER SUSPENSION  1/17/2011    Performed by GILMAR CHUNG [facility]    CYSTOSCOPY  1/17/2011    Performed by GILMAR CHUNG [facility]    ABDOMINAL HYSTERECTOMY TOTAL      ARTHROSCOPY, KNEE      CATARACT EXTRACTION WITH IOL      HEMORRHOIDECTOMY      HYSTERECTOMY, TOTAL ABDOMINAL      INJECTION SCLERO HEMORROIDS      OTHER ORTHOPEDIC SURGERY      knee scope x 2    PB KNEE SCOPE,DIAGNOSTIC  2003;2009    TONSILLECTOMY         Family History   Problem Relation Age of Onset    Non-contributory Mother         gall bladder surgery    Heart Disease Mother     Cancer Father         colon rectal    Heart Disease Father         rheumatic heart disease    Other Brother         HIT BY CAR    Heart Disease Brother     Sleep Apnea Brother     No Known Problems Maternal Grandmother     No Known Problems Maternal Grandfather     No Known Problems Paternal Grandmother     No Known Problems Paternal Grandfather     No Known Problems Son        Allergies as of 08/27/2024 - Reviewed 05/28/2024   Allergen Reaction Noted    Atorvastatin  10/25/2017    Atorvastatin calcium  10/25/2017    Bactrim ds Hives 05/11/2017    Hydrocodone-acetaminophen  08/25/2022    Levaquin  08/25/2022    Levofloxacin  04/07/2021    Pneumococcal vaccines Swelling 07/27/2018    Sulfa drugs Hives 05/11/2017    Vicodin [hydrocodone-acetaminophen] Rash 12/09/2010    Vytorin  12/15/2008         Vitals:  There were no vitals taken for this visit.    Current medications as of today   Current Outpatient Medications   Medication Sig Dispense Refill    phenazopyridine (PYRIDIUM) 100 MG Tab TAKE 1 TABLET BY MOUTH EVERY 8 HOURS AS NEEDED FOR DYSURIA      fosfomycin (MONUROL) 3 GM Pack MIX AND TAKE 1 PACKET BY MOUTH EVERY OTHER DAY      estradiol (ESTRACE) 0.1 MG/GM vaginal cream INSERT A PEA-SIZED AMOUNT (1/4 GRAM) VAGINALLY EVERY DAY      Alirocumab (PRALUENT) 75 MG/ML Solution Auto-injector Inject 75 mg under the skin every 14 days. 6 mL 3    Alirocumab (PRALUENT) 75 MG/ML Solution Auto-injector Inject 75 mg under the skin every 14 days. 6 mL 3    metoprolol tartrate (LOPRESSOR) 25 MG Tab Take 1 Tablet by mouth 2 times a day. 180 Tablet 3    furosemide (LASIX) 20 MG Tab Take 1 Tablet by mouth 1 time a day as needed (swelling). 90 Tablet 3    Cholecalciferol (VITAMIN D3) 2000 UNIT Cap Take 2 capsules by mouth once daily 60 capsule 0    Multiple Vitamins-Minerals (CENTRUM SILVER PO) Take 1 Tablet by mouth every day.       No current facility-administered medications for this visit.         Physical Exam:   Gen:           Alert and oriented, No apparent distress. Mood and affect appropriate, normal interaction with examiner.  Eyes:          PERRL, EOM intact, sclere white, conjunctive moist.  Ears:          Not examined.   Hearing:     Grossly intact.  Nose:          Normal, no lesions or deformities.  Dentition:    Good dentition.  Oropharynx:   Tongue normal, posterior pharynx without erythema or exudate.  Neck:        Supple, trachea midline, no masses.  Respiratory Effort: No intercostal retractions or use of accessory muscles.   Lung Auscultation:      Clear to auscultation bilaterally; no rales, rhonchi or wheezing.  CV:            Regular rate and rhythm. No murmurs, rubs or gallops.  Abd:           Not examined.   Lymphadenopathy: Not examined.  Gait and Station: Normal.  Digits and Nails: No  clubbing, cyanosis, petechiae, or nodes.   Cranial Nerves: II-XII grossly intact.  Skin:        No rashes, lesions or ulcers noted.               Ext:           No cyanosis or edema.      Assessment:  1. RUY (obstructive sleep apnea)          Plan:  Patient is using and benefiting from CPAP therapy.  Compliance shows adequate use and control of RUY with a residual AHI of 2.2.  Does show evidence of occasional mask leak.  New diagnosis of macular degeneration.  Recommended wearing eye mask while wearing CPAP to prevent excessively dry eyes.  Patient to follow-up in 6 months.  Order placed for mask and supplies which would be good for 1 year.  Advised follow-up sooner if needed.    Please note that this dictation was created using voice recognition software. I have made every reasonable attempt to correct obvious errors, but it is possible there are errors of grammar and possibly content that I did not discover before finalizing the note.

## 2024-08-30 ENCOUNTER — TELEPHONE (OUTPATIENT)
Dept: MEDICAL GROUP | Facility: PHYSICIAN GROUP | Age: 89
End: 2024-08-30
Payer: MEDICARE

## 2024-08-30 NOTE — TELEPHONE ENCOUNTER
LVM stating that Dr. Cabrera is leaving [facility] and any future appointment have been cancelled. Left the schedulers number as well for an appointment.

## 2024-09-05 ENCOUNTER — ANCILLARY PROCEDURE (OUTPATIENT)
Dept: CARDIOLOGY | Facility: MEDICAL CENTER | Age: 89
End: 2024-09-05
Attending: INTERNAL MEDICINE
Payer: MEDICARE

## 2024-09-05 DIAGNOSIS — I35.0 NONRHEUMATIC AORTIC VALVE STENOSIS: ICD-10-CM

## 2024-09-05 PROCEDURE — 93306 TTE W/DOPPLER COMPLETE: CPT

## 2024-09-06 LAB
LV EJECT FRACT  99904: 65
LV EJECT FRACT MOD 2C 99903: 67
LV EJECT FRACT MOD 4C 99902: 61.06
LV EJECT FRACT MOD BP 99901: 64.9

## 2024-09-06 PROCEDURE — 93306 TTE W/DOPPLER COMPLETE: CPT | Mod: 26 | Performed by: INTERNAL MEDICINE

## 2024-09-09 ENCOUNTER — TELEPHONE (OUTPATIENT)
Dept: CARDIOLOGY | Facility: MEDICAL CENTER | Age: 89
End: 2024-09-09
Payer: MEDICARE

## 2024-09-09 NOTE — LETTER
2024        Nini Taylor  [address]  [facility] [address]          Dear Nini,    We have received the results of your recent: Echocardiogram    Dr. Kamara advised your test came back stable overall, but we need to continue annual check in.  Please follow up as previously discussed with your physician.      Feel free to call us with any questions.        Sincerely,          iAlyn ACOSTA  Electronically Signed    Resulted Orders   EC-ECHOCARDIOGRAM COMPLETE W/O CONT   Result Value Ref Range    Eject.Frac. MOD BP 64.9     Eject.Frac. MOD 4C 61.06     Eject.Frac. MOD 2C 67     Left Ventrical Ejection Fraction 65     Narrative    Transthoracic  Echo Report      Echocardiography Laboratory    CONCLUSIONS  Compared to the images of the prior study on 11/15/2023 - aortic   stenosis has progressed, previous mean gradient was 20 mmHg.   Normal left ventricular systolic function.  The left ventricular ejection fraction is visually estimated to be 65%.  Grade I diastolic dysfunction.  The right ventricle is normal in size and systolic function.  Mild mitral regurgitation.  Moderate aortic valve stenosis.  Mild aortic insufficiency.    MERLYJOANNAAN  Exam Date:         2024                      10:01  Exam Location:     Out Patient  Priority:          Routine    Ordering Physician:        MIRNA KAMARA  Referring Physician:       [id number] CHRISTOPHER,  Sonographer:               Mami Mann [facility]    Age:    89     Gender:    F  MRN:    [id number]  :    1932  BSA:    1.51   Ht (in):    62     Wt (lb):    115  Exam Type:     Complete    Indications:     Nonrheumatic aortic (valve) stenosis  ICD Codes:       I35.0    CPT Codes:       63078    BP:   100    /   64     HR:   81  Technical Quality:       Technically difficult study -                            adequate information is obtained    MEASUREMENTS  (Male / Female)  Normal Values  2D ECHO  LV Diastolic Diameter PLAX        3.3 cm                4.2 - 5.9 / 3.9 - 5.3   cm  LV Systolic Diameter PLAX         2.1 cm                2.1 - 4.0 cm  IVS Diastolic Thickness           1.2 cm                  LVPW Diastolic Thickness          1.2 cm                  LVOT Diameter                     2 cm                    Aortic Root Diameter              3.5 cm                  Estimated LV Ejection Fraction    65 %                    LV Ejection Fraction MOD BP       64.9 %                >= 55  %  LV Ejection Fraction MOD 4C       61.1 %                  LV Ejection Fraction MOD 2C       67 %                    LV Ejection Fraction 4C AL        64.4 %                  LV Ejection Fraction 2C AL        68.3 %                  LA Volume Index                   31 cm3/m2             16 - 28 cm3/m2    DOPPLER  AV Peak Velocity                  3.5 m/s                 AV Peak Gradient                  50.4 mmHg               AV Mean Gradient                  26 mmHg                 LVOT Peak Velocity                0.8 m/s                 AV Area Cont Eq vti               0.78 cm2                Mitral E Point Velocity           0.75 m/s                Mitral E to A Ratio               0.82                    MV Pressure Half Time             70 ms                   MV Area PHT                       3.1 cm2                 MV Deceleration Time              239 ms                  PV Peak Velocity                  1.2 m/s                 PV Peak Gradient                  6.1 mmHg                RVOT Peak Velocity                0.48 m/s                LV E' Lateral Velocity            4 cm/s                  Mitral E to LV E' Lateral Ratio   18.7                    LV E' Septal Velocity             4.1 cm/s                Mitral E to LV E' Septal Ratio    18.3                      * Indicates values subject to auto-interpretation  LV EF:  65    %    FINDINGS  Left Ventricle  The left  ventricle is small in size. Mild concentric left ventricular   hypertrophy. Sigmoid septum measuring 1.6cm. Normal left ventricular   systolic function. The left ventricular ejection fraction is visually   estimated to be 65%. No regional wall motion abnormalities. Grade I   diastolic dysfunction.    Right Ventricle  The right ventricle is normal in size and systolic function.    Right Atrium  The right atrium is normal in size. Normal inferior vena cava size and   inspiratory collapse.    Left Atrium  The left atrium is normal in size. Left atrial volume index is 29 mL/sq   m.    Mitral Valve  Mild mitral annular calcification. No mitral stenosis. Mild mitral   regurgitation.    Aortic Valve  The aortic valve appears trileaflet. The aortic valve leaflets are   heavily calcified. Moderate aortic valve stenosis. Transvalvular   gradients are - Peak: 50 mmHg, Mean: 27 mmHg. Vmax is 3.5 m/s. Aortic   valve area calculated from the continuity equation is 0.7 cm2. Mild   aortic insufficiency.    Tricuspid Valve  Structurally normal tricuspid valve without significant stenosis. Mild   tricuspid regurgitation. Estimated right ventricular systolic pressure   is 25 mmHg.    Pulmonic Valve  Structurally normal pulmonic valve without significant stenosis. Mild   to moderate pulmonic insufficiency.    Pericardium  No pericardial effusion.    Aorta  Normal aortic root for body surface area. The ascending aorta diameter   is 3.3 cm.                      Montez Kamara MD  (Electronically Signed)  Final Date:     06 September 2024                   18:54

## 2024-09-09 NOTE — TELEPHONE ENCOUNTER
----- Message from Physician Montez Kamara M.D. sent at 9/6/2024  7:02 PM PDT -----  Let her know the echocardiogram is overall stable but we need to continue annual check-in

## 2024-09-10 ENCOUNTER — DOCUMENTATION (OUTPATIENT)
Dept: CARDIOLOGY | Facility: MEDICAL CENTER | Age: 89
End: 2024-09-10
Payer: MEDICARE

## 2024-09-10 NOTE — PROGRESS NOTES
This patient has been flagged through our echocardiogram surveillance with the following echocardiogram results:    Moderate Aortic Stenosis    Cardiologist: Dr. Kamara    Current Plan of Care for Structural Heart Disease: Added to surveillance tracking list    Next Echo date needed by: 9/5/2024    Next Follow up appointment needed by: 11/20/2024 per Dr. Kamara

## 2024-09-13 ENCOUNTER — OFFICE VISIT (OUTPATIENT)
Dept: URGENT CARE | Facility: PHYSICIAN GROUP | Age: 89
End: 2024-09-13
Payer: MEDICARE

## 2024-09-13 ENCOUNTER — HOSPITAL ENCOUNTER (OUTPATIENT)
Dept: RADIOLOGY | Facility: MEDICAL CENTER | Age: 89
End: 2024-09-13
Payer: MEDICARE

## 2024-09-13 VITALS
BODY MASS INDEX: 20.37 KG/M2 | OXYGEN SATURATION: 96 % | TEMPERATURE: 99.3 F | WEIGHT: 110.67 LBS | HEIGHT: 62 IN | RESPIRATION RATE: 20 BRPM | HEART RATE: 94 BPM | SYSTOLIC BLOOD PRESSURE: 130 MMHG | DIASTOLIC BLOOD PRESSURE: 90 MMHG

## 2024-09-13 DIAGNOSIS — R05.1 ACUTE COUGH: ICD-10-CM

## 2024-09-13 DIAGNOSIS — J06.9 VIRAL URI WITH COUGH: ICD-10-CM

## 2024-09-13 LAB
FLUAV RNA SPEC QL NAA+PROBE: NEGATIVE
FLUBV RNA SPEC QL NAA+PROBE: NEGATIVE
RSV RNA SPEC QL NAA+PROBE: NEGATIVE
S PYO DNA SPEC NAA+PROBE: NOT DETECTED
SARS-COV-2 RNA RESP QL NAA+PROBE: NEGATIVE

## 2024-09-13 PROCEDURE — 71046 X-RAY EXAM CHEST 2 VIEWS: CPT

## 2024-09-13 PROCEDURE — 0241U POCT CEPHEID COV-2, FLU A/B, RSV - PCR: CPT

## 2024-09-13 PROCEDURE — 87651 STREP A DNA AMP PROBE: CPT

## 2024-09-13 PROCEDURE — 3075F SYST BP GE 130 - 139MM HG: CPT

## 2024-09-13 PROCEDURE — 3080F DIAST BP >= 90 MM HG: CPT

## 2024-09-13 PROCEDURE — 99213 OFFICE O/P EST LOW 20 MIN: CPT

## 2024-09-13 ASSESSMENT — ENCOUNTER SYMPTOMS
SPUTUM PRODUCTION: 0
SHORTNESS OF BREATH: 0
EYE DISCHARGE: 0
WHEEZING: 0
MYALGIAS: 0
VOMITING: 0
PHOTOPHOBIA: 0
FEVER: 1
CHILLS: 1
EYE PAIN: 0
SENSORY CHANGE: 0
SORE THROAT: 1
ABDOMINAL PAIN: 0
NECK PAIN: 0
TINGLING: 0
DIZZINESS: 0
SPEECH CHANGE: 0
NAUSEA: 0
WEAKNESS: 0
FOCAL WEAKNESS: 0
DOUBLE VISION: 0
STRIDOR: 0
COUGH: 1
PALPITATIONS: 0
SINUS PAIN: 0
DIARRHEA: 0
EYE REDNESS: 0
BLURRED VISION: 0
HEADACHES: 0

## 2024-09-13 ASSESSMENT — VISUAL ACUITY: OU: 1

## 2024-09-13 NOTE — PROGRESS NOTES
Subjective     Nini Taylor is a 89 y.o. female who presents with sore throat and cough x5 days.     HPI:   Nini is a 89yo female presenting for sore throat and cough x5 days. Reports associated nasal congestion and fever. Tmax 99.3. Denies abdominal pain, vomiting, or diarrhea. No facial or neck pain or swelling. Denies shortness of breath or increased work of breathing. Denies dysphagia or difficulty managing oral secretions. No rash. She is eating and drinking normally with a normal urine output.        Review of Systems   Constitutional:  Positive for chills and fever. Negative for malaise/fatigue.   HENT:  Positive for congestion and sore throat. Negative for ear discharge, ear pain and sinus pain.    Eyes:  Negative for blurred vision, double vision, photophobia, pain, discharge and redness.   Respiratory:  Positive for cough. Negative for sputum production, shortness of breath, wheezing and stridor.    Cardiovascular:  Negative for chest pain, palpitations and leg swelling.   Gastrointestinal:  Negative for abdominal pain, diarrhea, nausea and vomiting.   Musculoskeletal:  Negative for myalgias and neck pain.   Skin:  Negative for rash.   Neurological:  Negative for dizziness, tingling, sensory change, speech change, focal weakness, weakness and headaches.     Past Medical History:   Diagnosis Date    AAA (abdominal aortic aneurysm) (HCC)     Atherosclerosis of native arteries of the extremities with intermittent claudication 10/16/2013    Blepharospasm syndrome     Blepharospasm syndrome      IMO load March 2020    Bright red rectal bleeding 6/24/2015    Cellulitis 5/5/2021    Chest pain 07/2015    Unspecified; Nuclear stress test negative     Constipation     Edema 5/29/2012    Female bladder prolapse, acquired     GERD (gastroesophageal reflux disease)     Hyperlipidemia     Hypertension     Insomnia     Low vitamin D level 4/20/2018    OSTEOPOROSIS     Palpitations 5/29/2012    Primary insomnia  1/10/2017    Sleep apnea     SVT (supraventricular tachycardia) (HCC) - on Zio 2015 and 2019 8/17/2015    Varicose veins 5/29/2012    Vitamin D deficiency disease     Vitamin D deficiency disease      IMO load March 2020     Past Surgical History:   Procedure Laterality Date    CYSTOCELE REPAIR  1/17/2011    Performed by GILMAR CHUNG at [facility]    RECTOCELE REPAIR  1/17/2011    Performed by GILMAR CHUNG at [facility]    BLADDER SUSPENSION  1/17/2011    Performed by GILMAR CHUNG at [facility]    CYSTOSCOPY  1/17/2011    Performed by GILMAR CHUNG at [facility]    ABDOMINAL HYSTERECTOMY TOTAL      ARTHROSCOPY, KNEE      CATARACT EXTRACTION WITH IOL      HEMORRHOIDECTOMY      HYSTERECTOMY, TOTAL ABDOMINAL      INJECTION SCLERO HEMORROIDS      OTHER ORTHOPEDIC SURGERY      knee scope x 2    PB KNEE SCOPE,DIAGNOSTIC  2003;2009    TONSILLECTOMY       Allergies: Atorvastatin, Atorvastatin calcium, Bactrim ds, Hydrocodone-acetaminophen, Levaquin, Levofloxacin, Pneumococcal vaccines, Sulfa drugs, Vicodin [hydrocodone-acetaminophen], and Vytorin     Current Outpatient Medications:     phenazopyridine (PYRIDIUM) 100 MG Tab, TAKE 1 TABLET BY MOUTH EVERY 8 HOURS AS NEEDED FOR DYSURIA, Disp: , Rfl:     estradiol (ESTRACE) 0.1 MG/GM vaginal cream, INSERT A PEA-SIZED AMOUNT (1/4 GRAM) VAGINALLY EVERY DAY, Disp: , Rfl:     Alirocumab (PRALUENT) 75 MG/ML Solution Auto-injector, Inject 75 mg under the skin every 14 days., Disp: 6 mL, Rfl: 3    Alirocumab (PRALUENT) 75 MG/ML Solution Auto-injector, Inject 75 mg under the skin every 14 days., Disp: 6 mL, Rfl: 3    metoprolol tartrate (LOPRESSOR) 25 MG Tab, Take 1 Tablet by mouth 2 times a day., Disp: 180 Tablet, Rfl: 3    Cholecalciferol (VITAMIN D3) 2000 UNIT Cap, Take 2 capsules by mouth once daily, Disp: 60 capsule, Rfl: 0    Multiple Vitamins-Minerals (CENTRUM SILVER PO), Take 1  "Tablet by mouth every day., Disp: , Rfl:     Social History     Tobacco Use    Smoking status: Never    Smokeless tobacco: Never   Vaping Use    Vaping status: Never Used   Substance Use Topics    Alcohol use: Not Currently     Alcohol/week: 0.0 oz     Comment: occasionally    Drug use: No     Family History   Problem Relation Age of Onset    Non-contributory Mother         gall bladder surgery    Heart Disease Mother     Cancer Father         colon rectal    Heart Disease Father         rheumatic heart disease    Other Brother         HIT BY CAR    Heart Disease Brother     Sleep Apnea Brother     No Known Problems Maternal Grandmother     No Known Problems Maternal Grandfather     No Known Problems Paternal Grandmother     No Known Problems Paternal Grandfather     No Known Problems Son      Medications, Allergies, and current problem list reviewed today in Epic.      Objective     BP (!) 130/90 (BP Location: Right arm, Patient Position: Sitting, BP Cuff Size: Adult)   Pulse 94   Temp 37.4 °C (99.3 °F) (Temporal)   Resp 20   Ht 1.575 m (5' 2\")   Wt 50.2 kg (110 lb 10.7 oz)   SpO2 96%   BMI 20.24 kg/m²      Physical Exam  Vitals reviewed.   Constitutional:       General: She is not in acute distress.  HENT:      Head: No right periorbital erythema or left periorbital erythema.      Jaw: There is normal jaw occlusion. No tenderness, swelling, pain on movement or malocclusion.      Right Ear: Tympanic membrane, ear canal and external ear normal.      Left Ear: Tympanic membrane, ear canal and external ear normal.      Nose: Congestion present.      Right Sinus: No maxillary sinus tenderness or frontal sinus tenderness.      Left Sinus: No maxillary sinus tenderness or frontal sinus tenderness.      Mouth/Throat:      Lips: No lesions.      Mouth: Mucous membranes are moist. No oral lesions or angioedema.      Tongue: No lesions. Tongue does not deviate from midline.      Palate: No mass and lesions.      " Pharynx: Uvula midline. Posterior oropharyngeal erythema present. No oropharyngeal exudate or uvula swelling.   Eyes:      General: Vision grossly intact. Gaze aligned appropriately. No visual field deficit.     Extraocular Movements: Extraocular movements intact.      Right eye: No nystagmus.      Left eye: No nystagmus.      Conjunctiva/sclera: Conjunctivae normal.      Pupils: Pupils are equal, round, and reactive to light.      Right eye: Pupil is round, reactive and not sluggish.      Left eye: Pupil is round, reactive and not sluggish.      Funduscopic exam:     Right eye: Red reflex present.         Left eye: Red reflex present.  Neck:      Trachea: Trachea normal. No abnormal tracheal secretions or tracheal deviation.   Cardiovascular:      Rate and Rhythm: Normal rate and regular rhythm.      Pulses: Normal pulses.      Heart sounds: Normal heart sounds.   Pulmonary:      Effort: Pulmonary effort is normal. No tachypnea, accessory muscle usage, prolonged expiration, respiratory distress or retractions.      Breath sounds: Normal breath sounds. No stridor, decreased air movement or transmitted upper airway sounds. No wheezing, rhonchi or rales.   Musculoskeletal:         General: Normal range of motion.      Cervical back: Full passive range of motion without pain, normal range of motion and neck supple. No erythema, rigidity, tenderness or crepitus. No pain with movement. Normal range of motion.      Right lower leg: No edema.      Left lower leg: No edema.   Lymphadenopathy:      Cervical: No cervical adenopathy.   Skin:     General: Skin is warm and dry.      Findings: No rash.   Neurological:      General: No focal deficit present.      Mental Status: She is alert. Mental status is at baseline.      Cranial Nerves: Cranial nerves 2-12 are intact.      Sensory: Sensation is intact.      Motor: Motor function is intact.      Coordination: Coordination is intact.      Gait: Gait is intact.   Psychiatric:          Mood and Affect: Mood normal.         Behavior: Behavior normal.         Thought Content: Thought content normal.       DX-CHEST-2 VIEWS    Result Date: 9/13/2024 9/13/2024 12:19 PM HISTORY/REASON FOR EXAM:  Cough TECHNIQUE/EXAM DESCRIPTION AND NUMBER OF VIEWS: Two views of the chest. COMPARISON:  1/29/2019. FINDINGS: LUNGS: Clear. No effusions. PNEUMOTHORAX: None. LINES AND TUBES: None. MEDIASTINUM: No cardiomegaly. Atherosclerosis. MUSCULOSKELETAL STRUCTURES: No acute displaced fracture.     No acute cardiopulmonary abnormality.    Results for orders placed or performed in visit on 09/13/24   POCT CEPHEID GROUP A STREP - PCR   Result Value Ref Range    POC Group A Strep, PCR Not Detected Not Detected, Invalid   POCT CoV-2, Flu A/B, RSV by PCR   Result Value Ref Range    SARS-CoV-2 by PCR Negative Negative, Invalid    Influenza virus A RNA Negative Negative, Invalid    Influenza virus B, PCR Negative Negative, Invalid    RSV, PCR Negative Negative, Invalid     Assessment & Plan     1. Acute cough   - DX-CHEST-2 VIEWS; Future  - POCT CEPHEID GROUP A STREP - PCR  - POCT CoV-2, Flu A/B, RSV by PCR    2. Viral URI with cough  - Supportive measures encouraged      MDM/Comments:   History and examination most consistent with viral URI  No signs of bacterial infection on exam   Lung sounds present and clear throughout all lung fields   - POCT Influenza A/B/RSV- negative  - POCT Group A Strep- negative   Encouraged conservative treatment.     Advised patient symptoms are viral in etiology, recommended supportive care. Increased fluids and rest. Recommended Tylenol for symptomatic relief and fevers. Discussed good hand hygiene and ways to decrease spread of disease.  Follow-up with PCP return for reevaluation if symptoms persist/worsen. The patient demonstrated a good understanding and agreed with the treatment plan.        Illness progression and alarm symptoms discussed with patient, emphasizing low threshold for  returning to clinic/emergency department for worsening symptoms. Patient is agreeable to the plan and verbalizes understanding, and will follow up if warranted.        Differential diagnosis, supportive care, and indications for immediate follow-up discussed with patient. Instructed to return to clinic or nearest emergency department for any change in condition, further concerns, or worsening of symptoms.     Recommended supportive treatment at home:     - Use a humidifier, especially at night. Cold or warm water humidifiers have the same effect.  - Dane Med squeeze bottle sinus rinses or plain nasal saline twice a day.  - Hot tea + honey + fresh lemon juice  - Frequent hand washing, rest, hydration  - Warm salt water gargles at least twice a day       Follow up with primary care provider. Follow up urgently for worsening symptoms, persistent fevers, facial swelling, visual changes, weakness, elevated heart rate, stiff neck, prolonged cough, persistent wheezing, leg swelling, or any other concerns. Seek emergency medical care immediately for: Trouble breathing, persistent pain or pressure in the chest, confusion, inability to wake or stay awake, bluish lips or face, persistent tachycardia (fast heart rate), prolonged dizziness, persistent high grade fevers.                             Electronically signed by PEG Van

## 2024-09-24 ENCOUNTER — TELEPHONE (OUTPATIENT)
Dept: CARDIOLOGY | Facility: MEDICAL CENTER | Age: 89
End: 2024-09-24
Payer: MEDICARE

## 2024-09-24 ENCOUNTER — PHARMACY VISIT (OUTPATIENT)
Dept: PHARMACY | Facility: MEDICAL CENTER | Age: 89
End: 2024-09-24
Payer: MEDICARE

## 2024-09-24 DIAGNOSIS — I47.10 SVT (SUPRAVENTRICULAR TACHYCARDIA) (HCC): Chronic | ICD-10-CM

## 2024-09-24 DIAGNOSIS — I35.0 NONRHEUMATIC AORTIC VALVE STENOSIS: ICD-10-CM

## 2024-09-24 PROCEDURE — RXMED WILLOW AMBULATORY MEDICATION CHARGE: Performed by: INTERNAL MEDICINE

## 2024-09-24 RX ORDER — METOPROLOL TARTRATE 25 MG/1
25 TABLET, FILM COATED ORAL 2 TIMES DAILY
Qty: 180 TABLET | Refills: 2 | Status: SHIPPED | OUTPATIENT
Start: 2024-09-24

## 2024-09-24 RX ORDER — FUROSEMIDE 20 MG
20 TABLET ORAL
Qty: 90 TABLET | Refills: 0 | Status: SHIPPED | OUTPATIENT
Start: 2024-09-24

## 2024-09-24 RX ORDER — COVID-19 VACCINE, MRNA 0.04 MG/.418ML
INJECTION, SUSPENSION INTRAMUSCULAR
Qty: 0.3 ML | Refills: 0 | OUTPATIENT
Start: 2024-09-24

## 2024-09-24 RX ORDER — INFLUENZA A VIRUS A/VICTORIA/4897/2022 IVR-238 (H1N1) ANTIGEN (FORMALDEHYDE INACTIVATED), INFLUENZA A VIRUS A/CALIFORNIA/122/2022 SAN-022 (H3N2) ANTIGEN (FORMALDEHYDE INACTIVATED), AND INFLUENZA B VIRUS B/MICHIGAN/01/2021 ANTIGEN (FORMALDEHYDE INACTIVATED) 60; 60; 60 UG/.5ML; UG/.5ML; UG/.5ML
INJECTION, SUSPENSION INTRAMUSCULAR
Qty: 0.5 ML | Refills: 0 | OUTPATIENT
Start: 2024-09-24

## 2024-09-24 NOTE — TELEPHONE ENCOUNTER
Is the patient due for a refill? Yes    Was the patient seen the past year? Yes    Date of last office visit: 05/28/2024    Does the patient have an upcoming appointment?  Yes   If yes, When? 11/20/2024    Provider to refill:CW    Does the patient have [facility] and need 100-day supply? (This applies to ALL medications) Patient does not have SCP  Refill Metropolol and furosemide   Walk-in given, scanned into media

## 2024-09-24 NOTE — TELEPHONE ENCOUNTER
Pt requested medication refill for:  Metoprolol and Furosemide.    Would like Dr. Kamara's nurse to send order to:    [facility]  [address] [address]

## 2024-10-01 ENCOUNTER — OFFICE VISIT (OUTPATIENT)
Dept: URGENT CARE | Facility: PHYSICIAN GROUP | Age: 89
End: 2024-10-01
Payer: MEDICARE

## 2024-10-01 VITALS
DIASTOLIC BLOOD PRESSURE: 82 MMHG | HEART RATE: 91 BPM | OXYGEN SATURATION: 97 % | WEIGHT: 112.88 LBS | HEIGHT: 62 IN | SYSTOLIC BLOOD PRESSURE: 118 MMHG | TEMPERATURE: 97.7 F | BODY MASS INDEX: 20.77 KG/M2 | RESPIRATION RATE: 12 BRPM

## 2024-10-01 DIAGNOSIS — H61.21 IMPACTED CERUMEN OF RIGHT EAR: ICD-10-CM

## 2024-10-01 DIAGNOSIS — J34.89 NOSE PAIN: ICD-10-CM

## 2024-10-01 PROCEDURE — 99213 OFFICE O/P EST LOW 20 MIN: CPT

## 2024-10-01 PROCEDURE — 3079F DIAST BP 80-89 MM HG: CPT

## 2024-10-01 PROCEDURE — 3074F SYST BP LT 130 MM HG: CPT

## 2024-10-01 ASSESSMENT — ENCOUNTER SYMPTOMS
ABDOMINAL PAIN: 0
DIAPHORESIS: 0
DIARRHEA: 0
CHILLS: 0
NAUSEA: 0
MYALGIAS: 0
BLOOD IN STOOL: 0
SHORTNESS OF BREATH: 0
VOMITING: 0
PSYCHIATRIC NEGATIVE: 1
WEAKNESS: 0
HEADACHES: 0
DIZZINESS: 0
SINUS PAIN: 0
EYE DISCHARGE: 0
SORE THROAT: 0
WHEEZING: 0
COUGH: 0
BLURRED VISION: 0
SPUTUM PRODUCTION: 0
FEVER: 0

## 2024-10-31 ENCOUNTER — PHARMACY VISIT (OUTPATIENT)
Dept: PHARMACY | Facility: MEDICAL CENTER | Age: 89
End: 2024-10-31
Payer: COMMERCIAL

## 2024-10-31 ENCOUNTER — HOSPITAL ENCOUNTER (OUTPATIENT)
Dept: LAB | Facility: MEDICAL CENTER | Age: 89
End: 2024-10-31
Attending: NURSE PRACTITIONER
Payer: MEDICARE

## 2024-10-31 LAB
25(OH)D3 SERPL-MCNC: 37 NG/ML (ref 30–100)
ALBUMIN SERPL BCP-MCNC: 3.6 G/DL (ref 3.2–4.9)
ALBUMIN/GLOB SERPL: 1.2 G/DL
ALP SERPL-CCNC: 85 U/L (ref 30–99)
ALT SERPL-CCNC: 12 U/L (ref 2–50)
ANION GAP SERPL CALC-SCNC: 11 MMOL/L (ref 7–16)
APPEARANCE UR: CLEAR
AST SERPL-CCNC: 24 U/L (ref 12–45)
BACTERIA #/AREA URNS HPF: ABNORMAL /HPF
BASOPHILS # BLD AUTO: 0.6 % (ref 0–1.8)
BASOPHILS # BLD: 0.03 K/UL (ref 0–0.12)
BILIRUB SERPL-MCNC: 0.9 MG/DL (ref 0.1–1.5)
BILIRUB UR QL STRIP.AUTO: NEGATIVE
BUN SERPL-MCNC: 8 MG/DL (ref 8–22)
CALCIUM ALBUM COR SERPL-MCNC: 9.3 MG/DL (ref 8.5–10.5)
CALCIUM SERPL-MCNC: 9 MG/DL (ref 8.5–10.5)
CASTS URNS QL MICRO: ABNORMAL /LPF (ref 0–2)
CHLORIDE SERPL-SCNC: 103 MMOL/L (ref 96–112)
CO2 SERPL-SCNC: 23 MMOL/L (ref 20–33)
COLOR UR: YELLOW
CREAT SERPL-MCNC: 0.92 MG/DL (ref 0.5–1.4)
EOSINOPHIL # BLD AUTO: 0.1 K/UL (ref 0–0.51)
EOSINOPHIL NFR BLD: 2 % (ref 0–6.9)
EPITHELIAL CELLS 1715: ABNORMAL /HPF (ref 0–5)
ERYTHROCYTE [DISTWIDTH] IN BLOOD BY AUTOMATED COUNT: 50 FL (ref 35.9–50)
EST. AVERAGE GLUCOSE BLD GHB EST-MCNC: 120 MG/DL
GFR SERPLBLD CREATININE-BSD FMLA CKD-EPI: 58 ML/MIN/1.73 M 2
GLOBULIN SER CALC-MCNC: 3 G/DL (ref 1.9–3.5)
GLUCOSE SERPL-MCNC: 93 MG/DL (ref 65–99)
GLUCOSE UR STRIP.AUTO-MCNC: NEGATIVE MG/DL
HBA1C MFR BLD: 5.8 % (ref 4–5.6)
HCT VFR BLD AUTO: 42.9 % (ref 37–47)
HGB BLD-MCNC: 14.1 G/DL (ref 12–16)
IMM GRANULOCYTES # BLD AUTO: 0.03 K/UL (ref 0–0.11)
IMM GRANULOCYTES NFR BLD AUTO: 0.6 % (ref 0–0.9)
KETONES UR STRIP.AUTO-MCNC: NEGATIVE MG/DL
LEUKOCYTE ESTERASE UR QL STRIP.AUTO: ABNORMAL
LYMPHOCYTES # BLD AUTO: 1.81 K/UL (ref 1–4.8)
LYMPHOCYTES NFR BLD: 35.7 % (ref 22–41)
MCH RBC QN AUTO: 28.2 PG (ref 27–33)
MCHC RBC AUTO-ENTMCNC: 32.9 G/DL (ref 32.2–35.5)
MCV RBC AUTO: 85.8 FL (ref 81.4–97.8)
MICRO URNS: ABNORMAL
MONOCYTES # BLD AUTO: 0.52 K/UL (ref 0–0.85)
MONOCYTES NFR BLD AUTO: 10.3 % (ref 0–13.4)
NEUTROPHILS # BLD AUTO: 2.58 K/UL (ref 1.82–7.42)
NEUTROPHILS NFR BLD: 50.8 % (ref 44–72)
NITRITE UR QL STRIP.AUTO: NEGATIVE
NRBC # BLD AUTO: 0 K/UL
NRBC BLD-RTO: 0 /100 WBC (ref 0–0.2)
PH UR STRIP.AUTO: 6.5 [PH] (ref 5–8)
PLATELET # BLD AUTO: 240 K/UL (ref 164–446)
PMV BLD AUTO: 9.9 FL (ref 9–12.9)
POTASSIUM SERPL-SCNC: 4.2 MMOL/L (ref 3.6–5.5)
PROT SERPL-MCNC: 6.6 G/DL (ref 6–8.2)
PROT UR QL STRIP: NEGATIVE MG/DL
RBC # BLD AUTO: 5 M/UL (ref 4.2–5.4)
RBC # URNS HPF: ABNORMAL /HPF (ref 0–2)
RBC UR QL AUTO: NEGATIVE
SODIUM SERPL-SCNC: 137 MMOL/L (ref 135–145)
SP GR UR STRIP.AUTO: 1.01
TSH SERPL-ACNC: 2.4 UIU/ML (ref 0.35–5.5)
UROBILINOGEN UR STRIP.AUTO-MCNC: 0.2 EU/DL
WBC # BLD AUTO: 5.1 K/UL (ref 4.8–10.8)
WBC #/AREA URNS HPF: ABNORMAL /HPF

## 2024-10-31 PROCEDURE — 82306 VITAMIN D 25 HYDROXY: CPT | Mod: GA

## 2024-10-31 PROCEDURE — 87086 URINE CULTURE/COLONY COUNT: CPT

## 2024-10-31 PROCEDURE — 36415 COLL VENOUS BLD VENIPUNCTURE: CPT

## 2024-10-31 PROCEDURE — 83036 HEMOGLOBIN GLYCOSYLATED A1C: CPT | Mod: GA

## 2024-10-31 PROCEDURE — 80053 COMPREHEN METABOLIC PANEL: CPT

## 2024-10-31 PROCEDURE — 85025 COMPLETE CBC W/AUTO DIFF WBC: CPT

## 2024-10-31 PROCEDURE — RXMED WILLOW AMBULATORY MEDICATION CHARGE

## 2024-10-31 PROCEDURE — 81003 URINALYSIS AUTO W/O SCOPE: CPT

## 2024-10-31 PROCEDURE — 81015 MICROSCOPIC EXAM OF URINE: CPT

## 2024-10-31 PROCEDURE — 84443 ASSAY THYROID STIM HORMONE: CPT | Mod: GA

## 2024-11-02 LAB
BACTERIA UR CULT: NORMAL
SIGNIFICANT IND 70042: NORMAL
SITE SITE: NORMAL
SOURCE SOURCE: NORMAL

## 2024-11-12 ENCOUNTER — APPOINTMENT (OUTPATIENT)
Dept: MEDICAL GROUP | Facility: MEDICAL CENTER | Age: 89
End: 2024-11-12
Payer: MEDICARE

## 2024-11-12 VITALS
SYSTOLIC BLOOD PRESSURE: 118 MMHG | HEART RATE: 83 BPM | DIASTOLIC BLOOD PRESSURE: 78 MMHG | TEMPERATURE: 98.6 F | OXYGEN SATURATION: 97 % | WEIGHT: 110.9 LBS | BODY MASS INDEX: 20.28 KG/M2

## 2024-11-12 DIAGNOSIS — I47.10 SVT (SUPRAVENTRICULAR TACHYCARDIA) (HCC): ICD-10-CM

## 2024-11-12 DIAGNOSIS — I70.219 ATHEROSCLEROSIS OF NATIVE ARTERY OF EXTREMITY WITH INTERMITTENT CLAUDICATION, UNSPECIFIED EXTREMITY (HCC): ICD-10-CM

## 2024-11-12 PROCEDURE — G2211 COMPLEX E/M VISIT ADD ON: HCPCS | Performed by: PHYSICIAN ASSISTANT

## 2024-11-12 PROCEDURE — 99214 OFFICE O/P EST MOD 30 MIN: CPT | Performed by: PHYSICIAN ASSISTANT

## 2024-11-12 PROCEDURE — 3078F DIAST BP <80 MM HG: CPT | Performed by: PHYSICIAN ASSISTANT

## 2024-11-12 PROCEDURE — 3074F SYST BP LT 130 MM HG: CPT | Performed by: PHYSICIAN ASSISTANT

## 2024-11-12 ASSESSMENT — FIBROSIS 4 INDEX: FIB4 SCORE: 2.66

## 2024-11-12 NOTE — PROGRESS NOTES
Subjective:     History of Present Illness  The patient is a 3-year-old female who presents for establishing care.    She is currently on Praluent, administered bi-weekly, for her high cholesterol. Her medication regimen also includes metoprolol, taken twice daily, and calcium supplements. She does not take furosemide.    She experiences leg swelling during the summer months and uses compression stockings for relief.    She has been dealing with a persistent bladder infection for several years, which is now under control. She recently completed a course of antibiotics, taking 20 pills over 14 days. Despite this, she still experiences symptoms of infection. She has been prescribed a total of 90 pills, which has raised her concerns about the amount of medication she is taking.    SOCIAL HISTORY  She has 1 son.      Current medicines (including changes today)  Current Outpatient Medications   Medication Sig Dispense Refill    metoprolol tartrate (LOPRESSOR) 25 MG Tab Take 1 Tablet by mouth 2 times a day. 180 Tablet 2    furosemide (LASIX) 20 MG Tab Take 1 Tablet by mouth 1 time a day as needed (swelling). 90 Tablet 0    Alirocumab (PRALUENT) 75 MG/ML Solution Auto-injector Inject 75 mg under the skin every 14 days. 6 mL 3    Cholecalciferol (VITAMIN D3) 2000 UNIT Cap Take 2 capsules by mouth once daily 60 capsule 0     No current facility-administered medications for this visit.     She  has a past medical history of AAA (abdominal aortic aneurysm) ([facility]), Atherosclerosis of native arteries of the extremities with intermittent claudication (10/16/2013), Blepharospasm syndrome, Blepharospasm syndrome, Bright red rectal bleeding (6/24/2015), Cellulitis (5/5/2021), Chest pain (07/2015), Constipation, Edema (5/29/2012), Female bladder prolapse, acquired, GERD (gastroesophageal reflux disease), Hyperlipidemia, Hypertension, Insomnia, Low vitamin D level (4/20/2018), OSTEOPOROSIS, Palpitations (5/29/2012), Primary insomnia  (1/10/2017), Sleep apnea, SVT (supraventricular tachycardia) (HCC) - on Zio 2015 and 2019 (8/17/2015), Varicose veins (5/29/2012), Vitamin D deficiency disease, and Vitamin D deficiency disease.    ROS   No chest pain, no shortness of breath, no abdominal pain  Positive ROS as per HPI.  All other systems reviewed and are negative.     Objective:     /78   Pulse 83   Temp 37 °C (98.6 °F) (Temporal)   Wt 50.3 kg (110 lb 14.4 oz)   SpO2 97%  Body mass index is 20.28 kg/m².   Physical Exam    Constitutional: Alert, no distress.  Skin: Warm, dry, good turgor, no rashes in visible areas.  Eye: Equal, round and reactive, conjunctiva clear, lids normal.  ENMT: Lips without lesions, good dentition, oropharynx clear.  Neck: Trachea midline, no masses, no thyromegaly. No cervical or supraclavicular lymphadenopathy  Respiratory: Unlabored respiratory effort, lungs clear to auscultation, no wheezes, no ronchi.  Cardiovascular: Normal S1, S2, no murmur, no edema.  Abdomen: Soft, non-tender, no masses, no hepatosplenomegaly.  Psych: Alert and oriented x3, normal affect and mood.      Results       Latest Reference Range & Units 10/31/24 09:41   WBC 4.8 - 10.8 K/uL 5.1   RBC 4.20 - 5.40 M/uL 5.00   Hemoglobin 12.0 - 16.0 g/dL 14.1   Hematocrit 37.0 - 47.0 % 42.9   MCV 81.4 - 97.8 fL 85.8   MCH 27.0 - 33.0 pg 28.2   MCHC 32.2 - 35.5 g/dL 32.9   RDW 35.9 - 50.0 fL 50.0   Platelet Count 164 - 446 K/uL 240   MPV 9.0 - 12.9 fL 9.9   Neutrophils-Polys 44.00 - 72.00 % 50.80   Neutrophils (Absolute) 1.82 - 7.42 K/uL 2.58   Lymphocytes 22.00 - 41.00 % 35.70   Lymphs (Absolute) 1.00 - 4.80 K/uL 1.81   Monocytes 0.00 - 13.40 % 10.30   Monos (Absolute) 0.00 - 0.85 K/uL 0.52   Eosinophils 0.00 - 6.90 % 2.00   Eos (Absolute) 0.00 - 0.51 K/uL 0.10   Basophils 0.00 - 1.80 % 0.60   Baso (Absolute) 0.00 - 0.12 K/uL 0.03   Immature Granulocytes 0.00 - 0.90 % 0.60   Immature Granulocytes (abs) 0.00 - 0.11 K/uL 0.03   Nucleated RBC 0.00 -  0.20 /100 WBC 0.00   NRBC (Absolute) K/uL 0.00   Sodium 135 - 145 mmol/L 137   Potassium 3.6 - 5.5 mmol/L 4.2   Chloride 96 - 112 mmol/L 103   Co2 20 - 33 mmol/L 23   Anion Gap 7.0 - 16.0  11.0   Glucose 65 - 99 mg/dL 93   Bun 8 - 22 mg/dL 8   Creatinine 0.50 - 1.40 mg/dL 0.92   GFR (CKD-EPI) >60 mL/min/1.73 m 2 58 !   Calcium 8.5 - 10.5 mg/dL 9.0   Correct Calcium 8.5 - 10.5 mg/dL 9.3   AST(SGOT) 12 - 45 U/L 24   ALT(SGPT) 2 - 50 U/L 12   Alkaline Phosphatase 30 - 99 U/L 85   Total Bilirubin 0.1 - 1.5 mg/dL 0.9   Albumin 3.2 - 4.9 g/dL 3.6   Total Protein 6.0 - 8.2 g/dL 6.6   Globulin 1.9 - 3.5 g/dL 3.0   A-G Ratio g/dL 1.2   Glycohemoglobin 4.0 - 5.6 % 5.8 (H)   Estim. Avg Glu mg/dL 120   Urobilinogen, Urine <=1.0 EU/dL 0.2   Color  Yellow   Character  Clear   Specific Gravity <1.035  1.010   Ph 5.0 - 8.0  6.5   Glucose Negative mg/dL Negative   Ketones Negative mg/dL Negative   Bilirubin Negative  Negative   Occult Blood Negative  Negative   Protein Negative mg/dL Negative   Nitrite Negative  Negative   Leukocyte Esterase Negative  Small !   Micro Urine Req  Microscopic   WBC /hpf 6-10 !   RBC 0 - 2 /hpf 0-2   Epithelial Cells 0 - 5 /hpf 3-5   Bacteria None /hpf None Seen   Urine Casts 0 - 2 /lpf 0-2   25-Hydroxy   Vitamin D 25 30 - 100 ng/mL 37   !: Data is abnormal  (H): Data is abnormally high    Assessment and Plan:   The following treatment plan was discussed    Assessment & Plan  1. Establishment of care.  No additional labs are required at this time. She is advised to schedule an appointment as needed.    2. Hyperlipidemia.  She is currently on Praluent every other week for high cholesterol. This medication will be continued.    3. Hypertension.  She is taking metoprolol twice a day. This medication will be continued.    4. Urinary tract infection.  She recently completed a course of antibiotics (maricruz 14 days). No current medication is needed for this condition.    5. Leg swelling.  She uses compression  stockings to manage leg swelling, especially in the summertime.    Follow-up  Patient will follow up in 1 year.      ORDERS:  1. Atherosclerosis of native artery of extremity with intermittent claudication, unspecified extremity (HCC)      2. SVT (supraventricular tachycardia) (HCC)            Follow Up: 1 year for annual wellness    Please note that this dictation was created using voice recognition software. I have made every reasonable attempt to correct obvious errors, but I expect that there are errors of grammar and possibly content that I did not discover before finalizing the note.      Attestation      () Today's E/M visit is associated with medical care services that serve as the continuing focal point for all needed health care services and/or with medical care services that are part of ongoing care related to a patient's single, serious condition, or a complex condition: This includes furnishing services to patients on an ongoing basis that result in care that is personalized to the patient. The services result in a comprehensive, longitudinal, and continuous relationship with the patient and involve delivery of team-based care that is accessible, coordinated with other practitioners and providers, and integrated with the broader health care landscape.       Verbal consent was acquired by the patient to use [employer] ambient listening note generation during this visit Yes

## 2024-11-20 ENCOUNTER — OFFICE VISIT (OUTPATIENT)
Dept: CARDIOLOGY | Facility: MEDICAL CENTER | Age: 89
End: 2024-11-20
Attending: INTERNAL MEDICINE
Payer: MEDICARE

## 2024-11-20 VITALS
WEIGHT: 111 LBS | DIASTOLIC BLOOD PRESSURE: 60 MMHG | OXYGEN SATURATION: 96 % | BODY MASS INDEX: 20.43 KG/M2 | RESPIRATION RATE: 16 BRPM | HEIGHT: 62 IN | HEART RATE: 74 BPM | SYSTOLIC BLOOD PRESSURE: 110 MMHG

## 2024-11-20 DIAGNOSIS — I71.43 INFRARENAL ABDOMINAL AORTIC ANEURYSM (AAA) WITHOUT RUPTURE (HCC): ICD-10-CM

## 2024-11-20 DIAGNOSIS — I70.219 ATHEROSCLEROSIS OF NATIVE ARTERY OF EXTREMITY WITH INTERMITTENT CLAUDICATION, UNSPECIFIED EXTREMITY (HCC): ICD-10-CM

## 2024-11-20 DIAGNOSIS — I47.10 SVT (SUPRAVENTRICULAR TACHYCARDIA) (HCC): Chronic | ICD-10-CM

## 2024-11-20 DIAGNOSIS — E78.2 MIXED HYPERLIPIDEMIA: ICD-10-CM

## 2024-11-20 DIAGNOSIS — I35.0 NONRHEUMATIC AORTIC VALVE STENOSIS: ICD-10-CM

## 2024-11-20 PROCEDURE — 3074F SYST BP LT 130 MM HG: CPT | Performed by: INTERNAL MEDICINE

## 2024-11-20 PROCEDURE — 99211 OFF/OP EST MAY X REQ PHY/QHP: CPT | Performed by: INTERNAL MEDICINE

## 2024-11-20 PROCEDURE — 99214 OFFICE O/P EST MOD 30 MIN: CPT | Performed by: INTERNAL MEDICINE

## 2024-11-20 PROCEDURE — 3078F DIAST BP <80 MM HG: CPT | Performed by: INTERNAL MEDICINE

## 2024-11-20 ASSESSMENT — FIBROSIS 4 INDEX: FIB4 SCORE: 2.66

## 2024-11-20 NOTE — PROGRESS NOTES
Chief Complaint   Patient presents with    Supraventricular Tachycardia (SVT)    Aortic Stenosis     F/v dx: Nonrheumatic aortic valve stenosis    Hyperlipidemia       Subjective     Nini Taylor is a 89 y.o. female who presents today with PAD AAA SVT on pCSK9       Doing okay this summer    She's worried about upcoming GYN appt    Past Medical History:   Diagnosis Date    AAA (abdominal aortic aneurysm) (HCC)     Atherosclerosis of native arteries of the extremities with intermittent claudication 10/16/2013    Blepharospasm syndrome     Blepharospasm syndrome      IMO load March 2020    Bright red rectal bleeding 6/24/2015    Cellulitis 5/5/2021    Chest pain 07/2015    Unspecified; Nuclear stress test negative     Constipation     Edema 5/29/2012    Female bladder prolapse, acquired     GERD (gastroesophageal reflux disease)     Hyperlipidemia     Hypertension     Insomnia     Low vitamin D level 4/20/2018    OSTEOPOROSIS     Palpitations 5/29/2012    Primary insomnia 1/10/2017    Sleep apnea     SVT (supraventricular tachycardia) (HCC) - on Zio 2015 and 2019 8/17/2015    Varicose veins 5/29/2012    Vitamin D deficiency disease     Vitamin D deficiency disease      IMO load March 2020     Past Surgical History:   Procedure Laterality Date    CYSTOCELE REPAIR  1/17/2011    Performed by GILMAR CHUNG at [facility]    RECTOCELE REPAIR  1/17/2011    Performed by GILMAR CHUNG at [facility]    BLADDER SUSPENSION  1/17/2011    Performed by GILMAR CHUNG at [facility]    CYSTOSCOPY  1/17/2011    Performed by GILMAR CHUNG at [facility]    ABDOMINAL HYSTERECTOMY TOTAL      ARTHROSCOPY, KNEE      CATARACT EXTRACTION WITH IOL      HEMORRHOIDECTOMY      HYSTERECTOMY, TOTAL ABDOMINAL      INJECTION SCLERO HEMORROIDS      OTHER ORTHOPEDIC SURGERY      knee scope x 2    PB KNEE SCOPE,DIAGNOSTIC  2003;2009    TONSILLECTOMY        Family History   Problem Relation Age of Onset    Non-contributory Mother         gall bladder surgery    Heart Disease Mother     Cancer Father         colon rectal    Heart Disease Father         rheumatic heart disease    Other Brother         HIT BY CAR    Heart Disease Brother     Sleep Apnea Brother     No Known Problems Maternal Grandmother     No Known Problems Maternal Grandfather     No Known Problems Paternal Grandmother     No Known Problems Paternal Grandfather     No Known Problems Son      Social History     Socioeconomic History    Marital status:      Spouse name: Not on file    Number of children: Not on file    Years of education: Not on file    Highest education level: Not on file   Occupational History    Not on file   Tobacco Use    Smoking status: Never    Smokeless tobacco: Never   Vaping Use    Vaping status: Never Used   Substance and Sexual Activity    Alcohol use: Not Currently     Alcohol/week: 0.0 oz     Comment: occasionally    Drug use: No    Sexual activity: Never   Other Topics Concern    Not on file   Social History Narrative    Not on file     Social Drivers of Health     Financial Resource Strain: Not on file   Food Insecurity: Not on file   Transportation Needs: Not on file   Physical Activity: Not on file   Stress: Not on file   Social Connections: Not on file   Intimate Partner Violence: Not on file   Housing Stability: Not on file     Allergies   Allergen Reactions    Atorvastatin     Atorvastatin Calcium     Bactrim Ds Hives    Hydrocodone-Acetaminophen     Levaquin     Levofloxacin     Pneumococcal Vaccines Swelling    Sulfa Drugs Hives    Vicodin [Hydrocodone-Acetaminophen] Rash    Vytorin      Outpatient Encounter Medications as of 11/20/2024   Medication Sig Dispense Refill    metoprolol tartrate (LOPRESSOR) 25 MG Tab Take 1 Tablet by mouth 2 times a day. 180 Tablet 2    furosemide (LASIX) 20 MG Tab Take 1 Tablet by mouth 1 time a day as needed (swelling). 90  "Tablet 0    Alirocumab (PRALUENT) 75 MG/ML Solution Auto-injector Inject 75 mg under the skin every 14 days. 6 mL 3    Cholecalciferol (VITAMIN D3) 2000 UNIT Cap Take 2 capsules by mouth once daily 60 capsule 0     No facility-administered encounter medications on file as of 11/20/2024.     ROS           Objective     /60 (BP Location: Left arm, Patient Position: Sitting, BP Cuff Size: Adult)   Pulse 74   Resp 16   Ht 1.575 m (5' 2\")   Wt 50.3 kg (111 lb)   SpO2 96%   BMI 20.30 kg/m²     Physical Exam  Constitutional:       General: She is not in acute distress.     Appearance: She is not diaphoretic.   Eyes:      General: No scleral icterus.  Neck:      Vascular: No JVD.   Cardiovascular:      Rate and Rhythm: Normal rate.      Heart sounds: Normal heart sounds. No murmur heard.     No friction rub. No gallop.   Pulmonary:      Effort: No respiratory distress.      Breath sounds: No wheezing or rales.   Abdominal:      General: Bowel sounds are normal.      Palpations: Abdomen is soft.   Musculoskeletal:      Right lower leg: No edema.      Left lower leg: No edema.   Skin:     Findings: No rash.   Neurological:      Mental Status: She is alert. Mental status is at baseline.   Psychiatric:         Mood and Affect: Mood normal.            We reviewed in person the most recent labs  Recent Results (from the past 30 weeks)   Comp Metabolic Panel    Collection Time: 05/23/24 11:23 AM   Result Value Ref Range    Sodium 141 135 - 145 mmol/L    Potassium 4.4 3.6 - 5.5 mmol/L    Chloride 105 96 - 112 mmol/L    Co2 25 20 - 33 mmol/L    Anion Gap 11.0 7.0 - 16.0    Glucose 89 65 - 99 mg/dL    Bun 10 8 - 22 mg/dL    Creatinine 0.87 0.50 - 1.40 mg/dL    Calcium 9.0 8.5 - 10.5 mg/dL    Correct Calcium 9.2 8.5 - 10.5 mg/dL    AST(SGOT) 18 12 - 45 U/L    ALT(SGPT) 7 2 - 50 U/L    Alkaline Phosphatase 91 30 - 99 U/L    Total Bilirubin 0.9 0.1 - 1.5 mg/dL    Albumin 3.7 3.2 - 4.9 g/dL    Total Protein 6.7 6.0 - 8.2 g/dL "    Globulin 3.0 1.9 - 3.5 g/dL    A-G Ratio 1.2 g/dL   Lipid Profile    Collection Time: 05/23/24 11:23 AM   Result Value Ref Range    Cholesterol,Tot 131 100 - 199 mg/dL    Triglycerides 89 0 - 149 mg/dL    HDL 56 >=40 mg/dL    LDL 57 <100 mg/dL   ESTIMATED GFR    Collection Time: 05/23/24 11:23 AM   Result Value Ref Range    GFR (CKD-EPI) 63 >60 mL/min/1.73 m 2   EC-ECHOCARDIOGRAM COMPLETE W/O CONT    Collection Time: 09/05/24  1:50 PM   Result Value Ref Range    Eject.Frac. MOD BP 64.9     Eject.Frac. MOD 4C 61.06     Eject.Frac. MOD 2C 67     Left Ventrical Ejection Fraction 65    POCT CEPHEID GROUP A STREP - PCR    Collection Time: 09/13/24 12:14 PM   Result Value Ref Range    POC Group A Strep, PCR Not Detected Not Detected, Invalid   POCT CoV-2, Flu A/B, RSV by PCR    Collection Time: 09/13/24 12:14 PM   Result Value Ref Range    SARS-CoV-2 by PCR Negative Negative, Invalid    Influenza virus A RNA Negative Negative, Invalid    Influenza virus B, PCR Negative Negative, Invalid    RSV, PCR Negative Negative, Invalid   CBC WITH DIFFERENTIAL    Collection Time: 10/31/24  9:41 AM   Result Value Ref Range    WBC 5.1 4.8 - 10.8 K/uL    RBC 5.00 4.20 - 5.40 M/uL    Hemoglobin 14.1 12.0 - 16.0 g/dL    Hematocrit 42.9 37.0 - 47.0 %    MCV 85.8 81.4 - 97.8 fL    MCH 28.2 27.0 - 33.0 pg    MCHC 32.9 32.2 - 35.5 g/dL    RDW 50.0 35.9 - 50.0 fL    Platelet Count 240 164 - 446 K/uL    MPV 9.9 9.0 - 12.9 fL    Neutrophils-Polys 50.80 44.00 - 72.00 %    Lymphocytes 35.70 22.00 - 41.00 %    Monocytes 10.30 0.00 - 13.40 %    Eosinophils 2.00 0.00 - 6.90 %    Basophils 0.60 0.00 - 1.80 %    Immature Granulocytes 0.60 0.00 - 0.90 %    Nucleated RBC 0.00 0.00 - 0.20 /100 WBC    Neutrophils (Absolute) 2.58 1.82 - 7.42 K/uL    Lymphs (Absolute) 1.81 1.00 - 4.80 K/uL    Monos (Absolute) 0.52 0.00 - 0.85 K/uL    Eos (Absolute) 0.10 0.00 - 0.51 K/uL    Baso (Absolute) 0.03 0.00 - 0.12 K/uL    Immature Granulocytes (abs) 0.03 0.00 -  0.11 K/uL    NRBC (Absolute) 0.00 K/uL   HEMOGLOBIN A1C    Collection Time: 10/31/24  9:41 AM   Result Value Ref Range    Glycohemoglobin 5.8 (H) 4.0 - 5.6 %    Est Avg Glucose 120 mg/dL   Comp Metabolic Panel    Collection Time: 10/31/24  9:41 AM   Result Value Ref Range    Sodium 137 135 - 145 mmol/L    Potassium 4.2 3.6 - 5.5 mmol/L    Chloride 103 96 - 112 mmol/L    Co2 23 20 - 33 mmol/L    Anion Gap 11.0 7.0 - 16.0    Glucose 93 65 - 99 mg/dL    Bun 8 8 - 22 mg/dL    Creatinine 0.92 0.50 - 1.40 mg/dL    Calcium 9.0 8.5 - 10.5 mg/dL    Correct Calcium 9.3 8.5 - 10.5 mg/dL    AST(SGOT) 24 12 - 45 U/L    ALT(SGPT) 12 2 - 50 U/L    Alkaline Phosphatase 85 30 - 99 U/L    Total Bilirubin 0.9 0.1 - 1.5 mg/dL    Albumin 3.6 3.2 - 4.9 g/dL    Total Protein 6.6 6.0 - 8.2 g/dL    Globulin 3.0 1.9 - 3.5 g/dL    A-G Ratio 1.2 g/dL   TSH    Collection Time: 10/31/24  9:41 AM   Result Value Ref Range    TSH 2.400 0.350 - 5.500 uIU/mL   URINALYSIS    Collection Time: 10/31/24  9:41 AM   Result Value Ref Range    Color Yellow     Character Clear     Specific Gravity 1.010 <1.035    Ph 6.5 5.0 - 8.0    Glucose Negative Negative mg/dL    Ketones Negative Negative mg/dL    Protein Negative Negative mg/dL    Bilirubin Negative Negative    Urobilinogen, Urine 0.2 <=1.0 EU/dL    Nitrite Negative Negative    Leukocyte Esterase Small (A) Negative    Occult Blood Negative Negative    Micro Urine Req Microscopic    URINE CULTURE(NEW)    Collection Time: 10/31/24  9:41 AM    Specimen: Urine   Result Value Ref Range    Significant Indicator NEG     Source UR     Site -     Culture Result       Usual urogenital leigha 10-50,000 cfu/mL  3 or more organisms isolated, culture of doubtful  significance, please recollect.     VITAMIN D,25 HYDROXY (DEFICIENCY)    Collection Time: 10/31/24  9:41 AM   Result Value Ref Range    25-Hydroxy   Vitamin D 25 37 30 - 100 ng/mL   URINE MICROSCOPIC (W/UA)    Collection Time: 10/31/24  9:41 AM   Result Value  Ref Range    WBC 6-10 (A) /hpf    RBC 0-2 0 - 2 /hpf    Bacteria None Seen None /hpf    Epithelial Cells 3-5 0 - 5 /hpf    Urine Casts 0-2 0 - 2 /lpf   ESTIMATED GFR    Collection Time: 10/31/24  9:41 AM   Result Value Ref Range    GFR (CKD-EPI) 58 (A) >60 mL/min/1.73 m 2         Assessment & Plan     1. Atherosclerosis of native artery of extremity with intermittent claudication, unspecified extremity (HCC)        2. SVT (supraventricular tachycardia) (HCC) - on Zio 2015 and 2019        3. Mixed hyperlipidemia            Medical Decision Making: Today's Assessment/Status/Plan:          It was my pleasure to meet with Ms. Taylor.    Blood pressure is well controlled.  She will continue to monitor and eat hearty healthy diet.      We addressed the management of dyslipidemia at today's visit. She is on appropriate PCSK9    Echo annually for moderate AS    If needs GYN surgery can do safely    I will see Ms. Taylor back in 6 months time and encouraged her to follow up with us over the phone or electronically using my MyChart as issues arise.    It is my pleasure to participate in the care of Ms. Taylor.  Please do not hesitate to contact me with questions or concerns.    Montez Kamara MD PhD [facility]  Cardiologist [facility]    Please note that this dictation was created using voice recognition software. There may be errors I did not discover before finalizing the note.

## 2024-12-10 DIAGNOSIS — I47.10 SVT (SUPRAVENTRICULAR TACHYCARDIA) (HCC): Chronic | ICD-10-CM

## 2024-12-10 RX ORDER — METOPROLOL TARTRATE 25 MG/1
25 TABLET, FILM COATED ORAL 2 TIMES DAILY
Qty: 180 TABLET | Refills: 0 | OUTPATIENT
Start: 2024-12-10

## 2024-12-11 ENCOUNTER — NON-PROVIDER VISIT (OUTPATIENT)
Dept: VASCULAR LAB | Facility: MEDICAL CENTER | Age: 89
End: 2024-12-11
Attending: INTERNAL MEDICINE
Payer: MEDICARE

## 2024-12-11 ENCOUNTER — TELEPHONE (OUTPATIENT)
Dept: CARDIOLOGY | Facility: MEDICAL CENTER | Age: 89
End: 2024-12-11

## 2024-12-11 VITALS — BODY MASS INDEX: 20.27 KG/M2 | WEIGHT: 110.8 LBS

## 2024-12-11 DIAGNOSIS — I71.43 INFRARENAL ABDOMINAL AORTIC ANEURYSM (AAA) WITHOUT RUPTURE (HCC): ICD-10-CM

## 2024-12-11 DIAGNOSIS — E78.5 DYSLIPIDEMIA: ICD-10-CM

## 2024-12-11 PROCEDURE — 99212 OFFICE O/P EST SF 10 MIN: CPT

## 2024-12-11 RX ORDER — ALIROCUMAB 75 MG/ML
75 INJECTION, SOLUTION SUBCUTANEOUS
Qty: 6 ML | Refills: 3 | Status: SHIPPED | OUTPATIENT
Start: 2024-12-11

## 2024-12-11 ASSESSMENT — FIBROSIS 4 INDEX: FIB4 SCORE: 2.66

## 2024-12-11 NOTE — TELEPHONE ENCOUNTER
Upon chart review 180 tab 2 refills sent 9/24/24 to preferred pharmacy.  Medication refill request refused.

## 2024-12-11 NOTE — PROGRESS NOTES
[facility]  Date of Service: 12/11/24    Nini Taylor presents for management of dyslipidemia    Referral Source: [facility] - Dr. Kamara  Date First Seen in Clinic 2/16/2017    PERTINENT HLD PMHX  Age at Initial Diagnosis of Dyslipidemia: Unknown     Baseline Lipids Prior to Treatment:    Latest Reference Range & Units 02/06/17 12:20   Cholesterol,Tot 100 - 199 mg/dL 274 (H)   Triglycerides 0 - 149 mg/dL 101   HDL >=40 mg/dL 58   LDL <100 mg/dL 196 (H)   (H): Data is abnormally high    History of ASCVD: Abdominal Aortic Aneurysm     Previously Attempted Interventions for Lipids - including outcome  Statin: Simvastatin, rosuvastatin, atorvastatin     Outcome: myalgias  Non-Statin: none  Outcome: not applicable    Secondary causes/contributors (report to medical director if present):   Endocrine/Hypothyroidism:  none reported   Liver disease: none reported   Renal disease/nephrotic syndrome:  none reported  Dietary-induced (ketogenic, lean mass hyper-responder)? no  Medications: Beta-blockers and Loop diuretic    CURRENT MED MGMT  Current Lipid Lowering Meds:   Statin: None  Non-Statin: Praluent 75 mg SQ Q14D  Supplements: None  Current adverse drug reactions/side effects? no  Is Adherence an Issue? yes    Any Family History Cardiovascular Disease? yes  Premature CAD in brother & baseline LDL would suggest FH    Vitals:    12/11/24 1156   Weight: 50.3 kg (110 lb 12.8 oz)      BMI Readings from Last 1 Encounters:   12/11/24 20.27 kg/m²      Wt Readings from Last 3 Encounters:   12/11/24 50.3 kg (110 lb 12.8 oz)   11/20/24 50.3 kg (111 lb)   11/12/24 50.3 kg (110 lb 14.4 oz)     BP Readings from Last 2 Encounters:   11/20/24 110/60   11/12/24 118/78       DATA REVIEW:  Most Recent Lipid Panel:   Lab Results   Component Value Date/Time    CHOLSTRLTOT 131 05/23/2024 11:23 AM    LDL 57 05/23/2024 11:23 AM    HDL 56 05/23/2024 11:23 AM    TRIGLYCERIDE 89 05/23/2024 11:23 AM     Lab Results   Component  "Value Date/Time    LDL 57 05/23/2024 11:23 AM    LDL 61 11/15/2023 01:41 PM    LDL 61 05/08/2023 12:26 PM      Lab Results   Component Value Date/Time    LIPOPROTA 114 (H) 12/03/2018 11:51 AM      No results found for: \"APOB\"   No results found for: \"CRPHIGHSEN\"    Other Pertinent Blood Work:   Lab Results   Component Value Date    SODIUM 137 10/31/2024    POTASSIUM 4.2 10/31/2024    CHLORIDE 103 10/31/2024    CO2 23 10/31/2024    ANION 11.0 10/31/2024    GLUCOSE 93 10/31/2024    BUN 8 10/31/2024    CREATININE 0.92 10/31/2024    CALCIUM 9.0 10/31/2024    ASTSGOT 24 10/31/2024    ALTSGPT 12 10/31/2024    ALKPHOSPHAT 85 10/31/2024    TBILIRUBIN 0.9 10/31/2024    ALBUMIN 3.6 10/31/2024    AGRATIO 1.2 10/31/2024    TSHULTRASEN 2.400 10/31/2024       VASCULAR IMAGING:  CAC Score (if available): None  CIMT/Vascular screen (if available): None  Other (if applicable): N/a    ASSESSMENT AND PLAN    Patient Type, check all that apply: Secondary Prevention    Major ASCVD events: AAA - surveillance no longer per Dr. Briceño. Will notify Vascular to see if they can assist.       Evidence of genetic dyslipidemia (Familial Hyperlipidemia): Yes - see above    ASCVD risk calculations (if applicable)  The ASCVD Risk score (Chintan MARTINEZ, et al., 2019) failed to calculate., N/A    ACC/AHA Indication for Statin Therapy:  Established ASCVD: Indication for High intensity statin     Other Significant Risk Markers:  High-risk conditions: >64yr old , Heterozygous familial hypercholesterolemia, and Hypertension   Risk-enhancers: Famhx of premature ASCVD  Lipoprotein(a): High (>50 mg/dl or <125 nmol/L  Most recent CAC (if available): N/a    Lipid Goals:  Primary: LDL-C <70 mg/dl  Secondary apoB <70 mg/dl  At goals? TBD. Pt due for repeat lipid panel and apoB. LDL historically at goal.    LIFESTYLE INTERVENTIONS  TOBACCO: None  continued complete avoidance of all tobacco products   EtOH: None  Men: No more than two standard servings per day  Women: " No more than one standard serving per day  PHYSICAL ACTIVITY:  None, recommended pt increase as tolerated.  NUTRITION: Mediterranean and DASH , avoids dairy products    LIPID-LOWERING MEDICATION MANAGEMENT:     Statin Therapy:   Would not rechallenge at this time due to hx of intolerance - see above.    PCSK9 Inhibitor strategy indications: ASCVD with suboptimal control of LDL-C despite maximally tolerated statin    Non-Statin Meds:   EZETIMIBE: Not currently indicated  NEXLETOL/NEXLIZET (avoid simva >20, prava >40): Not currently indicated  BAS: Not currently indicated    OMEGA-3 FAs: Not currently indicated  FIBRATE:  Not currently indicated  PCSK9 mAb: Continue Praluent 75 mg subQ Q14D  LEQVIO: Not currently indicated    Recommended Supplements: Continue vitamin D     Studies Ordered at Todays Visit: None   Blood Work To Be Obtained Prior to Next Visit: Lipid panel and apoB w/in next 2 weeks, Lipid panel, CMP, and ApoB prior to next appt  Follow-Up: 6 months    Dinesh Rosen, PharmD     CC:  Kristen Wiese, P.A.-C. Dr. Bloch

## 2024-12-11 NOTE — Clinical Note
Pt mentioned that no one is doing routine surveillance of her AAA at this time. Previously followed w/ Dr. Briceño. Just wanted to make sure you were aware of this.  Thank you, Dinesh

## 2024-12-11 NOTE — TELEPHONE ENCOUNTER
----- Message from Physician Michael Bloch, M.D. sent at 12/11/2024  1:58 PM PST -----  Regarding: AAA surveillance  Dinesh (and Cheko) -that's a good catch. it sounds like she had a somewhat saccular aneurysm that had grown a bit over time.  Even at her age is probably worth getting a CTA as it would be an easy fix with a stent graft if it has grown.    We can either have Dr. Kamara order if he is willing or I am happy to see patient back again in vascular medicine clinic -which ever patient and Dr. Kamara prefer    MB  ----- Message -----  From: Dinesh Rosen, Yudy  Sent: 12/11/2024  12:08 PM PST  To: Michael J Bloch, M.D.    Pt mentioned that no one is doing routine surveillance of her AAA at this time. Previously followed w/ Dr. Briceño. Just wanted to make sure you were aware of this.   Thank you,  Dinesh

## 2024-12-11 NOTE — TELEPHONE ENCOUNTER
Yeah she's essentially blind and nearly deaf but otherwise very with it so I think she would do stent graft, I'll order CTA.  She had felt in the past that the vascular surgeon was being to pushy to do something about it so I had been watching, but hers is very saccular so perhaps better to do stent if indicated.    Ailyn please call her and let her know that we should update her CTA it has been three years she has to have updated Cr.  Also let her know that I spoke with Dr Bowen about her upcoming appointment next week.  I hope she and her  have a great holiday

## 2024-12-12 ENCOUNTER — HOSPITAL ENCOUNTER (OUTPATIENT)
Dept: RADIOLOGY | Facility: MEDICAL CENTER | Age: 89
End: 2024-12-12
Attending: INTERNAL MEDICINE
Payer: MEDICARE

## 2024-12-12 DIAGNOSIS — I71.43 INFRARENAL ABDOMINAL AORTIC ANEURYSM (AAA) WITHOUT RUPTURE (HCC): ICD-10-CM

## 2024-12-12 PROCEDURE — 700117 HCHG RX CONTRAST REV CODE 255: Performed by: INTERNAL MEDICINE

## 2024-12-12 PROCEDURE — 74174 CTA ABD&PLVS W/CONTRAST: CPT

## 2024-12-12 RX ADMIN — IOHEXOL 100 ML: 350 INJECTION, SOLUTION INTRAVENOUS at 15:56

## 2024-12-12 NOTE — TELEPHONE ENCOUNTER
Called pt, [phone], and s/w pt spouse Kingsley regarding CW recommendations.  given to Kingsley to schedule CTA.  He verbalizes understanding and states no other concerns or questions at this time.  Kingsley is appreciative of information given.    Call successfully transferred to imaging scheduling.

## 2024-12-12 NOTE — PROGRESS NOTES
Urogynecology & Reconstructive Pelvic Surgery - Consultation Visit    Nini Taylor MRN:[id number]    Referred by: Cheko Kamara MD ([facility])    Reason for Visit:   Chief Complaint   Patient presents with    New Patient     Consult          Subjective     History of Presenting Illness:    Nini Taylor is a 89 y.o.  who presents for the evaluation and management of UTIs and to check prior surgery due to concern for mesh exposure.     She does not move and she underwent a tricompartmental prolapse repair with vaginal mesh pedicle kits with Dr. Harman on and Dr. Castaneda. She saw Dr. Harman every 6 months for several years after that until he sold his practice.  She changed doctors and her next doctor brought out a lot of pain on her pelvic exam, and bleeding noted after the exam. A few months after that she had some more pelvic discomfort and a vaginal odor. She then saw a urologist, didn't     She was started on vaginal estrogen, instructed to use her finger and felt sharp material, concerned about mesh problems    UTI symptoms start as an odor, bladder discomfort, urgency.  Review of her past treatments by Dr. Castaneda note these are almost always treated with Macrobid.  Another prescriber sent her fosfomycin recentl, but she did not take it because she read that it could cause C. difficile and this scared her.    Her medical history is notable for supraventricular tachycardia, infrarenal AAA PAD    Prior Pelvic surgery:   Hysterectomy (40s)  : Anterior repair with [place] mesh, Obtryx transobturator sling, posterior repair with [place] mesh, vault suspension with medical mesh (Kimani/Tonya) Op report reviewed     Prior treatment:   Estrogen cream (prior)    Urine cultures:   2024: neg  3/2023: E coli  2022: E col    Pelvic floor symptom review:       Past medical and surgical history      Past medical history:  Past Medical History:   Diagnosis Date    Cellulitis 2021     Low vitamin D level 04/20/2018    Primary insomnia 01/10/2017    SVT (supraventricular tachycardia) ([facility]) - on Zio 2015 and 2019 08/17/2015    Chest pain 07/2015    Unspecified; Nuclear stress test negative     Bright red rectal bleeding 06/24/2015    Atherosclerosis of native arteries of the extremities with intermittent claudication 10/16/2013    Edema 05/29/2012    Palpitations 05/29/2012    Varicose veins 05/29/2012    AAA (abdominal aortic aneurysm) ([facility])     Blepharospasm syndrome     Blepharospasm syndrome      IMO load March 2020    Constipation     Female bladder prolapse, acquired     GERD (gastroesophageal reflux disease)     Hyperlipidemia     Hypertension     Insomnia     OSTEOPOROSIS     Sleep apnea     Urinary tract infection     Vitamin D deficiency disease     Vitamin D deficiency disease      IMO load March 2020     Past surgical history:  Past Surgical History:   Procedure Laterality Date    CYSTOCELE REPAIR  1/17/2011    Performed by GILMAR CHUNG at SURGERY SAME DAY [facility] ORS    RECTOCELE REPAIR  1/17/2011    Performed by GILMAR CHUNG at SURGERY SAME DAY [facility] ORS    BLADDER SUSPENSION  1/17/2011    Performed by GILMAR CHUNG at SURGERY SAME DAY [facility] ORS    CYSTOSCOPY  1/17/2011    Performed by GILMAR CHUNG at SURGERY SAME DAY [facility] ORS    ABDOMINAL HYSTERECTOMY TOTAL      ARTHROSCOPY, KNEE      CATARACT EXTRACTION WITH IOL      HEMORRHOIDECTOMY      HYSTERECTOMY, TOTAL ABDOMINAL      INJECTION SCLERO HEMORROIDS      OTHER ORTHOPEDIC SURGERY      knee scope x 2    PB KNEE SCOPE,DIAGNOSTIC  2003;2009    TONSILLECTOMY       Medications:has a current medication list which includes the following prescription(s): praluent, metoprolol tartrate, furosemide, and vitamin d3.  Allergies:Atorvastatin, Atorvastatin calcium, Bactrim ds, Hydrocodone-acetaminophen, Levaquin, Levofloxacin, Pneumococcal vaccines, Sulfa drugs, Vicodin [hydrocodone-acetaminophen], and  "Vytorin  Family history:  Family History   Problem Relation Age of Onset    Non-contributory Mother         gall bladder surgery    Heart Disease Mother     Cancer Father         colon rectal    Heart Disease Father         rheumatic heart disease    Other Brother         HIT BY CAR    Heart Disease Brother     Sleep Apnea Brother     No Known Problems Maternal Grandmother     No Known Problems Maternal Grandfather     No Known Problems Paternal Grandmother     No Known Problems Paternal Grandfather     No Known Problems Son      Social history: reports that she has never smoked. She has never used smokeless tobacco. She reports that she does not currently use alcohol. She reports that she does not use drugs.    Review of systems: A full review of systems was performed, and negative with the exception of want is noted above in the HPI.        Objective        BP (!) 134/95   Pulse 100   Ht 5' 2\"   Wt 110 lb   BMI 20.12 kg/m²     Physical Exam  Vitals reviewed. Exam conducted with a chaperone present.   Constitutional:       Appearance: Normal appearance.   HENT:      Head: Normocephalic.      Mouth/Throat:      Mouth: Mucous membranes are moist.   Cardiovascular:      Rate and Rhythm: Normal rate.   Pulmonary:      Effort: Pulmonary effort is normal.   Skin:     General: Skin is warm and dry.   Neurological:      Mental Status: She is alert.   Psychiatric:         Mood and Affect: Mood normal.         Genitourinary:    Vulva: WNL   Bulbocavernosus reflex: Intact   Anal wink reflex: Intact   Perineal sensation: WNL   Urethra: Atrophic   Vagina: Atrophic.  Approximately 5 mm of permanent Prolene suture seen protruding through the vaginal wall approximately 3 cm proximal to the introitus on the right-hand side.  This was trimmed with scissors at the level of the skin.   Atrophy: Severe   Cough stress test: Not Performed    Pelvic floor:    Detailed exam of the pelvis did not note any recurrent prolapse, no mesh " exposure seen throughout the vagina with the exception of the suture exposure as noted above.    Procedure Performed: suture trim    Diagnostic test and records review:    Urine dipstick: Abnormal urinalysis but currently asymptomatic, not sent for culture     Post-void residual: 88 mL, performed by Bladder Scanner    Labs: cultures reviewed    Radiology: n/a    Procedural: n/a    Documentation reviewed: Prior EMR Records           Assessment & Plan     Nini Taylor is a 89 y.o. with recurrent UTI and history of vaginal mesh, suture exposure seen on exam today. We discussed my recommendations for further diagnosis and treatment at length today.     1. Exposed surgical suture, h/o vaginal mesh kit (anterior/posterior)  This was trimmed today.  Unlikely related to any of her symptoms.  Thorough examination of the vagina did not note any other mesh exposures.  Reassurance was provided.  I do not recommend any surgical intervention    2. Recurrent UTI  Is unclear whether she truly has recurrent UTIs or simply asymptomatic bacteriuria.  She does not have any severe symptoms such as pain, burning, worsening urgency or frequency, just change in odor and irritation.  Strongly discouraged any urine testing if she has no change in her urinary symptoms.    The importance of obtaining urine cultures with each bout of symptoms was emphasized. She should call/message our office when she has new symptoms, and then present to the closest [facility] lab for a urinalysis and culture to guide treatment.  If symptoms or not severe, I recommend waiting for the urine culture before prescribing treatment in order to give the narrowest therapy possible.  If symptoms are severe, we can start empiric therapy but this may need to be changed if the urine culture results in different resistances or sensitivities.  Standing urine culture ordered    She was previously treated with Macrobid, which I discouraged given her age and renal function, as  this is less likely to be effective and may harbor side effects.  She is allergic to sulfa/Bactrim, so my recommended treatment course for any future UTIs would be fosfomycin.  She was reassured today that this is unlikely to cause C. difficile, but I would recommend minimizing antibiotics long-term in general to prevent this from happening.    She was counseled that vaginal estrogen replacement in postmenopausal women is one of the most effective and safest therapies in preventing recurrent urinary tract infection.  This works by helping improve periurethral tissue quality and optimizing the vaginal microbiome to prevent colonization by harmful gut leigha.  Estrogen therapy is safe, not significantly absorbed by the bloodstream, and may take weeks to months to start to see effects    3. 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 This is very common and due to low estrogen levels, which render the vaginal tissue thin, irritated, and open to colonization with gut leigha. This can lead to irritation, dryness, painful sex, urinary infections and urinary urgency and incontinence. Discussed risks, benefits, and indications for vaginal estrogen therapy.  Vaginal estrogen is considered a topical-only therapy that has negligible absorption into the bloodstream and is not associated with increased risks for uterine or breast cancers, stroke, blood clots. The effects of vaginal estrogen can take weeks to months.  Prescription given for:   Estradiol vaginal cream 0.1 mg/g, apply 1 g twice weekly                 Annalisa Bowen MD, FACOG  Urogynecology and Reconstructive Pelvic Surgery  Department of Obstetrics and Gynecology  [facility]        This medical record contains text that has been entered with the assistance of computer voice recognition and dictation software.  Therefore, it may contain unintended errors in  text, spelling, punctuation, or grammar

## 2024-12-13 ENCOUNTER — TELEPHONE (OUTPATIENT)
Dept: CARDIOLOGY | Facility: MEDICAL CENTER | Age: 89
End: 2024-12-13

## 2024-12-13 ENCOUNTER — GYNECOLOGY VISIT (OUTPATIENT)
Dept: GYNECOLOGY | Facility: CLINIC | Age: 89
End: 2024-12-13
Attending: INTERNAL MEDICINE
Payer: MEDICARE

## 2024-12-13 VITALS
WEIGHT: 110 LBS | HEIGHT: 62 IN | SYSTOLIC BLOOD PRESSURE: 134 MMHG | BODY MASS INDEX: 20.24 KG/M2 | HEART RATE: 100 BPM | DIASTOLIC BLOOD PRESSURE: 95 MMHG

## 2024-12-13 DIAGNOSIS — T81.89XS: ICD-10-CM

## 2024-12-13 DIAGNOSIS — N95.8 GENITOURINARY SYNDROME OF MENOPAUSE: ICD-10-CM

## 2024-12-13 DIAGNOSIS — N39.0 RECURRENT UTI: ICD-10-CM

## 2024-12-13 DIAGNOSIS — I71.43 INFRARENAL ABDOMINAL AORTIC ANEURYSM (AAA) WITHOUT RUPTURE (HCC): ICD-10-CM

## 2024-12-13 PROBLEM — T81.89XA: Status: ACTIVE | Noted: 2024-12-13

## 2024-12-13 LAB
APPEARANCE UR: NORMAL
BILIRUB UR STRIP-MCNC: NEGATIVE MG/DL
COLOR UR AUTO: NORMAL
GLUCOSE UR STRIP.AUTO-MCNC: NEGATIVE MG/DL
KETONES UR STRIP.AUTO-MCNC: NORMAL MG/DL
LEUKOCYTE ESTERASE UR QL STRIP.AUTO: NORMAL
NITRITE UR QL STRIP.AUTO: POSITIVE
PH UR STRIP.AUTO: 6 [PH] (ref 5–8)
PROT UR QL STRIP: 30 MG/DL
RBC UR QL AUTO: NORMAL
SP GR UR STRIP.AUTO: 1.01
UROBILINOGEN UR STRIP-MCNC: 0.2 MG/DL

## 2024-12-13 PROCEDURE — 99214 OFFICE O/P EST MOD 30 MIN: CPT | Performed by: STUDENT IN AN ORGANIZED HEALTH CARE EDUCATION/TRAINING PROGRAM

## 2024-12-13 PROCEDURE — 81002 URINALYSIS NONAUTO W/O SCOPE: CPT | Performed by: STUDENT IN AN ORGANIZED HEALTH CARE EDUCATION/TRAINING PROGRAM

## 2024-12-13 RX ORDER — ESTRADIOL 0.1 MG/G
CREAM VAGINAL
Qty: 1 EACH | Refills: 6 | Status: SHIPPED | OUTPATIENT
Start: 2024-12-13

## 2024-12-13 ASSESSMENT — FIBROSIS 4 INDEX: FIB4 SCORE: 2.66

## 2024-12-13 NOTE — TELEPHONE ENCOUNTER
AM/CW please advise  =================    Voalte message send to AB per Care Team (CW included)- response received from CW    Critical result, patient aneurysm is 6.0 cm now& was 4.5 cm prior

## 2024-12-13 NOTE — TELEPHONE ENCOUNTER
CW    Caller: Dr Robert    Topic/issue: Dr [facility] is calling to notify CW that the patient has a aneurism.     Callback Number: [phone]    Thank you,  Niecy RAMESH

## 2024-12-13 NOTE — TELEPHONE ENCOUNTER
I called and left a message that she should see vascular surgery to see if she is a candidate for EVAR.  I placed a referral to [facility]

## 2024-12-13 NOTE — PROGRESS NOTES
PT here today for consult   Ref for recurrent UTIs  Hysterectomy? In her 40's  Good #:  [phone]   PVR :   88 mL   Pharmacy Verified

## 2024-12-19 ENCOUNTER — TELEPHONE (OUTPATIENT)
Dept: GYNECOLOGY | Facility: CLINIC | Age: 89
End: 2024-12-19
Payer: MEDICARE

## 2024-12-19 NOTE — TELEPHONE ENCOUNTER
Pharmacy called to report that estradiol is not covered by insurance. Pharmacy states PA does not need to be done, instead a new Rx can be sent stating that day supply is 172 days instead of 85 days.      also called to review UA - 12/13/24 (positive for nitrites) and is wondering what next steps are.

## 2024-12-20 ENCOUNTER — APPOINTMENT (OUTPATIENT)
Dept: RADIOLOGY | Facility: MEDICAL CENTER | Age: 89
End: 2024-12-20
Attending: EMERGENCY MEDICINE
Payer: MEDICARE

## 2024-12-20 ENCOUNTER — OFFICE VISIT (OUTPATIENT)
Dept: URGENT CARE | Facility: CLINIC | Age: 89
End: 2024-12-20
Payer: MEDICARE

## 2024-12-20 ENCOUNTER — HOSPITAL ENCOUNTER (EMERGENCY)
Facility: MEDICAL CENTER | Age: 89
End: 2024-12-20
Attending: EMERGENCY MEDICINE
Payer: MEDICARE

## 2024-12-20 ENCOUNTER — HOSPITAL ENCOUNTER (OUTPATIENT)
Dept: LAB | Facility: MEDICAL CENTER | Age: 89
End: 2024-12-20
Payer: MEDICARE

## 2024-12-20 VITALS
HEIGHT: 62 IN | RESPIRATION RATE: 18 BRPM | WEIGHT: 108 LBS | DIASTOLIC BLOOD PRESSURE: 68 MMHG | SYSTOLIC BLOOD PRESSURE: 112 MMHG | HEART RATE: 97 BPM | BODY MASS INDEX: 19.88 KG/M2 | OXYGEN SATURATION: 100 % | TEMPERATURE: 97.9 F

## 2024-12-20 VITALS
DIASTOLIC BLOOD PRESSURE: 90 MMHG | HEIGHT: 62 IN | TEMPERATURE: 97.7 F | HEART RATE: 106 BPM | WEIGHT: 108.03 LBS | OXYGEN SATURATION: 96 % | SYSTOLIC BLOOD PRESSURE: 145 MMHG | BODY MASS INDEX: 19.88 KG/M2 | RESPIRATION RATE: 18 BRPM

## 2024-12-20 DIAGNOSIS — R19.00 ABDOMINAL WALL BULGE: ICD-10-CM

## 2024-12-20 DIAGNOSIS — I71.40 ABDOMINAL AORTIC ANEURYSM (AAA) WITHOUT RUPTURE, UNSPECIFIED PART (HCC): ICD-10-CM

## 2024-12-20 DIAGNOSIS — R10.33 PERIUMBILICAL ABDOMINAL PAIN: ICD-10-CM

## 2024-12-20 DIAGNOSIS — E78.5 DYSLIPIDEMIA: ICD-10-CM

## 2024-12-20 LAB
CHOLEST SERPL-MCNC: 130 MG/DL (ref 100–199)
HDLC SERPL-MCNC: 60 MG/DL
LDLC SERPL CALC-MCNC: 55 MG/DL
TRIGL SERPL-MCNC: 77 MG/DL (ref 0–149)

## 2024-12-20 PROCEDURE — 3078F DIAST BP <80 MM HG: CPT

## 2024-12-20 PROCEDURE — 3074F SYST BP LT 130 MM HG: CPT

## 2024-12-20 PROCEDURE — 99215 OFFICE O/P EST HI 40 MIN: CPT

## 2024-12-20 PROCEDURE — 99284 EMERGENCY DEPT VISIT MOD MDM: CPT

## 2024-12-20 PROCEDURE — 76775 US EXAM ABDO BACK WALL LIM: CPT

## 2024-12-20 PROCEDURE — 80061 LIPID PANEL: CPT

## 2024-12-20 PROCEDURE — 36415 COLL VENOUS BLD VENIPUNCTURE: CPT

## 2024-12-20 PROCEDURE — 82172 ASSAY OF APOLIPOPROTEIN: CPT

## 2024-12-20 ASSESSMENT — FIBROSIS 4 INDEX
FIB4 SCORE: 2.66
FIB4 SCORE: 2.66

## 2024-12-20 NOTE — ED TRIAGE NOTES
"Chief Complaint   Patient presents with    Sent by MD     Sent by urgent care. Patient reports she has hx of hernia and abdominal aortic aneurysm. Patient reports she has noticed during the last 3-5 days has noticed swelling that has \"shifted to the center\"   Denies any pain however. Reports daily bowel movements. Abdomen looks distended to this RN, pt denies tenderness to palpation.      Pt asked about pain, repeatedly denies pain.      Pt ambulatory to triage for above complaint.      Pt is alert/oriented and follows commands. Pt speaking in full sentences and responds appropriately to questions. No acute distress noted in triage and respirations are even and unlabored.     Pt placed in lobby and educated on triage process. Pt encouraged to alert staff for any changes in condition.     Charge RN notified of patient.   "

## 2024-12-20 NOTE — TELEPHONE ENCOUNTER
Pt informed and verbalized understanding. Pt still has estradiol cream from Dr. Baxter that they will finish first.

## 2024-12-20 NOTE — PROGRESS NOTES
"Chief Complaint   Patient presents with    Hernia     Hx of hernia on RLQ now, can feel lump towards middle abdomen          Subjective:   HISTORY OF PRESENT ILLNESS: Nini Taylor is a 89 y.o. female who presents for abdominal pain and a new  bulging in her abdomen over the last 3 days.  Hx of hernia in her groin and she feels this has moved to her mid abdomin about 3 days ago suddenly..  It is tender to touch.  She also has a hx of an 6 cm abdominal aortic aneurysm recently seen on CT .  She is having more difficulty with bowel movements over the last week.  No pain through to her back, no chest or SOB or dizziness, no LE edema    Medications, Allergies, current problem list, Social and Family history reviewed today in Epic.     Objective:     /68   Pulse 97   Temp 36.6 °C (97.9 °F) (Temporal)   Resp 18   Ht 1.575 m (5' 2\")   Wt 49 kg (108 lb)   SpO2 100%     Physical Exam  Vitals reviewed.   Constitutional:       Appearance: Normal appearance.   HENT:      Mouth/Throat:      Mouth: Mucous membranes are moist.   Cardiovascular:      Rate and Rhythm: Normal rate.   Pulmonary:      Effort: Pulmonary effort is normal.   Abdominal:      Palpations: There is pulsatile mass. There is no mass.      Comments: Mildly Tender Pulsatile mass in mid abdomen above the umbilicus    No skin changes    No right inguinal hernia noted   Skin:     General: Skin is warm and dry.   Neurological:      Mental Status: She is alert and oriented to person, place, and time.   Psychiatric:         Mood and Affect: Mood normal.          Assessment/Plan:     Diagnosis and associated orders    I personally reviewed prior external notes and test results pertinent to today's visit.     1. Periumbilical abdominal pain        2. Abdominal wall bulge              IMPRESSION: Pt has concerning pulsatile mass in abdomen given her recent CT scan with 6 cm growing AAA I have referred her to the ER for further eval.  She is very well " appearing otherwise and going by POV with her       Please note that this dictation was created using voice recognition software. I have made a reasonable attempt to correct obvious errors, but I expect that there are errors of grammar and possibly content that I did not discover before finalizing the note.    This note was electronically signed by UNIQUE Collado

## 2024-12-21 NOTE — ED NOTES
Pt discharged, all appropriate hospital equipment removed (IV, monitor, pulse ox, etc.). Pt left unit via walking with stable gait to ED lobby for transport home with . Personal belongings with pt when leaving unit. Pt given discharge instructions prior to leaving unit including where to  prescriptions and when to follow-up; verbalizes understanding. Pt informed to return to ED if symptoms worsen/return or altered status develop. Copy of discharge instructions signed and turned into DC basket and copy sent with pt.

## 2024-12-21 NOTE — DISCHARGE INSTRUCTIONS
Return to the Emergency Department for dizziness, severe pain, abdominal bloating, or any concerns.  Follow-up with vascular surgery as scheduled.

## 2024-12-21 NOTE — ED PROVIDER NOTES
"ED Provider Note    CHIEF COMPLAINT  Chief Complaint   Patient presents with    Sent by MD     Sent by urgent care. Patient reports she has hx of hernia and abdominal aortic aneurysm. Patient reports she has noticed during the last 3-5 days has noticed swelling that has \"shifted to the center\"   Denies any pain however. Reports daily bowel movements. Abdomen looks distended to this RN, pt denies tenderness to palpation.        EXTERNAL RECORDS REVIEWED  Outpatient labs & studies December 12, 2024 CT scan aorta with IV contrast shows 6 cm abdominal aortic aneurysm, increased in size from 2022 when it was 4.5 cm.    HPI/ROS  LIMITATION TO HISTORY   Select: Patient with some memory difficulty  OUTSIDE HISTORIAN(S):  Significant other patient's  at bedside for discussion history and symptoms    Nini Taylor is a 89 y.o. female who presents with abdominal discomfort described as \"a hard spot\".  She noticed this 3 nights ago while resting.  Pain is mild, intermittent, stating \"it feels like my hernia\".  Patient was diagnosed with abdominal aortic aneurysm 6 cm diameter on December 12.  She stated they spoke to Dr. Kamara with cardiology, she describes him as a friend, who had recommended vascular consultation.  She was to see Dr. Cardoso on January 2 but states \"I feel like they are rushing into surgery, so I canceled the appointment.  I want to talk to Dr. Kamara again.\"  No numbness or weakness to the lower extremities.  No acute back pain.  She denies trauma.  She is eating and drinking without difficulty.  Bowel movements have been normal.    PAST MEDICAL HISTORY   has a past medical history of AAA (abdominal aortic aneurysm) ([facility]), Atherosclerosis of native arteries of the extremities with intermittent claudication (10/16/2013), Blepharospasm syndrome, Blepharospasm syndrome, Bright red rectal bleeding (06/24/2015), Cellulitis (05/05/2021), Chest pain (07/2015), Constipation, Edema (05/29/2012), Female " bladder prolapse, acquired, GERD (gastroesophageal reflux disease), Hyperlipidemia, Hypertension, Insomnia, Low vitamin D level (04/20/2018), OSTEOPOROSIS, Palpitations (05/29/2012), Primary insomnia (01/10/2017), Sleep apnea, SVT (supraventricular tachycardia) ([facility]) - on Zio 2015 and 2019 (08/17/2015), Urinary tract infection, Varicose veins (05/29/2012), Vitamin D deficiency disease, and Vitamin D deficiency disease.    SURGICAL HISTORY   has a past surgical history that includes abdominal hysterectomy total; injection sclero hemorroids; hemorrhoidectomy; hysterectomy, total abdominal; arthroscopy, knee; tonsillectomy; knee scope,diagnostic (2003;2009); other orthopedic surgery; cystocele repair (1/17/2011); rectocele repair (1/17/2011); bladder suspension (1/17/2011); cystoscopy (1/17/2011); and cataract extraction with iol.    FAMILY HISTORY  Family History   Problem Relation Age of Onset    Non-contributory Mother         gall bladder surgery    Heart Disease Mother     Cancer Father         colon rectal    Heart Disease Father         rheumatic heart disease    Other Brother         HIT BY CAR    Heart Disease Brother     Sleep Apnea Brother     No Known Problems Maternal Grandmother     No Known Problems Maternal Grandfather     No Known Problems Paternal Grandmother     No Known Problems Paternal Grandfather     No Known Problems Son        SOCIAL HISTORY  Social History     Tobacco Use    Smoking status: Never    Smokeless tobacco: Never   Vaping Use    Vaping status: Never Used   Substance and Sexual Activity    Alcohol use: Not Currently     Alcohol/week: 0.0 oz     Comment: occasionally    Drug use: No    Sexual activity: Not Currently       CURRENT MEDICATIONS  Home Medications       Reviewed by Gabby Garrison RManuelaNManuela (Registered Nurse) on 12/20/24 at 1548  Med List Status: Partial     Medication Last Dose Status   Alirocumab (PRALUENT) 75 MG/ML Solution Auto-injector  Active   Cholecalciferol  "(VITAMIN D3) 2000 UNIT Cap  Active   estradiol (ESTRACE VAGINAL) 0.1 MG/GM vaginal cream  Active   furosemide (LASIX) 20 MG Tab  Active   metoprolol tartrate (LOPRESSOR) 25 MG Tab  Active                  Audit from Redirected Encounters    **Home medications have not yet been reviewed for this encounter**         ALLERGIES  Allergies   Allergen Reactions    Atorvastatin     Atorvastatin Calcium     Bactrim Ds Hives    Hydrocodone-Acetaminophen     Levaquin     Levofloxacin     Pneumococcal Vaccines Swelling    Sulfa Drugs Hives    Vicodin [Hydrocodone-Acetaminophen] Rash    Vytorin        PHYSICAL EXAM  VITAL SIGNS: /76   Pulse 86   Temp 36.5 °C (97.7 °F) (Temporal)   Resp 16   Ht 1.575 m (5' 2\")   Wt 49 kg (108 lb 0.4 oz)   SpO2 96%   BMI 19.76 kg/m²    GI: Palpable pulsatile mass midline infraumbilical lower abdomen, minimal tenderness.  No abdominal distention.  Other areas are soft and nontender  Vascular: Palpable pulses both feet  Respiratory: Clear lung sounds  Cardiac: Systolic ejection murmur.  Regular rate, regular rhythm  Psychiatric: Normal mood, cooperative  Neurologic: Strength intact, speech clear.  She is hard of hearing  Eyes: No scleral icterus, pupils are equal    EKG/LABS  Patient had labs drawn earlier today as an outpatient, refused redraw in the ER    RADIOLOGY/PROCEDURES       Radiologist interpretation:  US-ABDOMINAL AORTA W/O DOPPLER   Final Result      Severe atherosclerotic change aorta. There is a infrarenal distal abdominal aortic saccular eccentric aneurysm which measures 5.5 x 5.2 cm in size by ultrasound.          COURSE & MEDICAL DECISION MAKING    ASSESSMENT, COURSE AND PLAN  Care Narrative: The patient presents with her , complaining of a hard lump in her abdomen.  She has known abdominal aortic aneurysm, has been referred by Dr. Kamara to vascular surgery.  She became anxious thinking they were going to perform surgery on her day as opposed to the appointment " and consultation and called to cancel it.  She has been encouraged to go to the appointment so she can have their expertise.  Both the patient's  and the patient herself did not want repeat CT scan citing that they recently had 1.  Today refused blood work as well stating they had test drawn as an outpatient today.  Unfortunately these test results are unavailable to me.  I discussed obtaining new blood work and CT scan to reevaluate her abdominal aneurysm given the discomfort that the patient is having, they are refusing.  They did agree to an ultrasound, no free fluid, the ultrasound shows aneurysm measuring 5.5 x 5.2 cm in size, slightly less than what was measured on the CT scan.  As she appears comfortable at this time and is refusing further testing, she is discharged with plans to continue outpatient workup.  The patient and her  are agreeable now to going to their vascular surgery appointment and are encouraged to contact the doctor's office early Monday to reinstitute her appointment or reschedule if needed.  I have given them return precautions including worsening pain, abdominal bloating, dizziness, bleeding or any concerns.        DISPOSITION AND DISCUSSIONS    Escalation of care considered, and ultimately not performed:after discussion with the patient / family, they have elected to decline an escalation in care, refusal CT angiogram of the aorta, refusal blood work      Decision tools and prescription drugs considered including, but not limited to: Medication modification no change in the patient's medications, she is encouraged to take them as prescribed .    FINAL DIAGNOSIS  1. Abdominal aortic aneurysm (AAA) without rupture, unspecified part (HCC)         Electronically signed by: Onel Carrillo M.D., 12/20/2024 6:02 PM

## 2024-12-23 ENCOUNTER — DOCUMENTATION (OUTPATIENT)
Dept: VASCULAR LAB | Facility: MEDICAL CENTER | Age: 89
End: 2024-12-23
Payer: MEDICARE

## 2024-12-23 DIAGNOSIS — M81.0 AGE-RELATED OSTEOPOROSIS WITHOUT CURRENT PATHOLOGICAL FRACTURE: ICD-10-CM

## 2024-12-23 DIAGNOSIS — I10 PRIMARY HYPERTENSION: ICD-10-CM

## 2024-12-23 DIAGNOSIS — E78.2 MIXED HYPERLIPIDEMIA: ICD-10-CM

## 2024-12-23 DIAGNOSIS — N81.10 BLADDER PROLAPSE, FEMALE, ACQUIRED: ICD-10-CM

## 2024-12-23 DIAGNOSIS — I47.10 SVT (SUPRAVENTRICULAR TACHYCARDIA) (HCC): Chronic | ICD-10-CM

## 2024-12-23 NOTE — PROGRESS NOTES
Pt presented to Vascular Medicine requesting to s/w myself regarding her recent ED visit - she wanted to know if her renal function was assessed. Informed pt that there's been no repeat metabolic panel since 10/31/24.     Pt also was hoping that I could help her establish w/ a new PCP that is closer to her home. She is not unhappy w/ new PCP, RAFAEL Thomason PA-C, but does not like the commute as she and her  are in their 90's and prefer to keep their commuting distances to a minimum. Advised pt that I will place a referral for new PCP in [place], but that it will depend on availability of providers taking new pt's out at the [facility] [facility].    Dinesh Rosen, PharmD, BCACP

## 2024-12-24 LAB — APO B100 SERPL-MCNC: 70 MG/DL (ref 60–117)

## 2024-12-27 ENCOUNTER — HOSPITAL ENCOUNTER (OUTPATIENT)
Dept: LAB | Facility: MEDICAL CENTER | Age: 89
End: 2024-12-27
Attending: FAMILY MEDICINE
Payer: MEDICARE

## 2024-12-27 DIAGNOSIS — E78.5 DYSLIPIDEMIA: ICD-10-CM

## 2024-12-27 DIAGNOSIS — E78.5 DYSLIPIDEMIA: Primary | ICD-10-CM

## 2024-12-27 LAB
ANION GAP SERPL CALC-SCNC: 10 MMOL/L (ref 7–16)
BUN SERPL-MCNC: 12 MG/DL (ref 8–22)
CALCIUM SERPL-MCNC: 9.2 MG/DL (ref 8.5–10.5)
CHLORIDE SERPL-SCNC: 99 MMOL/L (ref 96–112)
CO2 SERPL-SCNC: 27 MMOL/L (ref 20–33)
CREAT SERPL-MCNC: 0.82 MG/DL (ref 0.5–1.4)
GFR SERPLBLD CREATININE-BSD FMLA CKD-EPI: 67 ML/MIN/1.73 M 2
GLUCOSE SERPL-MCNC: 109 MG/DL (ref 65–99)
POTASSIUM SERPL-SCNC: 4.7 MMOL/L (ref 3.6–5.5)
SODIUM SERPL-SCNC: 136 MMOL/L (ref 135–145)

## 2024-12-27 PROCEDURE — 80048 BASIC METABOLIC PNL TOTAL CA: CPT

## 2024-12-27 PROCEDURE — 36415 COLL VENOUS BLD VENIPUNCTURE: CPT

## 2024-12-27 NOTE — PROGRESS NOTES
Patient presented to clinic today requesting for renal fxn to be evaluated. BMP ordered. Will contact pt when results are available.    La Zamudio, KeikoD, BCACP

## 2024-12-30 ENCOUNTER — TELEPHONE (OUTPATIENT)
Dept: CARDIOLOGY | Facility: MEDICAL CENTER | Age: 89
End: 2024-12-30
Payer: MEDICARE

## 2024-12-30 NOTE — PROGRESS NOTES
Glucose slightly elevated on 12/27/24 otherwise BMP WNL and renal fxn shows improvement. Called and spoke to patient/ to relay lab results. They confirmed that this was a non-fasting lab.     No further action needed at this time.    La Zamudio, PharmD, BCACP

## 2024-12-30 NOTE — TELEPHONE ENCOUNTER
----- Message from Physician Montez Kamara M.D. sent at 12/28/2024 12:02 PM PST -----  Labs look good, please let her know     Thank you

## 2024-12-30 NOTE — LETTER
December 30, 2024        Nini Taylor  [address] [facility] [address]          Dear Nini,    We have received the results of your recent: basic metabolic panel    Your test came back and Dr Kamara advised it looks good.  Please follow up as previously discussed with your physician.      Feel free to call us with any questions.    Sincerely,          Ailyn ACOSTA  Electronically Signed    Resulted Orders   Basic Metabolic Panel   Result Value Ref Range    Sodium 136 135 - 145 mmol/L    Potassium 4.7 3.6 - 5.5 mmol/L    Chloride 99 96 - 112 mmol/L    Co2 27 20 - 33 mmol/L    Glucose 109 (H) 65 - 99 mg/dL    Bun 12 8 - 22 mg/dL    Creatinine 0.82 0.50 - 1.40 mg/dL    Calcium 9.2 8.5 - 10.5 mg/dL    Anion Gap 10.0 7.0 - 16.0   ESTIMATED GFR   Result Value Ref Range    GFR (CKD-EPI) 67 >60 mL/min/1.73 m 2      Comment:      Estimated Glomerular Filtration Rate is calculated using  race neutral CKD-EPI 2021 equation per NKF-ASN recommendations.

## 2024-12-31 ENCOUNTER — APPOINTMENT (OUTPATIENT)
Dept: VASCULAR SURGERY | Facility: MEDICAL CENTER | Age: 89
End: 2024-12-31
Payer: MEDICARE

## 2025-01-06 PROCEDURE — RXMED WILLOW AMBULATORY MEDICATION CHARGE

## 2025-01-07 ENCOUNTER — PHARMACY VISIT (OUTPATIENT)
Dept: PHARMACY | Facility: MEDICAL CENTER | Age: OVER 89
End: 2025-01-07
Payer: COMMERCIAL

## 2025-01-14 ENCOUNTER — OFFICE VISIT (OUTPATIENT)
Dept: VASCULAR SURGERY | Facility: MEDICAL CENTER | Age: OVER 89
End: 2025-01-14
Payer: MEDICARE

## 2025-01-14 VITALS
OXYGEN SATURATION: 96 % | SYSTOLIC BLOOD PRESSURE: 118 MMHG | DIASTOLIC BLOOD PRESSURE: 74 MMHG | HEIGHT: 62 IN | BODY MASS INDEX: 19.88 KG/M2 | WEIGHT: 108 LBS | HEART RATE: 82 BPM | TEMPERATURE: 98.6 F

## 2025-01-14 DIAGNOSIS — I71.43 INFRARENAL ABDOMINAL AORTIC ANEURYSM (AAA) WITHOUT RUPTURE (HCC): ICD-10-CM

## 2025-01-14 PROCEDURE — 3074F SYST BP LT 130 MM HG: CPT | Performed by: SURGERY

## 2025-01-14 PROCEDURE — 99203 OFFICE O/P NEW LOW 30 MIN: CPT | Performed by: SURGERY

## 2025-01-14 PROCEDURE — 3078F DIAST BP <80 MM HG: CPT | Performed by: SURGERY

## 2025-01-14 ASSESSMENT — FIBROSIS 4 INDEX: FIB4 SCORE: 2.66

## 2025-01-14 NOTE — H&P
Vascular Surgery            New Patient Consultation    Patient:Nini Taylor  MRN:[id number]  Primary care physician:Deepa Sykes M.D.  Referring Provider: Montez Kamara MD    Vascular Consultant: Nate Worthy MD    Date: 1/14/2025  _____________________________________________________    Chief Complaint:     Abdominal aortic aneurysm    History of Present Illness:   Nini Taylor  is a 89 y.o. year old female diagnosed with a large abdominal aortic aneurysm.  The patient is referred for consideration for repair.  Despite the patient's advanced age at 89 she is quite healthy for her age.  She is very functional and has no serious comorbidities at this time.  Scan demonstrates a large 6 cm abdominal aortic aneurysm and a small elderly female.  See discussion below    Past Medical History:     Past Medical History:   Diagnosis Date    AAA (abdominal aortic aneurysm) ([facility])     Atherosclerosis of native arteries of the extremities with intermittent claudication 10/16/2013    Blepharospasm syndrome     Blepharospasm syndrome      IMO load March 2020    Bright red rectal bleeding 06/24/2015    Cellulitis 05/05/2021    Chest pain 07/2015    Unspecified; Nuclear stress test negative     Constipation     Edema 05/29/2012    Female bladder prolapse, acquired     GERD (gastroesophageal reflux disease)     Hyperlipidemia     Hypertension     Insomnia     Low vitamin D level 04/20/2018    OSTEOPOROSIS     Palpitations 05/29/2012    Primary insomnia 01/10/2017    Sleep apnea     SVT (supraventricular tachycardia) ([facility]) - on Zio 2015 and 2019 08/17/2015    Urinary tract infection     Varicose veins 05/29/2012    Vitamin D deficiency disease     Vitamin D deficiency disease      IMO load March 2020     Past Surgical History:     Past Surgical History:   Procedure Laterality Date    CYSTOCELE REPAIR  1/17/2011    Performed by GILMAR CHUNG at SURGERY SAME DAY [facility]    RECTOCELE REPAIR   1/17/2011    Performed by GILMAR CHUNG at SURGERY SAME DAY [facility]    BLADDER SUSPENSION  1/17/2011    Performed by GILMAR CHUNG at SURGERY SAME DAY [facility]    CYSTOSCOPY  1/17/2011    Performed by GILMAR CHUNG at SURGERY SAME DAY [facility]    ABDOMINAL HYSTERECTOMY TOTAL      ARTHROSCOPY, KNEE      CATARACT EXTRACTION WITH IOL      HEMORRHOIDECTOMY      HYSTERECTOMY, TOTAL ABDOMINAL      INJECTION SCLERO HEMORROIDS      OTHER ORTHOPEDIC SURGERY      knee scope x 2    PB KNEE SCOPE,DIAGNOSTIC  2003;2009    TONSILLECTOMY       Allergies:     Allergies   Allergen Reactions    Atorvastatin     Atorvastatin Calcium     Bactrim Ds Hives    Hydrocodone-Acetaminophen     Levaquin     Levofloxacin     Pneumococcal Vaccines Swelling    Sulfa Drugs Hives    Vicodin [Hydrocodone-Acetaminophen] Rash    Vytorin      Medications:     Outpatient Encounter Medications as of 1/14/2025   Medication Sig Dispense Refill    estradiol (ESTRACE VAGINAL) 0.1 MG/GM vaginal cream Apply 1g cream inside vagina twice per week 1 Each 6    Alirocumab (PRALUENT) 75 MG/ML Solution Auto-injector Inject 75 mg under the skin every 14 days. 6 mL 3    metoprolol tartrate (LOPRESSOR) 25 MG Tab Take 1 Tablet by mouth 2 times a day. 180 Tablet 2    furosemide (LASIX) 20 MG Tab Take 1 Tablet by mouth 1 time a day as needed (swelling). 90 Tablet 0    Cholecalciferol (VITAMIN D3) 2000 UNIT Cap Take 2 capsules by mouth once daily (Patient not taking: Reported on 12/20/2024) 60 capsule 0     No facility-administered encounter medications on file as of 1/14/2025.     Social History:     Social History     Socioeconomic History    Marital status:      Spouse name: Not on file    Number of children: Not on file    Years of education: Not on file    Highest education level: Not on file   Occupational History    Not on file   Tobacco Use    Smoking status: Never    Smokeless tobacco: Never   Vaping Use    Vaping status: Never  Used   Substance and Sexual Activity    Alcohol use: Not Currently     Alcohol/week: 0.0 oz     Comment: occasionally    Drug use: No    Sexual activity: Not Currently   Other Topics Concern    Not on file   Social History Narrative    Not on file     Social Drivers of Health     Financial Resource Strain: Not on file   Food Insecurity: Not on file   Transportation Needs: Not on file   Physical Activity: Not on file   Stress: Not on file   Social Connections: Not on file   Intimate Partner Violence: Not on file   Housing Stability: Not on file      Social History     Tobacco Use   Smoking Status Never   Smokeless Tobacco Never     Social History     Substance and Sexual Activity   Alcohol Use Not Currently    Alcohol/week: 0.0 oz    Comment: occasionally     Social History     Substance and Sexual Activity   Drug Use No      Family History:     Family History   Problem Relation Age of Onset    Non-contributory Mother         gall bladder surgery    Heart Disease Mother     Cancer Father         colon rectal    Heart Disease Father         rheumatic heart disease    Other Brother         HIT BY CAR    Heart Disease Brother     Sleep Apnea Brother     No Known Problems Maternal Grandmother     No Known Problems Maternal Grandfather     No Known Problems Paternal Grandmother     No Known Problems Paternal Grandfather     No Known Problems Son        Review of Systems:   Constitutional:   Reports -no complaints    Denies Chest pain   Denies SOB   Denies abdominal pain   Denies focal neuro deficits      Exam:   There were no vitals taken for this visit.    Constitutional: Alert, oriented, no acute distress  HEENT:  Normocephalic and atraumatic, EOMI  Neck:   Supple, no JVD,   Cardiovascular: Regular rate and rhythm,   Pulmonary:  Good air entry bilaterally,    Abdominal:  Soft, non-tender, non-distended  Musculoskeletal: No tenderness, no deformity  Neurological:  grossly intact, no focal deficits  Skin:   Skin is warm  and dry. No rash noted.  Vascular exam: Upper extremities warm and adequately perfused, no edema     Lower extremities warm and adequately perfused, no edema           Imaging:   CT scan 12/12/2024  Growing 6 cm abdominal aortic aneurysm. This measured 4.5 cm on 9/26/2022. No retroperitoneal hematoma.     2. 3 small pancreatic cysts unchanged from 9/26/2022. 1 year follow-up recommended.     3. Small hiatal hernia. Prior hysterectomy.         Assessment and Plan:   -Abdominal aortic aneurysm    I believe that this patient's life expectancy is actually quite good despite her age of 89.  She is doing well from a chronic medical disease standpoint.  A 6 cm aneurysm and a small female is significant.  Based on the size of the aneurysm and the patient's life expectancy I have recommended endovascular repair of this aortic aneurysm.    A long discussion with the patient the patient's  with respect to the pathophysiology and natural history of aortic aneurysms.  We discussed the expected expansion rate and the size of aneurysms at time of rupture.  Based on our clinical available information I believe that is in her best interest to proceed with endovascular repair.    Patient the patient's  demonstrating very clear understanding of our discussion and agreed to proceed.    Plan -endovascular repair infrarenal abdominal aortic aneurysm        _____________________________________________________  Nate Worthy MD  [facility]  [phone]  [address], [place] [address]

## 2025-01-16 ENCOUNTER — APPOINTMENT (OUTPATIENT)
Dept: ADMISSIONS | Facility: MEDICAL CENTER | Age: OVER 89
End: 2025-01-16
Attending: SURGERY
Payer: MEDICARE

## 2025-01-22 ENCOUNTER — PRE-ADMISSION TESTING (OUTPATIENT)
Dept: ADMISSIONS | Facility: MEDICAL CENTER | Age: OVER 89
End: 2025-01-22
Attending: SURGERY
Payer: MEDICARE

## 2025-01-22 DIAGNOSIS — Z01.810 PRE-OPERATIVE CARDIOVASCULAR EXAMINATION: ICD-10-CM

## 2025-01-22 DIAGNOSIS — Z01.812 PRE-OPERATIVE LABORATORY EXAMINATION: ICD-10-CM

## 2025-01-22 LAB
ALBUMIN SERPL BCP-MCNC: 3.7 G/DL (ref 3.2–4.9)
ALBUMIN/GLOB SERPL: 1.3 G/DL
ALP SERPL-CCNC: 84 U/L (ref 30–99)
ALT SERPL-CCNC: 14 U/L (ref 2–50)
ANION GAP SERPL CALC-SCNC: 11 MMOL/L (ref 7–16)
AST SERPL-CCNC: 23 U/L (ref 12–45)
BILIRUB SERPL-MCNC: 0.8 MG/DL (ref 0.1–1.5)
BUN SERPL-MCNC: 10 MG/DL (ref 8–22)
CALCIUM ALBUM COR SERPL-MCNC: 9.1 MG/DL (ref 8.5–10.5)
CALCIUM SERPL-MCNC: 8.9 MG/DL (ref 8.5–10.5)
CHLORIDE SERPL-SCNC: 101 MMOL/L (ref 96–112)
CO2 SERPL-SCNC: 25 MMOL/L (ref 20–33)
CREAT SERPL-MCNC: 0.97 MG/DL (ref 0.5–1.4)
EKG IMPRESSION: NORMAL
ERYTHROCYTE [DISTWIDTH] IN BLOOD BY AUTOMATED COUNT: 49.6 FL (ref 35.9–50)
GFR SERPLBLD CREATININE-BSD FMLA CKD-EPI: 55 ML/MIN/1.73 M 2
GLOBULIN SER CALC-MCNC: 2.8 G/DL (ref 1.9–3.5)
GLUCOSE SERPL-MCNC: 100 MG/DL (ref 65–99)
HCT VFR BLD AUTO: 44.3 % (ref 37–47)
HGB BLD-MCNC: 14.3 G/DL (ref 12–16)
MCH RBC QN AUTO: 28.2 PG (ref 27–33)
MCHC RBC AUTO-ENTMCNC: 32.3 G/DL (ref 32.2–35.5)
MCV RBC AUTO: 87.4 FL (ref 81.4–97.8)
PLATELET # BLD AUTO: 224 K/UL (ref 164–446)
PMV BLD AUTO: 10 FL (ref 9–12.9)
POTASSIUM SERPL-SCNC: 4.7 MMOL/L (ref 3.6–5.5)
PROT SERPL-MCNC: 6.5 G/DL (ref 6–8.2)
RBC # BLD AUTO: 5.07 M/UL (ref 4.2–5.4)
SODIUM SERPL-SCNC: 137 MMOL/L (ref 135–145)
WBC # BLD AUTO: 6.1 K/UL (ref 4.8–10.8)

## 2025-01-22 PROCEDURE — 93010 ELECTROCARDIOGRAM REPORT: CPT | Performed by: INTERNAL MEDICINE

## 2025-01-22 PROCEDURE — 93005 ELECTROCARDIOGRAM TRACING: CPT | Mod: TC

## 2025-01-22 PROCEDURE — 85027 COMPLETE CBC AUTOMATED: CPT

## 2025-01-22 PROCEDURE — 80053 COMPREHEN METABOLIC PANEL: CPT

## 2025-01-22 RX ORDER — CEPHALEXIN 500 MG/1
500 CAPSULE ORAL 3 TIMES DAILY
COMMUNITY

## 2025-01-22 NOTE — PREADMIT AVS NOTE
Current Medications   Medication Instructions    Calcium Carb-Cholecalciferol (CALCIUM 600 + D PO) Stop 7 days before surgery    cephALEXin (KEFLEX) 500 MG Cap Follow instructions from surgeon or specialist.    estradiol (ESTRACE VAGINAL) 0.1 MG/GM vaginal cream Hold medication day of procedure    Alirocumab (PRALUENT) 75 MG/ML Solution Auto-injector Follow instructions from surgeon or specialist.    metoprolol tartrate (LOPRESSOR) 25 MG Tab Continue taking medication as prescribed, including morning of procedure     furosemide (LASIX) 20 MG Tab Hold medication day of procedure    Cholecalciferol (VITAMIN D3) 2000 UNIT Cap Stop 7 days before surgery

## 2025-02-05 ENCOUNTER — HOSPITAL ENCOUNTER (INPATIENT)
Facility: MEDICAL CENTER | Age: OVER 89
LOS: 1 days | DRG: 269 | End: 2025-02-06
Attending: SURGERY | Admitting: SURGERY
Payer: MEDICARE

## 2025-02-05 ENCOUNTER — ANESTHESIA EVENT (OUTPATIENT)
Dept: SURGERY | Facility: MEDICAL CENTER | Age: OVER 89
DRG: 269 | End: 2025-02-05
Payer: MEDICARE

## 2025-02-05 ENCOUNTER — APPOINTMENT (OUTPATIENT)
Dept: RADIOLOGY | Facility: MEDICAL CENTER | Age: OVER 89
DRG: 269 | End: 2025-02-05
Attending: SURGERY
Payer: MEDICARE

## 2025-02-05 ENCOUNTER — ANESTHESIA (OUTPATIENT)
Dept: SURGERY | Facility: MEDICAL CENTER | Age: OVER 89
DRG: 269 | End: 2025-02-05
Payer: MEDICARE

## 2025-02-05 DIAGNOSIS — G89.18 POST-OP PAIN: ICD-10-CM

## 2025-02-05 LAB
ABO + RH BLD: NORMAL
ABO GROUP BLD: NORMAL
BLD GP AB SCN SERPL QL: NORMAL
RH BLD: NORMAL

## 2025-02-05 PROCEDURE — 160009 HCHG ANES TIME/MIN: Performed by: SURGERY

## 2025-02-05 PROCEDURE — 86901 BLOOD TYPING SEROLOGIC RH(D): CPT

## 2025-02-05 PROCEDURE — 160002 HCHG RECOVERY MINUTES (STAT): Performed by: SURGERY

## 2025-02-05 PROCEDURE — 700111 HCHG RX REV CODE 636 W/ 250 OVERRIDE (IP): Performed by: STUDENT IN AN ORGANIZED HEALTH CARE EDUCATION/TRAINING PROGRAM

## 2025-02-05 PROCEDURE — A9270 NON-COVERED ITEM OR SERVICE: HCPCS | Performed by: STUDENT IN AN ORGANIZED HEALTH CARE EDUCATION/TRAINING PROGRAM

## 2025-02-05 PROCEDURE — RXMED WILLOW AMBULATORY MEDICATION CHARGE: Performed by: NURSE PRACTITIONER

## 2025-02-05 PROCEDURE — 160035 HCHG PACU - 1ST 60 MINS PHASE I: Performed by: SURGERY

## 2025-02-05 PROCEDURE — 502000 HCHG MISC OR IMPLANTS RC 0278: Performed by: SURGERY

## 2025-02-05 PROCEDURE — 75625 CONTRAST EXAM ABDOMINL AORTA: CPT | Mod: 26,59 | Performed by: SURGERY

## 2025-02-05 PROCEDURE — 34812 OPN FEM ART EXPOS: CPT | Mod: AS,50 | Performed by: NURSE PRACTITIONER

## 2025-02-05 PROCEDURE — C1769 GUIDE WIRE: HCPCS | Performed by: SURGERY

## 2025-02-05 PROCEDURE — B4101ZZ FLUOROSCOPY OF ABDOMINAL AORTA USING LOW OSMOLAR CONTRAST: ICD-10-PCS | Performed by: SURGERY

## 2025-02-05 PROCEDURE — 700102 HCHG RX REV CODE 250 W/ 637 OVERRIDE(OP): Performed by: NURSE PRACTITIONER

## 2025-02-05 PROCEDURE — 700111 HCHG RX REV CODE 636 W/ 250 OVERRIDE (IP): Performed by: SURGERY

## 2025-02-05 PROCEDURE — 34705 EVAC RPR A-BIILIAC NDGFT: CPT | Performed by: SURGERY

## 2025-02-05 PROCEDURE — 700105 HCHG RX REV CODE 258: Performed by: SURGERY

## 2025-02-05 PROCEDURE — 700111 HCHG RX REV CODE 636 W/ 250 OVERRIDE (IP): Mod: JZ | Performed by: STUDENT IN AN ORGANIZED HEALTH CARE EDUCATION/TRAINING PROGRAM

## 2025-02-05 PROCEDURE — 700111 HCHG RX REV CODE 636 W/ 250 OVERRIDE (IP): Mod: JZ | Performed by: NURSE PRACTITIONER

## 2025-02-05 PROCEDURE — A9270 NON-COVERED ITEM OR SERVICE: HCPCS | Performed by: NURSE PRACTITIONER

## 2025-02-05 PROCEDURE — 110454 HCHG SHELL REV 250: Performed by: SURGERY

## 2025-02-05 PROCEDURE — C1887 CATHETER, GUIDING: HCPCS | Performed by: SURGERY

## 2025-02-05 PROCEDURE — 700105 HCHG RX REV CODE 258: Performed by: NURSE PRACTITIONER

## 2025-02-05 PROCEDURE — 160036 HCHG PACU - EA ADDL 30 MINS PHASE I: Performed by: SURGERY

## 2025-02-05 PROCEDURE — 700117 HCHG RX CONTRAST REV CODE 255: Performed by: SURGERY

## 2025-02-05 PROCEDURE — 86850 RBC ANTIBODY SCREEN: CPT

## 2025-02-05 PROCEDURE — 160029 HCHG SURGERY MINUTES - 1ST 30 MINS LEVEL 4: Performed by: SURGERY

## 2025-02-05 PROCEDURE — C2628 CATHETER, OCCLUSION: HCPCS | Performed by: SURGERY

## 2025-02-05 PROCEDURE — C1894 INTRO/SHEATH, NON-LASER: HCPCS | Performed by: SURGERY

## 2025-02-05 PROCEDURE — 34705 EVAC RPR A-BIILIAC NDGFT: CPT | Mod: AS | Performed by: NURSE PRACTITIONER

## 2025-02-05 PROCEDURE — 700101 HCHG RX REV CODE 250: Performed by: STUDENT IN AN ORGANIZED HEALTH CARE EDUCATION/TRAINING PROGRAM

## 2025-02-05 PROCEDURE — 160048 HCHG OR STATISTICAL LEVEL 1-5: Performed by: SURGERY

## 2025-02-05 PROCEDURE — C1874 STENT, COATED/COV W/DEL SYS: HCPCS | Performed by: SURGERY

## 2025-02-05 PROCEDURE — 700102 HCHG RX REV CODE 250 W/ 637 OVERRIDE(OP): Performed by: STUDENT IN AN ORGANIZED HEALTH CARE EDUCATION/TRAINING PROGRAM

## 2025-02-05 PROCEDURE — 34812 OPN FEM ART EXPOS: CPT | Mod: 50 | Performed by: SURGERY

## 2025-02-05 PROCEDURE — 86900 BLOOD TYPING SEROLOGIC ABO: CPT

## 2025-02-05 PROCEDURE — 700101 HCHG RX REV CODE 250: Performed by: SURGERY

## 2025-02-05 PROCEDURE — 770001 HCHG ROOM/CARE - MED/SURG/GYN PRIV*

## 2025-02-05 PROCEDURE — 75625 CONTRAST EXAM ABDOMINL AORTA: CPT | Mod: 26,AS,59 | Performed by: NURSE PRACTITIONER

## 2025-02-05 PROCEDURE — 04V03DZ RESTRICTION OF ABDOMINAL AORTA WITH INTRALUMINAL DEVICE, PERCUTANEOUS APPROACH: ICD-10-PCS | Performed by: SURGERY

## 2025-02-05 PROCEDURE — 160041 HCHG SURGERY MINUTES - EA ADDL 1 MIN LEVEL 4: Performed by: SURGERY

## 2025-02-05 DEVICE — IMPLANTABLE DEVICE: Type: IMPLANTABLE DEVICE | Site: GROIN | Status: FUNCTIONAL

## 2025-02-05 DEVICE — ENDOPROSTHESIS CONTRALATERAL LEG 14.5MM X 12CM: Type: IMPLANTABLE DEVICE | Site: GROIN | Status: FUNCTIONAL

## 2025-02-05 DEVICE — ENDOPROSTHESIS CONTRALATERAL LEG 14.5MM X 10CM: Type: IMPLANTABLE DEVICE | Site: GROIN | Status: FUNCTIONAL

## 2025-02-05 RX ORDER — ROCURONIUM BROMIDE 10 MG/ML
INJECTION, SOLUTION INTRAVENOUS PRN
Status: DISCONTINUED | OUTPATIENT
Start: 2025-02-05 | End: 2025-02-05 | Stop reason: SURG

## 2025-02-05 RX ORDER — HYDRALAZINE HYDROCHLORIDE 20 MG/ML
5 INJECTION INTRAMUSCULAR; INTRAVENOUS
Status: DISCONTINUED | OUTPATIENT
Start: 2025-02-05 | End: 2025-02-05 | Stop reason: HOSPADM

## 2025-02-05 RX ORDER — HALOPERIDOL 5 MG/ML
1 INJECTION INTRAMUSCULAR EVERY 6 HOURS PRN
Status: DISCONTINUED | OUTPATIENT
Start: 2025-02-05 | End: 2025-02-06 | Stop reason: HOSPADM

## 2025-02-05 RX ORDER — OXYCODONE HCL 5 MG/5 ML
10 SOLUTION, ORAL ORAL
Status: COMPLETED | OUTPATIENT
Start: 2025-02-05 | End: 2025-02-05

## 2025-02-05 RX ORDER — LABETALOL HYDROCHLORIDE 5 MG/ML
5 INJECTION, SOLUTION INTRAVENOUS
Status: DISCONTINUED | OUTPATIENT
Start: 2025-02-05 | End: 2025-02-05 | Stop reason: HOSPADM

## 2025-02-05 RX ORDER — HALOPERIDOL 5 MG/ML
1 INJECTION INTRAMUSCULAR
Status: DISCONTINUED | OUTPATIENT
Start: 2025-02-05 | End: 2025-02-05 | Stop reason: HOSPADM

## 2025-02-05 RX ORDER — LABETALOL HYDROCHLORIDE 5 MG/ML
INJECTION, SOLUTION INTRAVENOUS PRN
Status: DISCONTINUED | OUTPATIENT
Start: 2025-02-05 | End: 2025-02-05 | Stop reason: SURG

## 2025-02-05 RX ORDER — OXYCODONE HCL 5 MG/5 ML
5 SOLUTION, ORAL ORAL
Status: COMPLETED | OUTPATIENT
Start: 2025-02-05 | End: 2025-02-05

## 2025-02-05 RX ORDER — DIPHENHYDRAMINE HYDROCHLORIDE 50 MG/ML
25 INJECTION INTRAMUSCULAR; INTRAVENOUS EVERY 6 HOURS PRN
Status: DISCONTINUED | OUTPATIENT
Start: 2025-02-05 | End: 2025-02-06 | Stop reason: HOSPADM

## 2025-02-05 RX ORDER — CLONIDINE HYDROCHLORIDE 0.1 MG/1
0.2 TABLET ORAL
Status: DISCONTINUED | OUTPATIENT
Start: 2025-02-05 | End: 2025-02-06 | Stop reason: HOSPADM

## 2025-02-05 RX ORDER — PROTAMINE SULFATE 10 MG/ML
INJECTION, SOLUTION INTRAVENOUS PRN
Status: DISCONTINUED | OUTPATIENT
Start: 2025-02-05 | End: 2025-02-05 | Stop reason: SURG

## 2025-02-05 RX ORDER — NITROFURANTOIN MACROCRYSTALS 50 MG/1
50 CAPSULE ORAL DAILY
COMMUNITY
Start: 2025-01-20

## 2025-02-05 RX ORDER — ESMOLOL HYDROCHLORIDE 10 MG/ML
INJECTION INTRAVENOUS PRN
Status: DISCONTINUED | OUTPATIENT
Start: 2025-02-05 | End: 2025-02-05 | Stop reason: SURG

## 2025-02-05 RX ORDER — ONDANSETRON 2 MG/ML
4 INJECTION INTRAMUSCULAR; INTRAVENOUS
Status: DISCONTINUED | OUTPATIENT
Start: 2025-02-05 | End: 2025-02-05 | Stop reason: HOSPADM

## 2025-02-05 RX ORDER — SCOPOLAMINE 1 MG/3D
1 PATCH, EXTENDED RELEASE TRANSDERMAL
Status: DISCONTINUED | OUTPATIENT
Start: 2025-02-05 | End: 2025-02-06 | Stop reason: HOSPADM

## 2025-02-05 RX ORDER — SODIUM CHLORIDE, SODIUM LACTATE, POTASSIUM CHLORIDE, CALCIUM CHLORIDE 600; 310; 30; 20 MG/100ML; MG/100ML; MG/100ML; MG/100ML
INJECTION, SOLUTION INTRAVENOUS CONTINUOUS
Status: ACTIVE | OUTPATIENT
Start: 2025-02-05 | End: 2025-02-05

## 2025-02-05 RX ORDER — HEPARIN SODIUM 1000 [USP'U]/ML
INJECTION, SOLUTION INTRAVENOUS; SUBCUTANEOUS PRN
Status: DISCONTINUED | OUTPATIENT
Start: 2025-02-05 | End: 2025-02-05 | Stop reason: SURG

## 2025-02-05 RX ORDER — DEXAMETHASONE SODIUM PHOSPHATE 4 MG/ML
4 INJECTION, SOLUTION INTRA-ARTICULAR; INTRALESIONAL; INTRAMUSCULAR; INTRAVENOUS; SOFT TISSUE
Status: DISCONTINUED | OUTPATIENT
Start: 2025-02-05 | End: 2025-02-06 | Stop reason: HOSPADM

## 2025-02-05 RX ORDER — TRAMADOL HYDROCHLORIDE 50 MG/1
50 TABLET ORAL EVERY 6 HOURS PRN
Status: DISCONTINUED | OUTPATIENT
Start: 2025-02-05 | End: 2025-02-06 | Stop reason: HOSPADM

## 2025-02-05 RX ORDER — LABETALOL HYDROCHLORIDE 5 MG/ML
10 INJECTION, SOLUTION INTRAVENOUS EVERY 4 HOURS PRN
Status: DISCONTINUED | OUTPATIENT
Start: 2025-02-05 | End: 2025-02-06 | Stop reason: HOSPADM

## 2025-02-05 RX ORDER — TRAMADOL HYDROCHLORIDE 50 MG/1
50 TABLET ORAL EVERY 4 HOURS PRN
Qty: 10 TABLET | Refills: 0 | Status: SHIPPED | OUTPATIENT
Start: 2025-02-05 | End: 2025-02-08

## 2025-02-05 RX ORDER — ALBUTEROL SULFATE 5 MG/ML
2.5 SOLUTION RESPIRATORY (INHALATION)
Status: DISCONTINUED | OUTPATIENT
Start: 2025-02-05 | End: 2025-02-05 | Stop reason: HOSPADM

## 2025-02-05 RX ORDER — ONDANSETRON 2 MG/ML
INJECTION INTRAMUSCULAR; INTRAVENOUS PRN
Status: DISCONTINUED | OUTPATIENT
Start: 2025-02-05 | End: 2025-02-05 | Stop reason: SURG

## 2025-02-05 RX ORDER — CEFAZOLIN SODIUM 1 G/3ML
INJECTION, POWDER, FOR SOLUTION INTRAMUSCULAR; INTRAVENOUS PRN
Status: DISCONTINUED | OUTPATIENT
Start: 2025-02-05 | End: 2025-02-05 | Stop reason: SURG

## 2025-02-05 RX ORDER — SODIUM CHLORIDE 9 MG/ML
INJECTION, SOLUTION INTRAVENOUS CONTINUOUS
Status: ACTIVE | OUTPATIENT
Start: 2025-02-05 | End: 2025-02-06

## 2025-02-05 RX ORDER — METOPROLOL TARTRATE 25 MG/1
25 TABLET, FILM COATED ORAL 2 TIMES DAILY
Status: DISCONTINUED | OUTPATIENT
Start: 2025-02-05 | End: 2025-02-06 | Stop reason: HOSPADM

## 2025-02-05 RX ORDER — LIDOCAINE HYDROCHLORIDE 20 MG/ML
INJECTION, SOLUTION EPIDURAL; INFILTRATION; INTRACAUDAL; PERINEURAL PRN
Status: DISCONTINUED | OUTPATIENT
Start: 2025-02-05 | End: 2025-02-05 | Stop reason: SURG

## 2025-02-05 RX ORDER — EPHEDRINE SULFATE 50 MG/ML
5 INJECTION, SOLUTION INTRAVENOUS
Status: DISCONTINUED | OUTPATIENT
Start: 2025-02-05 | End: 2025-02-05 | Stop reason: HOSPADM

## 2025-02-05 RX ORDER — NITROFURANTOIN MACROCRYSTALS 50 MG/1
50 CAPSULE ORAL DAILY
Status: DISCONTINUED | OUTPATIENT
Start: 2025-02-06 | End: 2025-02-05

## 2025-02-05 RX ORDER — ONDANSETRON 2 MG/ML
4 INJECTION INTRAMUSCULAR; INTRAVENOUS EVERY 4 HOURS PRN
Status: DISCONTINUED | OUTPATIENT
Start: 2025-02-05 | End: 2025-02-06 | Stop reason: HOSPADM

## 2025-02-05 RX ORDER — DEXAMETHASONE SODIUM PHOSPHATE 4 MG/ML
INJECTION, SOLUTION INTRA-ARTICULAR; INTRALESIONAL; INTRAMUSCULAR; INTRAVENOUS; SOFT TISSUE PRN
Status: DISCONTINUED | OUTPATIENT
Start: 2025-02-05 | End: 2025-02-05 | Stop reason: SURG

## 2025-02-05 RX ORDER — CLONIDINE HYDROCHLORIDE 0.1 MG/1
0.1 TABLET ORAL
Status: DISCONTINUED | OUTPATIENT
Start: 2025-02-05 | End: 2025-02-06 | Stop reason: HOSPADM

## 2025-02-05 RX ORDER — IODIXANOL 270 MG/ML
INJECTION, SOLUTION INTRAVASCULAR
Status: DISCONTINUED | OUTPATIENT
Start: 2025-02-05 | End: 2025-02-05 | Stop reason: HOSPADM

## 2025-02-05 RX ORDER — DIPHENHYDRAMINE HYDROCHLORIDE 50 MG/ML
12.5 INJECTION INTRAMUSCULAR; INTRAVENOUS
Status: DISCONTINUED | OUTPATIENT
Start: 2025-02-05 | End: 2025-02-05 | Stop reason: HOSPADM

## 2025-02-05 RX ADMIN — PROPOFOL 70 MG: 10 INJECTION, EMULSION INTRAVENOUS at 15:23

## 2025-02-05 RX ADMIN — LABETALOL HYDROCHLORIDE 5 MG: 5 INJECTION, SOLUTION INTRAVENOUS at 16:19

## 2025-02-05 RX ADMIN — DEXAMETHASONE SODIUM PHOSPHATE 4 MG: 4 INJECTION INTRA-ARTICULAR; INTRALESIONAL; INTRAMUSCULAR; INTRAVENOUS; SOFT TISSUE at 15:26

## 2025-02-05 RX ADMIN — ONDANSETRON 4 MG: 2 INJECTION INTRAMUSCULAR; INTRAVENOUS at 16:35

## 2025-02-05 RX ADMIN — FENTANYL CITRATE 25 MCG: 50 INJECTION, SOLUTION INTRAMUSCULAR; INTRAVENOUS at 17:06

## 2025-02-05 RX ADMIN — FENTANYL CITRATE 25 MCG: 50 INJECTION, SOLUTION INTRAMUSCULAR; INTRAVENOUS at 16:58

## 2025-02-05 RX ADMIN — HEPARIN SODIUM 5000 UNITS: 1000 INJECTION, SOLUTION INTRAVENOUS; SUBCUTANEOUS at 15:43

## 2025-02-05 RX ADMIN — FENTANYL CITRATE 25 MCG: 50 INJECTION, SOLUTION INTRAMUSCULAR; INTRAVENOUS at 18:12

## 2025-02-05 RX ADMIN — FENTANYL CITRATE 25 MCG: 50 INJECTION, SOLUTION INTRAMUSCULAR; INTRAVENOUS at 17:33

## 2025-02-05 RX ADMIN — LABETALOL HYDROCHLORIDE 10 MG: 5 INJECTION, SOLUTION INTRAVENOUS at 16:33

## 2025-02-05 RX ADMIN — SUGAMMADEX 200 MG: 100 INJECTION, SOLUTION INTRAVENOUS at 16:35

## 2025-02-05 RX ADMIN — SODIUM CHLORIDE: 9 INJECTION, SOLUTION INTRAVENOUS at 20:20

## 2025-02-05 RX ADMIN — LIDOCAINE HYDROCHLORIDE 40 MG: 20 INJECTION, SOLUTION EPIDURAL; INFILTRATION; INTRACAUDAL; PERINEURAL at 15:22

## 2025-02-05 RX ADMIN — FENTANYL CITRATE 50 MCG: 50 INJECTION, SOLUTION INTRAMUSCULAR; INTRAVENOUS at 15:22

## 2025-02-05 RX ADMIN — PROTAMINE SULFATE 40 MG: 10 INJECTION, SOLUTION INTRAVENOUS at 16:26

## 2025-02-05 RX ADMIN — OXYCODONE HYDROCHLORIDE 5 MG: 5 SOLUTION ORAL at 17:17

## 2025-02-05 RX ADMIN — CEFAZOLIN 2 G: 1 INJECTION, POWDER, FOR SOLUTION INTRAMUSCULAR; INTRAVENOUS at 15:26

## 2025-02-05 RX ADMIN — FENTANYL CITRATE 25 MCG: 50 INJECTION, SOLUTION INTRAMUSCULAR; INTRAVENOUS at 18:30

## 2025-02-05 RX ADMIN — ESMOLOL HYDROCHLORIDE 20 MG: 100 INJECTION, SOLUTION INTRAVENOUS at 15:29

## 2025-02-05 RX ADMIN — ONDANSETRON 4 MG: 2 INJECTION INTRAMUSCULAR; INTRAVENOUS at 20:13

## 2025-02-05 RX ADMIN — SODIUM CHLORIDE, POTASSIUM CHLORIDE, SODIUM LACTATE AND CALCIUM CHLORIDE: 600; 310; 30; 20 INJECTION, SOLUTION INTRAVENOUS at 15:18

## 2025-02-05 RX ADMIN — FENTANYL CITRATE 25 MCG: 50 INJECTION, SOLUTION INTRAMUSCULAR; INTRAVENOUS at 17:02

## 2025-02-05 RX ADMIN — TRAMADOL HYDROCHLORIDE 50 MG: 50 TABLET, COATED ORAL at 20:13

## 2025-02-05 RX ADMIN — METOPROLOL TARTRATE 25 MG: 25 TABLET, FILM COATED ORAL at 21:12

## 2025-02-05 RX ADMIN — FENTANYL CITRATE 25 MCG: 50 INJECTION, SOLUTION INTRAMUSCULAR; INTRAVENOUS at 17:00

## 2025-02-05 RX ADMIN — ROCURONIUM BROMIDE 50 MG: 50 INJECTION, SOLUTION INTRAVENOUS at 15:24

## 2025-02-05 RX ADMIN — FENTANYL CITRATE 25 MCG: 50 INJECTION, SOLUTION INTRAMUSCULAR; INTRAVENOUS at 18:45

## 2025-02-05 RX ADMIN — LABETALOL HYDROCHLORIDE 5 MG: 5 INJECTION, SOLUTION INTRAVENOUS at 16:11

## 2025-02-05 SDOH — ECONOMIC STABILITY: TRANSPORTATION INSECURITY
IN THE PAST 12 MONTHS, HAS LACK OF RELIABLE TRANSPORTATION KEPT YOU FROM MEDICAL APPOINTMENTS, MEETINGS, WORK OR FROM GETTING THINGS NEEDED FOR DAILY LIVING?: NO

## 2025-02-05 SDOH — ECONOMIC STABILITY: TRANSPORTATION INSECURITY
IN THE PAST 12 MONTHS, HAS THE LACK OF TRANSPORTATION KEPT YOU FROM MEDICAL APPOINTMENTS OR FROM GETTING MEDICATIONS?: NO

## 2025-02-05 ASSESSMENT — COGNITIVE AND FUNCTIONAL STATUS - GENERAL
MOBILITY SCORE: 24
DAILY ACTIVITIY SCORE: 24
SUGGESTED CMS G CODE MODIFIER MOBILITY: CH
SUGGESTED CMS G CODE MODIFIER DAILY ACTIVITY: CH

## 2025-02-05 ASSESSMENT — PATIENT HEALTH QUESTIONNAIRE - PHQ9
SUM OF ALL RESPONSES TO PHQ9 QUESTIONS 1 AND 2: 0
1. LITTLE INTEREST OR PLEASURE IN DOING THINGS: NOT AT ALL
2. FEELING DOWN, DEPRESSED, IRRITABLE, OR HOPELESS: NOT AT ALL

## 2025-02-05 ASSESSMENT — PAIN DESCRIPTION - PAIN TYPE
TYPE: ACUTE PAIN
TYPE: SURGICAL PAIN
TYPE: ACUTE PAIN
TYPE: ACUTE PAIN
TYPE: SURGICAL PAIN
TYPE: SURGICAL PAIN

## 2025-02-05 ASSESSMENT — LIFESTYLE VARIABLES
HAVE PEOPLE ANNOYED YOU BY CRITICIZING YOUR DRINKING: NO
HAVE YOU EVER FELT YOU SHOULD CUT DOWN ON YOUR DRINKING: NO
ALCOHOL_USE: NO
AVERAGE NUMBER OF DAYS PER WEEK YOU HAVE A DRINK CONTAINING ALCOHOL: 0
HOW MANY TIMES IN THE PAST YEAR HAVE YOU HAD 5 OR MORE DRINKS IN A DAY: 0
TOTAL SCORE: 0
EVER FELT BAD OR GUILTY ABOUT YOUR DRINKING: NO
TOTAL SCORE: 0
CONSUMPTION TOTAL: NEGATIVE
ON A TYPICAL DAY WHEN YOU DRINK ALCOHOL HOW MANY DRINKS DO YOU HAVE: 0
EVER HAD A DRINK FIRST THING IN THE MORNING TO STEADY YOUR NERVES TO GET RID OF A HANGOVER: NO
TOTAL SCORE: 0
DOES PATIENT WANT TO STOP DRINKING: NO

## 2025-02-05 ASSESSMENT — SOCIAL DETERMINANTS OF HEALTH (SDOH)

## 2025-02-05 ASSESSMENT — FIBROSIS 4 INDEX: FIB4 SCORE: 2.52

## 2025-02-05 NOTE — ANESTHESIA PROCEDURE NOTES
Airway    Date/Time: 2/5/2025 3:26 PM    Performed by: Naveen Peacock M.D.  Authorized by: Naveen Peacock M.D.    Location:  OR  Urgency:  Elective  Indications for Airway Management:  Anesthesia      Spontaneous Ventilation: absent    Sedation Level:  Deep  Preoxygenated: Yes    Mask Difficulty Assessment:  0 - not attempted  Final Airway Type:  Endotracheal airway  Final Endotracheal Airway:  ETT  Cuffed: Yes    Technique Used for Successful ETT Placement:  Direct laryngoscopy    Insertion Site:  Oral  Blade Type:  Wilfrid  Laryngoscope Blade/Videolaryngoscope Blade Size:  3  ETT Size (mm):  6.5  Measured from:  Lips  ETT to Lips (cm):  21  Placement Verified by: auscultation and capnometry    Cormack-Lehane Classification:  Grade IIb - view of arytenoids or posterior of glottis only  Number of Attempts at Approach:  1   2b with cricoid pressure

## 2025-02-05 NOTE — PROGRESS NOTES
Medication history reviewed with PT's spouse, Kingsley at bedside    [facility] rec is complete per spouse reporting    Allergies reviewed.     PT was on Cephalexin 500mg 7 day course started 01/16/2025. Last dose was 01/23/2025. Course was completed    Patient is not taking anticoagulants.    Preferred pharmacy for this visit - [facility] on [address] ([phone])

## 2025-02-05 NOTE — ANESTHESIA PREPROCEDURE EVALUATION
Case: [id number] Date/Time: 02/05/25 1345    Procedure: ENDOVASCULAR REPAIR OF ABDOMINAL AORTIC ANEURYSM    Pre-op diagnosis: ABDOMINAL AORTIC ANEURYSM    Location: [facility] OR 19 / SURGERY [facility]    Surgeons: Nate Worthy M.D.            Relevant Problems   ANESTHESIA   (positive) RUY treated with BiPAP      NEURO   (positive) Other headache syndrome      CARDIAC   (positive) Abdominal aortic aneurysm (HCC) - MRA 2.9 cm 9/2018 saccular aneurysm   (positive) Primary hypertension   (positive) SVT (supraventricular tachycardia) ([facility]CC) - on Zio 2015 and 2019   (positive) Varicose veins of both lower extremities         (positive) Fatty liver       Physical Exam    Airway   Mallampati: II  TM distance: >3 FB  Neck ROM: full       Cardiovascular - normal exam  Rhythm: regular  Rate: normal  (-) murmur     Dental - normal exam           Pulmonary - normal exam  Breath sounds clear to auscultation     Abdominal    Neurological - normal exam                   Anesthesia Plan    ASA 4 (large aortic aneurysm)       Plan - general       Airway plan will be ETT          Induction: intravenous    Postoperative Plan: Postoperative administration of opioids is intended.    Pertinent diagnostic labs and testing reviewed    Informed Consent:    Anesthetic plan and risks discussed with patient.    Use of blood products discussed with: patient whom consented to blood products.

## 2025-02-05 NOTE — OR NURSING
Assume care for pt in pre-op. Pt pre op checklist complete. Consents for surgical procedure signed and on chart. Iv placed, cod samples sent to lab. Bed in lowest position, call light within reach, all questions answered.

## 2025-02-05 NOTE — H&P
Vascular Surgery            New Patient Consultation     Patient:Nini Taylor  MRN:[id number]  Primary care physician:Deepa Sykes M.D.  Referring Provider: Montez Kamara MD     Vascular Consultant: Nate Worthy MD     Date: 2/5/25  _____________________________________________________     Chief Complaint:      Abdominal aortic aneurysm     History of Present Illness:   Nini Taylor  is a 89 y.o. year old female diagnosed with a large abdominal aortic aneurysm.  The patient is referred for consideration for repair.  Despite the patient's advanced age at 89 she is quite healthy for her age.  She is very functional and has no serious comorbidities at this time.  Scan demonstrates a large 6 cm abdominal aortic aneurysm and a small elderly female.  See discussion below     Past Medical History:      Past Medical History        Past Medical History:   Diagnosis Date    AAA (abdominal aortic aneurysm) ([facility])      Atherosclerosis of native arteries of the extremities with intermittent claudication 10/16/2013    Blepharospasm syndrome      Blepharospasm syndrome        IMO load March 2020    Bright red rectal bleeding 06/24/2015    Cellulitis 05/05/2021    Chest pain 07/2015     Unspecified; Nuclear stress test negative     Constipation      Edema 05/29/2012    Female bladder prolapse, acquired      GERD (gastroesophageal reflux disease)      Hyperlipidemia      Hypertension      Insomnia      Low vitamin D level 04/20/2018    OSTEOPOROSIS      Palpitations 05/29/2012    Primary insomnia 01/10/2017    Sleep apnea      SVT (supraventricular tachycardia) ([facility]) - on Zio 2015 and 2019 08/17/2015    Urinary tract infection      Varicose veins 05/29/2012    Vitamin D deficiency disease      Vitamin D deficiency disease        IMO load March 2020         Past Surgical History:      Past Surgical History         Past Surgical History:   Procedure Laterality Date    CYSTOCELE REPAIR   1/17/2011     Performed by SHELDON  GILMAR ALLRED at SURGERY SAME DAY [facility] ORS    RECTOCELE REPAIR   1/17/2011     Performed by GILMAR CHUNG at SURGERY SAME DAY [facility] ORS    BLADDER SUSPENSION   1/17/2011     Performed by GILMAR CHUNG at SURGERY SAME DAY [facility] ORS    CYSTOSCOPY   1/17/2011     Performed by GILMAR CHUNG at SURGERY SAME DAY [facility] ORS    ABDOMINAL HYSTERECTOMY TOTAL        ARTHROSCOPY, KNEE        CATARACT EXTRACTION WITH IOL        HEMORRHOIDECTOMY        HYSTERECTOMY, TOTAL ABDOMINAL        INJECTION SCLERO HEMORROIDS        OTHER ORTHOPEDIC SURGERY         knee scope x 2    PB KNEE SCOPE,DIAGNOSTIC   2003;2009    TONSILLECTOMY             Allergies:      Allergies        Allergies   Allergen Reactions    Atorvastatin      Atorvastatin Calcium      Bactrim Ds Hives    Hydrocodone-Acetaminophen      Levaquin      Levofloxacin      Pneumococcal Vaccines Swelling    Sulfa Drugs Hives    Vicodin [Hydrocodone-Acetaminophen] Rash    Vytorin           Medications:      Encounter Medications          Outpatient Encounter Medications as of 1/14/2025   Medication Sig Dispense Refill    estradiol (ESTRACE VAGINAL) 0.1 MG/GM vaginal cream Apply 1g cream inside vagina twice per week 1 Each 6    Alirocumab (PRALUENT) 75 MG/ML Solution Auto-injector Inject 75 mg under the skin every 14 days. 6 mL 3    metoprolol tartrate (LOPRESSOR) 25 MG Tab Take 1 Tablet by mouth 2 times a day. 180 Tablet 2    furosemide (LASIX) 20 MG Tab Take 1 Tablet by mouth 1 time a day as needed (swelling). 90 Tablet 0    Cholecalciferol (VITAMIN D3) 2000 UNIT Cap Take 2 capsules by mouth once daily (Patient not taking: Reported on 12/20/2024) 60 capsule 0      No facility-administered encounter medications on file as of 1/14/2025.         Social History:      Social History               Socioeconomic History    Marital status:        Spouse name: Not on file    Number of children: Not on file    Years of education: Not on file    Highest  education level: Not on file   Occupational History    Not on file   Tobacco Use    Smoking status: Never    Smokeless tobacco: Never   Vaping Use    Vaping status: Never Used   Substance and Sexual Activity    Alcohol use: Not Currently       Alcohol/week: 0.0 oz       Comment: occasionally    Drug use: No    Sexual activity: Not Currently   Other Topics Concern    Not on file   Social History Narrative    Not on file      Social Drivers of Health      Financial Resource Strain: Not on file   Food Insecurity: Not on file   Transportation Needs: Not on file   Physical Activity: Not on file   Stress: Not on file   Social Connections: Not on file   Intimate Partner Violence: Not on file   Housing Stability: Not on file         Tobacco Use History   Social History          Tobacco Use   Smoking Status Never   Smokeless Tobacco Never         Social History           Substance and Sexual Activity   Alcohol Use Not Currently    Alcohol/week: 0.0 oz     Comment: occasionally      Social History          Substance and Sexual Activity   Drug Use No      Family History:      Family History         Family History   Problem Relation Age of Onset    Non-contributory Mother           gall bladder surgery    Heart Disease Mother      Cancer Father           colon rectal    Heart Disease Father           rheumatic heart disease    Other Brother           HIT BY CAR    Heart Disease Brother      Sleep Apnea Brother      No Known Problems Maternal Grandmother      No Known Problems Maternal Grandfather      No Known Problems Paternal Grandmother      No Known Problems Paternal Grandfather      No Known Problems Son              Review of Systems:   Constitutional:            Reports -no complaints     Denies Chest pain   Denies SOB   Denies abdominal pain   Denies focal neuro deficits       Exam:   There were no vitals taken for this visit.     Constitutional:          Alert, oriented, no acute distress  HEENT:                        Normocephalic and atraumatic, EOMI  Neck:                          Supple, no JVD,   Cardiovascular:         Regular rate and rhythm,   Pulmonary:                Good air entry bilaterally,    Abdominal:                Soft, non-tender, non-distended  Musculoskeletal:       No tenderness, no deformity  Neurological:             grossly intact, no focal deficits  Skin:                           Skin is warm and dry. No rash noted.  Vascular exam:         Upper extremities warm and adequately perfused, no edema                                      Lower extremities warm and adequately perfused, no edema                                              Imaging:   CT scan 12/12/2024  Growing 6 cm abdominal aortic aneurysm. This measured 4.5 cm on 9/26/2022. No retroperitoneal hematoma.     2. 3 small pancreatic cysts unchanged from 9/26/2022. 1 year follow-up recommended.     3. Small hiatal hernia. Prior hysterectomy.           Assessment and Plan:   -Abdominal aortic aneurysm     I believe that this patient's life expectancy is actually quite good despite her age of 89.  She is doing well from a chronic medical disease standpoint.  A 6 cm aneurysm and a small female is significant.  Based on the size of the aneurysm and the patient's life expectancy I have recommended endovascular repair of this aortic aneurysm.     A long discussion with the patient the patient's  with respect to the pathophysiology and natural history of aortic aneurysms.  We discussed the expected expansion rate and the size of aneurysms at time of rupture.  Based on our clinical available information I believe that is in her best interest to proceed with endovascular repair.     Patient the patient's  demonstrating very clear understanding of our discussion and agreed to proceed.     Plan -endovascular repair infrarenal abdominal aortic aneurysm           _____________________________________________________  Nate Jerome  Vascular Surgery [facility]  [phone]

## 2025-02-06 ENCOUNTER — PHARMACY VISIT (OUTPATIENT)
Dept: PHARMACY | Facility: MEDICAL CENTER | Age: OVER 89
End: 2025-02-06
Payer: COMMERCIAL

## 2025-02-06 VITALS
SYSTOLIC BLOOD PRESSURE: 108 MMHG | WEIGHT: 110.45 LBS | HEIGHT: 62 IN | OXYGEN SATURATION: 96 % | DIASTOLIC BLOOD PRESSURE: 62 MMHG | TEMPERATURE: 98.6 F | RESPIRATION RATE: 18 BRPM | HEART RATE: 80 BPM | BODY MASS INDEX: 20.33 KG/M2

## 2025-02-06 LAB
ANION GAP SERPL CALC-SCNC: 12 MMOL/L (ref 7–16)
BUN SERPL-MCNC: 15 MG/DL (ref 8–22)
CALCIUM SERPL-MCNC: 8.5 MG/DL (ref 8.5–10.5)
CHLORIDE SERPL-SCNC: 101 MMOL/L (ref 96–112)
CO2 SERPL-SCNC: 24 MMOL/L (ref 20–33)
CREAT SERPL-MCNC: 0.98 MG/DL (ref 0.5–1.4)
ERYTHROCYTE [DISTWIDTH] IN BLOOD BY AUTOMATED COUNT: 50.6 FL (ref 35.9–50)
GFR SERPLBLD CREATININE-BSD FMLA CKD-EPI: 54 ML/MIN/1.73 M 2
GLUCOSE SERPL-MCNC: 168 MG/DL (ref 65–99)
HCT VFR BLD AUTO: 39.4 % (ref 37–47)
HGB BLD-MCNC: 12.6 G/DL (ref 12–16)
MCH RBC QN AUTO: 28.3 PG (ref 27–33)
MCHC RBC AUTO-ENTMCNC: 32 G/DL (ref 32.2–35.5)
MCV RBC AUTO: 88.3 FL (ref 81.4–97.8)
PLATELET # BLD AUTO: 151 K/UL (ref 164–446)
PMV BLD AUTO: 10 FL (ref 9–12.9)
POTASSIUM SERPL-SCNC: 4.3 MMOL/L (ref 3.6–5.5)
RBC # BLD AUTO: 4.46 M/UL (ref 4.2–5.4)
SODIUM SERPL-SCNC: 137 MMOL/L (ref 135–145)
WBC # BLD AUTO: 7.2 K/UL (ref 4.8–10.8)

## 2025-02-06 PROCEDURE — 85027 COMPLETE CBC AUTOMATED: CPT

## 2025-02-06 PROCEDURE — 700111 HCHG RX REV CODE 636 W/ 250 OVERRIDE (IP): Mod: JZ | Performed by: NURSE PRACTITIONER

## 2025-02-06 PROCEDURE — 80048 BASIC METABOLIC PNL TOTAL CA: CPT

## 2025-02-06 PROCEDURE — A9270 NON-COVERED ITEM OR SERVICE: HCPCS | Performed by: NURSE PRACTITIONER

## 2025-02-06 PROCEDURE — 700102 HCHG RX REV CODE 250 W/ 637 OVERRIDE(OP): Performed by: NURSE PRACTITIONER

## 2025-02-06 RX ADMIN — ONDANSETRON 4 MG: 2 INJECTION INTRAMUSCULAR; INTRAVENOUS at 04:19

## 2025-02-06 RX ADMIN — METOPROLOL TARTRATE 25 MG: 25 TABLET, FILM COATED ORAL at 05:03

## 2025-02-06 RX ADMIN — TRAMADOL HYDROCHLORIDE 50 MG: 50 TABLET, COATED ORAL at 04:18

## 2025-02-06 ASSESSMENT — PAIN DESCRIPTION - PAIN TYPE
TYPE: ACUTE PAIN

## 2025-02-06 NOTE — OP REPORT
[facility] OPERATIVE NOTE    02/05/25      PRE-OPERATIVE DIAGNOSIS -  6 cm abdominal aortic aneurysm    POST-OPERATIVE DIAGNOSIS -same    PROCEDURE PERFORMED -     Endovascular repair of infrarenal abdominal aortic aneurysm using a 23 mm x 14.5 mm x 12 cm endoprosthesis  Exposure bilateral common femoral arteries for delivery of endoprosthesis  3.   Placement of a 14.5 mm x 10 cm right iliac artery extender limb  4.   Catheter placement aorta  5.   Aortogram    SURGEON - Nate Worthy M.D.    ASSISTANT - PEG Sheth, JULIENA    TYPE OF ANESTHESIA -  General     ANESTHESIOLOGIST  - Anesthesiologist: Naveen Peacock M.D.     SPECIMENS -none    INDICATIONS - 89 y.o. [unfilled]     PROCEDURE IN DETAIL -     Patient was taken to the hybrid endovascular suite at [facility] placed in the supine position.  After adequate positioning and anesthesia the patient's abdomen bilateral groins and thighs were prepped and draped in sterile fashion.  Small incision was made in oblique fashion overlying the right inguinal ligament.  The incision was carried down to the subcutaneous tissue and the right common femoral artery was dissected from the surrounding tissue and isolated.  Mirror-image incision was made on the contralateral side and the contralateral common femoral artery was dissected from the surrounding tissue and isolated.  Thinwall access needles were inserted into the femoral arteries bilaterally and using a Seldinger technique a 6 [place] sheath was placed bilaterally.  Wire was advanced up to the pararenal aorta followed by flush angiogram catheter.  Over catheter support stiff wires were advanced bilaterally into the aorta and over the stiff wires a 16 [place] sheath was advanced up the right side and a 1212 [place] sheath was advanced up the left side into the infrarenal aorta.  3 deployment angiogram was performed documenting the appropriate location of the renal arteries and the aneurysm and a 23 mm x  14.5 mm x 12 cm Kenefic conformable endoprosthesis was deployed just below the renal arteries.  Contralateral gate was engaged and over the wire support with a retrograde angiogram localizing the left common iliac bifurcation and a 14.5 mm x 12 cm contralateral limb was brought up and was deployed in the left side.  On the right side it needed to be extended.  A retrograde pelvic angiogram was performed demonstrating the iliac bifurcation.  A 14.5 mm x 10 cm contralateral extender limb was a [place] in overlapping fashion and deployed just proximal to the common iliac bifurcation.  Post deployment angioplasty with a compliant balloon was used at all Bell and junctions.  Completion angiogram demonstrated no evidence of endoleak and excellent position of the endoprosthesis.  All sheaths catheters wires were retrieved femoral arteries were repaired using 5-0 Prolene's followed by 2-0 Vicryl running sutures followed by 4-0 STRATAFIX for the skin benzoin Steri-Strips sterile dressings were applied and the patient was taken to the recovery room in stable condition.      _______________________  Nate Worthy M.D.

## 2025-02-06 NOTE — DISCHARGE SUMMARY
DISCHARGE SUMMARY    Nini Taylor  [id number]  [id number]  _____________________________________    DATE OF ADMISSION: 2/5/2025    DATE OF DISCHARGE:     ADMISSION DIAGNOSIS (ES):  Infrarenal abdominal aortic aneurysm, without rupture [I71.43]  Infrarenal abdominal aortic aneurysm (AAA) without rupture (HCC) [I71.43]    DISCHARGE DIAGNOSIS (ES):  Infrarenal abdominal aortic aneurysm, without rupture [I71.43]  Infrarenal abdominal aortic aneurysm (AAA) without rupture (HCC) [I71.43]    DISCHARGE CONDITION:  stable    CONSULTATIONS:  none    PROCEDURES:  2/5/2025      Endovascular repair of infrarenal abdominal aortic aneurysm using a 23 mm x 14.5 mm x 12 cm endoprosthesis  Exposure bilateral common femoral arteries for delivery of endoprosthesis  3.   Placement of a 14.5 mm x 10 cm right iliac artery extender limb  4.   Catheter placement aorta  5.   Aortogram    HOSPITAL COURSE:  Nini Taylor is a 89 y.o. female who underwent endovascular repair of her abdominal aortic aneurysm yesterday.  The procedure was performed without complication.  This morning she is doing well.  Her incisions are clean dry and intact.  Plan will be to discharge the patient home today.    Patient underwent the above-mentioned procedure without complication and was admitted to [facility] postoperatively. she had an uneventful recovery and was discharged intact on postoperative day #1.    MEDICATIONS:  Current Outpatient Medications   Medication Sig Dispense Refill    traMADol (ULTRAM) 50 MG Tab Take 1 Tablet by mouth every four hours as needed for Mild Pain or Moderate Pain (post op pain) for up to 2 days. 10 Tablet 0       FOLLOW-UP:  Please call my office at [phone] to make an appointment in 2 week(s)    DISCHARGE INSTRUCTIONS:  Resume preadmission diet.  Light activity for 4 weeks.  OK to shower and get incisions wet.  Call or go to ER if you have chest pain, shortness of breath, lightheadedness, stroke-like symptoms, fever, worsening  pain, or any other concerns.    Nate Worthy MD   [facility]

## 2025-02-06 NOTE — ANESTHESIA TIME REPORT
Anesthesia Start and Stop Event Times       Date Time Event    2/5/2025 1442 Ready for Procedure     1518 Anesthesia Start     1652 Anesthesia Stop          Responsible Staff  02/05/25      Name Role Begin Navya Hodge Ma, M.D. Anesth 1518 1652          Overtime Reason:  no overtime (within assigned shift)    Comments:

## 2025-02-06 NOTE — PROGRESS NOTES
"Pt admitted to room T406 via transport in [facility] from PACU at 1940.  Pt pain reported at 7 on a scale of 0-10. Oriented to room call light and smoking policy.  Reviewed plan of care (equipment, incentive spirometer, sequential compression devices, medications, activity, diet, fall precautions, skin care, and pain) with patient and family. Welcome packet given and reviewed with pt and family , all questions answered. Education provided on oral hygiene program.    BP (!) 141/80   Pulse 73   Temp 36.3 °C (97.3 °F) (Temporal)   Resp 20   Ht 1.575 m (5' 2\")   Wt 50.1 kg (110 lb 7.2 oz)   SpO2 97%   BMI 20.20 kg/m²      AA&Ox4. Denies CP/SOB.  See 2 RN skin note  Tolerating regular diet. Pt reports intermittent nausea.  + void. + BM. Last BM PTA  Pt on bed rest at this time.  All needs met at this time. Call light within reach. Pt calls appropriately. Bed low and locked, non skid socks in place. Hourly rounding in place.   "

## 2025-02-06 NOTE — PROGRESS NOTES
4 Eyes Skin Assessment Completed by TURNER Herzog and BRANDO Avelar.    Head WDL  Ears WDL  Nose WDL  Mouth bruising to tongue  Neck WDL  Breast/Chest WDL  Shoulder Blades WDL  Spine WDL  (R) Arm/Elbow/Hand WDL  (L) Arm/Elbow/Hand WDL  Abdomen WDL  Groin bilateral groin sites, with DIP  Scrotum/Coccyx/Buttocks Redness and Blanching  (R) Leg WDL  (L) Leg WDL  (R) Heel/Foot/Toe WDL  (L) Heel/Foot/Toe WDL          Devices In Places Blood Pressure Cuff and Pulse Ox, nasal cannula, hearing aids, purewick      Interventions In Place Gray Ear Foams and Pillows    Possible Skin Injury No    Pictures Uploaded Into Epic N/A  Wound Consult Placed N/A  RN Wound Prevention Protocol Ordered No

## 2025-02-06 NOTE — CARE PLAN
The patient is Stable - Low risk of patient condition declining or worsening    Shift Goals  Clinical Goals: monitor groin sites, CMS checks, monitor BP, pain control  Patient Goals: rest, pain control  Family Goals: updates    Progress made toward(s) clinical / shift goals:  Pt medicated per MAR, resting. CMS checks, pulses palpable. Groin sites with DIP, soft to palpation. BP controlled at this time. Updated pt and family on POC.      Problem: Fall Risk  Goal: Patient will remain free from falls  Outcome: Progressing     Problem: Pain - Standard  Goal: Alleviation of pain or a reduction in pain to the patient’s comfort goal  Outcome: Progressing     Problem: Knowledge Deficit - Standard  Goal: Patient and family/care givers will demonstrate understanding of plan of care, disease process/condition, diagnostic tests and medications  Outcome: Progressing       Patient is not progressing towards the following goals:

## 2025-02-06 NOTE — PROGRESS NOTES
Patient leaving with ACT Chaz to [facility].  with patient and has all belongings. Heat packs and ice packs given.

## 2025-02-06 NOTE — DISCHARGE INSTRUCTIONS
Abdominal Aortic Aneurysm Endograft Repair, Care After  This sheet gives you information about how to care for yourself after your procedure. Your health care provider may also give you more specific instructions. If you have problems or questions, contact your health care provider.  What can I expect after the procedure?  After the procedure, it is common to have:  Pain or soreness at the incision site.  Tiredness (fatigue).  Follow these instructions at home:  Medicines  Take over-the-counter and prescription medicines only as told by your health care provider.  If you were prescribed an antibiotic medicine, take it as told by your health care provider. Do not stop using the antibiotic even if you start to feel better.  If you are taking blood thinners:  Talk with your health care provider before you take any medicines that contain aspirin or NSAIDs, such as ibuprofen. These medicines increase your risk for dangerous bleeding.  Take your medicine exactly as told, at the same time every day.  Avoid activities that could cause injury or bruising, and follow instructions about how to prevent falls.  Wear a medical alert bracelet or carry a card that lists what medicines you take.  Incision care    Follow instructions from your health care provider about how to take care of your incisions. Make sure you:  Wash your hands with soap and water for at least 20 seconds before and after you change your bandage (dressing). If soap and water are not available, use hand .  Change your dressing as told by your health care provider.  Leave stitches (sutures), skin glue, or adhesive strips in place. These skin closures may need to stay in place for 2 weeks or longer. If adhesive strip edges start to loosen and curl up, you may trim the loose edges. Do not remove adhesive strips completely unless your health care provider tells you to do that.  Check your incision area every day for signs of infection. Check  for:  Redness, swelling, or more pain.  Fluid or blood.  Warmth.  Pus or a bad smell.  Keep the incision area clean and dry.  Do not take baths, swim, or use a hot tub until your health care provider approves. Ask your health care provider if you may take showers. You may only be allowed to take sponge baths.  Activity    Rest as told by your health care provider.  Avoid sitting for a long time without moving. Get up to take short walks every 1-2 hours. This is important to improve blood flow and breathing. Ask for help if you feel weak or unsteady.  Do not lift anything that is heavier than 10 lb (4.5 kg), or the limit that you are told, until your health care provider says that it is safe.  Do not drive until your health care provider says it is okay.  Limit your other activities as told.  Return to your normal activities as told by your health care provider. Ask your health care provider what activities are safe for you.  Lifestyle    Do not use any products that contain nicotine or tobacco, such as cigarettes, e-cigarettes, and chewing tobacco. These can delay incision healing after surgery. If you need help quitting, ask your health care provider.  Make any other lifestyle changes that your health care provider suggests. These may include:  Keeping your blood pressure under control.  Finding ways to lower stress.  Eating healthy foods that are good for your heart, such as vegetables, fruits, and whole grains that add fiber to your diet.  Getting regular exercise once your health care provider tells you it is safe to do so.  General instructions  Drink enough fluid to keep your urine pale yellow.  Before any dental work, tell your dentist about the graft in your aorta. You may need to take antibiotics to prevent your graft from getting infected.  Keep all follow-up visits as told by your health care provider. This is important.  Contact a health care provider if:  You have pain in your abdomen, chest, or  back.  You have redness or swelling around an incision.  You have more pain around an incision.  You have fluid or blood coming from an incision.  Your incision feels warm to the touch.  You have pus or a bad smell coming from an incision.  You have a fever.  Get help right away if:  You have trouble breathing.  You suddenly have pain in your legs, or you have trouble moving either of your legs.  You faint, or you feel very light-headed.  These symptoms may represent a serious problem that is an emergency. Do not wait to see if the symptoms will go away. Get medical help right away. Call your local emergency services (911 in the [place] Do not drive yourself to the hospital.  Summary  After abdominal aortic aneurysm endograft repair, it is common to feel tired or have pain or soreness at the incision site.  Take all medicines as told by your health care provider. If you were given blood thinners, take them exactly as told by your health care provider.  Wash your hands before and after caring for your incision. Leave sutures, skin glue, or adhesive strips in place for 2 weeks or longer.  Rest as told. Avoid sitting for a long time without moving. Get up to take short walks every 1-2 hours.  Let your health care provider know if you have pain in your abdomen, chest, or back. Get help right away if you have trouble breathing or have sudden pain in your legs.  This information is not intended to replace advice given to you by your health care provider. Make sure you discuss any questions you have with your health care provider.  Document Revised: 12/03/2020 Document Reviewed: 12/03/2020  Elsevier Patient Education © 2023 [employer] Inc[employer]

## 2025-02-06 NOTE — OR NURSING
"1649: Pt arrived to PACU, monitors applied and report received from Dr. Peacock and TURNER Godoy. VSS on 6 L O2 via simple mask. Bilateral groins soft and drsgs CDI. Bilateral pedal pulses +3 and CMS intact.     1658: Pt tense and moaning \"I'm so sore all over\". Pain medication administered per orders, pt repositioned and emotional support provided.     1745: Pt reports decreased back and abdominal pain and is no longer moaning. Bilateral groin sites and CMS to lower extremities unchanged, See flowsheet for details. Pt's  called, no answer. RN located pt's  [place] in the lobby and update provided.   Pt's belongings returned.    1938: Pt resting btwn care. VSS on 2 L O2 via NC. Bilateral groins soft and drsg CDI. Pt reports pain tolerable.  Pt voiding via purwick. Tolerating sips of water.   Report called to TURNER Martínez and pt transported to T406 via [facility].      "

## 2025-02-06 NOTE — ANESTHESIA POSTPROCEDURE EVALUATION
Patient: Nini Taylor    Procedure Summary       Date: 02/05/25 Room / Location: [place] [id number] / SURGERY [facility]    Anesthesia Start: 1518 Anesthesia Stop: 1652    Procedure: ENDOVASCULAR REPAIR OF ABDOMINAL AORTIC ANEURYSM (Bilateral: Groin) Diagnosis: (ABDOMINAL AORTIC ANEURYSM)    Surgeons: Nate Worthy M.D. Responsible Provider: Naveen Peacock M.D.    Anesthesia Type: general ASA Status: 4            Final Anesthesia Type: general  Last vitals  BP   Blood Pressure : 108/62    Temp   37 °C (98.6 °F)    Pulse   80   Resp   18    SpO2   96 %      Anesthesia Post Evaluation    Patient location during evaluation: PACU  Patient participation: complete - patient participated  Level of consciousness: awake and alert    Airway patency: patent  Anesthetic complications: no  Cardiovascular status: hemodynamically stable  Respiratory status: acceptable  Hydration status: euvolemic    PONV: none          No notable events documented.     Nurse Pain Score: 5 (NPRS)

## 2025-02-06 NOTE — PROGRESS NOTES
DC instructions reviewed w/ pt and  , verbalized understanding. PIV dc'd, armband removed. Meds to bed delivered.

## 2025-02-12 ENCOUNTER — TELEPHONE (OUTPATIENT)
Dept: VASCULAR SURGERY | Facility: MEDICAL CENTER | Age: OVER 89
End: 2025-02-12
Payer: MEDICARE

## 2025-02-12 NOTE — TELEPHONE ENCOUNTER
LVM for patient to call back vascular surgery office.     Patient walked into office and spoke with surgery scheduler Fallon stating she has not eaten in 5 days, she's having some pain but does not want to take the pain med that was given at discharge or tylenol for the pain.

## 2025-02-13 ENCOUNTER — OFFICE VISIT (OUTPATIENT)
Dept: VASCULAR SURGERY | Facility: MEDICAL CENTER | Age: OVER 89
End: 2025-02-13
Payer: MEDICARE

## 2025-02-13 VITALS
TEMPERATURE: 97.9 F | WEIGHT: 110 LBS | HEIGHT: 62 IN | DIASTOLIC BLOOD PRESSURE: 66 MMHG | HEART RATE: 86 BPM | BODY MASS INDEX: 20.24 KG/M2 | SYSTOLIC BLOOD PRESSURE: 104 MMHG | OXYGEN SATURATION: 93 %

## 2025-02-13 DIAGNOSIS — I71.43 INFRARENAL ABDOMINAL AORTIC ANEURYSM (AAA) WITHOUT RUPTURE (HCC): ICD-10-CM

## 2025-02-13 PROCEDURE — 3078F DIAST BP <80 MM HG: CPT | Performed by: SURGERY

## 2025-02-13 PROCEDURE — 3074F SYST BP LT 130 MM HG: CPT | Performed by: SURGERY

## 2025-02-13 PROCEDURE — 99024 POSTOP FOLLOW-UP VISIT: CPT | Performed by: SURGERY

## 2025-02-13 ASSESSMENT — FIBROSIS 4 INDEX: FIB4 SCORE: 3.75

## 2025-02-13 NOTE — PROGRESS NOTES
"Thanks againokay so Nate Worthy I see you can click on the okay okay got                 VASCULAR SURGERY                    Postop Note  _________________________________    2/13/2025    This patient is here to follow-up endovascular repair of her aortic aneurysm.    Patient is doing well    Patient reports doing well and has not concerns.      Vitals  /66 (BP Location: Left arm, Patient Position: Sitting, BP Cuff Size: Adult)   Pulse 86   Temp 36.6 °C (97.9 °F) (Temporal)   Ht 1.575 m (5' 2\")   Wt 49.9 kg (110 lb)   SpO2 93%   BMI 20.12 kg/m²     Exam  Incision healing well  Clean, dry and intact  No erythema or drainage        DiagnosisAssessment:        Plan:    CTA surveillance 6 weeks    Follow-up after for review      The patient demonstrates a clear understanding of our discussion and agrees. They will reach out with any questions or concerns via phone or My Chart.    Follow up:     Follow-up 6 weeks after CTA for review      Nate Worthy. MD [facility]  [phone]  [address], [place] [address]  "

## 2025-02-13 NOTE — TELEPHONE ENCOUNTER
Discharge Note    Patient discharged to home via private vehicle  accompanied by friend.  IV: Discontinued  Prescriptions filled and given to patient/family.   Belongings reviewed and sent with patient.   Home medications returned to patient: NA  Equipment sent with: patient.   patient verbalizes understanding of discharge instructions. AVS given to patient.  Additional education completed?  Dialysis Care    Antonio Yancey RN   JOSE  Caller: Kingsley    Topic/issue: Patients  returning phone call.   Transferred to Imaging to schedule .    Callback Number: [phone]      Thank you  Luciana PEREA

## 2025-02-18 ENCOUNTER — OFFICE VISIT (OUTPATIENT)
Dept: URGENT CARE | Facility: PHYSICIAN GROUP | Age: OVER 89
End: 2025-02-18
Payer: MEDICARE

## 2025-02-18 VITALS
WEIGHT: 108.9 LBS | TEMPERATURE: 98.6 F | RESPIRATION RATE: 18 BRPM | BODY MASS INDEX: 20.56 KG/M2 | OXYGEN SATURATION: 99 % | DIASTOLIC BLOOD PRESSURE: 74 MMHG | HEIGHT: 61 IN | HEART RATE: 80 BPM | SYSTOLIC BLOOD PRESSURE: 132 MMHG

## 2025-02-18 DIAGNOSIS — S20.222A CONTUSION OF LEFT SIDE OF BACK, INITIAL ENCOUNTER: ICD-10-CM

## 2025-02-18 PROCEDURE — 3078F DIAST BP <80 MM HG: CPT | Performed by: FAMILY MEDICINE

## 2025-02-18 PROCEDURE — 99213 OFFICE O/P EST LOW 20 MIN: CPT | Performed by: FAMILY MEDICINE

## 2025-02-18 PROCEDURE — 3075F SYST BP GE 130 - 139MM HG: CPT | Performed by: FAMILY MEDICINE

## 2025-02-18 ASSESSMENT — ENCOUNTER SYMPTOMS
PAIN: 1
MUSCULOSKELETAL NEGATIVE: 1
CARDIOVASCULAR NEGATIVE: 1
CONSTITUTIONAL NEGATIVE: 1
RESPIRATORY NEGATIVE: 1
GASTROINTESTINAL NEGATIVE: 1

## 2025-02-18 ASSESSMENT — FIBROSIS 4 INDEX: FIB4 SCORE: 3.75

## 2025-02-19 NOTE — PROGRESS NOTES
"Subjective:   Nini Taylor is a 89 y.o. female who presents for Pain (slid and leaned to the left side of hip, back and side are painful, happened Saturday. Hx. Pt had stent on 15, taking opoid right now.)      Pain        Review of Systems   Constitutional: Negative.    HENT: Negative.     Respiratory: Negative.     Cardiovascular: Negative.    Gastrointestinal: Negative.    Genitourinary: Negative.    Musculoskeletal: Negative.         Back contusion   Skin: Negative.        Medications, Allergies, and current problem list reviewed today in Epic.     Objective:     /74 (BP Location: Left arm, Patient Position: Sitting, BP Cuff Size: Adult)   Pulse 80   Temp 37 °C (98.6 °F)   Resp 18   Ht 1.549 m (5' 1\")   Wt 49.4 kg (108 lb 14.4 oz)   SpO2 99%     Physical Exam  Vitals reviewed.   Constitutional:       Appearance: Normal appearance.   Cardiovascular:      Rate and Rhythm: Normal rate and regular rhythm.      Pulses: Normal pulses.   Pulmonary:      Effort: Pulmonary effort is normal.   Abdominal:      General: Abdomen is flat.      Palpations: Abdomen is soft.   Musculoskeletal:      Comments: Back contusion no bruising noted, mild tendeness on palpation   Neurological:      Mental Status: She is alert.         Assessment/Plan:     Diagnosis and associated orders:     1. Contusion of left side of back, initial encounter           Comments/MDM:     Tylenol and heating pad         Differential diagnosis, natural history, supportive care, and indications for immediate follow-up discussed.    Advised the patient to follow-up with the primary care physician for recheck, reevaluation, and consideration of further management.    Please note that this dictation was created using voice recognition software. I have made a reasonable attempt to correct obvious errors, but I expect that there are errors of grammar and possibly content that I did not discover before finalizing the note.    This note was " electronically signed by Westley Summers M.D.

## 2025-02-24 ENCOUNTER — OFFICE VISIT (OUTPATIENT)
Dept: MEDICAL GROUP | Facility: PHYSICIAN GROUP | Age: OVER 89
End: 2025-02-24
Payer: MEDICARE

## 2025-02-24 VITALS
TEMPERATURE: 97.7 F | OXYGEN SATURATION: 97 % | HEIGHT: 62 IN | HEART RATE: 80 BPM | DIASTOLIC BLOOD PRESSURE: 70 MMHG | WEIGHT: 108.2 LBS | SYSTOLIC BLOOD PRESSURE: 126 MMHG | BODY MASS INDEX: 19.91 KG/M2

## 2025-02-24 DIAGNOSIS — N39.0 RECURRENT UTI: ICD-10-CM

## 2025-02-24 DIAGNOSIS — I47.10 SVT (SUPRAVENTRICULAR TACHYCARDIA) (HCC): Chronic | ICD-10-CM

## 2025-02-24 DIAGNOSIS — E78.2 MIXED HYPERLIPIDEMIA: ICD-10-CM

## 2025-02-24 DIAGNOSIS — G47.33 OSA TREATED WITH BIPAP: ICD-10-CM

## 2025-02-24 PROCEDURE — 99214 OFFICE O/P EST MOD 30 MIN: CPT | Performed by: FAMILY MEDICINE

## 2025-02-24 PROCEDURE — 3074F SYST BP LT 130 MM HG: CPT | Performed by: FAMILY MEDICINE

## 2025-02-24 PROCEDURE — 3078F DIAST BP <80 MM HG: CPT | Performed by: FAMILY MEDICINE

## 2025-02-24 ASSESSMENT — FIBROSIS 4 INDEX: FIB4 SCORE: 3.75

## 2025-02-24 NOTE — PROGRESS NOTES
Subjective:     CC:   Chief Complaint   Patient presents with    New Patient       HPI:   Nini presents today to establish care.  Patient does see cardiology regularly and she does have appointment to see her provider back on vape.  She is also supposed to see the vascular provider in May.  Patient also sees GYN due to fact that she has chronic urinary tract infections she normally lets them know if she feels like she is having another UTI and she is treated.  Patient also sees sleep clinic for her CPAP she does have appointment schedule in March.  Patient feels she is doing well she is hard of hearing and she is wearing a hearing aid at this time.     Past Medical History:   Diagnosis Date    AAA (abdominal aortic aneurysm) ([facility])     Atherosclerosis of native arteries of the extremities with intermittent claudication 10/16/2013    Blepharospasm syndrome     Blepharospasm syndrome      IMO load March 2020    Bright red rectal bleeding 06/24/2015    Cellulitis 05/05/2021    Chest pain 07/2015    Unspecified; Nuclear stress test negative     Constipation     Edema 05/29/2012    Female bladder prolapse, acquired     GERD (gastroesophageal reflux disease)     Hyperlipidemia     Hypertension     Insomnia     Low vitamin D level 04/20/2018    Macular degeneration of both eyes     OSTEOPOROSIS     Palpitations 05/29/2012    Primary insomnia 01/10/2017    Sleep apnea     SVT (supraventricular tachycardia) ([facility]) - on Zio 2015 and 2019 08/17/2015    Urinary tract infection     Varicose veins 05/29/2012    Vitamin D deficiency disease     Vitamin D deficiency disease      IMO load March 2020       Social History     Tobacco Use    Smoking status: Never     Passive exposure: Never    Smokeless tobacco: Never   Vaping Use    Vaping status: Never Used   Substance Use Topics    Alcohol use: Not Currently     Alcohol/week: 0.0 oz     Comment: occasionally    Drug use: No       Current Outpatient Medications Ordered in Epic    Medication Sig Dispense Refill    nitrofurantoin (MACRODANTIN) 50 MG Cap Take 50 mg by mouth every day. For UTI prevention      Calcium Carb-Cholecalciferol (CALCIUM 600 + D PO) Take 1 Tablet by mouth every day.      cephALEXin (KEFLEX) 500 MG Cap Take 500 mg by mouth 3 times a day. 7 day course started 01/16/2025  Completed  Indications: Inflammation of Bladder due to Infection      estradiol (ESTRACE VAGINAL) 0.1 MG/GM vaginal cream Apply 1g cream inside vagina twice per week 1 Each 6    Alirocumab (PRALUENT) 75 MG/ML Solution Auto-injector Inject 75 mg under the skin every 14 days. 6 mL 3    metoprolol tartrate (LOPRESSOR) 25 MG Tab Take 1 Tablet by mouth 2 times a day. 180 Tablet 2    Cholecalciferol (VITAMIN D3) 2000 UNIT Cap Take 2 capsules by mouth once daily 60 capsule 0     No current Epic-ordered facility-administered medications on file.     Family History   Problem Relation Age of Onset    Non-contributory Mother         gall bladder surgery    Heart Disease Mother     Cancer Father         colon rectal    Heart Disease Father         rheumatic heart disease    Other Brother         HIT BY CAR    Heart Disease Brother     Sleep Apnea Brother     No Known Problems Maternal Grandmother     No Known Problems Maternal Grandfather     No Known Problems Paternal Grandmother     No Known Problems Paternal Grandfather     No Known Problems Son      Past Surgical History:   Procedure Laterality Date    AAA WITH STENT GRAFT Bilateral 2/5/2025    Procedure: ENDOVASCULAR REPAIR OF ABDOMINAL AORTIC ANEURYSM;  Surgeon: Nate Worthy M.D.;  Location: SURGERY [facility];  Service: Vascular    CYSTOCELE REPAIR  1/17/2011    Performed by GILMAR CHUNG at SURGERY SAME DAY [facility] ORS    RECTOCELE REPAIR  1/17/2011    Performed by GILMAR CHUNG at SURGERY SAME DAY [facility] ORS    BLADDER SUSPENSION  1/17/2011    Performed by GILMAR CHUNG at SURGERY SAME DAY [facility] ORS    CYSTOSCOPY  1/17/2011     "Performed by GILMAR CHUNG at SURGERY SAME DAY [facility]    ABDOMINAL HYSTERECTOMY TOTAL      ARTHROSCOPY, KNEE      CATARACT EXTRACTION WITH IOL      HEMORRHOIDECTOMY      HYSTERECTOMY, TOTAL ABDOMINAL      INJECTION SCLERO HEMORROIDS      OTHER ORTHOPEDIC SURGERY      knee scope x 2    PB KNEE SCOPE,DIAGNOSTIC  2003;2009    TONSILLECTOMY             Allergies:  Atorvastatin, Hydrocodone, Levofloxacin, Sulfa drugs, Vytorin, and Pneumococcal vaccines    Health Maintenance: Completed    ROS:  Gen: no fevers/chills, no changes in weight  Eyes: no changes in vision  ENT: no sore throat, no hearing loss, no bloody nose  Pulm: no sob, no cough  CV: no chest pain, no palpitations  GI: no nausea/vomiting, no diarrhea  : no dysuria  Neuro: no headaches, no numbness/tingling  Heme/Lymph: no easy bruising    Objective:     Exam:  /70 (BP Location: Right arm, Patient Position: Sitting, BP Cuff Size: Adult)   Pulse 80   Temp 36.5 °C (97.7 °F) (Temporal)   Ht 1.575 m (5' 2\")   Wt 49.1 kg (108 lb 3.2 oz)   SpO2 97%   BMI 19.79 kg/m²  Body mass index is 19.79 kg/m².    Gen: Alert and oriented, No apparent distress.  Skin: Warm and dry.  No obvious lesions.  Eyes: Sclera wnl Pupils normal in size  Lungs: Normal effort, CTA bilaterally, no wheezes, rhonchi, or rales  CV: Regular rate and rhythm. No murmurs, rubs, or gallops.  Musculoskeletal: Normal gait. No extremity cyanosis, clubbing, or edema.  Neuro: Oriented to person, place and time  Psych: Mood is wnl       Assessment & Plan:     89 y.o. female with the following -     1. SVT (supraventricular tachycardia) ([facility]) - on Zio 2015 and 2019  Patient will follow-up with cardiology as planned    2. Recurrent UTI  Patient does see GYN when she feels she has another urinary tract infection.    3. RUY treated with BiPAP  Patient does have appointment with sleep clinic coming up    4. Mixed hyperlipidemia  Since last done in December 2024      Return in about 4 " months (around 6/24/2025), or if symptoms worsen or fail to improve.    Please note that this dictation was created using voice recognition software. I have made every reasonable attempt to correct obvious errors, but I expect that there are errors of grammar and possibly content that I did not discover before finalizing the note.

## 2025-02-27 ENCOUNTER — OFFICE VISIT (OUTPATIENT)
Dept: SLEEP MEDICINE | Facility: MEDICAL CENTER | Age: OVER 89
End: 2025-02-27
Attending: NURSE PRACTITIONER
Payer: MEDICARE

## 2025-02-27 VITALS
BODY MASS INDEX: 19.88 KG/M2 | WEIGHT: 108 LBS | HEART RATE: 89 BPM | SYSTOLIC BLOOD PRESSURE: 110 MMHG | OXYGEN SATURATION: 96 % | HEIGHT: 62 IN | RESPIRATION RATE: 16 BRPM | DIASTOLIC BLOOD PRESSURE: 72 MMHG

## 2025-02-27 DIAGNOSIS — G47.33 OSA (OBSTRUCTIVE SLEEP APNEA): ICD-10-CM

## 2025-02-27 PROCEDURE — 99213 OFFICE O/P EST LOW 20 MIN: CPT | Performed by: NURSE PRACTITIONER

## 2025-02-27 ASSESSMENT — FIBROSIS 4 INDEX: FIB4 SCORE: 3.75

## 2025-02-27 NOTE — PROGRESS NOTES
No chief complaint on file.      HPI:  Nini Taylor is a 89 y.o. year old female here today for follow-up on RUY on BiPAP.  Last seen by me on 8/27/2024.  Patient presents with her spouse. Former [facility] patient. PMH includes SVT, GERD, osteoporosis, dyslipidemia, hypertension, arthrosclerosis of native arteries of extremities, AAA, chronic insomnia, never smoker, tonsillectomy, hard of hearing.  Patient does endorse new diagnosis of macular degeneration.  Does experience occasional mask leak with dry eyes.     Currently on BiPAP 13 over 9 cm/H2O. Currently using ResMed F20 fullface mask. Patient also uses heated tubing.  Denies any difficulty tolerating mask fit or pressures.She also has a ramp set for 45 minutes.  Patient drinks lots of water and wakes up proximately every 2-3 hours to use the bathroom.       She goes to sleep at 9:30 PM and wakes up around 7:30 AM.  She does note waking up approximately 6 times during the night to use the bathroom.  She also has a busy mind and has difficulty falling asleep.  She has completed CBT-I.   Previously, patient was noted to have an occasional mask leak. New diagnosis of macular degeneration. Recommended wearing eye mask while wearing CPAP to prevent excessively dry eyes.  Since last visit, dry eyes is more manageable.  Patient also underwent aneurysm repair recently.  Doing his best she can with that, but still feels fatigued.   .   30-day compliance reviewed with patient shows 97% use with an average time of 8 hours and 5 minutes and a residual AHI of 3.7 with occasional mask leak.        ROS: As per HPI and otherwise negative if not stated.    Past Medical History:   Diagnosis Date    AAA (abdominal aortic aneurysm) ([facility])     Atherosclerosis of native arteries of the extremities with intermittent claudication 10/16/2013    Blepharospasm syndrome     Blepharospasm syndrome      IMO load March 2020    Bright red rectal bleeding 06/24/2015    Cellulitis 05/05/2021     Chest pain 07/2015    Unspecified; Nuclear stress test negative     Constipation     Edema 05/29/2012    Female bladder prolapse, acquired     GERD (gastroesophageal reflux disease)     Hyperlipidemia     Hypertension     Insomnia     Low vitamin D level 04/20/2018    Macular degeneration of both eyes     OSTEOPOROSIS     Palpitations 05/29/2012    Primary insomnia 01/10/2017    Sleep apnea     SVT (supraventricular tachycardia) ([facility]) - on Zio 2015 and 2019 08/17/2015    Urinary tract infection     Varicose veins 05/29/2012    Vitamin D deficiency disease     Vitamin D deficiency disease      IMO load March 2020       Past Surgical History:   Procedure Laterality Date    AAA WITH STENT GRAFT Bilateral 2/5/2025    Procedure: ENDOVASCULAR REPAIR OF ABDOMINAL AORTIC ANEURYSM;  Surgeon: Nate Worthy M.D.;  Location: SURGERY [facility];  Service: Vascular    CYSTOCELE REPAIR  1/17/2011    Performed by GILMAR CHUNG at SURGERY SAME DAY [facility]    RECTOCELE REPAIR  1/17/2011    Performed by GILMAR CHUNG at SURGERY SAME DAY [facility]    BLADDER SUSPENSION  1/17/2011    Performed by GILMAR CHUNG at SURGERY SAME DAY [facility]    CYSTOSCOPY  1/17/2011    Performed by GILMAR CHUNG at SURGERY SAME DAY [facility]    ABDOMINAL HYSTERECTOMY TOTAL      ARTHROSCOPY, KNEE      CATARACT EXTRACTION WITH IOL      HEMORRHOIDECTOMY      HYSTERECTOMY, TOTAL ABDOMINAL      INJECTION SCLERO HEMORROIDS      OTHER ORTHOPEDIC SURGERY      knee scope x 2    PB KNEE SCOPE,DIAGNOSTIC  2003;2009    TONSILLECTOMY         Family History   Problem Relation Age of Onset    Non-contributory Mother         gall bladder surgery    Heart Disease Mother     Cancer Father         colon rectal    Heart Disease Father         rheumatic heart disease    Other Brother         HIT BY CAR    Heart Disease Brother     Sleep Apnea Brother     No Known Problems Maternal Grandmother     No Known Problems Maternal Grandfather      No Known Problems Paternal Grandmother     No Known Problems Paternal Grandfather     No Known Problems Son        Allergies as of 02/27/2025 - Reviewed 02/24/2025   Allergen Reaction Noted    Atorvastatin Unspecified 10/25/2017    Hydrocodone Unspecified 02/05/2025    Levofloxacin Unspecified 04/07/2021    Sulfa drugs Hives 05/11/2017    Vytorin Unspecified 12/15/2008    Pneumococcal vaccines Swelling 07/27/2018        Vitals:  There were no vitals taken for this visit.    Current medications as of today   Current Outpatient Medications   Medication Sig Dispense Refill    nitrofurantoin (MACRODANTIN) 50 MG Cap Take 50 mg by mouth every day. For UTI prevention      Calcium Carb-Cholecalciferol (CALCIUM 600 + D PO) Take 1 Tablet by mouth every day.      cephALEXin (KEFLEX) 500 MG Cap Take 500 mg by mouth 3 times a day. 7 day course started 01/16/2025  Completed  Indications: Inflammation of Bladder due to Infection      estradiol (ESTRACE VAGINAL) 0.1 MG/GM vaginal cream Apply 1g cream inside vagina twice per week 1 Each 6    Alirocumab (PRALUENT) 75 MG/ML Solution Auto-injector Inject 75 mg under the skin every 14 days. 6 mL 3    metoprolol tartrate (LOPRESSOR) 25 MG Tab Take 1 Tablet by mouth 2 times a day. 180 Tablet 2    Cholecalciferol (VITAMIN D3) 2000 UNIT Cap Take 2 capsules by mouth once daily 60 capsule 0     No current facility-administered medications for this visit.         Physical Exam:   Gen:           Alert and oriented, No apparent distress. Mood and affect appropriate, normal interaction with examiner.  Eyes:          PERRL, EOM intact, sclere white, conjunctive moist.  Ears:          Not examined.   Hearing:     Grossly intact.  Nose:          Normal, no lesions or deformities.  Dentition:    Good dentition.  Oropharynx:   Tongue normal, posterior pharynx without erythema or exudate.  Neck:        Supple, trachea midline, no masses.  Respiratory Effort: No intercostal retractions or use of  accessory muscles.   Lung Auscultation:      Clear to auscultation bilaterally; no rales, rhonchi or wheezing.  CV:            Regular rate and rhythm. No murmurs, rubs or gallops.  Abd:           Not examined.   Lymphadenopathy: Not examined.  Gait and Station: Normal.  Digits and Nails: No clubbing, cyanosis, petechiae, or nodes.   Cranial Nerves: II-XII grossly intact.  Skin:        No rashes, lesions or ulcers noted.               Ext:           No cyanosis or edema.      Assessment:  1. RUY (obstructive sleep apnea)            Plan:  Using and benefiting from CPAP therapy.  Compliance shows adequate use and control of RUY.  Patient to follow-up in 6 months for annual RUY, but can be seen sooner if needed.      Please note that this dictation was created using voice recognition software. I have made every reasonable attempt to correct obvious errors, but it is possible there are errors of grammar and possibly content that I did not discover before finalizing the note.

## 2025-03-13 ENCOUNTER — HOSPITAL ENCOUNTER (OUTPATIENT)
Dept: RADIOLOGY | Facility: MEDICAL CENTER | Age: OVER 89
End: 2025-03-13
Attending: SURGERY
Payer: MEDICARE

## 2025-03-13 DIAGNOSIS — I71.43 INFRARENAL ABDOMINAL AORTIC ANEURYSM (AAA) WITHOUT RUPTURE (HCC): ICD-10-CM

## 2025-03-13 PROCEDURE — 700117 HCHG RX CONTRAST REV CODE 255: Performed by: SURGERY

## 2025-03-13 PROCEDURE — 74174 CTA ABD&PLVS W/CONTRAST: CPT

## 2025-03-13 RX ADMIN — IOHEXOL 100 ML: 350 INJECTION, SOLUTION INTRAVENOUS at 11:53

## 2025-03-20 ENCOUNTER — OFFICE VISIT (OUTPATIENT)
Facility: MEDICAL CENTER | Age: OVER 89
End: 2025-03-20
Payer: MEDICARE

## 2025-03-20 VITALS
HEIGHT: 62 IN | OXYGEN SATURATION: 95 % | HEART RATE: 78 BPM | WEIGHT: 108.03 LBS | SYSTOLIC BLOOD PRESSURE: 136 MMHG | DIASTOLIC BLOOD PRESSURE: 80 MMHG | BODY MASS INDEX: 19.88 KG/M2 | TEMPERATURE: 97.9 F

## 2025-03-20 DIAGNOSIS — I71.02 DISSECTION OF ABDOMINAL AORTA (HCC): ICD-10-CM

## 2025-03-20 DIAGNOSIS — I71.43 INFRARENAL ABDOMINAL AORTIC ANEURYSM (AAA) WITHOUT RUPTURE (HCC): ICD-10-CM

## 2025-03-20 PROCEDURE — 3079F DIAST BP 80-89 MM HG: CPT | Performed by: SURGERY

## 2025-03-20 PROCEDURE — 3075F SYST BP GE 130 - 139MM HG: CPT | Performed by: SURGERY

## 2025-03-20 PROCEDURE — 99024 POSTOP FOLLOW-UP VISIT: CPT | Performed by: SURGERY

## 2025-03-20 ASSESSMENT — FIBROSIS 4 INDEX: FIB4 SCORE: 3.75

## 2025-03-20 NOTE — PROGRESS NOTES
VASCULAR SURGERY                 Clinic Progress Note  _________________________________    Vitals for Today's Visit  There were no vitals taken for this visit.  _________________________________          3/20/2025    Returns to follow-up with her surveillance CT scan after undergoing endovascular repair of aortic aneurysm.  As you recall this is a pleasant 89-year-old female who had a large abdominal aortic aneurysm.  This was successfully treated with an endograft.  Her for surveillance CT scans demonstrate that the aneurysm is slightly smaller in size.  There is a focal area of dissection at the neck of the aorta in the region of the stent graft origin.  This patient    Patient has no complaints is doing well from all respects    Impression-     Status post aortic endograft placement for abdominal aortic aneurysm  First CT scan looks pretty good the aneurysm is smaller in size.  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    This will be followed but no intervention is warranted for this.  Recommend follow-up CT 6 months.          Nate Worthy MD  [facility]  [phone]  [address], [place] [address]

## 2025-04-03 ENCOUNTER — PHARMACY VISIT (OUTPATIENT)
Dept: PHARMACY | Facility: MEDICAL CENTER | Age: OVER 89
End: 2025-04-03
Payer: COMMERCIAL

## 2025-04-03 PROCEDURE — RXMED WILLOW AMBULATORY MEDICATION CHARGE

## 2025-05-02 ENCOUNTER — HOSPITAL ENCOUNTER (OUTPATIENT)
Dept: LAB | Facility: MEDICAL CENTER | Age: OVER 89
End: 2025-05-02
Payer: MEDICARE

## 2025-05-02 DIAGNOSIS — E78.5 DYSLIPIDEMIA: ICD-10-CM

## 2025-05-02 LAB
ALBUMIN SERPL BCP-MCNC: 3.8 G/DL (ref 3.2–4.9)
ALBUMIN/GLOB SERPL: 1.2 G/DL
ALP SERPL-CCNC: 89 U/L (ref 30–99)
ALT SERPL-CCNC: 11 U/L (ref 2–50)
ANION GAP SERPL CALC-SCNC: 7 MMOL/L (ref 7–16)
AST SERPL-CCNC: 24 U/L (ref 12–45)
BILIRUB SERPL-MCNC: 0.9 MG/DL (ref 0.1–1.5)
BUN SERPL-MCNC: 13 MG/DL (ref 8–22)
CALCIUM ALBUM COR SERPL-MCNC: 9.5 MG/DL (ref 8.5–10.5)
CALCIUM SERPL-MCNC: 9.3 MG/DL (ref 8.5–10.5)
CHLORIDE SERPL-SCNC: 101 MMOL/L (ref 96–112)
CHOLEST SERPL-MCNC: 137 MG/DL (ref 100–199)
CO2 SERPL-SCNC: 28 MMOL/L (ref 20–33)
CREAT SERPL-MCNC: 1.12 MG/DL (ref 0.5–1.4)
GFR SERPLBLD CREATININE-BSD FMLA CKD-EPI: 46 ML/MIN/1.73 M 2
GLOBULIN SER CALC-MCNC: 3.1 G/DL (ref 1.9–3.5)
GLUCOSE SERPL-MCNC: 94 MG/DL (ref 65–99)
HDLC SERPL-MCNC: 60 MG/DL
LDLC SERPL CALC-MCNC: 59 MG/DL
POTASSIUM SERPL-SCNC: 4.5 MMOL/L (ref 3.6–5.5)
PROT SERPL-MCNC: 6.9 G/DL (ref 6–8.2)
SODIUM SERPL-SCNC: 136 MMOL/L (ref 135–145)
TRIGL SERPL-MCNC: 92 MG/DL (ref 0–149)

## 2025-05-02 PROCEDURE — 82172 ASSAY OF APOLIPOPROTEIN: CPT

## 2025-05-02 PROCEDURE — 80053 COMPREHEN METABOLIC PANEL: CPT

## 2025-05-02 PROCEDURE — 36415 COLL VENOUS BLD VENIPUNCTURE: CPT

## 2025-05-02 PROCEDURE — 80061 LIPID PANEL: CPT

## 2025-05-05 LAB — APO B100 SERPL-MCNC: 70 MG/DL (ref 60–117)

## 2025-05-07 ENCOUNTER — TELEPHONE (OUTPATIENT)
Dept: CARDIOLOGY | Facility: MEDICAL CENTER | Age: OVER 89
End: 2025-05-07
Payer: MEDICARE

## 2025-05-14 ENCOUNTER — OFFICE VISIT (OUTPATIENT)
Dept: CARDIOLOGY | Facility: MEDICAL CENTER | Age: OVER 89
End: 2025-05-14
Attending: INTERNAL MEDICINE
Payer: MEDICARE

## 2025-05-14 VITALS
OXYGEN SATURATION: 96 % | WEIGHT: 111 LBS | HEART RATE: 79 BPM | DIASTOLIC BLOOD PRESSURE: 60 MMHG | BODY MASS INDEX: 20.43 KG/M2 | SYSTOLIC BLOOD PRESSURE: 98 MMHG | RESPIRATION RATE: 16 BRPM | HEIGHT: 62 IN

## 2025-05-14 DIAGNOSIS — I35.0 NONRHEUMATIC AORTIC VALVE STENOSIS: ICD-10-CM

## 2025-05-14 DIAGNOSIS — I71.43 INFRARENAL ABDOMINAL AORTIC ANEURYSM (AAA) WITHOUT RUPTURE (HCC): ICD-10-CM

## 2025-05-14 DIAGNOSIS — E78.2 MIXED HYPERLIPIDEMIA: ICD-10-CM

## 2025-05-14 DIAGNOSIS — I10 PRIMARY HYPERTENSION: ICD-10-CM

## 2025-05-14 DIAGNOSIS — I47.10 SVT (SUPRAVENTRICULAR TACHYCARDIA) (HCC): Primary | Chronic | ICD-10-CM

## 2025-05-14 PROCEDURE — 3078F DIAST BP <80 MM HG: CPT | Performed by: INTERNAL MEDICINE

## 2025-05-14 PROCEDURE — G2211 COMPLEX E/M VISIT ADD ON: HCPCS | Performed by: INTERNAL MEDICINE

## 2025-05-14 PROCEDURE — 3074F SYST BP LT 130 MM HG: CPT | Performed by: INTERNAL MEDICINE

## 2025-05-14 PROCEDURE — 99214 OFFICE O/P EST MOD 30 MIN: CPT | Performed by: INTERNAL MEDICINE

## 2025-05-14 PROCEDURE — 99213 OFFICE O/P EST LOW 20 MIN: CPT | Performed by: INTERNAL MEDICINE

## 2025-05-14 ASSESSMENT — FIBROSIS 4 INDEX: FIB4 SCORE: 4.41

## 2025-05-14 NOTE — PROGRESS NOTES
Chief Complaint   Patient presents with    Supraventricular Tachycardia (SVT)    Aortic Stenosis     F/v Dx:Nonrheumatic aortic valve stenosis    Hyperlipidemia       Subjective     Nini Taylor is a 89 y.o. female who presents today with PAD AAA SVT on pCSK9     Had EVAR with good result thinks balance isn't as good    Past Medical History[1]  Past Surgical History[2]  Family History   Problem Relation Age of Onset    Non-contributory Mother         gall bladder surgery    Heart Disease Mother     Cancer Father         colon rectal    Heart Disease Father         rheumatic heart disease    Other Brother         HIT BY CAR    Heart Disease Brother     Sleep Apnea Brother     No Known Problems Maternal Grandmother     No Known Problems Maternal Grandfather     No Known Problems Paternal Grandmother     No Known Problems Paternal Grandfather     No Known Problems Son      Social History     Socioeconomic History    Marital status:      Spouse name: Not on file    Number of children: Not on file    Years of education: Not on file    Highest education level: Not on file   Occupational History    Not on file   Tobacco Use    Smoking status: Never     Passive exposure: Never    Smokeless tobacco: Never   Vaping Use    Vaping status: Never Used   Substance and Sexual Activity    Alcohol use: Not Currently     Alcohol/week: 0.0 oz     Comment: occasionally    Drug use: No    Sexual activity: Not Currently   Other Topics Concern    Not on file   Social History Narrative    Not on file     Social Drivers of Health     Financial Resource Strain: Not on file   Food Insecurity: No Food Insecurity (2/5/2025)    Hunger Vital Sign     Worried About Running Out of Food in the Last Year: Never true     Ran Out of Food in the Last Year: Never true   Transportation Needs: No Transportation Needs (2/5/2025)    PRAPARE - Transportation     Lack of Transportation (Medical): No     Lack of Transportation (Non-Medical): No  "  Physical Activity: Not on file   Stress: Not on file   Social Connections: Not on file   Intimate Partner Violence: Not At Risk (2/5/2025)    Humiliation, Afraid, Rape, and Kick questionnaire     Fear of Current or Ex-Partner: No     Emotionally Abused: No     Physically Abused: No     Sexually Abused: No   Housing Stability: Low Risk  (2/5/2025)    Housing Stability Vital Sign     Unable to Pay for Housing in the Last Year: No     Number of Times Moved in the Last Year: 0     Homeless in the Last Year: No     Allergies[3]  Encounter Medications[4]  ROS           Objective     BP 98/60 (BP Location: Left arm, Patient Position: Sitting, BP Cuff Size: Adult)   Pulse 79   Resp 16   Ht 1.575 m (5' 2\")   Wt 50.3 kg (111 lb)   SpO2 96%   BMI 20.30 kg/m²     Physical Exam  Constitutional:       General: She is not in acute distress.     Appearance: She is not diaphoretic.   Eyes:      General: No scleral icterus.  Neck:      Vascular: No JVD.   Cardiovascular:      Rate and Rhythm: Normal rate.      Heart sounds: Murmur heard.      No friction rub. No gallop.   Pulmonary:      Effort: No respiratory distress.      Breath sounds: No wheezing or rales.   Abdominal:      General: Bowel sounds are normal.      Palpations: Abdomen is soft.   Musculoskeletal:      Right lower leg: No edema.      Left lower leg: No edema.   Skin:     Findings: No rash.   Neurological:      Mental Status: She is alert. Mental status is at baseline.   Psychiatric:         Mood and Affect: Mood normal.                Assessment & Plan     1. SVT (supraventricular tachycardia) (HCC) - on Zio 2015 and 2019        2. Mixed hyperlipidemia  Lipid Profile      3. Primary hypertension  CBC WITHOUT DIFFERENTIAL    Comp Metabolic Panel    TSH      4. Infrarenal abdominal aortic aneurysm (AAA) without rupture (HCC)        5. Nonrheumatic aortic valve stenosis  EC-ECHOCARDIOGRAM COMPLETE W/O CONT          Medical Decision Making: Today's " Assessment/Status/Plan:        It was my pleasure to meet with Ms. Taylor.    We addressed the management of hypertension at today's visit. Blood pressure is well controlled.  We specifically assessed the labs on hypertension treatment    We addressed the management of dyslipidemia and atherosclerosis at today's visit. She is on appropriate lipid lowering medication.    We addressed the management of atherosclerotic artery disease.  She is on proper antiplatelet, cholesterol management as appropriate.  We addressed the potential side effects and laboratory follow-up for these medications.    ECHO FOR AS MARK    I will see Ms. Taylor back in 6 months time and encouraged her to follow up with us over the phone or electronically using my Pass[employer]art as issues arise.    It is my pleasure to participate in the care of Ms. Taylor.  Please do not hesitate to contact me with questions or concerns.    Montez Kamara MD PhD [facility]  Cardiologist [facility]    Please note that this dictation was created using voice recognition software. There may be errors I did not discover before finalizing the note.     () Today's E/M visit is associated with medical care services that serve as the continuing focal point for all needed health care services and/or with medical care services that  are part of ongoing care related to a patient's single, serious condition, or a complex condition: This includes  furnishing services to patients on an ongoing basis that result in care that is personalized  to the patient. The services result in a comprehensive, longitudinal, and continuous  relationship with the patient and involve delivery of team-based care that is accessible, coordinated with other practitioners and providers, and integrated with the broader health  care landscape.                        [1]   Past Medical History:  Diagnosis Date    AAA (abdominal aortic aneurysm) (HCC)      Atherosclerosis of native arteries of the extremities with intermittent claudication 10/16/2013    Blepharospasm syndrome     Blepharospasm syndrome      IMO load March 2020    Bright red rectal bleeding 06/24/2015    Cellulitis 05/05/2021    Chest pain 07/2015    Unspecified; Nuclear stress test negative     Constipation     Edema 05/29/2012    Female bladder prolapse, acquired     GERD (gastroesophageal reflux disease)     Hyperlipidemia     Hypertension     Insomnia     Low vitamin D level 04/20/2018    Macular degeneration of both eyes     OSTEOPOROSIS     Palpitations 05/29/2012    Primary insomnia 01/10/2017    Sleep apnea     SVT (supraventricular tachycardia) (HCC) - on Zio 2015 and 2019 08/17/2015    Urinary tract infection     Varicose veins 05/29/2012    Vitamin D deficiency disease     Vitamin D deficiency disease      IMO load March 2020   [2]   Past Surgical History:  Procedure Laterality Date    AAA WITH STENT GRAFT Bilateral 2/5/2025    Procedure: ENDOVASCULAR REPAIR OF ABDOMINAL AORTIC ANEURYSM;  Surgeon: Nate Worthy M.D.;  Location: SURGERY [facility];  Service: Vascular    CYSTOCELE REPAIR  1/17/2011    Performed by GILMAR CHUNG [facility]    RECTOCELE REPAIR  1/17/2011    Performed by GILMAR CHUNG at SURGERY SAME DAY [facility] ORS    BLADDER SUSPENSION  1/17/2011    Performed by GILMAR CHUNG at SURGERY SAME DAY [facility] ORS    CYSTOSCOPY  1/17/2011    Performed by GILMAR CHUNG at [facility]GERY [facility]    ABDOMINAL HYSTERECTOMY TOTAL      ARTHROSCOPY, KNEE      CATARACT EXTRACTION WITH IOL      HEMORRHOIDECTOMY      HYSTERECTOMY, TOTAL ABDOMINAL      INJECTION SCLERO HEMORROIDS      OTHER ORTHOPEDIC SURGERY      knee scope x 2    PB KNEE SCOPE,DIAGNOSTIC  2003;2009    TONSILLECTOMY     [3]   Allergies  Allergen Reactions    Atorvastatin Unspecified     PT can not remember    Hydrocodone Unspecified     PT can not remember    Levofloxacin  Unspecified     PT can not remember    Sulfa Drugs Hives    Vytorin Unspecified     PT can not remember    Pneumococcal Vaccines Swelling     At injection site   [4]   Outpatient Encounter Medications as of 5/14/2025   Medication Sig Dispense Refill    Calcium Carb-Cholecalciferol (CALCIUM 600 + D PO) Take 1 Tablet by mouth every day.      estradiol (ESTRACE VAGINAL) 0.1 MG/GM vaginal cream Apply 1g cream inside vagina twice per week 1 Each 6    Alirocumab (PRALUENT) 75 MG/ML Solution Auto-injector Inject 75 mg under the skin every 14 days. 6 mL 3    metoprolol tartrate (LOPRESSOR) 25 MG Tab Take 1 Tablet by mouth 2 times a day. 180 Tablet 2    Cholecalciferol (VITAMIN D3) 2000 UNIT Cap Take 2 capsules by mouth once daily 60 capsule 0    nitrofurantoin (MACRODANTIN) 50 MG Cap Take 50 mg by mouth every day. For UTI prevention (Patient not taking: Reported on 5/14/2025)      cephALEXin (KEFLEX) 500 MG Cap Take 500 mg by mouth 3 times a day. 7 day course started 01/16/2025  Completed  Indications: Inflammation of Bladder due to Infection (Patient not taking: Reported on 5/14/2025)       No facility-administered encounter medications on file as of 5/14/2025.

## 2025-05-28 ENCOUNTER — NON-PROVIDER VISIT (OUTPATIENT)
Dept: VASCULAR LAB | Facility: MEDICAL CENTER | Age: OVER 89
End: 2025-05-28
Attending: INTERNAL MEDICINE
Payer: MEDICARE

## 2025-05-28 DIAGNOSIS — E78.5 DYSLIPIDEMIA: Primary | ICD-10-CM

## 2025-05-28 DIAGNOSIS — E78.2 MIXED HYPERLIPIDEMIA: ICD-10-CM

## 2025-05-28 PROCEDURE — 99212 OFFICE O/P EST SF 10 MIN: CPT

## 2025-05-28 NOTE — PROGRESS NOTES
[facility]  Date of Referral: 5/29/25    Nini Taylor presents for management of dyslipidemia    Referral Source: [facility] - Dr. Kamara  Date First Seen in Clinic 2/16/2017    PERTINENT HLD PMHX  Age at Initial Diagnosis of Dyslipidemia: Unknown     Baseline Lipids Prior to Treatment:    Latest Reference Range & Units 02/06/17 12:20   Cholesterol,Tot 100 - 199 mg/dL 274 (H)   Triglycerides 0 - 149 mg/dL 101   HDL >=40 mg/dL 58   LDL <100 mg/dL 196 (H)   (H): Data is abnormally high    History of ASCVD: Abdominal Aortic Aneurysm     Previously Attempted Interventions for Lipids - including outcome  Statin: Simvastatin, rosuvastatin, atorvastatin     Outcome: myalgias  Non-Statin: none  Outcome: not applicable    Secondary causes/contributors (report to medical director if present):   Endocrine/Hypothyroidism:  none reported   Liver disease: none reported   Renal disease/nephrotic syndrome:  none reported  Dietary-induced (ketogenic, lean mass hyper-responder)? no  Medications: Beta-blockers and Loop diuretic    CURRENT MED MGMT  Current Lipid Lowering Meds:   Statin: None  Non-Statin: Praluent 75 mg SQ Q14D  Supplements: None  Current adverse drug reactions/side effects? no  Is Adherence an Issue? yes    Any Family History Cardiovascular Disease? yes  Premature CAD in brother & baseline LDL would suggest FH    There were no vitals filed for this visit.     BMI Readings from Last 1 Encounters:   05/14/25 20.30 kg/m²      Wt Readings from Last 3 Encounters:   05/14/25 50.3 kg (111 lb)   03/20/25 49 kg (108 lb 0.4 oz)   02/27/25 49 kg (108 lb)     BP Readings from Last 2 Encounters:   05/14/25 98/60   03/20/25 136/80       DATA REVIEW:  Most Recent Lipid Panel:   Lab Results   Component Value Date/Time    CHOLSTRLTOT 137 05/02/2025 10:17 AM    LDL 59 05/02/2025 10:17 AM    HDL 60 05/02/2025 10:17 AM    TRIGLYCERIDE 92 05/02/2025 10:17 AM     Lab Results   Component Value Date/Time    LDL 59  "05/02/2025 10:17 AM    LDL 55 12/20/2024 01:32 PM    LDL 57 05/23/2024 11:23 AM      Lab Results   Component Value Date/Time    LIPOPROTA 114 (H) 12/03/2018 11:51 AM      Lab Results   Component Value Date/Time    APOB 70 05/02/2025 10:17 AM      No results found for: \"CRPHIGHSEN\"    Other Pertinent Blood Work:   Lab Results   Component Value Date    SODIUM 136 05/02/2025    POTASSIUM 4.5 05/02/2025    CHLORIDE 101 05/02/2025    CO2 28 05/02/2025    ANION 7.0 05/02/2025    GLUCOSE 94 05/02/2025    BUN 13 05/02/2025    CREATININE 1.12 05/02/2025    CALCIUM 9.3 05/02/2025    ASTSGOT 24 05/02/2025    ALTSGPT 11 05/02/2025    ALKPHOSPHAT 89 05/02/2025    TBILIRUBIN 0.9 05/02/2025    ALBUMIN 3.8 05/02/2025    AGRATIO 1.2 05/02/2025    TSHULTRASEN 2.400 10/31/2024       VASCULAR IMAGING:  CAC Score (if available): None  CIMT/Vascular screen (if available): None  Other (if applicable): N/a    ASSESSMENT AND PLAN    Patient Type, check all that apply: Secondary Prevention    Major ASCVD events: AAA - surveillance no longer per Dr. Briceño.    Evidence of genetic dyslipidemia (Familial Hyperlipidemia): Yes - baseline LDL-C >190 mg/dL and premature family hx of ASCVD.    ASCVD risk calculations (if applicable)   N/A    ACC/AHA Indication for Statin Therapy:  Established ASCVD: Indication for High intensity statin     Other Significant Risk Markers:  High-risk conditions: >64yr old , Heterozygous familial hypercholesterolemia, and Hypertension   Risk-enhancers: Famhx of premature ASCVD  Lipoprotein(a): High (>50 mg/dl or <125 nmol/L  Most recent CAC (if available): N/a    Lipid Goals:  Primary: LDL-C <70 mg/dl  Secondary apoB <70 mg/dl  At goals? Yes for LDL-C, borderline for apoB (at 70)    LIFESTYLE INTERVENTIONS  TOBACCO: None  continued complete avoidance of all tobacco products   EtOH: None  Men: No more than two standard servings per day  Women: No more than one standard serving per day  PHYSICAL ACTIVITY: None, " recommended pt increase as tolerated.  NUTRITION: Mediterranean and DASH , avoids dairy products    LIPID-LOWERING MEDICATION MANAGEMENT:     Statin Therapy:   Would not rechallenge at this time due to hx of intolerance - see above.    PCSK9 Inhibitor strategy indications: ASCVD with suboptimal control of LDL-C despite maximally tolerated statin    Non-Statin Meds:   EZETIMIBE: Not currently indicated  NEXLETOL/NEXLIZET (avoid simva >20, prava >40): Not currently indicated  BAS: Not currently indicated    OMEGA-3 FAs: Not currently indicated  FIBRATE:  Not currently indicated  PCSK9 mAb: Continue Praluent 75 mg subQ Q14D  LEQVIO: Not currently indicated    Recommended Supplements: Continue vitamin D     Studies Ordered at Todays Visit: None   Blood Work To Be Obtained Prior to Next Visit: Lipid panel, CMP, and ApoB prior to next appt  Follow-Up: 6 months    Jack Tovar, PharmD     CC:  Dr. Bloch

## 2025-06-10 ENCOUNTER — OFFICE VISIT (OUTPATIENT)
Dept: MEDICAL GROUP | Facility: PHYSICIAN GROUP | Age: OVER 89
End: 2025-06-10
Payer: MEDICARE

## 2025-06-10 VITALS
RESPIRATION RATE: 16 BRPM | OXYGEN SATURATION: 98 % | HEART RATE: 79 BPM | BODY MASS INDEX: 20.65 KG/M2 | DIASTOLIC BLOOD PRESSURE: 62 MMHG | WEIGHT: 112.2 LBS | HEIGHT: 62 IN | SYSTOLIC BLOOD PRESSURE: 102 MMHG | TEMPERATURE: 98.4 F

## 2025-06-10 DIAGNOSIS — H91.93 HEARING PROBLEM OF BOTH EARS: ICD-10-CM

## 2025-06-10 DIAGNOSIS — H54.7 VISION PROBLEM: ICD-10-CM

## 2025-06-10 DIAGNOSIS — I10 PRIMARY HYPERTENSION: Primary | ICD-10-CM

## 2025-06-10 DIAGNOSIS — G47.33 OSA TREATED WITH BIPAP: ICD-10-CM

## 2025-06-10 PROCEDURE — 3078F DIAST BP <80 MM HG: CPT | Performed by: FAMILY MEDICINE

## 2025-06-10 PROCEDURE — 99213 OFFICE O/P EST LOW 20 MIN: CPT | Performed by: FAMILY MEDICINE

## 2025-06-10 PROCEDURE — 3074F SYST BP LT 130 MM HG: CPT | Performed by: FAMILY MEDICINE

## 2025-06-10 RX ORDER — NITROFURANTOIN MACROCRYSTALS 50 MG/1
CAPSULE ORAL
COMMUNITY
Start: 2025-05-15

## 2025-06-10 ASSESSMENT — FIBROSIS 4 INDEX: FIB4 SCORE: 4.41

## 2025-06-10 NOTE — PROGRESS NOTES
"Subjective:     CC:   Chief Complaint   Patient presents with    Follow-Up       HPI:   Nini presents today with  for follow-up.  Patient feels she is doing well she has a number of cardiology appointments coming up.  Patient is also supposed to see sleep clinic coming up in August.  Patient is complaining about her vision especially with it being 70 patient forgets to wear her sunglasses.   states that she does have appointment see their eye provider in about 2 months.  Patient also states that she is having problems with her hearing  states that he is going to take her to see if possibly the batteries to her hearing aids need to be checked.    Past Medical History[1]    Social History[2]    Current Medications and Prescriptions Ordered in Epic[3]    Allergies:  Atorvastatin, Hydrocodone, Levofloxacin, Sulfa drugs, Vytorin, and Pneumococcal vaccines    Health Maintenance: Completed    ROS:  Gen: no fevers/chills, no changes in weight  ENT: no sore throat,  no bloody nose  Pulm: no sob, no cough  CV: no chest pain, no palpitations  GI: no nausea/vomiting, no diarrhea  : no dysuria  Neuro: no headaches, no numbness/tingling  Heme/Lymph: no easy bruising    Objective:     Exam:  /62 (BP Location: Left arm, Patient Position: Sitting, BP Cuff Size: Adult)   Pulse 79   Temp 36.9 °C (98.4 °F)   Resp 16   Ht 1.575 m (5' 2\")   Wt 50.9 kg (112 lb 3.2 oz)   SpO2 98%   BMI 20.52 kg/m²  Body mass index is 20.52 kg/m².    Gen: Alert and oriented, No apparent distress.  Skin: Warm and dry.  No obvious lesions.  Eyes: Sclera wnl Pupils normal in size  ENT: Canals wnl and TM are not red, there is no cerumen in either ears  Lungs: Normal effort, CTA bilaterally, no wheezes, rhonchi, or rales  CV: Regular rate and rhythm. 2/6 systolic murmur heard  Musculoskeletal: Normal gait. No extremity cyanosis, clubbing, or edema.  Neuro: Oriented to person, place and time  Psych: Mood is wnl "       Assessment & Plan:     89 y.o. female with the following -     1. Hearing problem of both ears   does have a plan to have patient's hearing aids looked at to see if the batteries have to be replaced they will let me know if I need to refer her to audiology.    2. Vision problem  Patient should be wearing her sunglasses when going outside since that summer and is very bright.  Plan is for patient to have her eyes rechecked again in a couple of months by her eye provider    3. Primary hypertension  Patient's blood pressure looks great continue present medication patient also has appointments with cardiology coming up in November    4. RUY treated with BiPAP  Patient does have a pulmonary appointment coming up in August    Other orders  - nitrofurantoin (MACRODANTIN) 50 MG Cap       Return in about 2 months (around 8/10/2025), or if symptoms worsen or fail to improve.  Patient was given a printout of appointments that she has through the [employer] system    Please note that this dictation was created using voice recognition software. I have made every reasonable attempt to correct obvious errors, but I expect that there are errors of grammar and possibly content that I did not discover before finalizing the note.       [1]   Past Medical History:  Diagnosis Date    AAA (abdominal aortic aneurysm) (HCC)     Atherosclerosis of native arteries of the extremities with intermittent claudication 10/16/2013    Blepharospasm syndrome     Blepharospasm syndrome      IMO load March 2020    Bright red rectal bleeding 06/24/2015    Cellulitis 05/05/2021    Chest pain 07/2015    Unspecified; Nuclear stress test negative     Constipation     Edema 05/29/2012    Female bladder prolapse, acquired     GERD (gastroesophageal reflux disease)     Hyperlipidemia     Hypertension     Insomnia     Low vitamin D level 04/20/2018    Macular degeneration of both eyes     OSTEOPOROSIS     Palpitations 05/29/2012    Primary insomnia  01/10/2017    Sleep apnea     SVT (supraventricular tachycardia) (HCC) - on Zio 2015 and 2019 08/17/2015    Urinary tract infection     Varicose veins 05/29/2012    Vitamin D deficiency disease     Vitamin D deficiency disease      IMO load March 2020   [2]   Social History  Tobacco Use    Smoking status: Never     Passive exposure: Never    Smokeless tobacco: Never   Vaping Use    Vaping status: Never Used   Substance Use Topics    Alcohol use: Not Currently     Alcohol/week: 0.0 oz     Comment: occasionally    Drug use: No   [3]   Current Outpatient Medications Ordered in Epic   Medication Sig Dispense Refill    nitrofurantoin (MACRODANTIN) 50 MG Cap       Calcium Carb-Cholecalciferol (CALCIUM 600 + D PO) Take 1 Tablet by mouth every day.      estradiol (ESTRACE VAGINAL) 0.1 MG/GM vaginal cream Apply 1g cream inside vagina twice per week 1 Each 6    Alirocumab (PRALUENT) 75 MG/ML Solution Auto-injector Inject 75 mg under the skin every 14 days. 6 mL 3    metoprolol tartrate (LOPRESSOR) 25 MG Tab Take 1 Tablet by mouth 2 times a day. 180 Tablet 2    Cholecalciferol (VITAMIN D3) 2000 UNIT Cap Take 2 capsules by mouth once daily 60 capsule 0     No current Epic-ordered facility-administered medications on file.

## 2025-06-19 ENCOUNTER — DOCUMENTATION (OUTPATIENT)
Dept: VASCULAR LAB | Facility: MEDICAL CENTER | Age: OVER 89
End: 2025-06-19
Payer: MEDICARE

## 2025-06-19 NOTE — PROGRESS NOTES
Pt came to clinic looking for Chandler Brown as well as to get recommendations for new PCP.    She states her  sees Dr. Martinez Pitt. I suggested she see the same provider. I also advised her that Chandler Brown no longer works here.    Naye Kenyon, PharmD

## 2025-07-02 ENCOUNTER — PHARMACY VISIT (OUTPATIENT)
Dept: PHARMACY | Facility: MEDICAL CENTER | Age: OVER 89
End: 2025-07-02
Payer: COMMERCIAL

## 2025-07-02 PROCEDURE — RXMED WILLOW AMBULATORY MEDICATION CHARGE

## 2025-07-08 ENCOUNTER — TELEPHONE (OUTPATIENT)
Dept: VASCULAR LAB | Facility: MEDICAL CENTER | Age: OVER 89
End: 2025-07-08
Payer: MEDICARE

## 2025-07-08 NOTE — TELEPHONE ENCOUNTER
Pt due for surveillance imaging, not yet scheduled. Due for CTA abdomen pelvis in September    Request for MA to call patient and remind them to schedule their surveillance vascular imaging. No mychart available

## 2025-07-08 NOTE — TELEPHONE ENCOUNTER
Lvm for pt to call imaging at [phone] to schedule imaging. Provided office number for any questions/concerns.    Chacorta Frias, Med Ass't  [facility]  P: [phone]  F: [phone]

## 2025-07-11 DIAGNOSIS — I71.02 DISSECTION OF ABDOMINAL AORTA (HCC): ICD-10-CM

## 2025-07-11 DIAGNOSIS — I71.43 INFRARENAL ABDOMINAL AORTIC ANEURYSM (AAA) WITHOUT RUPTURE (HCC): Primary | ICD-10-CM

## 2025-07-12 DIAGNOSIS — I47.10 SVT (SUPRAVENTRICULAR TACHYCARDIA) (HCC): Chronic | ICD-10-CM

## 2025-07-16 RX ORDER — METOPROLOL TARTRATE 25 MG/1
25 TABLET, FILM COATED ORAL 2 TIMES DAILY
Qty: 180 TABLET | Refills: 1 | Status: SHIPPED | OUTPATIENT
Start: 2025-07-16

## 2025-07-16 NOTE — TELEPHONE ENCOUNTER
Is the patient due for a refill? Yes    Was the patient seen the last 15 months? Yes    Date of last office visit: 5/14/25    Does the patient have an upcoming appointment?  Yes   If yes, When? 11/11/25    Provider to refill:CW    Does the patient have retirement Plus and need 100-day supply? (This applies to ALL medications) Patient does not have SCP

## 2025-07-16 NOTE — TELEPHONE ENCOUNTER
"Called and s/w  \"marily\".      Patient is scheduled for 09/19 for CTA abdomen pelvis     Janie Alexander, Med Ass't  [facility]  Ph. [phone]  Fx. [phone]    "

## 2025-08-27 ENCOUNTER — APPOINTMENT (OUTPATIENT)
Dept: SLEEP MEDICINE | Facility: MEDICAL CENTER | Age: OVER 89
End: 2025-08-27
Attending: NURSE PRACTITIONER
Payer: MEDICARE

## (undated) DEVICE — ELECTRODE DUAL RETURN W/ CORD - (50/PK)

## (undated) DEVICE — WIRE GUIDE LUNDQST.035X180 - TSMG-35-180-4-LES ORDER BY BOX (5EA/BX)

## (undated) DEVICE — GOWN SURGEONS X-LARGE - DISP. (30/CA)

## (undated) DEVICE — SET EXTENSION WITH 2 PORTS (48EA/CA) ***PART #2C8610 IS A SUBSTITUTE*****

## (undated) DEVICE — SUTURE 5-0 PROLENE BLUE C-1 HS 1 X 30 (36EA/BX)"

## (undated) DEVICE — GOWN WARMING STANDARD FLEX - (30/CA)

## (undated) DEVICE — SHEATH RO 6F 10CM (10EA/BX)

## (undated) DEVICE — SURGIFOAM (SIZE 100) - (6EA/CA)

## (undated) DEVICE — SENSOR OXIMETER ADULT SPO2 RD SET (20EA/BX)

## (undated) DEVICE — GOWN SURGEONS LARGE - (32/CA)

## (undated) DEVICE — LACTATED RINGERS INJ 1000 ML - (14EA/CA 60CA/PF)

## (undated) DEVICE — GLOVE BIOGEL PI INDICATOR SZ 7.0 SURGICAL PF LF - (50/BX 4BX/CA)

## (undated) DEVICE — SET LEADWIRE 5 LEAD BEDSIDE DISPOSABLE ECG (1SET OF 5/EA)

## (undated) DEVICE — GLOVE BIOGEL SZ 6 PF LATEX - (50EA/BX 4BX/CA)

## (undated) DEVICE — GLOVE BIOGEL SZ 6.5 SURGICAL PF LTX (50PR/BX 4BX/CA)

## (undated) DEVICE — VESSELOOP MAXI BLUE STERILE- SURG-I-LOOP (10EA/BX)

## (undated) DEVICE — TRANSDUCER ADULT DISP. SINGLE BONDED STERILE - (10EA/CA)

## (undated) DEVICE — KIT SURGIFLO W/OUT THROMBIN - (6EA/BX)

## (undated) DEVICE — GUIDEWIRES STARTER (PTFE COATED) J CURVED FIXED CORE 0.035 180CM 10 3 MM J FS

## (undated) DEVICE — CANISTER SUCTION 3000ML MECHANICAL FILTER AUTO SHUTOFF MEDI-VAC NONSTERILE LF DISP (40EA/CA)

## (undated) DEVICE — SUCTION INSTRUMENT YANKAUER BULBOUS TIP W/O VENT (50EA/CA)

## (undated) DEVICE — ADHESIVE MASTISOL - (48/BX)

## (undated) DEVICE — GLIDECATH ANGLE 4F X 70CM (5EA/BX)

## (undated) DEVICE — DRESSING TRANSPARENT FILM TEGADERM 4 X 4.75" (50EA/BX)"

## (undated) DEVICE — BALLOON RELIANT

## (undated) DEVICE — GLIDEWIRE ANGLED .035 X 180 (5EA/BX)

## (undated) DEVICE — TUBING CLEARLINK DUO-VENT - C-FLO (48EA/CA)

## (undated) DEVICE — TOWELS CLOTH SURGICAL - (4/PK 20PK/CA)

## (undated) DEVICE — SUTURE 3-0 SILK 12 X 18 IN - (36/BX)

## (undated) DEVICE — SYRINGE NON SAFETY 10 CC 21 GA X 1-1/2 IN (100/BX 4BX/CA)

## (undated) DEVICE — DECANTER FLD BLS - (50/CA)

## (undated) DEVICE — CATHETER 4FR OMNI FRESH 20CM SIZING

## (undated) DEVICE — SUTURE 4-0 30CM STRATAFIX SPIRAL PS-2 (12EA/BX)

## (undated) DEVICE — SHEATH DRYSEAL FLEX INTRODUCER 16FR X 28CM

## (undated) DEVICE — COVER LIGHT HANDLE ALC PLUS DISP (18EA/BX)

## (undated) DEVICE — SODIUM CHL IRRIGATION 0.9% 1000ML (12EA/CA)

## (undated) DEVICE — CLOSURE SKIN STRIP 1/2 X 4 IN - (STERI STRIP) (50/BX 4BX/CA)

## (undated) DEVICE — PAD LAP STERILE 18 X 18 - (5/PK 40PK/CA)

## (undated) DEVICE — SUTURE 2-0 VICRYL PLUS CT-1 36 (36PK/BX)"

## (undated) DEVICE — GUIDEWIRE HYDROPHILIC COATED ANGLE TIP GLIDEWIRE .038 X 180CM (5EA/BX)"

## (undated) DEVICE — SLEEVE, VASO, THIGH, MED

## (undated) DEVICE — SUTURE GENERAL

## (undated) DEVICE — PACK AV FISTULA (2EA/CA)

## (undated) DEVICE — SHEET CARDIOVASCULAR - (4/CA)

## (undated) DEVICE — CHLORAPREP 26 ML APPLICATOR - ORANGE TINT(25/CA)

## (undated) DEVICE — SPONGE GAUZESTER 4 X 4 4PLY - (128PK/CA)

## (undated) DEVICE — SUTURE CV

## (undated) DEVICE — SYRINGE STERILE 10 ML LL (200/BX)

## (undated) DEVICE — NEEDLE THINWALL 19GA X 7CM

## (undated) DEVICE — SODIUM CHL. INJ. 0.9% 500ML (24EA/CA 50CA/PF)

## (undated) DEVICE — GLOVE BIOGEL SZ 8 SURGICAL PF LTX - (50PR/BX 4BX/CA)

## (undated) DEVICE — SHEATH DRYSEAL FLEX INTRODUCER 12FR